# Patient Record
Sex: FEMALE | Race: WHITE | Employment: OTHER | URBAN - METROPOLITAN AREA
[De-identification: names, ages, dates, MRNs, and addresses within clinical notes are randomized per-mention and may not be internally consistent; named-entity substitution may affect disease eponyms.]

---

## 2017-03-01 ENCOUNTER — TELEPHONE (OUTPATIENT)
Dept: ADMINISTRATIVE | Age: 67
End: 2017-03-01

## 2017-03-22 ENCOUNTER — OFFICE VISIT (OUTPATIENT)
Dept: FAMILY MEDICINE CLINIC | Age: 67
End: 2017-03-22

## 2017-03-22 VITALS
SYSTOLIC BLOOD PRESSURE: 120 MMHG | BODY MASS INDEX: 26.66 KG/M2 | OXYGEN SATURATION: 98 % | HEIGHT: 65 IN | HEART RATE: 74 BPM | WEIGHT: 160 LBS | DIASTOLIC BLOOD PRESSURE: 64 MMHG

## 2017-03-22 DIAGNOSIS — F32.A DEPRESSION, UNSPECIFIED DEPRESSION TYPE: ICD-10-CM

## 2017-03-22 DIAGNOSIS — K21.9 GASTROESOPHAGEAL REFLUX DISEASE WITHOUT ESOPHAGITIS: ICD-10-CM

## 2017-03-22 DIAGNOSIS — Z11.59 NEED FOR HEPATITIS C SCREENING TEST: ICD-10-CM

## 2017-03-22 DIAGNOSIS — Z13.29 THYROID DISORDER SCREEN: ICD-10-CM

## 2017-03-22 DIAGNOSIS — F41.9 ANXIETY: Primary | ICD-10-CM

## 2017-03-22 DIAGNOSIS — E78.2 MIXED HYPERLIPIDEMIA: ICD-10-CM

## 2017-03-22 PROCEDURE — 3014F SCREEN MAMMO DOC REV: CPT | Performed by: FAMILY MEDICINE

## 2017-03-22 PROCEDURE — 99204 OFFICE O/P NEW MOD 45 MIN: CPT | Performed by: FAMILY MEDICINE

## 2017-03-22 PROCEDURE — 4040F PNEUMOC VAC/ADMIN/RCVD: CPT | Performed by: FAMILY MEDICINE

## 2017-03-22 PROCEDURE — 1090F PRES/ABSN URINE INCON ASSESS: CPT | Performed by: FAMILY MEDICINE

## 2017-03-22 PROCEDURE — G8400 PT W/DXA NO RESULTS DOC: HCPCS | Performed by: FAMILY MEDICINE

## 2017-03-22 PROCEDURE — G8484 FLU IMMUNIZE NO ADMIN: HCPCS | Performed by: FAMILY MEDICINE

## 2017-03-22 PROCEDURE — 1036F TOBACCO NON-USER: CPT | Performed by: FAMILY MEDICINE

## 2017-03-22 PROCEDURE — 1123F ACP DISCUSS/DSCN MKR DOCD: CPT | Performed by: FAMILY MEDICINE

## 2017-03-22 PROCEDURE — 3017F COLORECTAL CA SCREEN DOC REV: CPT | Performed by: FAMILY MEDICINE

## 2017-03-22 PROCEDURE — G8420 CALC BMI NORM PARAMETERS: HCPCS | Performed by: FAMILY MEDICINE

## 2017-03-22 PROCEDURE — G8427 DOCREV CUR MEDS BY ELIG CLIN: HCPCS | Performed by: FAMILY MEDICINE

## 2017-03-22 RX ORDER — FLUOXETINE HYDROCHLORIDE 20 MG/1
20 CAPSULE ORAL 2 TIMES DAILY
COMMUNITY
End: 2017-03-22 | Stop reason: SDUPTHER

## 2017-03-22 RX ORDER — ALPRAZOLAM 0.5 MG/1
TABLET ORAL
Qty: 15 TABLET | Refills: 0 | Status: SHIPPED | OUTPATIENT
Start: 2017-03-22 | End: 2017-05-11 | Stop reason: HOSPADM

## 2017-03-22 RX ORDER — OMEPRAZOLE 20 MG/1
20 CAPSULE, DELAYED RELEASE ORAL DAILY
Qty: 90 CAPSULE | Refills: 0 | Status: SHIPPED | OUTPATIENT
Start: 2017-03-22 | End: 2017-06-18 | Stop reason: SDUPTHER

## 2017-03-22 RX ORDER — FLUOXETINE HYDROCHLORIDE 20 MG/1
20 CAPSULE ORAL 2 TIMES DAILY
Qty: 180 CAPSULE | Refills: 0 | Status: SHIPPED | OUTPATIENT
Start: 2017-03-22 | End: 2017-06-18 | Stop reason: SDUPTHER

## 2017-03-22 ASSESSMENT — ENCOUNTER SYMPTOMS
RESPIRATORY NEGATIVE: 1
GASTROINTESTINAL NEGATIVE: 1

## 2017-03-23 DIAGNOSIS — E78.2 MIXED HYPERLIPIDEMIA: ICD-10-CM

## 2017-03-23 DIAGNOSIS — Z11.59 NEED FOR HEPATITIS C SCREENING TEST: ICD-10-CM

## 2017-03-23 DIAGNOSIS — Z13.29 THYROID DISORDER SCREEN: ICD-10-CM

## 2017-03-23 LAB
A/G RATIO: 1.7 (ref 1.1–2.2)
ALBUMIN SERPL-MCNC: 4 G/DL (ref 3.4–5)
ALP BLD-CCNC: 88 U/L (ref 40–129)
ALT SERPL-CCNC: 14 U/L (ref 10–40)
ANION GAP SERPL CALCULATED.3IONS-SCNC: 13 MMOL/L (ref 3–16)
AST SERPL-CCNC: 16 U/L (ref 15–37)
BASOPHILS ABSOLUTE: 0 K/UL (ref 0–0.2)
BASOPHILS RELATIVE PERCENT: 0.3 %
BILIRUB SERPL-MCNC: 0.4 MG/DL (ref 0–1)
BUN BLDV-MCNC: 14 MG/DL (ref 7–20)
CALCIUM SERPL-MCNC: 9.7 MG/DL (ref 8.3–10.6)
CHLORIDE BLD-SCNC: 103 MMOL/L (ref 99–110)
CHOLESTEROL, TOTAL: 247 MG/DL (ref 0–199)
CO2: 27 MMOL/L (ref 21–32)
CREAT SERPL-MCNC: 0.6 MG/DL (ref 0.6–1.2)
EOSINOPHILS ABSOLUTE: 0.1 K/UL (ref 0–0.6)
EOSINOPHILS RELATIVE PERCENT: 1.5 %
GFR AFRICAN AMERICAN: >60
GFR NON-AFRICAN AMERICAN: >60
GLOBULIN: 2.4 G/DL
GLUCOSE BLD-MCNC: 86 MG/DL (ref 70–99)
HCT VFR BLD CALC: 45.8 % (ref 36–48)
HDLC SERPL-MCNC: 51 MG/DL (ref 40–60)
HEMOGLOBIN: 14.8 G/DL (ref 12–16)
HEPATITIS C ANTIBODY INTERPRETATION: NORMAL
LDL CHOLESTEROL CALCULATED: 165 MG/DL
LYMPHOCYTES ABSOLUTE: 2.5 K/UL (ref 1–5.1)
LYMPHOCYTES RELATIVE PERCENT: 42.7 %
MCH RBC QN AUTO: 27.8 PG (ref 26–34)
MCHC RBC AUTO-ENTMCNC: 32.3 G/DL (ref 31–36)
MCV RBC AUTO: 85.9 FL (ref 80–100)
MONOCYTES ABSOLUTE: 0.3 K/UL (ref 0–1.3)
MONOCYTES RELATIVE PERCENT: 5.7 %
NEUTROPHILS ABSOLUTE: 2.9 K/UL (ref 1.7–7.7)
NEUTROPHILS RELATIVE PERCENT: 49.8 %
PDW BLD-RTO: 13.4 % (ref 12.4–15.4)
PLATELET # BLD: 202 K/UL (ref 135–450)
PMV BLD AUTO: 9.8 FL (ref 5–10.5)
POTASSIUM SERPL-SCNC: 4.5 MMOL/L (ref 3.5–5.1)
RBC # BLD: 5.33 M/UL (ref 4–5.2)
SODIUM BLD-SCNC: 143 MMOL/L (ref 136–145)
TOTAL PROTEIN: 6.4 G/DL (ref 6.4–8.2)
TRIGL SERPL-MCNC: 157 MG/DL (ref 0–150)
TSH SERPL DL<=0.05 MIU/L-ACNC: 1.45 UIU/ML (ref 0.27–4.2)
VLDLC SERPL CALC-MCNC: 31 MG/DL
WBC # BLD: 5.8 K/UL (ref 4–11)

## 2017-03-26 LAB
6-ACETYLMORPHINE: NOT DETECTED
7-AMINOCLONAZEPAM: NOT DETECTED
ALPHA-OH-ALPRAZOLAM: NOT DETECTED
ALPRAZOLAM: NOT DETECTED
AMPHETAMINE: NOT DETECTED
BARBITURATES: NOT DETECTED
BENZOYLECGONINE: NOT DETECTED
BUPRENORPHINE: NOT DETECTED
CARISOPRODOL: NOT DETECTED
CLONAZEPAM: NOT DETECTED
CODEINE: NOT DETECTED
CREATININE URINE: 42 MG/DL (ref 20–400)
DIAZEPAM: NOT DETECTED
DRUGS EXPECTED: NORMAL
EER PAIN MGT DRUG PANEL, HIGH RES/EMIT U: NORMAL
ETHYL GLUCURONIDE: NOT DETECTED
FENTANYL: NOT DETECTED
HYDROCODONE: NOT DETECTED
HYDROMORPHONE: NOT DETECTED
LORAZEPAM: NOT DETECTED
MARIJUANA METABOLITE: NOT DETECTED
MDA: NOT DETECTED
MDEA: NOT DETECTED
MDMA URINE: NOT DETECTED
MEPERIDINE: NOT DETECTED
METHADONE: NOT DETECTED
METHAMPHETAMINE: NOT DETECTED
METHYLPHENIDATE: NOT DETECTED
MIDAZOLAM: NOT DETECTED
MORPHINE: NOT DETECTED
NORBUPRENORPHINE, FREE: NOT DETECTED
NORDIAZEPAM: NOT DETECTED
NORFENTANYL: NOT DETECTED
NORHYDROCODONE, URINE: NOT DETECTED
NOROXYCODONE: NOT DETECTED
NOROXYMORPHONE, URINE: NOT DETECTED
OXAZEPAM: NOT DETECTED
OXYCODONE: NOT DETECTED
OXYMORPHONE: NOT DETECTED
PAIN MANAGEMENT DRUG PANEL: NORMAL
PAIN MANAGEMENT DRUG PANEL: NORMAL
PCP: NOT DETECTED
PHENTERMINE: NOT DETECTED
PROPOXYPHENE: NOT DETECTED
TAPENTADOL, URINE: NOT DETECTED
TAPENTADOL-O-SULFATE, URINE: NOT DETECTED
TEMAZEPAM: NOT DETECTED
TRAMADOL: NOT DETECTED
ZOLPIDEM: NOT DETECTED

## 2017-04-07 ENCOUNTER — TELEPHONE (OUTPATIENT)
Dept: FAMILY MEDICINE CLINIC | Age: 67
End: 2017-04-07

## 2017-04-07 RX ORDER — ATORVASTATIN CALCIUM 20 MG/1
20 TABLET, FILM COATED ORAL DAILY
Qty: 90 TABLET | Refills: 0 | Status: SHIPPED | OUTPATIENT
Start: 2017-04-07 | End: 2017-06-29 | Stop reason: SDUPTHER

## 2017-05-02 ENCOUNTER — OFFICE VISIT (OUTPATIENT)
Dept: PSYCHOLOGY | Age: 67
End: 2017-05-02

## 2017-05-02 DIAGNOSIS — F41.9 ANXIETY: ICD-10-CM

## 2017-05-02 DIAGNOSIS — F63.0 PATHOLOGICAL GAMBLING: ICD-10-CM

## 2017-05-02 DIAGNOSIS — F32.A DEPRESSION, UNSPECIFIED DEPRESSION TYPE: Primary | ICD-10-CM

## 2017-05-02 PROCEDURE — 90791 PSYCH DIAGNOSTIC EVALUATION: CPT | Performed by: PSYCHOLOGIST

## 2017-05-02 ASSESSMENT — PATIENT HEALTH QUESTIONNAIRE - PHQ9
1. LITTLE INTEREST OR PLEASURE IN DOING THINGS: 3
7. TROUBLE CONCENTRATING ON THINGS, SUCH AS READING THE NEWSPAPER OR WATCHING TELEVISION: 3
9. THOUGHTS THAT YOU WOULD BE BETTER OFF DEAD, OR OF HURTING YOURSELF: 0
5. POOR APPETITE OR OVEREATING: 0
6. FEELING BAD ABOUT YOURSELF - OR THAT YOU ARE A FAILURE OR HAVE LET YOURSELF OR YOUR FAMILY DOWN: 1
SUM OF ALL RESPONSES TO PHQ QUESTIONS 1-9: 17
2. FEELING DOWN, DEPRESSED OR HOPELESS: 3
4. FEELING TIRED OR HAVING LITTLE ENERGY: 1
SUM OF ALL RESPONSES TO PHQ9 QUESTIONS 1 & 2: 6
3. TROUBLE FALLING OR STAYING ASLEEP: 3
8. MOVING OR SPEAKING SO SLOWLY THAT OTHER PEOPLE COULD HAVE NOTICED. OR THE OPPOSITE, BEING SO FIGETY OR RESTLESS THAT YOU HAVE BEEN MOVING AROUND A LOT MORE THAN USUAL: 3

## 2017-05-08 ENCOUNTER — TELEPHONE (OUTPATIENT)
Dept: FAMILY MEDICINE CLINIC | Age: 67
End: 2017-05-08

## 2017-05-09 ENCOUNTER — HOSPITAL ENCOUNTER (OUTPATIENT)
Dept: PSYCHIATRY | Age: 67
Discharge: HOME | End: 2017-05-31
Attending: PSYCHIATRY & NEUROLOGY | Admitting: PSYCHIATRY & NEUROLOGY

## 2017-05-09 ASSESSMENT — SLEEP AND FATIGUE QUESTIONNAIRES
DIFFICULTY ARISING: NO
DIFFICULTY FALLING ASLEEP: YES
DO YOU HAVE DIFFICULTY SLEEPING: YES
DIFFICULTY STAYING ASLEEP: YES
RESTFUL SLEEP: NO
SLEEP PATTERN: DIFFICULTY FALLING ASLEEP;DISTURBED/INTERRUPTED SLEEP;EARLY AWAKENING
AVERAGE NUMBER OF SLEEP HOURS: 4

## 2017-05-09 ASSESSMENT — PATIENT HEALTH QUESTIONNAIRE - PHQ9: SUM OF ALL RESPONSES TO PHQ QUESTIONS 1-9: 24

## 2017-05-09 ASSESSMENT — LIFESTYLE VARIABLES: HISTORY_ALCOHOL_USE: NO

## 2017-05-11 ENCOUNTER — HOSPITAL ENCOUNTER (OUTPATIENT)
Dept: PSYCHIATRY | Age: 67
Discharge: HOME | End: 2017-05-11
Admitting: PSYCHIATRY & NEUROLOGY

## 2017-05-11 PROCEDURE — 99214 OFFICE O/P EST MOD 30 MIN: CPT | Performed by: PHYSICIAN ASSISTANT

## 2017-05-12 RX ORDER — LORAZEPAM 0.5 MG/1
TABLET ORAL
Qty: 15 TABLET | Refills: 0 | Status: SHIPPED | OUTPATIENT
Start: 2017-05-12 | End: 2017-07-07 | Stop reason: SDUPTHER

## 2017-06-19 RX ORDER — FLUOXETINE HYDROCHLORIDE 20 MG/1
CAPSULE ORAL
Qty: 180 CAPSULE | Refills: 0 | Status: SHIPPED | OUTPATIENT
Start: 2017-06-19 | End: 2017-08-09

## 2017-06-19 RX ORDER — OMEPRAZOLE 20 MG/1
CAPSULE, DELAYED RELEASE ORAL
Qty: 90 CAPSULE | Refills: 0 | Status: SHIPPED | OUTPATIENT
Start: 2017-06-19 | End: 2018-04-08 | Stop reason: SDUPTHER

## 2017-06-22 ENCOUNTER — TELEPHONE (OUTPATIENT)
Dept: FAMILY MEDICINE CLINIC | Age: 67
End: 2017-06-22

## 2017-06-22 DIAGNOSIS — E78.2 MIXED HYPERLIPIDEMIA: Primary | ICD-10-CM

## 2017-06-26 DIAGNOSIS — E78.2 MIXED HYPERLIPIDEMIA: ICD-10-CM

## 2017-06-26 LAB
ALBUMIN SERPL-MCNC: 4.5 G/DL (ref 3.4–5)
ALP BLD-CCNC: 100 U/L (ref 40–129)
ALT SERPL-CCNC: 19 U/L (ref 10–40)
AST SERPL-CCNC: 18 U/L (ref 15–37)
BILIRUB SERPL-MCNC: 0.7 MG/DL (ref 0–1)
BILIRUBIN DIRECT: <0.2 MG/DL (ref 0–0.3)
BILIRUBIN, INDIRECT: NORMAL MG/DL (ref 0–1)
CHOLESTEROL, TOTAL: 150 MG/DL (ref 0–199)
HDLC SERPL-MCNC: 59 MG/DL (ref 40–60)
LDL CHOLESTEROL CALCULATED: 73 MG/DL
TOTAL PROTEIN: 6.8 G/DL (ref 6.4–8.2)
TRIGL SERPL-MCNC: 90 MG/DL (ref 0–150)
VLDLC SERPL CALC-MCNC: 18 MG/DL

## 2017-06-29 ENCOUNTER — OFFICE VISIT (OUTPATIENT)
Dept: FAMILY MEDICINE CLINIC | Age: 67
End: 2017-06-29

## 2017-06-29 VITALS
OXYGEN SATURATION: 97 % | HEIGHT: 65 IN | BODY MASS INDEX: 26.82 KG/M2 | WEIGHT: 161 LBS | DIASTOLIC BLOOD PRESSURE: 74 MMHG | SYSTOLIC BLOOD PRESSURE: 120 MMHG | HEART RATE: 84 BPM

## 2017-06-29 DIAGNOSIS — E78.2 MIXED HYPERLIPIDEMIA: ICD-10-CM

## 2017-06-29 DIAGNOSIS — F41.9 ANXIETY: ICD-10-CM

## 2017-06-29 DIAGNOSIS — F32.A DEPRESSION, UNSPECIFIED DEPRESSION TYPE: Primary | ICD-10-CM

## 2017-06-29 PROCEDURE — 3017F COLORECTAL CA SCREEN DOC REV: CPT | Performed by: FAMILY MEDICINE

## 2017-06-29 PROCEDURE — 1090F PRES/ABSN URINE INCON ASSESS: CPT | Performed by: FAMILY MEDICINE

## 2017-06-29 PROCEDURE — 1036F TOBACCO NON-USER: CPT | Performed by: FAMILY MEDICINE

## 2017-06-29 PROCEDURE — 99214 OFFICE O/P EST MOD 30 MIN: CPT | Performed by: FAMILY MEDICINE

## 2017-06-29 PROCEDURE — G8427 DOCREV CUR MEDS BY ELIG CLIN: HCPCS | Performed by: FAMILY MEDICINE

## 2017-06-29 PROCEDURE — G8400 PT W/DXA NO RESULTS DOC: HCPCS | Performed by: FAMILY MEDICINE

## 2017-06-29 PROCEDURE — 4040F PNEUMOC VAC/ADMIN/RCVD: CPT | Performed by: FAMILY MEDICINE

## 2017-06-29 PROCEDURE — 3014F SCREEN MAMMO DOC REV: CPT | Performed by: FAMILY MEDICINE

## 2017-06-29 PROCEDURE — 1123F ACP DISCUSS/DSCN MKR DOCD: CPT | Performed by: FAMILY MEDICINE

## 2017-06-29 PROCEDURE — G8419 CALC BMI OUT NRM PARAM NOF/U: HCPCS | Performed by: FAMILY MEDICINE

## 2017-06-29 RX ORDER — ATORVASTATIN CALCIUM 20 MG/1
20 TABLET, FILM COATED ORAL DAILY
Qty: 90 TABLET | Refills: 1 | Status: SHIPPED | OUTPATIENT
Start: 2017-06-29 | End: 2017-12-20 | Stop reason: SDUPTHER

## 2017-06-29 ASSESSMENT — ENCOUNTER SYMPTOMS
RESPIRATORY NEGATIVE: 1
GASTROINTESTINAL NEGATIVE: 1

## 2017-07-07 RX ORDER — ALPRAZOLAM 0.5 MG/1
TABLET ORAL
Qty: 15 TABLET | Refills: 0 | OUTPATIENT
Start: 2017-07-07

## 2017-07-07 RX ORDER — LORAZEPAM 0.5 MG/1
TABLET ORAL
Qty: 15 TABLET | Refills: 0 | Status: SHIPPED | OUTPATIENT
Start: 2017-07-07 | End: 2017-10-19

## 2017-08-09 ENCOUNTER — TELEPHONE (OUTPATIENT)
Dept: FAMILY MEDICINE CLINIC | Age: 67
End: 2017-08-09

## 2017-08-09 RX ORDER — SERTRALINE HYDROCHLORIDE 25 MG/1
TABLET, FILM COATED ORAL
Qty: 60 TABLET | Refills: 0 | Status: SHIPPED | OUTPATIENT
Start: 2017-08-09 | End: 2017-08-28

## 2017-08-28 ENCOUNTER — OFFICE VISIT (OUTPATIENT)
Dept: FAMILY MEDICINE CLINIC | Age: 67
End: 2017-08-28

## 2017-08-28 VITALS
HEIGHT: 65 IN | OXYGEN SATURATION: 98 % | BODY MASS INDEX: 27.32 KG/M2 | SYSTOLIC BLOOD PRESSURE: 118 MMHG | DIASTOLIC BLOOD PRESSURE: 72 MMHG | HEART RATE: 72 BPM | WEIGHT: 164 LBS

## 2017-08-28 DIAGNOSIS — F41.9 ANXIETY: ICD-10-CM

## 2017-08-28 DIAGNOSIS — F63.0 ADDICTIVE GAMBLING: Primary | ICD-10-CM

## 2017-08-28 PROCEDURE — 3017F COLORECTAL CA SCREEN DOC REV: CPT | Performed by: FAMILY MEDICINE

## 2017-08-28 PROCEDURE — 4040F PNEUMOC VAC/ADMIN/RCVD: CPT | Performed by: FAMILY MEDICINE

## 2017-08-28 PROCEDURE — 1036F TOBACCO NON-USER: CPT | Performed by: FAMILY MEDICINE

## 2017-08-28 PROCEDURE — 1123F ACP DISCUSS/DSCN MKR DOCD: CPT | Performed by: FAMILY MEDICINE

## 2017-08-28 PROCEDURE — 3014F SCREEN MAMMO DOC REV: CPT | Performed by: FAMILY MEDICINE

## 2017-08-28 PROCEDURE — 99213 OFFICE O/P EST LOW 20 MIN: CPT | Performed by: FAMILY MEDICINE

## 2017-08-28 PROCEDURE — G8400 PT W/DXA NO RESULTS DOC: HCPCS | Performed by: FAMILY MEDICINE

## 2017-08-28 PROCEDURE — G8427 DOCREV CUR MEDS BY ELIG CLIN: HCPCS | Performed by: FAMILY MEDICINE

## 2017-08-28 PROCEDURE — 1090F PRES/ABSN URINE INCON ASSESS: CPT | Performed by: FAMILY MEDICINE

## 2017-08-28 PROCEDURE — G8419 CALC BMI OUT NRM PARAM NOF/U: HCPCS | Performed by: FAMILY MEDICINE

## 2017-08-28 ASSESSMENT — ENCOUNTER SYMPTOMS: RESPIRATORY NEGATIVE: 1

## 2017-09-15 RX ORDER — SERTRALINE HYDROCHLORIDE 25 MG/1
50 TABLET, FILM COATED ORAL DAILY
Qty: 60 TABLET | Refills: 1 | Status: SHIPPED | OUTPATIENT
Start: 2017-09-15 | End: 2017-10-19 | Stop reason: DRUGHIGH

## 2017-09-22 ENCOUNTER — NURSE ONLY (OUTPATIENT)
Dept: FAMILY MEDICINE CLINIC | Age: 67
End: 2017-09-22

## 2017-09-22 PROCEDURE — 90662 IIV NO PRSV INCREASED AG IM: CPT | Performed by: FAMILY MEDICINE

## 2017-09-22 PROCEDURE — G0008 ADMIN INFLUENZA VIRUS VAC: HCPCS | Performed by: FAMILY MEDICINE

## 2017-09-22 RX ORDER — ALPRAZOLAM 0.5 MG/1
TABLET ORAL
Qty: 15 TABLET | Refills: 0 | Status: SHIPPED | OUTPATIENT
Start: 2017-09-22 | End: 2018-09-12 | Stop reason: SDUPTHER

## 2017-10-19 ENCOUNTER — HOSPITAL ENCOUNTER (OUTPATIENT)
Dept: OTHER | Age: 67
Discharge: HOME | End: 2017-10-19
Attending: NURSE PRACTITIONER | Admitting: NURSE PRACTITIONER

## 2017-10-19 ENCOUNTER — OFFICE VISIT (OUTPATIENT)
Dept: FAMILY MEDICINE CLINIC | Age: 67
End: 2017-10-19

## 2017-10-19 VITALS
DIASTOLIC BLOOD PRESSURE: 76 MMHG | SYSTOLIC BLOOD PRESSURE: 124 MMHG | BODY MASS INDEX: 27.62 KG/M2 | HEART RATE: 68 BPM | WEIGHT: 166 LBS | TEMPERATURE: 97.8 F | OXYGEN SATURATION: 96 %

## 2017-10-19 DIAGNOSIS — M54.2 NECK PAIN: ICD-10-CM

## 2017-10-19 DIAGNOSIS — Z12.39 SCREENING FOR BREAST CANCER: ICD-10-CM
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
## 2017-10-19 PROCEDURE — 3017F COLORECTAL CA SCREEN DOC REV: CPT | Performed by: NURSE PRACTITIONER

## 2017-10-19 PROCEDURE — G8427 DOCREV CUR MEDS BY ELIG CLIN: HCPCS | Performed by: NURSE PRACTITIONER

## 2017-10-19 PROCEDURE — G8417 CALC BMI ABV UP PARAM F/U: HCPCS | Performed by: NURSE PRACTITIONER

## 2017-10-19 PROCEDURE — G8400 PT W/DXA NO RESULTS DOC: HCPCS | Performed by: NURSE PRACTITIONER

## 2017-10-19 PROCEDURE — 4040F PNEUMOC VAC/ADMIN/RCVD: CPT | Performed by: NURSE PRACTITIONER

## 2017-10-19 PROCEDURE — 1036F TOBACCO NON-USER: CPT | Performed by: NURSE PRACTITIONER

## 2017-10-19 PROCEDURE — 1123F ACP DISCUSS/DSCN MKR DOCD: CPT | Performed by: NURSE PRACTITIONER

## 2017-10-19 PROCEDURE — 99213 OFFICE O/P EST LOW 20 MIN: CPT | Performed by: NURSE PRACTITIONER

## 2017-10-19 PROCEDURE — 1090F PRES/ABSN URINE INCON ASSESS: CPT | Performed by: NURSE PRACTITIONER

## 2017-10-19 PROCEDURE — 3014F SCREEN MAMMO DOC REV: CPT | Performed by: NURSE PRACTITIONER

## 2017-10-19 PROCEDURE — G8484 FLU IMMUNIZE NO ADMIN: HCPCS | Performed by: NURSE PRACTITIONER

## 2017-10-19 RX ORDER — METHYLPREDNISOLONE 4 MG/1
TABLET ORAL
Qty: 21 TABLET | Refills: 0 | Status: SHIPPED | OUTPATIENT
Start: 2017-10-19 | End: 2017-10-25

## 2017-10-19 RX ORDER — SERTRALINE HYDROCHLORIDE 100 MG/1
100 TABLET, FILM COATED ORAL DAILY
COMMUNITY
End: 2018-09-27 | Stop reason: SDUPTHER

## 2017-10-19 NOTE — PATIENT INSTRUCTIONS
Patient Education        Neck Strain or Sprain: Rehab Exercises  Your Care Instructions  Here are some examples of typical rehabilitation exercises for your condition. Start each exercise slowly. Ease off the exercise if you start to have pain. Your doctor or physical therapist will tell you when you can start these exercises and which ones will work best for you. How to do the exercises  Neck rotation    1. Sit in a firm chair, or stand up straight. 2. Keeping your chin level, turn your head to the right, and hold for 15 to 30 seconds. 3. Turn your head to the left and hold for 15 to 30 seconds. 4. Repeat 2 to 4 times to each side. Neck stretches    1. Look straight ahead, and tip your right ear to your right shoulder. Do not let your left shoulder rise up as you tip your head to the right. 2. Hold for 15 to 30 seconds. 3. Tilt your head to the left. Do not let your right shoulder rise up as you tip your head to the left. 4. Hold for 15 to 30 seconds. 5. Repeat 2 to 4 times to each side. Forward neck flexion    1. Sit in a firm chair, or stand up straight. 2. Bend your head forward. 3. Hold for 15 to 30 seconds. 4. Repeat 2 to 4 times. Lateral (side) bend strengthening    1. With your right hand, place your first two fingers on your right temple. 2. Start to bend your head to the side while using gentle pressure from your fingers to keep your head from bending. 3. Hold for about 6 seconds. 4. Repeat 8 to 12 times. 5. Switch hands and repeat the same exercise on your left side. Forward bend strengthening    1. Place your first two fingers of either hand on your forehead. 2. Start to bend your head forward while using gentle pressure from your fingers to keep your head from bending. 3. Hold for about 6 seconds. 4. Repeat 8 to 12 times. Neutral position strengthening    1. Using one hand, place your fingertips on the back of your head at the top of your neck.   2. Start to bend your head backward while using gentle pressure from your fingers to keep your head from bending. 3. Hold for about 6 seconds. 4. Repeat 8 to 12 times. Chin tuck    1. Lie on the floor with a rolled-up towel under your neck. Your head should be touching the floor. 2. Slowly bring your chin toward your chest.  3. Hold for a count of 6, and then relax for up to 10 seconds. 4. Repeat 8 to 12 times. Follow-up care is a key part of your treatment and safety. Be sure to make and go to all appointments, and call your doctor if you are having problems. It's also a good idea to know your test results and keep a list of the medicines you take. Where can you learn more? Go to https://Guanxi.me.Q-Layer. org and sign in to your GoPago account. Enter M679 in the Profex box to learn more about \"Neck Strain or Sprain: Rehab Exercises. \"     If you do not have an account, please click on the \"Sign Up Now\" link. Current as of: March 21, 2017  Content Version: 11.3  © 7153-3904 [employer]. Care instructions adapted under license by [facility] ([facility]). If you have questions about a medical condition or this instruction, always ask your healthcare professional. [address] any warranty or liability for your use of this information.

## 2017-10-19 NOTE — PROGRESS NOTES
°F (36.6 °C)   TempSrc: Oral   SpO2: 96%   Weight: 166 lb (75.3 kg)     Assessment:      1. Neck pain    2. Fall, initial encounter    3. Screening for breast cancer          Plan:      Saima Smalls was seen today for neck pain. Diagnoses and all orders for this visit:    Neck pain  -     XR CERVICAL SPINE (2-3 VIEWS); Future  -     methylPREDNISolone (MEDROL DOSEPACK) 4 MG tablet; Take by mouth. Consider PT if symptoms fail to resolve. Fall, initial encounter  -     XR CERVICAL SPINE (2-3 VIEWS); Future    Screening for breast cancer  -     RUBEN DIGITAL SCREEN W OR WO CAD BILATERAL;  Future

## 2017-10-22 ASSESSMENT — ENCOUNTER SYMPTOMS: COLOR CHANGE: 0

## 2017-11-09 ENCOUNTER — HOSPITAL ENCOUNTER (OUTPATIENT)
Dept: MAMMOGRAPHY | Age: 67
Discharge: HOME | End: 2017-11-09
Attending: FAMILY MEDICINE | Admitting: FAMILY MEDICINE

## 2017-11-09 DIAGNOSIS — Z12.39 SCREENING FOR BREAST CANCER: ICD-10-CM

## 2017-11-13 ENCOUNTER — TELEPHONE (OUTPATIENT)
Dept: FAMILY MEDICINE CLINIC | Age: 67
End: 2017-11-13

## 2017-11-13 RX ORDER — AMOXICILLIN 500 MG/1
500 CAPSULE ORAL 3 TIMES DAILY
Qty: 30 CAPSULE | Refills: 0 | Status: SHIPPED | OUTPATIENT
Start: 2017-11-13 | End: 2017-11-23

## 2017-11-14 ENCOUNTER — TELEPHONE (OUTPATIENT)
Dept: FAMILY MEDICINE CLINIC | Age: 67
End: 2017-11-14

## 2017-11-14 DIAGNOSIS — E04.2 MULTIPLE THYROID NODULES: Primary | ICD-10-CM

## 2017-11-21 ENCOUNTER — HOSPITAL ENCOUNTER (OUTPATIENT)
Dept: ULTRASOUND IMAGING | Age: 67
Discharge: HOME | End: 2017-11-21
Attending: FAMILY MEDICINE | Admitting: FAMILY MEDICINE

## 2017-11-21 DIAGNOSIS — E04.2 NONTOXIC MULTINODULAR GOITER: ICD-10-CM

## 2017-11-21 DIAGNOSIS — E04.2 MULTIPLE THYROID NODULES: ICD-10-CM

## 2018-02-22 ENCOUNTER — TELEPHONE (OUTPATIENT)
Dept: FAMILY MEDICINE CLINIC | Age: 68
End: 2018-02-22

## 2018-02-26 ENCOUNTER — TELEPHONE (OUTPATIENT)
Dept: FAMILY MEDICINE CLINIC | Age: 68
End: 2018-02-26

## 2018-02-26 DIAGNOSIS — E78.2 MIXED HYPERLIPIDEMIA: Primary | ICD-10-CM

## 2018-02-28 DIAGNOSIS — E78.2 MIXED HYPERLIPIDEMIA: ICD-10-CM

## 2018-02-28 LAB
A/G RATIO: 2.3 (ref 1.1–2.2)
ALBUMIN SERPL-MCNC: 4.6 G/DL (ref 3.4–5)
ALP BLD-CCNC: 100 U/L (ref 40–129)
ALT SERPL-CCNC: 18 U/L (ref 10–40)
ANION GAP SERPL CALCULATED.3IONS-SCNC: 15 MMOL/L (ref 3–16)
AST SERPL-CCNC: 17 U/L (ref 15–37)
BASOPHILS ABSOLUTE: 0 K/UL (ref 0–0.2)
BASOPHILS RELATIVE PERCENT: 0.5 %
BILIRUB SERPL-MCNC: 0.5 MG/DL (ref 0–1)
BUN BLDV-MCNC: 16 MG/DL (ref 7–20)
CALCIUM SERPL-MCNC: 9.3 MG/DL (ref 8.3–10.6)
CHLORIDE BLD-SCNC: 104 MMOL/L (ref 99–110)
CHOLESTEROL, TOTAL: 149 MG/DL (ref 0–199)
CO2: 25 MMOL/L (ref 21–32)
CREAT SERPL-MCNC: 0.7 MG/DL (ref 0.6–1.2)
EOSINOPHILS ABSOLUTE: 0.1 K/UL (ref 0–0.6)
EOSINOPHILS RELATIVE PERCENT: 2 %
GFR AFRICAN AMERICAN: >60
GFR NON-AFRICAN AMERICAN: >60
GLOBULIN: 2 G/DL
GLUCOSE BLD-MCNC: 95 MG/DL (ref 70–99)
HCT VFR BLD CALC: 43.1 % (ref 36–48)
HDLC SERPL-MCNC: 51 MG/DL (ref 40–60)
HEMOGLOBIN: 14.4 G/DL (ref 12–16)
LDL CHOLESTEROL CALCULATED: 73 MG/DL
LYMPHOCYTES ABSOLUTE: 2.3 K/UL (ref 1–5.1)
LYMPHOCYTES RELATIVE PERCENT: 38.7 %
MCH RBC QN AUTO: 28.5 PG (ref 26–34)
MCHC RBC AUTO-ENTMCNC: 33.3 G/DL (ref 31–36)
MCV RBC AUTO: 85.5 FL (ref 80–100)
MONOCYTES ABSOLUTE: 0.4 K/UL (ref 0–1.3)
MONOCYTES RELATIVE PERCENT: 7 %
NEUTROPHILS ABSOLUTE: 3 K/UL (ref 1.7–7.7)
NEUTROPHILS RELATIVE PERCENT: 51.8 %
PDW BLD-RTO: 13.6 % (ref 12.4–15.4)
PLATELET # BLD: 238 K/UL (ref 135–450)
PMV BLD AUTO: 9.8 FL (ref 5–10.5)
POTASSIUM SERPL-SCNC: 4.1 MMOL/L (ref 3.5–5.1)
RBC # BLD: 5.04 M/UL (ref 4–5.2)
SODIUM BLD-SCNC: 144 MMOL/L (ref 136–145)
TOTAL PROTEIN: 6.6 G/DL (ref 6.4–8.2)
TRIGL SERPL-MCNC: 125 MG/DL (ref 0–150)
VLDLC SERPL CALC-MCNC: 25 MG/DL
WBC # BLD: 5.9 K/UL (ref 4–11)

## 2018-03-05 ENCOUNTER — OFFICE VISIT (OUTPATIENT)
Dept: FAMILY MEDICINE CLINIC | Age: 68
End: 2018-03-05

## 2018-03-05 VITALS
HEIGHT: 65 IN | BODY MASS INDEX: 28.71 KG/M2 | OXYGEN SATURATION: 97 % | WEIGHT: 172.3 LBS | DIASTOLIC BLOOD PRESSURE: 80 MMHG | HEART RATE: 75 BPM | SYSTOLIC BLOOD PRESSURE: 126 MMHG

## 2018-03-05 DIAGNOSIS — K21.00 GASTROESOPHAGEAL REFLUX DISEASE WITH ESOPHAGITIS: ICD-10-CM

## 2018-03-05 DIAGNOSIS — Z23 NEED FOR PROPHYLACTIC VACCINATION AGAINST STREPTOCOCCUS PNEUMONIAE (PNEUMOCOCCUS): ICD-10-CM

## 2018-03-05 DIAGNOSIS — F41.9 ANXIETY: Primary | ICD-10-CM

## 2018-03-05 DIAGNOSIS — Z78.0 POST-MENOPAUSAL: ICD-10-CM

## 2018-03-05 PROCEDURE — 3014F SCREEN MAMMO DOC REV: CPT | Performed by: FAMILY MEDICINE

## 2018-03-05 PROCEDURE — 4040F PNEUMOC VAC/ADMIN/RCVD: CPT | Performed by: FAMILY MEDICINE

## 2018-03-05 PROCEDURE — G8400 PT W/DXA NO RESULTS DOC: HCPCS | Performed by: FAMILY MEDICINE

## 2018-03-05 PROCEDURE — 1123F ACP DISCUSS/DSCN MKR DOCD: CPT | Performed by: FAMILY MEDICINE

## 2018-03-05 PROCEDURE — 1036F TOBACCO NON-USER: CPT | Performed by: FAMILY MEDICINE

## 2018-03-05 PROCEDURE — G8417 CALC BMI ABV UP PARAM F/U: HCPCS | Performed by: FAMILY MEDICINE

## 2018-03-05 PROCEDURE — 1090F PRES/ABSN URINE INCON ASSESS: CPT | Performed by: FAMILY MEDICINE

## 2018-03-05 PROCEDURE — 99214 OFFICE O/P EST MOD 30 MIN: CPT | Performed by: FAMILY MEDICINE

## 2018-03-05 PROCEDURE — G0009 ADMIN PNEUMOCOCCAL VACCINE: HCPCS | Performed by: FAMILY MEDICINE

## 2018-03-05 PROCEDURE — 3017F COLORECTAL CA SCREEN DOC REV: CPT | Performed by: FAMILY MEDICINE

## 2018-03-05 PROCEDURE — G8427 DOCREV CUR MEDS BY ELIG CLIN: HCPCS | Performed by: FAMILY MEDICINE

## 2018-03-05 PROCEDURE — G8484 FLU IMMUNIZE NO ADMIN: HCPCS | Performed by: FAMILY MEDICINE

## 2018-03-05 PROCEDURE — 90670 PCV13 VACCINE IM: CPT | Performed by: FAMILY MEDICINE

## 2018-03-05 ASSESSMENT — ENCOUNTER SYMPTOMS
RESPIRATORY NEGATIVE: 1
GASTROINTESTINAL NEGATIVE: 1

## 2018-03-05 NOTE — PROGRESS NOTES
Subjective:      Patient ID: Milton Bojorquez is a 75 y.o. female. In for check on Anxiety(seeing Dr. Ladi Nunez zoloft-gambling- free x 5 mos)---GERD(flared in last 2-3 weeks-zoloft was increased recently)--PND a prob    Prior to Visit Medications :  Medication atorvastatin (LIPITOR) 20 MG tablet, Sig TAKE 1 TABLET BY MOUTH DAILY, Taking? Yes, Authorizing Provider Nelson Harrison, DO    Medication sertraline (ZOLOFT) 100 MG tablet, Sig Take 100 mg by mouth daily , Taking? Yes, Authorizing Provider Historical Provider, MD    Medication ALPRAZolam (XANAX) 0.5 MG tablet, Sig TAKE 1 TABLET BY MOUTH DAILY AS NEEDED LIMIT 3 PER WEEK, Taking? Yes, Authorizing Provider Nelson Harrison, DO    Medication omeprazole (PRILOSEC) 20 MG delayed release capsule, Sig TAKE ONE CAPSULE BY MOUTH DAILY, Taking? Yes, Authorizing Provider Nelson Harrison, DO    Medication vitamin D (CHOLECALCIFEROL) 1000 UNIT TABS tablet, Sig Take 1,000 Units by mouth daily, Taking? Yes, Authorizing Provider Historical Provider, MD    Medication vitamin B-12 (CYANOCOBALAMIN) 500 MCG tablet, Sig Take 2,500 mcg by mouth daily , Taking? Yes, Authorizing Provider Historical Provider, MD      Past Medical History:  No date: Allergic rhinitis  10/10/2011: Anxiety  3/22/2017: Depression  No date: GERD (gastroesophageal reflux disease)  No date: Hallux valgus  No date: Headache(784.0)  No date: Herpes simplex  No date: Irritable bowel syndrome  No date: Kidney stones  No date: Osteopenia  4/2/2013: Other and unspecified hyperlipidemia  10/10/2011: Pain medication agreement signed          Review of Systems   HENT: Negative. Respiratory: Negative. Cardiovascular: Negative. Gastrointestinal: Negative. Genitourinary: Negative. Musculoskeletal: Positive for arthralgias. Neurological: Negative. Psychiatric/Behavioral: The patient is nervous/anxious. Objective:   Physical Exam   Constitutional: She is oriented to person, place, and time.    HENT:

## 2018-03-06 ENCOUNTER — TELEPHONE (OUTPATIENT)
Dept: FAMILY MEDICINE CLINIC | Age: 68
End: 2018-03-06

## 2018-06-15 ENCOUNTER — OFFICE VISIT (OUTPATIENT)
Dept: FAMILY MEDICINE CLINIC | Age: 68
End: 2018-06-15

## 2018-06-15 VITALS
RESPIRATION RATE: 15 BRPM | HEART RATE: 82 BPM | TEMPERATURE: 97.9 F | BODY MASS INDEX: 28.96 KG/M2 | DIASTOLIC BLOOD PRESSURE: 72 MMHG | HEIGHT: 65 IN | OXYGEN SATURATION: 95 % | SYSTOLIC BLOOD PRESSURE: 126 MMHG | WEIGHT: 173.8 LBS

## 2018-06-15 DIAGNOSIS — R10.9 FLANK PAIN: Primary | ICD-10-CM

## 2018-06-15 DIAGNOSIS — R14.0 ABDOMINAL BLOATING: ICD-10-CM

## 2018-06-15 DIAGNOSIS — K21.00 GASTROESOPHAGEAL REFLUX DISEASE WITH ESOPHAGITIS: ICD-10-CM

## 2018-06-15 DIAGNOSIS — R31.9 HEMATURIA, UNSPECIFIED TYPE: ICD-10-CM

## 2018-06-15 LAB
BILIRUBIN, POC: NORMAL
BLOOD URINE, POC: NORMAL
CLARITY, POC: NORMAL
COLOR, POC: YELLOW
GLUCOSE URINE, POC: NORMAL
KETONES, POC: NORMAL
LEUKOCYTE EST, POC: NORMAL
NITRITE, POC: NORMAL
PH, POC: 5
PROTEIN, POC: NORMAL
SPECIFIC GRAVITY, POC: 1.02
UROBILINOGEN, POC: 0.2

## 2018-06-15 PROCEDURE — 81002 URINALYSIS NONAUTO W/O SCOPE: CPT | Performed by: NURSE PRACTITIONER

## 2018-06-15 PROCEDURE — 3017F COLORECTAL CA SCREEN DOC REV: CPT | Performed by: NURSE PRACTITIONER

## 2018-06-15 PROCEDURE — 99214 OFFICE O/P EST MOD 30 MIN: CPT | Performed by: NURSE PRACTITIONER

## 2018-06-15 PROCEDURE — G8400 PT W/DXA NO RESULTS DOC: HCPCS | Performed by: NURSE PRACTITIONER

## 2018-06-15 PROCEDURE — 4040F PNEUMOC VAC/ADMIN/RCVD: CPT | Performed by: NURSE PRACTITIONER

## 2018-06-15 PROCEDURE — 1036F TOBACCO NON-USER: CPT | Performed by: NURSE PRACTITIONER

## 2018-06-15 PROCEDURE — 1090F PRES/ABSN URINE INCON ASSESS: CPT | Performed by: NURSE PRACTITIONER

## 2018-06-15 PROCEDURE — G8417 CALC BMI ABV UP PARAM F/U: HCPCS | Performed by: NURSE PRACTITIONER

## 2018-06-15 PROCEDURE — G8427 DOCREV CUR MEDS BY ELIG CLIN: HCPCS | Performed by: NURSE PRACTITIONER

## 2018-06-15 PROCEDURE — 1123F ACP DISCUSS/DSCN MKR DOCD: CPT | Performed by: NURSE PRACTITIONER

## 2018-06-15 RX ORDER — RANITIDINE 150 MG/1
150 TABLET ORAL 2 TIMES DAILY
Qty: 60 TABLET | Refills: 3 | Status: SHIPPED | OUTPATIENT
Start: 2018-06-15 | End: 2019-02-23

## 2018-06-15 ASSESSMENT — ENCOUNTER SYMPTOMS
BACK PAIN: 1
SHORTNESS OF BREATH: 0
CONSTIPATION: 0
CHEST TIGHTNESS: 0
COUGH: 0
NAUSEA: 0

## 2018-06-15 ASSESSMENT — PATIENT HEALTH QUESTIONNAIRE - PHQ9
2. FEELING DOWN, DEPRESSED OR HOPELESS: 0
SUM OF ALL RESPONSES TO PHQ9 QUESTIONS 1 & 2: 0
SUM OF ALL RESPONSES TO PHQ QUESTIONS 1-9: 0
1. LITTLE INTEREST OR PLEASURE IN DOING THINGS: 0

## 2018-06-16 LAB
A/G RATIO: 1.9 (ref 1.1–2.2)
ALBUMIN SERPL-MCNC: 4.5 G/DL (ref 3.4–5)
ALP BLD-CCNC: 95 U/L (ref 40–129)
ALT SERPL-CCNC: 17 U/L (ref 10–40)
ANION GAP SERPL CALCULATED.3IONS-SCNC: 14 MMOL/L (ref 3–16)
AST SERPL-CCNC: 19 U/L (ref 15–37)
BASOPHILS ABSOLUTE: 0 K/UL (ref 0–0.2)
BASOPHILS RELATIVE PERCENT: 0.5 %
BILIRUB SERPL-MCNC: 0.4 MG/DL (ref 0–1)
BUN BLDV-MCNC: 12 MG/DL (ref 7–20)
CALCIUM SERPL-MCNC: 10.1 MG/DL (ref 8.3–10.6)
CHLORIDE BLD-SCNC: 104 MMOL/L (ref 99–110)
CO2: 26 MMOL/L (ref 21–32)
CREAT SERPL-MCNC: 0.6 MG/DL (ref 0.6–1.2)
EOSINOPHILS ABSOLUTE: 0.1 K/UL (ref 0–0.6)
EOSINOPHILS RELATIVE PERCENT: 1.1 %
GFR AFRICAN AMERICAN: >60
GFR NON-AFRICAN AMERICAN: >60
GLOBULIN: 2.4 G/DL
GLUCOSE BLD-MCNC: 87 MG/DL (ref 70–99)
HCT VFR BLD CALC: 39.8 % (ref 36–48)
HEMOGLOBIN: 13.6 G/DL (ref 12–16)
LYMPHOCYTES ABSOLUTE: 2.2 K/UL (ref 1–5.1)
LYMPHOCYTES RELATIVE PERCENT: 38.6 %
MCH RBC QN AUTO: 27.2 PG (ref 26–34)
MCHC RBC AUTO-ENTMCNC: 34.2 G/DL (ref 31–36)
MCV RBC AUTO: 79.8 FL (ref 80–100)
MONOCYTES ABSOLUTE: 0.3 K/UL (ref 0–1.3)
MONOCYTES RELATIVE PERCENT: 6.2 %
NEUTROPHILS ABSOLUTE: 3 K/UL (ref 1.7–7.7)
NEUTROPHILS RELATIVE PERCENT: 53.6 %
PDW BLD-RTO: 14.4 % (ref 12.4–15.4)
PLATELET # BLD: 207 K/UL (ref 135–450)
PMV BLD AUTO: 9.8 FL (ref 5–10.5)
POTASSIUM SERPL-SCNC: 4.4 MMOL/L (ref 3.5–5.1)
RBC # BLD: 4.99 M/UL (ref 4–5.2)
SODIUM BLD-SCNC: 144 MMOL/L (ref 136–145)
TOTAL PROTEIN: 6.9 G/DL (ref 6.4–8.2)
WBC # BLD: 5.6 K/UL (ref 4–11)

## 2018-06-17 LAB — URINE CULTURE, ROUTINE: NORMAL

## 2018-06-19 ENCOUNTER — HOSPITAL ENCOUNTER (OUTPATIENT)
Dept: CT IMAGING | Age: 68
Discharge: HOME | End: 2018-06-19
Attending: NURSE PRACTITIONER | Admitting: NURSE PRACTITIONER

## 2018-06-19 DIAGNOSIS — R10.9 ABDOMINAL PAIN: ICD-10-CM

## 2018-06-19 DIAGNOSIS — R14.0 ABDOMINAL BLOATING: ICD-10-CM

## 2018-06-19 DIAGNOSIS — R10.9 FLANK PAIN: ICD-10-CM

## 2018-06-22 ENCOUNTER — TELEPHONE (OUTPATIENT)
Dept: FAMILY MEDICINE CLINIC | Age: 68
End: 2018-06-22

## 2018-06-25 ENCOUNTER — OFFICE VISIT (OUTPATIENT)
Dept: FAMILY MEDICINE CLINIC | Age: 68
End: 2018-06-25

## 2018-06-25 VITALS
WEIGHT: 174.2 LBS | HEART RATE: 79 BPM | HEIGHT: 65 IN | SYSTOLIC BLOOD PRESSURE: 154 MMHG | DIASTOLIC BLOOD PRESSURE: 86 MMHG | BODY MASS INDEX: 29.02 KG/M2 | OXYGEN SATURATION: 98 % | RESPIRATION RATE: 18 BRPM

## 2018-06-25 DIAGNOSIS — R14.0 ABDOMINAL BLOATING: ICD-10-CM

## 2018-06-25 DIAGNOSIS — R93.89 INCREASED ENDOMETRIAL STRIPE THICKNESS: ICD-10-CM

## 2018-06-25 DIAGNOSIS — R10.9 FLANK PAIN: ICD-10-CM

## 2018-06-25 DIAGNOSIS — M54.50 ACUTE BILATERAL LOW BACK PAIN WITHOUT SCIATICA: Primary | ICD-10-CM

## 2018-06-25 LAB
BILIRUBIN, POC: NORMAL
BLOOD URINE, POC: NORMAL
CLARITY, POC: CLEAR
COLOR, POC: YELLOW
GLUCOSE URINE, POC: NORMAL
KETONES, POC: NORMAL
LEUKOCYTE EST, POC: NORMAL
NITRITE, POC: NORMAL
PH, POC: 7
PROTEIN, POC: NORMAL
SPECIFIC GRAVITY, POC: 1.01
UROBILINOGEN, POC: 0.2

## 2018-06-25 PROCEDURE — 1036F TOBACCO NON-USER: CPT | Performed by: FAMILY MEDICINE

## 2018-06-25 PROCEDURE — 1123F ACP DISCUSS/DSCN MKR DOCD: CPT | Performed by: FAMILY MEDICINE

## 2018-06-25 PROCEDURE — 1090F PRES/ABSN URINE INCON ASSESS: CPT | Performed by: FAMILY MEDICINE

## 2018-06-25 PROCEDURE — G8400 PT W/DXA NO RESULTS DOC: HCPCS | Performed by: FAMILY MEDICINE

## 2018-06-25 PROCEDURE — 3017F COLORECTAL CA SCREEN DOC REV: CPT | Performed by: FAMILY MEDICINE

## 2018-06-25 PROCEDURE — G8417 CALC BMI ABV UP PARAM F/U: HCPCS | Performed by: FAMILY MEDICINE

## 2018-06-25 PROCEDURE — 99214 OFFICE O/P EST MOD 30 MIN: CPT | Performed by: FAMILY MEDICINE

## 2018-06-25 PROCEDURE — 81002 URINALYSIS NONAUTO W/O SCOPE: CPT | Performed by: FAMILY MEDICINE

## 2018-06-25 PROCEDURE — 4040F PNEUMOC VAC/ADMIN/RCVD: CPT | Performed by: FAMILY MEDICINE

## 2018-06-25 PROCEDURE — G8427 DOCREV CUR MEDS BY ELIG CLIN: HCPCS | Performed by: FAMILY MEDICINE

## 2018-06-25 ASSESSMENT — ENCOUNTER SYMPTOMS
ABDOMINAL DISTENTION: 1
COUGH: 0
VOMITING: 0
ABDOMINAL PAIN: 1
NAUSEA: 1
BLOOD IN STOOL: 1
SHORTNESS OF BREATH: 0
DIARRHEA: 1

## 2018-07-02 ENCOUNTER — TELEPHONE (OUTPATIENT)
Dept: FAMILY MEDICINE CLINIC | Age: 68
End: 2018-07-02

## 2018-07-02 DIAGNOSIS — R93.5 ABNORMAL CT SCAN, PELVIS: Primary | ICD-10-CM

## 2018-07-02 NOTE — TELEPHONE ENCOUNTER
Pt called stating she does not have a gynecologist and was told by Dr. Breanne Beltrán that she needed to be seen by one per her recent CT results. Pt has tried calling several in the area and no one can see her any sooner than a month. Pt wants to know if Dr. Breanne Beltrán can give her any referrals that might be able to see her sooner? Please call to advise.

## 2018-08-22 RX ORDER — ATORVASTATIN CALCIUM 20 MG/1
20 TABLET, FILM COATED ORAL DAILY
Qty: 90 TABLET | Refills: 0 | Status: SHIPPED | OUTPATIENT
Start: 2018-08-22 | End: 2018-12-01 | Stop reason: SDUPTHER

## 2018-09-05 ENCOUNTER — OFFICE VISIT (OUTPATIENT)
Dept: FAMILY MEDICINE CLINIC | Age: 68
End: 2018-09-05

## 2018-09-05 VITALS
WEIGHT: 179.8 LBS | DIASTOLIC BLOOD PRESSURE: 80 MMHG | OXYGEN SATURATION: 96 % | HEART RATE: 71 BPM | BODY MASS INDEX: 29.96 KG/M2 | SYSTOLIC BLOOD PRESSURE: 140 MMHG | HEIGHT: 65 IN

## 2018-09-05 DIAGNOSIS — K21.00 GASTROESOPHAGEAL REFLUX DISEASE WITH ESOPHAGITIS: ICD-10-CM

## 2018-09-05 DIAGNOSIS — F41.9 ANXIETY: Primary | ICD-10-CM

## 2018-09-05 PROCEDURE — 4040F PNEUMOC VAC/ADMIN/RCVD: CPT | Performed by: FAMILY MEDICINE

## 2018-09-05 PROCEDURE — G8417 CALC BMI ABV UP PARAM F/U: HCPCS | Performed by: FAMILY MEDICINE

## 2018-09-05 PROCEDURE — G8427 DOCREV CUR MEDS BY ELIG CLIN: HCPCS | Performed by: FAMILY MEDICINE

## 2018-09-05 PROCEDURE — 3288F FALL RISK ASSESSMENT DOCD: CPT | Performed by: FAMILY MEDICINE

## 2018-09-05 PROCEDURE — 1090F PRES/ABSN URINE INCON ASSESS: CPT | Performed by: FAMILY MEDICINE

## 2018-09-05 PROCEDURE — 3017F COLORECTAL CA SCREEN DOC REV: CPT | Performed by: FAMILY MEDICINE

## 2018-09-05 PROCEDURE — 1101F PT FALLS ASSESS-DOCD LE1/YR: CPT | Performed by: FAMILY MEDICINE

## 2018-09-05 PROCEDURE — 1123F ACP DISCUSS/DSCN MKR DOCD: CPT | Performed by: FAMILY MEDICINE

## 2018-09-05 PROCEDURE — 99214 OFFICE O/P EST MOD 30 MIN: CPT | Performed by: FAMILY MEDICINE

## 2018-09-05 PROCEDURE — G8400 PT W/DXA NO RESULTS DOC: HCPCS | Performed by: FAMILY MEDICINE

## 2018-09-05 PROCEDURE — 1036F TOBACCO NON-USER: CPT | Performed by: FAMILY MEDICINE

## 2018-09-05 ASSESSMENT — ENCOUNTER SYMPTOMS
BLOOD IN STOOL: 0
ABDOMINAL PAIN: 0
SHORTNESS OF BREATH: 0

## 2018-09-05 NOTE — PROGRESS NOTES
Subjective:      Patient ID: Polo Arias is a 70 y.o. female. In for check on Anxiety(gambling-saw Dr. Chelsea Reyes gambling x 11 mos on Zoloft)--GERD(OK w meds)    Prior to Visit Medications :  Medication atorvastatin (LIPITOR) 20 MG tablet, Sig TAKE 1 TABLET BY MOUTH DAILY, Taking? Yes, Authorizing Provider Nelson Harrison, DO    Medication ranitidine (ZANTAC) 150 MG tablet, Sig Take 1 tablet by mouth 2 times daily, Taking? Yes, Authorizing Provider BELINDA Tony Asp - CNP    Medication omeprazole (PRILOSEC) 20 MG delayed release capsule, Sig TAKE ONE CAPSULE BY MOUTH DAILY, Taking? Yes, Authorizing Provider Nelson Harrison, DO    Medication sertraline (ZOLOFT) 100 MG tablet, Sig Take 100 mg by mouth daily , Taking? Yes, Authorizing Provider Historical Provider, MD    Medication ALPRAZolam (XANAX) 0.5 MG tablet, Sig TAKE 1 TABLET BY MOUTH DAILY AS NEEDED LIMIT 3 PER WEEK, Taking? Yes, Authorizing Provider Nelson Harrison, DO    Medication vitamin D (CHOLECALCIFEROL) 1000 UNIT TABS tablet, Sig Take 1,000 Units by mouth daily, Taking? Yes, Authorizing Provider Historical Provider, MD    Medication vitamin B-12 (CYANOCOBALAMIN) 500 MCG tablet, Sig Take 2,500 mcg by mouth daily , Taking? Yes, Authorizing Provider Historical Provider, MD      Past Medical History:  No date: Allergic rhinitis  10/10/2011: Anxiety  3/22/2017: Depression  No date: GERD (gastroesophageal reflux disease)  No date: Hallux valgus  No date: Headache(784.0)  No date: Herpes simplex  No date: Irritable bowel syndrome  No date: Kidney stones  No date: Osteopenia  4/2/2013: Other and unspecified hyperlipidemia  10/10/2011: Pain medication agreement signed          Review of Systems   Constitutional: Negative for unexpected weight change. HENT: Negative for congestion and postnasal drip. Eyes: Negative for visual disturbance. Respiratory: Negative for shortness of breath. Cardiovascular: Negative for chest pain and palpitations.

## 2018-09-12 DIAGNOSIS — F41.9 ANXIETY: Primary | ICD-10-CM

## 2018-09-12 RX ORDER — ALPRAZOLAM 0.5 MG/1
TABLET ORAL
Qty: 15 TABLET | Refills: 0 | Status: SHIPPED | OUTPATIENT
Start: 2018-09-12 | End: 2018-10-12

## 2018-09-27 RX ORDER — SERTRALINE HYDROCHLORIDE 100 MG/1
100 TABLET, FILM COATED ORAL DAILY
Qty: 90 TABLET | Refills: 1 | Status: SHIPPED | OUTPATIENT
Start: 2018-09-27 | End: 2019-04-03

## 2018-10-10 ENCOUNTER — HOSPITAL ENCOUNTER (EMERGENCY)
Age: 68
Discharge: HOME | End: 2018-10-10
Attending: EMERGENCY MEDICINE
Payer: MEDICARE

## 2018-10-10 VITALS
TEMPERATURE: 99 F | HEIGHT: 65 IN | WEIGHT: 175 LBS | DIASTOLIC BLOOD PRESSURE: 84 MMHG | OXYGEN SATURATION: 94 % | HEART RATE: 77 BPM | SYSTOLIC BLOOD PRESSURE: 121 MMHG | RESPIRATION RATE: 16 BRPM | BODY MASS INDEX: 29.16 KG/M2

## 2018-10-10 DIAGNOSIS — F41.1 ANXIETY STATE: Primary | ICD-10-CM

## 2018-10-10 PROCEDURE — 99285 EMERGENCY DEPT VISIT HI MDM: CPT

## 2018-10-10 PROCEDURE — 6370000000 HC RX 637 (ALT 250 FOR IP): Performed by: NURSE PRACTITIONER

## 2018-10-10 PROCEDURE — 93005 ELECTROCARDIOGRAM TRACING: CPT | Performed by: NURSE PRACTITIONER

## 2018-10-10 RX ORDER — LORAZEPAM 1 MG/1
1 TABLET ORAL ONCE
Status: COMPLETED | OUTPATIENT
Start: 2018-10-10 | End: 2018-10-10

## 2018-10-10 RX ADMIN — LORAZEPAM 1 MG: 1 TABLET ORAL at 09:55

## 2018-10-10 ASSESSMENT — ENCOUNTER SYMPTOMS
VOMITING: 0
ALLERGIC/IMMUNOLOGIC NEGATIVE: 1
ABDOMINAL PAIN: 0
COUGH: 0
NAUSEA: 0
SHORTNESS OF BREATH: 0
BACK PAIN: 0
ABDOMINAL DISTENTION: 0
EYES NEGATIVE: 1
CHEST TIGHTNESS: 1
DIARRHEA: 0
STRIDOR: 0
WHEEZING: 0
CONSTIPATION: 0
BLOOD IN STOOL: 0
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
## 2018-10-11 LAB
EKG ATRIAL RATE: 71 BPM
EKG DIAGNOSIS: NORMAL
EKG P AXIS: 58 DEGREES
EKG P-R INTERVAL: 164 MS
EKG Q-T INTERVAL: 364 MS
EKG QRS DURATION: 84 MS
EKG QTC CALCULATION (BAZETT): 395 MS
EKG R AXIS: 28 DEGREES
EKG T AXIS: 33 DEGREES
EKG VENTRICULAR RATE: 71 BPM

## 2018-10-11 PROCEDURE — 93010 ELECTROCARDIOGRAM REPORT: CPT | Performed by: INTERNAL MEDICINE

## 2018-12-17 ENCOUNTER — TELEPHONE (OUTPATIENT)
Dept: FAMILY MEDICINE CLINIC | Age: 68
End: 2018-12-17

## 2018-12-17 DIAGNOSIS — E04.1 THYROID NODULE: Primary | ICD-10-CM

## 2018-12-19 ENCOUNTER — HOSPITAL ENCOUNTER (OUTPATIENT)
Dept: ULTRASOUND IMAGING | Age: 68
Discharge: HOME | End: 2018-12-19
Payer: MEDICARE

## 2018-12-19 DIAGNOSIS — E04.1 THYROID NODULE: ICD-10-CM

## 2018-12-19 PROCEDURE — 76536 US EXAM OF HEAD AND NECK: CPT

## 2019-01-28 DIAGNOSIS — F41.9 ANXIETY: ICD-10-CM

## 2019-01-29 RX ORDER — ALPRAZOLAM 0.5 MG/1
TABLET ORAL
Qty: 15 TABLET | Refills: 0 | Status: SHIPPED | OUTPATIENT
Start: 2019-01-29 | End: 2019-05-20 | Stop reason: SDUPTHER

## 2019-02-23 ENCOUNTER — HOSPITAL ENCOUNTER (OUTPATIENT)
Age: 69
Setting detail: OBSERVATION
Discharge: HOME | End: 2019-02-25
Attending: EMERGENCY MEDICINE | Admitting: INTERNAL MEDICINE
Payer: MEDICARE

## 2019-02-23 ENCOUNTER — APPOINTMENT (OUTPATIENT)
Dept: GENERAL RADIOLOGY | Age: 69
End: 2019-02-23
Payer: MEDICARE

## 2019-02-23 DIAGNOSIS — R06.02 SHORTNESS OF BREATH: ICD-10-CM

## 2019-02-23 DIAGNOSIS — R07.9 CHEST PAIN, UNSPECIFIED TYPE: Primary | ICD-10-CM

## 2019-02-23 LAB
BASOPHILS ABSOLUTE: 0.1 K/UL (ref 0–0.2)
BASOPHILS RELATIVE PERCENT: 1 %
EOSINOPHILS ABSOLUTE: 0.1 K/UL (ref 0–0.6)
EOSINOPHILS RELATIVE PERCENT: 1.3 %
HCT VFR BLD CALC: 42.6 % (ref 36–48)
HEMOGLOBIN: 14.2 G/DL (ref 12–16)
LYMPHOCYTES ABSOLUTE: 3 K/UL (ref 1–5.1)
LYMPHOCYTES RELATIVE PERCENT: 41.6 %
MCH RBC QN AUTO: 26.6 PG (ref 26–34)
MCHC RBC AUTO-ENTMCNC: 33.3 G/DL (ref 31–36)
MCV RBC AUTO: 79.7 FL (ref 80–100)
MONOCYTES ABSOLUTE: 0.4 K/UL (ref 0–1.3)
MONOCYTES RELATIVE PERCENT: 5.4 %
NEUTROPHILS ABSOLUTE: 3.6 K/UL (ref 1.7–7.7)
NEUTROPHILS RELATIVE PERCENT: 50.7 %
PDW BLD-RTO: 15.7 % (ref 12.4–15.4)
PLATELET # BLD: 191 K/UL (ref 135–450)
PMV BLD AUTO: 9 FL (ref 5–10.5)
RBC # BLD: 5.34 M/UL (ref 4–5.2)
WBC # BLD: 7.1 K/UL (ref 4–11)

## 2019-02-23 PROCEDURE — 99285 EMERGENCY DEPT VISIT HI MDM: CPT

## 2019-02-23 PROCEDURE — 85025 COMPLETE CBC W/AUTO DIFF WBC: CPT

## 2019-02-23 PROCEDURE — 71045 X-RAY EXAM CHEST 1 VIEW: CPT

## 2019-02-23 PROCEDURE — 93005 ELECTROCARDIOGRAM TRACING: CPT | Performed by: EMERGENCY MEDICINE

## 2019-02-23 PROCEDURE — 80053 COMPREHEN METABOLIC PANEL: CPT

## 2019-02-23 PROCEDURE — 84484 ASSAY OF TROPONIN QUANT: CPT

## 2019-02-23 ASSESSMENT — PAIN DESCRIPTION - LOCATION: LOCATION: CHEST

## 2019-02-23 ASSESSMENT — PAIN DESCRIPTION - PAIN TYPE: TYPE: ACUTE PAIN

## 2019-02-23 ASSESSMENT — PAIN SCALES - GENERAL: PAINLEVEL_OUTOF10: 7

## 2019-02-24 PROBLEM — R07.9 CHEST PAIN: Status: ACTIVE | Noted: 2019-02-24

## 2019-02-24 LAB
A/G RATIO: 1.3 (ref 1.1–2.2)
ALBUMIN SERPL-MCNC: 4.2 G/DL (ref 3.4–5)
ALP BLD-CCNC: 96 U/L (ref 40–129)
ALT SERPL-CCNC: 23 U/L (ref 10–40)
ANION GAP SERPL CALCULATED.3IONS-SCNC: 11 MMOL/L (ref 3–16)
AST SERPL-CCNC: 22 U/L (ref 15–37)
BILIRUB SERPL-MCNC: <0.2 MG/DL (ref 0–1)
BUN BLDV-MCNC: 16 MG/DL (ref 7–20)
CALCIUM SERPL-MCNC: 9.9 MG/DL (ref 8.3–10.6)
CHLORIDE BLD-SCNC: 103 MMOL/L (ref 99–110)
CO2: 24 MMOL/L (ref 21–32)
CREAT SERPL-MCNC: 0.9 MG/DL (ref 0.6–1.2)
EKG ATRIAL RATE: 85 BPM
EKG DIAGNOSIS: NORMAL
EKG P AXIS: 65 DEGREES
EKG P-R INTERVAL: 158 MS
EKG Q-T INTERVAL: 344 MS
EKG QRS DURATION: 84 MS
EKG QTC CALCULATION (BAZETT): 409 MS
EKG R AXIS: 25 DEGREES
EKG T AXIS: 55 DEGREES
EKG VENTRICULAR RATE: 85 BPM
GFR AFRICAN AMERICAN: >60
GFR NON-AFRICAN AMERICAN: >60
GLOBULIN: 3.2 G/DL
GLUCOSE BLD-MCNC: 125 MG/DL (ref 70–99)
POTASSIUM SERPL-SCNC: 3.5 MMOL/L (ref 3.5–5.1)
SODIUM BLD-SCNC: 138 MMOL/L (ref 136–145)
TOTAL PROTEIN: 7.4 G/DL (ref 6.4–8.2)
TROPONIN: <0.01 NG/ML

## 2019-02-24 PROCEDURE — 96376 TX/PRO/DX INJ SAME DRUG ADON: CPT

## 2019-02-24 PROCEDURE — 84484 ASSAY OF TROPONIN QUANT: CPT

## 2019-02-24 PROCEDURE — 96374 THER/PROPH/DIAG INJ IV PUSH: CPT

## 2019-02-24 PROCEDURE — 93010 ELECTROCARDIOGRAM REPORT: CPT | Performed by: INTERNAL MEDICINE

## 2019-02-24 PROCEDURE — 2580000003 HC RX 258: Performed by: INTERNAL MEDICINE

## 2019-02-24 PROCEDURE — 6360000002 HC RX W HCPCS: Performed by: INTERNAL MEDICINE

## 2019-02-24 PROCEDURE — 96372 THER/PROPH/DIAG INJ SC/IM: CPT

## 2019-02-24 PROCEDURE — 6370000000 HC RX 637 (ALT 250 FOR IP): Performed by: INTERNAL MEDICINE

## 2019-02-24 PROCEDURE — 36415 COLL VENOUS BLD VENIPUNCTURE: CPT

## 2019-02-24 PROCEDURE — G0378 HOSPITAL OBSERVATION PER HR: HCPCS

## 2019-02-24 PROCEDURE — 6370000000 HC RX 637 (ALT 250 FOR IP): Performed by: NURSE PRACTITIONER

## 2019-02-24 PROCEDURE — 99234 HOSP IP/OBS SM DT SF/LOW 45: CPT | Performed by: INTERNAL MEDICINE

## 2019-02-24 RX ORDER — SODIUM CHLORIDE 0.9 % (FLUSH) 0.9 %
10 SYRINGE (ML) INJECTION PRN
Status: DISCONTINUED | OUTPATIENT
Start: 2019-02-24 | End: 2019-02-25 | Stop reason: HOSPADM

## 2019-02-24 RX ORDER — IPRATROPIUM BROMIDE AND ALBUTEROL SULFATE 2.5; .5 MG/3ML; MG/3ML
1 SOLUTION RESPIRATORY (INHALATION) ONCE
Status: COMPLETED | OUTPATIENT
Start: 2019-02-24 | End: 2019-02-24

## 2019-02-24 RX ORDER — KETOROLAC TROMETHAMINE 30 MG/ML
15 INJECTION, SOLUTION INTRAMUSCULAR; INTRAVENOUS EVERY 6 HOURS PRN
Status: DISCONTINUED | OUTPATIENT
Start: 2019-02-24 | End: 2019-02-25 | Stop reason: HOSPADM

## 2019-02-24 RX ORDER — ONDANSETRON 2 MG/ML
4 INJECTION INTRAMUSCULAR; INTRAVENOUS EVERY 6 HOURS PRN
Status: DISCONTINUED | OUTPATIENT
Start: 2019-02-24 | End: 2019-02-25 | Stop reason: HOSPADM

## 2019-02-24 RX ORDER — ASPIRIN 81 MG/1
81 TABLET, CHEWABLE ORAL DAILY
Status: DISCONTINUED | OUTPATIENT
Start: 2019-02-25 | End: 2019-02-25 | Stop reason: HOSPADM

## 2019-02-24 RX ORDER — SODIUM CHLORIDE 0.9 % (FLUSH) 0.9 %
10 SYRINGE (ML) INJECTION EVERY 12 HOURS SCHEDULED
Status: DISCONTINUED | OUTPATIENT
Start: 2019-02-24 | End: 2019-02-25 | Stop reason: HOSPADM

## 2019-02-24 RX ORDER — ATORVASTATIN CALCIUM 10 MG/1
20 TABLET, FILM COATED ORAL DAILY
Status: DISCONTINUED | OUTPATIENT
Start: 2019-02-24 | End: 2019-02-25 | Stop reason: HOSPADM

## 2019-02-24 RX ORDER — MAGNESIUM SULFATE 1 G/100ML
1 INJECTION INTRAVENOUS PRN
Status: DISCONTINUED | OUTPATIENT
Start: 2019-02-24 | End: 2019-02-25 | Stop reason: HOSPADM

## 2019-02-24 RX ORDER — PANTOPRAZOLE SODIUM 40 MG/1
40 TABLET, DELAYED RELEASE ORAL
Status: DISCONTINUED | OUTPATIENT
Start: 2019-02-24 | End: 2019-02-25 | Stop reason: HOSPADM

## 2019-02-24 RX ORDER — NITROGLYCERIN 0.4 MG/1
0.4 TABLET SUBLINGUAL EVERY 5 MIN PRN
Status: DISCONTINUED | OUTPATIENT
Start: 2019-02-24 | End: 2019-02-25 | Stop reason: HOSPADM

## 2019-02-24 RX ORDER — POTASSIUM CHLORIDE 7.45 MG/ML
10 INJECTION INTRAVENOUS PRN
Status: DISCONTINUED | OUTPATIENT
Start: 2019-02-24 | End: 2019-02-25 | Stop reason: HOSPADM

## 2019-02-24 RX ORDER — ATORVASTATIN CALCIUM 40 MG/1
40 TABLET, FILM COATED ORAL NIGHTLY
Status: DISCONTINUED | OUTPATIENT
Start: 2019-02-24 | End: 2019-02-24 | Stop reason: ALTCHOICE

## 2019-02-24 RX ORDER — ASPIRIN 81 MG/1
324 TABLET, CHEWABLE ORAL ONCE
Status: COMPLETED | OUTPATIENT
Start: 2019-02-24 | End: 2019-02-24

## 2019-02-24 RX ADMIN — ENOXAPARIN SODIUM 40 MG: 40 INJECTION SUBCUTANEOUS at 10:47

## 2019-02-24 RX ADMIN — NITROGLYCERIN 1 INCH: 20 OINTMENT TOPICAL at 02:27

## 2019-02-24 RX ADMIN — Medication 10 ML: at 21:15

## 2019-02-24 RX ADMIN — ASPIRIN 81 MG 324 MG: 81 TABLET ORAL at 02:27

## 2019-02-24 RX ADMIN — SERTRALINE HYDROCHLORIDE 100 MG: 50 TABLET ORAL at 10:47

## 2019-02-24 RX ADMIN — Medication 10 ML: at 10:47

## 2019-02-24 RX ADMIN — ATORVASTATIN CALCIUM 20 MG: 10 TABLET, FILM COATED ORAL at 10:46

## 2019-02-24 RX ADMIN — KETOROLAC TROMETHAMINE 15 MG: 30 INJECTION, SOLUTION INTRAMUSCULAR; INTRAVENOUS at 21:14

## 2019-02-24 RX ADMIN — IPRATROPIUM BROMIDE AND ALBUTEROL SULFATE 1 AMPULE: .5; 3 SOLUTION RESPIRATORY (INHALATION) at 00:42

## 2019-02-24 RX ADMIN — KETOROLAC TROMETHAMINE 15 MG: 30 INJECTION, SOLUTION INTRAMUSCULAR; INTRAVENOUS at 10:47

## 2019-02-24 ASSESSMENT — PAIN - FUNCTIONAL ASSESSMENT
PAIN_FUNCTIONAL_ASSESSMENT: ACTIVITIES ARE NOT PREVENTED
PAIN_FUNCTIONAL_ASSESSMENT: PREVENTS OR INTERFERES SOME ACTIVE ACTIVITIES AND ADLS

## 2019-02-24 ASSESSMENT — PAIN SCALES - GENERAL
PAINLEVEL_OUTOF10: 4
PAINLEVEL_OUTOF10: 5
PAINLEVEL_OUTOF10: 6

## 2019-02-24 ASSESSMENT — PAIN DESCRIPTION - PROGRESSION
CLINICAL_PROGRESSION: GRADUALLY WORSENING
CLINICAL_PROGRESSION: NOT CHANGED

## 2019-02-24 ASSESSMENT — PAIN DESCRIPTION - ONSET
ONSET: ON-GOING
ONSET: ON-GOING

## 2019-02-24 ASSESSMENT — PAIN DESCRIPTION - FREQUENCY
FREQUENCY: INTERMITTENT
FREQUENCY: CONTINUOUS

## 2019-02-24 ASSESSMENT — PAIN DESCRIPTION - PAIN TYPE
TYPE: ACUTE PAIN
TYPE: ACUTE PAIN

## 2019-02-24 ASSESSMENT — PAIN DESCRIPTION - DESCRIPTORS
DESCRIPTORS: PRESSURE;TIGHTNESS
DESCRIPTORS: PRESSURE

## 2019-02-24 ASSESSMENT — PAIN DESCRIPTION - LOCATION
LOCATION: CHEST
LOCATION: CHEST

## 2019-02-24 ASSESSMENT — HEART SCORE: ECG: 0

## 2019-02-25 ENCOUNTER — APPOINTMENT (OUTPATIENT)
Dept: NUCLEAR MEDICINE | Age: 69
End: 2019-02-25
Payer: MEDICARE

## 2019-02-25 ENCOUNTER — TELEPHONE (OUTPATIENT)
Dept: FAMILY MEDICINE CLINIC | Age: 69
End: 2019-02-25

## 2019-02-25 VITALS
DIASTOLIC BLOOD PRESSURE: 61 MMHG | TEMPERATURE: 98 F | OXYGEN SATURATION: 95 % | HEART RATE: 71 BPM | SYSTOLIC BLOOD PRESSURE: 113 MMHG | HEIGHT: 65 IN | WEIGHT: 174.6 LBS | BODY MASS INDEX: 29.09 KG/M2 | RESPIRATION RATE: 16 BRPM

## 2019-02-25 LAB
D DIMER: <200 NG/ML DDU (ref 0–229)
LV EF: 60 %
LVEF MODALITY: NORMAL

## 2019-02-25 PROCEDURE — G0378 HOSPITAL OBSERVATION PER HR: HCPCS

## 2019-02-25 PROCEDURE — 90686 IIV4 VACC NO PRSV 0.5 ML IM: CPT | Performed by: INTERNAL MEDICINE

## 2019-02-25 PROCEDURE — 78452 HT MUSCLE IMAGE SPECT MULT: CPT

## 2019-02-25 PROCEDURE — 3430000000 HC RX DIAGNOSTIC RADIOPHARMACEUTICAL: Performed by: INTERNAL MEDICINE

## 2019-02-25 PROCEDURE — 6370000000 HC RX 637 (ALT 250 FOR IP): Performed by: INTERNAL MEDICINE

## 2019-02-25 PROCEDURE — A9502 TC99M TETROFOSMIN: HCPCS | Performed by: INTERNAL MEDICINE

## 2019-02-25 PROCEDURE — 36415 COLL VENOUS BLD VENIPUNCTURE: CPT

## 2019-02-25 PROCEDURE — 93017 CV STRESS TEST TRACING ONLY: CPT

## 2019-02-25 PROCEDURE — G0008 ADMIN INFLUENZA VIRUS VAC: HCPCS | Performed by: INTERNAL MEDICINE

## 2019-02-25 PROCEDURE — 6360000002 HC RX W HCPCS: Performed by: INTERNAL MEDICINE

## 2019-02-25 PROCEDURE — 85379 FIBRIN DEGRADATION QUANT: CPT

## 2019-02-25 PROCEDURE — 99217 PR OBSERVATION CARE DISCHARGE MANAGEMENT: CPT | Performed by: INTERNAL MEDICINE

## 2019-02-25 RX ORDER — ALBUTEROL SULFATE 90 UG/1
2 AEROSOL, METERED RESPIRATORY (INHALATION) EVERY 6 HOURS PRN
Qty: 1 INHALER | Refills: 0 | Status: SHIPPED | OUTPATIENT
Start: 2019-02-25 | End: 2019-04-11 | Stop reason: CLARIF

## 2019-02-25 RX ADMIN — ASPIRIN 81 MG 81 MG: 81 TABLET ORAL at 13:39

## 2019-02-25 RX ADMIN — SERTRALINE HYDROCHLORIDE 100 MG: 50 TABLET ORAL at 13:39

## 2019-02-25 RX ADMIN — INFLUENZA A VIRUS A/MICHIGAN/45/2015 X-275 (H1N1) ANTIGEN (FORMALDEHYDE INACTIVATED), INFLUENZA A VIRUS A/SINGAPORE/INFIMH-16-0019/2016 IVR-186 (H3N2) ANTIGEN (FORMALDEHYDE INACTIVATED), INFLUENZA B VIRUS B/PHUKET/3073/2013 ANTIGEN (FORMALDEHYDE INACTIVATED), AND INFLUENZA B VIRUS B/MARYLAND/15/2016 BX-69A ANTIGEN (FORMALDEHYDE INACTIVATED) 0.5 ML: 15; 15; 15; 15 INJECTION, SUSPENSION INTRAMUSCULAR at 13:45

## 2019-02-25 RX ADMIN — TETROFOSMIN 30.9 MILLICURIE: 0.23 INJECTION, POWDER, LYOPHILIZED, FOR SOLUTION INTRAVENOUS at 11:10

## 2019-02-25 RX ADMIN — TETROFOSMIN 10.4 MILLICURIE: 0.23 INJECTION, POWDER, LYOPHILIZED, FOR SOLUTION INTRAVENOUS at 10:00

## 2019-02-25 RX ADMIN — ATORVASTATIN CALCIUM 20 MG: 10 TABLET, FILM COATED ORAL at 13:39

## 2019-02-25 NOTE — TELEPHONE ENCOUNTER
[facility] Transitions Initial Follow Up Call    Outreach made within 2 business days of discharge: [facility] called 19 to make appt    Patient: Lloyd Zendejas Patient : 1950   MRN: [id number]  Reason for Admission: chest pain, shortness of breath   Discharge Date: 18  Spoke with: Kody Shelton    Discharge department/facility: [place] [address] Interactive Patient Contact: did not speak with patient      Scheduled appointment with PCP within 7-14 days    Follow Up  Future Appointments  Date Time Provider Christina Burton   2019 8:40 AM NESHA Ordaz   2019 10:00 AM DO YVROSE Sanders       [facility]

## 2019-02-28 ENCOUNTER — OFFICE VISIT (OUTPATIENT)
Dept: FAMILY MEDICINE CLINIC | Age: 69
End: 2019-02-28
Payer: MEDICARE

## 2019-02-28 VITALS
WEIGHT: 177.6 LBS | HEART RATE: 68 BPM | BODY MASS INDEX: 29.59 KG/M2 | SYSTOLIC BLOOD PRESSURE: 132 MMHG | OXYGEN SATURATION: 98 % | DIASTOLIC BLOOD PRESSURE: 90 MMHG | HEIGHT: 65 IN

## 2019-02-28 DIAGNOSIS — I10 ESSENTIAL HYPERTENSION: ICD-10-CM

## 2019-02-28 DIAGNOSIS — F32.A DEPRESSION, UNSPECIFIED DEPRESSION TYPE: ICD-10-CM

## 2019-02-28 DIAGNOSIS — R07.9 CHEST PAIN, UNSPECIFIED TYPE: Primary | ICD-10-CM

## 2019-02-28 DIAGNOSIS — F41.9 ANXIETY: ICD-10-CM

## 2019-02-28 PROCEDURE — 99496 TRANSJ CARE MGMT HIGH F2F 7D: CPT | Performed by: PHYSICIAN ASSISTANT

## 2019-02-28 PROCEDURE — 1111F DSCHRG MED/CURRENT MED MERGE: CPT | Performed by: PHYSICIAN ASSISTANT

## 2019-02-28 ASSESSMENT — ENCOUNTER SYMPTOMS
ABDOMINAL PAIN: 0
SORE THROAT: 0
VOMITING: 0
CONSTIPATION: 0
COUGH: 0
RHINORRHEA: 0
NAUSEA: 0
DIARRHEA: 0
SHORTNESS OF BREATH: 0

## 2019-04-02 ENCOUNTER — TELEPHONE (OUTPATIENT)
Dept: FAMILY MEDICINE CLINIC | Age: 69
End: 2019-04-02

## 2019-04-02 NOTE — TELEPHONE ENCOUNTER
Pt requesting to speak with Loly Fuller concerning some medication she was supposed to take, sertraline and another for anxiety she can't remember the name of. [phone]

## 2019-04-03 RX ORDER — FLUTICASONE PROPIONATE 50 MCG
2 SPRAY, SUSPENSION (ML) NASAL DAILY
Qty: 1 BOTTLE | Refills: 11 | Status: SHIPPED | OUTPATIENT
Start: 2019-04-03 | End: 2020-02-27

## 2019-04-03 RX ORDER — BUSPIRONE HYDROCHLORIDE 7.5 MG/1
7.5 TABLET ORAL 2 TIMES DAILY
Qty: 60 TABLET | Refills: 2 | Status: SHIPPED | OUTPATIENT
Start: 2019-04-03 | End: 2019-04-11 | Stop reason: SDUPTHER

## 2019-04-03 RX ORDER — SERTRALINE HYDROCHLORIDE 100 MG/1
200 TABLET, FILM COATED ORAL DAILY
Qty: 180 TABLET | Refills: 1 | Status: SHIPPED | OUTPATIENT
Start: 2019-04-03 | End: 2019-05-29

## 2019-04-08 ENCOUNTER — TELEPHONE (OUTPATIENT)
Dept: FAMILY MEDICINE CLINIC | Age: 69
End: 2019-04-08

## 2019-04-08 DIAGNOSIS — E78.5 HYPERLIPIDEMIA, UNSPECIFIED HYPERLIPIDEMIA TYPE: ICD-10-CM

## 2019-04-08 DIAGNOSIS — Z13.1 DIABETES MELLITUS SCREENING: Primary | ICD-10-CM

## 2019-04-08 DIAGNOSIS — Z13.1 DIABETES MELLITUS SCREENING: ICD-10-CM

## 2019-04-08 DIAGNOSIS — R73.01 IMPAIRED FASTING GLUCOSE: ICD-10-CM

## 2019-04-08 LAB
CHOLESTEROL, TOTAL: 157 MG/DL (ref 0–199)
HDLC SERPL-MCNC: 50 MG/DL (ref 40–60)
LDL CHOLESTEROL CALCULATED: 78 MG/DL
TRIGL SERPL-MCNC: 145 MG/DL (ref 0–150)
VLDLC SERPL CALC-MCNC: 29 MG/DL

## 2019-04-08 NOTE — TELEPHONE ENCOUNTER
Pt is in the office has a appt 4/11 with you and states you informed her to come in today 4/8 to have blood drawn first.

## 2019-04-09 LAB
ESTIMATED AVERAGE GLUCOSE: 111.2 MG/DL
HBA1C MFR BLD: 5.5 %

## 2019-04-11 ENCOUNTER — OFFICE VISIT (OUTPATIENT)
Dept: FAMILY MEDICINE CLINIC | Age: 69
End: 2019-04-11
Payer: MEDICARE

## 2019-04-11 VITALS
OXYGEN SATURATION: 97 % | HEIGHT: 65 IN | RESPIRATION RATE: 16 BRPM | BODY MASS INDEX: 29.36 KG/M2 | WEIGHT: 176.2 LBS | HEART RATE: 81 BPM | DIASTOLIC BLOOD PRESSURE: 70 MMHG | SYSTOLIC BLOOD PRESSURE: 124 MMHG | TEMPERATURE: 98.3 F

## 2019-04-11 DIAGNOSIS — H93.13 TINNITUS OF BOTH EARS: Primary | ICD-10-CM

## 2019-04-11 DIAGNOSIS — E04.2 MULTIPLE THYROID NODULES: ICD-10-CM

## 2019-04-11 DIAGNOSIS — R06.2 WHEEZING: ICD-10-CM

## 2019-04-11 DIAGNOSIS — F33.9 EPISODE OF RECURRENT MAJOR DEPRESSIVE DISORDER, UNSPECIFIED DEPRESSION EPISODE SEVERITY (HCC): ICD-10-CM

## 2019-04-11 DIAGNOSIS — H91.93 DECREASED HEARING OF BOTH EARS: ICD-10-CM

## 2019-04-11 DIAGNOSIS — F33.0 MILD EPISODE OF RECURRENT MAJOR DEPRESSIVE DISORDER (HCC): ICD-10-CM

## 2019-04-11 DIAGNOSIS — F41.9 ANXIETY: ICD-10-CM

## 2019-04-11 PROCEDURE — 1090F PRES/ABSN URINE INCON ASSESS: CPT | Performed by: PHYSICIAN ASSISTANT

## 2019-04-11 PROCEDURE — 1123F ACP DISCUSS/DSCN MKR DOCD: CPT | Performed by: PHYSICIAN ASSISTANT

## 2019-04-11 PROCEDURE — 99214 OFFICE O/P EST MOD 30 MIN: CPT | Performed by: PHYSICIAN ASSISTANT

## 2019-04-11 PROCEDURE — G8427 DOCREV CUR MEDS BY ELIG CLIN: HCPCS | Performed by: PHYSICIAN ASSISTANT

## 2019-04-11 PROCEDURE — G8400 PT W/DXA NO RESULTS DOC: HCPCS | Performed by: PHYSICIAN ASSISTANT

## 2019-04-11 PROCEDURE — 4040F PNEUMOC VAC/ADMIN/RCVD: CPT | Performed by: PHYSICIAN ASSISTANT

## 2019-04-11 PROCEDURE — G8417 CALC BMI ABV UP PARAM F/U: HCPCS | Performed by: PHYSICIAN ASSISTANT

## 2019-04-11 PROCEDURE — 1036F TOBACCO NON-USER: CPT | Performed by: PHYSICIAN ASSISTANT

## 2019-04-11 PROCEDURE — 3017F COLORECTAL CA SCREEN DOC REV: CPT | Performed by: PHYSICIAN ASSISTANT

## 2019-04-11 RX ORDER — BUSPIRONE HYDROCHLORIDE 7.5 MG/1
7.5 TABLET ORAL 2 TIMES DAILY
Qty: 60 TABLET | Refills: 2 | Status: SHIPPED | OUTPATIENT
Start: 2019-04-11 | End: 2019-08-27

## 2019-04-11 RX ORDER — ALBUTEROL SULFATE 90 UG/1
2 AEROSOL, METERED RESPIRATORY (INHALATION) EVERY 6 HOURS PRN
Qty: 1 INHALER | Refills: 0 | Status: SHIPPED | OUTPATIENT
Start: 2019-04-11 | End: 2020-09-29

## 2019-04-11 RX ORDER — ALBUTEROL SULFATE 90 UG/1
2 AEROSOL, METERED RESPIRATORY (INHALATION) EVERY 6 HOURS PRN
Qty: 54 G | Refills: 0 | OUTPATIENT
Start: 2019-04-11 | End: 2020-04-10

## 2019-04-11 ASSESSMENT — ENCOUNTER SYMPTOMS
ABDOMINAL PAIN: 0
COUGH: 0
VOMITING: 0
RHINORRHEA: 0
CONSTIPATION: 0
SORE THROAT: 0
NAUSEA: 0
DIARRHEA: 0
SHORTNESS OF BREATH: 0
WHEEZING: 1

## 2019-04-11 NOTE — PROGRESS NOTES
2019  Marcos Agarwal (: 1950)  70 y.o. HPI    Patient presents for follow up of depression and anxiety. Currently on zoloft 200 mg daily. Feels that anxiety has improved. Has taken buspar occasionally, cannot tell much of a difference. Would like to try for 1 month before resuming xanax. Denies si/hi. Tinnitus: patient complains of tinnitus over the last year, has acutely worsened over the last month. Denies ASA use. Tried flonase, but could not tolerate due to burning sensation. Also endorses bilateral hearing loss. No history of cerumen impaction. Denies nasal congestion, sore throat. Thyroid nodules: Patient with history of thyroid nodules. US 2018 showed unchanged multiple bilateral thyroid nodules. Recommended follow up [address] in 1 year. Wheezing: discussed at last appointment, albuterol inhaler sent to pharmacy. Patient reports that she did not fill script. Also with worsening seasonal allergies, takes generic allergy medication prn, none recently. Denies fever, chest pain, cough, shortness of air. Review of Systems   Constitutional: Negative for activity change, chills and fever. HENT: Positive for hearing loss, postnasal drip and tinnitus. Negative for congestion, ear pain, rhinorrhea and sore throat. Eyes: Negative for visual disturbance. Respiratory: Positive for wheezing. Negative for cough and shortness of breath. Cardiovascular: Negative for chest pain and palpitations. Gastrointestinal: Negative for abdominal pain, constipation, diarrhea, nausea and vomiting. Genitourinary: Negative for difficulty urinating and dysuria. Musculoskeletal: Negative for arthralgias and myalgias. Skin: Negative for rash. Neurological: Negative for dizziness, weakness and numbness. Psychiatric/Behavioral: Negative for sleep disturbance. Allergies, past medical history, family history, and social history reviewed and unchanged from previous encounter.      Current Outpatient Medications   Medication Sig Dispense Refill    albuterol sulfate HFA (PROVENTIL HFA) 108 (90 Base) MCG/ACT inhaler Inhale 2 puffs into the lungs every 6 hours as needed for Wheezing 1 Inhaler 0    busPIRone (BUSPAR) 7.5 MG tablet Take 1 tablet by mouth 2 times daily May cause drowsiness. 60 tablet 2    sertraline (ZOLOFT) 100 MG tablet Take 2 tablets by mouth daily 180 tablet 1    fluticasone (FLONASE) 50 MCG/ACT nasal spray 2 sprays by Nasal route daily 1 Bottle 11    omeprazole (PRILOSEC) 20 MG delayed release capsule TAKE ONE CAPSULE BY MOUTH DAILY 90 capsule 2    atorvastatin (LIPITOR) 20 MG tablet TAKE 1 TABLET BY MOUTH DAILY 90 tablet 1    vitamin D (CHOLECALCIFEROL) 1000 UNIT TABS tablet Take 1,000 Units by mouth daily      vitamin B-12 (CYANOCOBALAMIN) 500 MCG tablet Take 2,500 mcg by mouth daily        No current facility-administered medications for this visit. Vitals:    04/11/19 0837   BP: 124/70   Site: Left Upper Arm   Position: Sitting   Cuff Size: Medium Adult   Pulse: 81   Resp: 16   Temp: 98.3 °F (36.8 °C)   SpO2: 97%   Weight: 176 lb 3.2 oz (79.9 kg)   Height: 5' 5\" (1.651 m)     Estimated body mass index is 29.32 kg/m² as calculated from the following:    Height as of this encounter: 5' 5\" (1.651 m). Weight as of this encounter: 176 lb 3.2 oz (79.9 kg). Physical Exam   Constitutional: She is oriented to person, place, and time. She appears well-developed and well-nourished. No distress. HENT:   Head: Normocephalic and atraumatic. Eyes: Pupils are equal, round, and reactive to light. Conjunctivae and EOM are normal.   Neck: Neck supple. Cardiovascular: Normal rate, regular rhythm and normal heart sounds. No murmur heard. Pulmonary/Chest: Effort normal. She has wheezes (minimal wheezing bilaterally). Abdominal: Soft. Bowel sounds are normal. There is no tenderness. Musculoskeletal: She exhibits no edema.    Lymphadenopathy:     She has no cervical

## 2019-04-11 NOTE — TELEPHONE ENCOUNTER
Last office visit 4/11/2019     Last written      Next office visit scheduled 9/9/2019    Requested Prescriptions     Pending Prescriptions Disp Refills    albuterol sulfate  (90 Base) MCG/ACT inhaler [Pharmacy Med Name: ALBUTEROL HFA INH (200 PUFFS) 18GM] 54 g 0     Sig: INHALE 2 PUFFS INTO THE LUNGS EVERY 6 HOURS AS NEEDED FOR WHEEZING

## 2019-05-29 DIAGNOSIS — F32.A DEPRESSION, UNSPECIFIED DEPRESSION TYPE: ICD-10-CM

## 2019-05-29 DIAGNOSIS — F41.9 ANXIETY: ICD-10-CM

## 2019-06-04 ENCOUNTER — TELEPHONE (OUTPATIENT)
Dept: FAMILY MEDICINE CLINIC | Age: 69
End: 2019-06-04

## 2019-06-05 ENCOUNTER — OFFICE VISIT (OUTPATIENT)
Dept: FAMILY MEDICINE CLINIC | Age: 69
End: 2019-06-05
Payer: MEDICARE

## 2019-06-05 VITALS
BODY MASS INDEX: 29.32 KG/M2 | SYSTOLIC BLOOD PRESSURE: 154 MMHG | HEIGHT: 65 IN | TEMPERATURE: 102.8 F | OXYGEN SATURATION: 98 % | WEIGHT: 176 LBS | HEART RATE: 97 BPM | DIASTOLIC BLOOD PRESSURE: 70 MMHG

## 2019-06-05 DIAGNOSIS — R50.9 FEBRILE ILLNESS: ICD-10-CM

## 2019-06-05 DIAGNOSIS — R05.9 COUGH: Primary | ICD-10-CM

## 2019-06-05 LAB
INFLUENZA A ANTIBODY: NEGATIVE
INFLUENZA B ANTIBODY: NEGATIVE

## 2019-06-05 PROCEDURE — 4040F PNEUMOC VAC/ADMIN/RCVD: CPT | Performed by: FAMILY MEDICINE

## 2019-06-05 PROCEDURE — 1123F ACP DISCUSS/DSCN MKR DOCD: CPT | Performed by: FAMILY MEDICINE

## 2019-06-05 PROCEDURE — G8427 DOCREV CUR MEDS BY ELIG CLIN: HCPCS | Performed by: FAMILY MEDICINE

## 2019-06-05 PROCEDURE — G8417 CALC BMI ABV UP PARAM F/U: HCPCS | Performed by: FAMILY MEDICINE

## 2019-06-05 PROCEDURE — 3017F COLORECTAL CA SCREEN DOC REV: CPT | Performed by: FAMILY MEDICINE

## 2019-06-05 PROCEDURE — 87804 INFLUENZA ASSAY W/OPTIC: CPT | Performed by: FAMILY MEDICINE

## 2019-06-05 PROCEDURE — G8400 PT W/DXA NO RESULTS DOC: HCPCS | Performed by: FAMILY MEDICINE

## 2019-06-05 PROCEDURE — 1090F PRES/ABSN URINE INCON ASSESS: CPT | Performed by: FAMILY MEDICINE

## 2019-06-05 PROCEDURE — 99214 OFFICE O/P EST MOD 30 MIN: CPT | Performed by: FAMILY MEDICINE

## 2019-06-05 PROCEDURE — 1036F TOBACCO NON-USER: CPT | Performed by: FAMILY MEDICINE

## 2019-06-05 RX ORDER — DOXYCYCLINE HYCLATE 100 MG
100 TABLET ORAL 2 TIMES DAILY
Qty: 20 TABLET | Refills: 0 | Status: SHIPPED | OUTPATIENT
Start: 2019-06-05 | End: 2019-06-15

## 2019-06-05 NOTE — PROGRESS NOTES
Chief Complaint   Patient presents with    Fever    Pharyngitis    Cough     productive-- yellow    Congestion     New onset 3-4d ago gradual cough, sore throat, achy/malaise, sinus congestion, frontal HA, fatigue  No dyspnea or wheezing. No CP or hemoptysis  Cough productive yellow sputum  nontobacco  grandaughter with similar sx's similar onset this past wk      BP (!) 154/70 (Site: Left Upper Arm, Position: Sitting, Cuff Size: Medium Adult)   Pulse 97   Temp 102.8 °F (39.3 °C) (Oral)   Ht 5' 5\" (1.651 m)   Wt 176 lb (79.8 kg)   LMP  (LMP Unknown)   SpO2 98%   BMI 29.29 kg/m²     A+Ox4, NAD smiling/appropriate/interactive, WD/WN, nontoxic appearing, RR20 no labored breathing  Conj clear, no icterus, TMs dull full. OP clear  Neck supple, no LAD  Lungs upper airway rhonchi, no rales or wheezes. Good aeration. CV: RRR, no M/R/G, nl S1S  Abd: soft, NT/ND  Extr: No edema  Skin: warm dry, no rash no obvious lesions    Assessment/plan:     Frankey Beets was seen today for fever, pharyngitis, cough and congestion. Diagnoses and all orders for this visit:    Cough  -     POCT Influenza A/B negative. -     XR CHEST STANDARD (2 VW) assess for pneumonia. -     doxycycline hyclate (VIBRA-TABS) 100 MG tablet; Take 1 tablet by mouth 2 times daily for 10 days  proventil HFA 2p q4-6 hrs prn  Mucinex prn fluids rest  RTO Fri am recheck; sooner or ER if worsening sx's ie dyspnea, fever, Red flags and precautions reviewed    Febrile illness  As above  -     POCT Influenza A/B  -     XR CHEST STANDARD (2 VW); Future  -     doxycycline hyclate (VIBRA-TABS) 100 MG tablet;  Take 1 tablet by mouth 2 times daily for 10 days

## 2019-06-06 ENCOUNTER — HOSPITAL ENCOUNTER (OUTPATIENT)
Age: 69
Discharge: HOME | End: 2019-06-06
Payer: MEDICARE

## 2019-06-06 ENCOUNTER — HOSPITAL ENCOUNTER (OUTPATIENT)
Dept: GENERAL RADIOLOGY | Age: 69
Discharge: HOME | End: 2019-06-06
Payer: MEDICARE

## 2019-06-06 DIAGNOSIS — R50.9 FEBRILE ILLNESS: ICD-10-CM

## 2019-06-06 DIAGNOSIS — R05.9 COUGH: ICD-10-CM

## 2019-06-06 PROCEDURE — 71046 X-RAY EXAM CHEST 2 VIEWS: CPT

## 2019-07-16 ENCOUNTER — OFFICE VISIT (OUTPATIENT)
Dept: ORTHOPEDIC SURGERY | Age: 69
End: 2019-07-16
Payer: MEDICARE

## 2019-07-16 VITALS
RESPIRATION RATE: 14 BRPM | HEART RATE: 70 BPM | SYSTOLIC BLOOD PRESSURE: 130 MMHG | WEIGHT: 172 LBS | DIASTOLIC BLOOD PRESSURE: 69 MMHG | HEIGHT: 65 IN | BODY MASS INDEX: 28.66 KG/M2

## 2019-07-16 DIAGNOSIS — M48.02 CERVICAL STENOSIS OF SPINE: Primary | ICD-10-CM

## 2019-07-16 DIAGNOSIS — M54.12 CERVICAL RADICULOPATHY: ICD-10-CM

## 2019-07-16 DIAGNOSIS — M47.812 CERVICAL SPONDYLOSIS WITHOUT MYELOPATHY: ICD-10-CM

## 2019-07-16 PROCEDURE — 99203 OFFICE O/P NEW LOW 30 MIN: CPT | Performed by: PHYSICAL MEDICINE & REHABILITATION

## 2019-07-16 PROCEDURE — G8417 CALC BMI ABV UP PARAM F/U: HCPCS | Performed by: PHYSICAL MEDICINE & REHABILITATION

## 2019-07-16 PROCEDURE — 1090F PRES/ABSN URINE INCON ASSESS: CPT | Performed by: PHYSICAL MEDICINE & REHABILITATION

## 2019-07-16 PROCEDURE — G8427 DOCREV CUR MEDS BY ELIG CLIN: HCPCS | Performed by: PHYSICAL MEDICINE & REHABILITATION

## 2019-07-16 NOTE — PROGRESS NOTES
sprays by Nasal route daily, Disp: 1 Bottle, Rfl: 11    omeprazole (PRILOSEC) 20 MG delayed release capsule, TAKE ONE CAPSULE BY MOUTH DAILY, Disp: 90 capsule, Rfl: 2    vitamin D (CHOLECALCIFEROL) 1000 UNIT TABS tablet, Take 1,000 Units by mouth daily, Disp: , Rfl:     vitamin B-12 (CYANOCOBALAMIN) 500 MCG tablet, Take 2,500 mcg by mouth daily , Disp: , Rfl:   Allergies:  Codeine and Sulfa antibiotics  Social History:    reports that she quit smoking about 36 years ago. She has a 15.00 pack-year smoking history. She has never used smokeless tobacco. She reports that she does not drink alcohol or use drugs. Family History:   Family History   Problem Relation Age of Onset    Asthma Mother     Heart Disease Father          REVIEW OF SYSTEMS: Full ROS reviewed & scanned from 7/16/2019           PHYSICAL EXAM:    Vitals: Blood pressure 130/69, pulse 70, resp. rate 14, height 5' 5\" (1.651 m), weight 172 lb (78 kg), not currently breastfeeding. GENERAL EXAM:  · General Apparence: Patient is adequately groomed with no evidence of malnutrition. · Psychiatric: Orientation: The patient is oriented to time, place and person. The patient's mood and affect are appropriate   · Vascular: Examination reveals no swelling and palpation reveals no tenderness in upper or lower extremities. Good capillary refill. · The lymphatic examination of the neck, axillae and groin reveals all areas to be without enlargement or induration   Sensation is intact without deficit in the upper and lower extremities to light touch and pinprick  · Coordination of the upper and lower extremities are normal.    CERVICAL EXAMINATION:  · Inspection: Local inspection shows no step-off or bruising. Cervical alignment is normal. No instability is noted. · Palpation and Percussion: No evidence of tenderness at the midline. Paraspinal tenderness is present. There is no paraspinal spasm.   · Range of Motion:  limited by 50% in all planes due to pain personally performed the services described in this documentation as scribed by Shahrzad Vásquez CRMA in my presence and it is both accurate and complete. Arely Mckenna. Ger Malik MD, REJI, [facility]  Board Certified in [address] [place] [address] Certified and Fellowship Trained in [facility] ([facility])     This dictation was performed with a verbal recognition program [facility]) and it was checked for errors. It is possible that there are still dictated errors within this office note. If so, please bring any errors to my attention for an addendum. All efforts were made to ensure that this office note is accurate.

## 2019-07-18 ENCOUNTER — HOSPITAL ENCOUNTER (OUTPATIENT)
Dept: MRI IMAGING | Age: 69
Discharge: HOME | End: 2019-07-18
Payer: MEDICARE

## 2019-07-18 ENCOUNTER — TELEPHONE (OUTPATIENT)
Dept: FAMILY MEDICINE CLINIC | Age: 69
End: 2019-07-18

## 2019-07-18 DIAGNOSIS — M54.12 CERVICAL RADICULOPATHY: ICD-10-CM

## 2019-07-18 DIAGNOSIS — M48.02 CERVICAL STENOSIS OF SPINE: ICD-10-CM

## 2019-07-18 DIAGNOSIS — M47.812 CERVICAL SPONDYLOSIS WITHOUT MYELOPATHY: ICD-10-CM

## 2019-07-18 DIAGNOSIS — R06.2 WHEEZING: Primary | ICD-10-CM

## 2019-07-18 PROCEDURE — 72141 MRI NECK SPINE W/O DYE: CPT

## 2019-07-22 ENCOUNTER — TELEPHONE (OUTPATIENT)
Dept: FAMILY MEDICINE CLINIC | Age: 69
End: 2019-07-22

## 2019-07-22 DIAGNOSIS — R06.2 WHEEZING: Primary | ICD-10-CM

## 2019-07-22 NOTE — TELEPHONE ENCOUNTER
Please call the patient with the following pulmonary referral:    [id number] [facility] [address] [address] [facility] [address], [address], [address] [place] [address]  Ph: [phone]

## 2019-07-24 ENCOUNTER — TELEPHONE (OUTPATIENT)
Dept: ORTHOPEDIC SURGERY | Age: 69
End: 2019-07-24

## 2019-07-24 NOTE — TELEPHONE ENCOUNTER
I believe the patient is referencing her MRI CSP. Per protocol, results are not given over the phone. Patient is more than welcome to move up her appointment for results review. Please contact patient offer to move up her appointment. Thanks.

## 2019-07-25 ENCOUNTER — TELEPHONE (OUTPATIENT)
Dept: ORTHOPEDIC SURGERY | Age: 69
End: 2019-07-25

## 2019-07-25 NOTE — TELEPHONE ENCOUNTER
S/W PATIENT offered appointment for today or tomorrow, however patient declines any office other than [facility]. Appointment has been r/s to 7/30 at 8:30am w/ PA-C. Patient voiced understanding and will contact the office with further questions or concerns.

## 2019-07-30 ENCOUNTER — OFFICE VISIT (OUTPATIENT)
Dept: ORTHOPEDIC SURGERY | Age: 69
End: 2019-07-30
Payer: MEDICARE

## 2019-07-30 VITALS
DIASTOLIC BLOOD PRESSURE: 80 MMHG | HEIGHT: 65 IN | WEIGHT: 171.96 LBS | BODY MASS INDEX: 28.65 KG/M2 | HEART RATE: 84 BPM | SYSTOLIC BLOOD PRESSURE: 128 MMHG

## 2019-07-30 DIAGNOSIS — M54.12 CERVICAL RADICULOPATHY: ICD-10-CM

## 2019-07-30 DIAGNOSIS — M79.18 MYOFASCIAL PAIN: ICD-10-CM

## 2019-07-30 DIAGNOSIS — M47.812 CERVICAL SPONDYLOSIS WITHOUT MYELOPATHY: ICD-10-CM

## 2019-07-30 DIAGNOSIS — M48.02 CERVICAL STENOSIS OF SPINE: Primary | ICD-10-CM

## 2019-07-30 PROCEDURE — G8400 PT W/DXA NO RESULTS DOC: HCPCS | Performed by: PHYSICIAN ASSISTANT

## 2019-07-30 PROCEDURE — 1036F TOBACCO NON-USER: CPT | Performed by: PHYSICIAN ASSISTANT

## 2019-07-30 PROCEDURE — 99213 OFFICE O/P EST LOW 20 MIN: CPT | Performed by: PHYSICIAN ASSISTANT

## 2019-07-30 PROCEDURE — 1123F ACP DISCUSS/DSCN MKR DOCD: CPT | Performed by: PHYSICIAN ASSISTANT

## 2019-07-30 PROCEDURE — 20552 NJX 1/MLT TRIGGER POINT 1/2: CPT | Performed by: PHYSICIAN ASSISTANT

## 2019-07-30 PROCEDURE — 4040F PNEUMOC VAC/ADMIN/RCVD: CPT | Performed by: PHYSICIAN ASSISTANT

## 2019-07-30 PROCEDURE — G8427 DOCREV CUR MEDS BY ELIG CLIN: HCPCS | Performed by: PHYSICIAN ASSISTANT

## 2019-07-30 PROCEDURE — G8417 CALC BMI ABV UP PARAM F/U: HCPCS | Performed by: PHYSICIAN ASSISTANT

## 2019-07-30 PROCEDURE — 1090F PRES/ABSN URINE INCON ASSESS: CPT | Performed by: PHYSICIAN ASSISTANT

## 2019-07-30 PROCEDURE — 3017F COLORECTAL CA SCREEN DOC REV: CPT | Performed by: PHYSICIAN ASSISTANT

## 2019-07-30 NOTE — PROGRESS NOTES
 Asthma Mother     Heart Disease Father        REVIEW OF SYSTEMS:   CONSTITUTIONAL: Denies unexplained weight loss, fevers, chills or fatigue  NEUROLOGICAL: Denies unsteady gait or progressive weakness  MUSCULOSKELETAL: Denies joint swelling or redness  GI: Denies nausea, vomiting, diarrhea   : Denies bowel or bladder issues       PHYSICAL EXAM:    Vitals: Blood pressure 128/80, pulse 84, height 5' 5\" (1.651 m), weight 171 lb 15.3 oz (78 kg), not currently breastfeeding. GENERAL EXAM:  · General Apparence: Patient is adequately groomed with no evidence of malnutrition. · Psychiatric: Orientation: The patient is oriented to time, place and person. The patient's mood and affect are appropriate   · Vascular: Examination reveals no swelling and palpation reveals no tenderness in upper or lower extremities. Good capillary refill. · The lymphatic examination of the neck, axillae and groin reveals all areas to be without enlargement or induration  · Sensation is intact without deficit in the upper and lower extremities to light touch and pinprick  · Coordination of the upper and lower extremities are normal.    CERVICAL EXAMINATION:  · Inspection: Local inspection shows no step-off or bruising. Cervical alignment is normal. No instability is noted. · Palpation and Percussion: No evidence of tenderness at the midline. Paraspinal tenderness is present with a trigger point palpated over the right trapezius muscle. There is no paraspinal spasm. Skin:There are no rashes, ulcerations or lesions  · Range of Motion:  limited by 25% in all planes due to pain   · Strength: 5/5 bilateral upper extremities  · Special Tests:   Spurling's and Tomas's are negative bilaterally. Turner and Impingement tests are negative bilaterally. · Skin:There are no rashes, ulcerations or lesions in right & left upper extremities. · Reflexes: Bilaterally triceps, biceps and brachioradialis are 2+.   Clonus absent bilaterally at the feet. No pathological reflexes are noted. · Gait & station: normal, patient ambulates without assistance  · Additional Examinations:  · RIGHT UPPER EXTREMITY:  Inspection/examination of the right upper extremity does not show any tenderness, deformity or injury. Range of motion is unremarkable and pain-free. There is no gross instability. There are no rashes, ulcerations or lesions. Strength and tone are normal. No atrophy or abnormal movements are noted. · LEFT UPPER EXTREMITY: Inspection/examination of the left upper extremity does not show any tenderness, deformity or injury. Range of motion is unremarkable and pain-free. There is no gross instability. There are no rashes, ulcerations or lesions. Strength and tone are normal. No atrophy or abnormal movements are noted. · RIGHT LOWER EXTREMITY: Inspection/examination of the right lower extremity does not show any tenderness, deformity or injury. Range of motion is normal and pain-free. There is no gross instability. There are no rashes, ulcerations or lesions. Strength and tone are normal. No atrophy or abnormal movements are noted. · LEFT LOWER EXTREMITY:  Inspection/examination of the left lower extremity does not show any tenderness, deformity or injury. Range of motion is normal and pain-free. There is no gross instability. There are no rashes, ulcerations or lesions. Strength and tone are normal. No atrophy or abnormal movements are noted. Diagnostic Testing:    MR cervical spine shows: from 7/18/19  FINDINGS:   BONES/ALIGNMENT: There is normal alignment of the spine. The vertebral body   heights are maintained.  The bone marrow signal appears unremarkable.       SPINAL CORD: The visualized spinal cord has normal signal and morphology.  No   evidence of mass or abnormal fluid collection within the spinal canal.       SOFT TISSUES: Paraspinal soft tissues are unremarkable.       C2-C3: Disc height and signal maintained.  No neural foraminal studies:  No further studies. 4. Interventional:  A trigger point injection was performed at the site of maximal tenderness along the right upper trapezius muscle using 1 mL of 1% plain Lidocaine and 40 mg of Kenalog. This was well tolerated, and followed by optimal relief of pain. 5. Follow up:  4-6 weeks      Stacie Marshall was instructed to call the office if her symptoms worsen or if new symptoms appear prior to the next scheduled visit. She is specifically instructed to contact the office between now & her scheduled appointment if she has concerns related to her condition or if she needs assistance in scheduling the above tests. She is welcome to call for an appointment sooner if she has any additional concerns or questions. Jacinto Buchanan ATC, am scribing for and in the presence of Maxine Beckham PA-C.    07/30/19 8:51 AM Genoveva Jama ATC    I, Stephanie Cordoba PA-C, personally performed the services described in this documentation as scribed by SINAI Bueno in my presence and it is both accurate and complete. CARLOS Nielson PA-C  Board Certified by the [employer] on Certification of Physician Assistants  Susy Gama 57  Partner of [facility] [place] ([facility] [place] [facility])           This dictation was performed with a verbal recognition program [facility]) and it was checked for errors. It is possible that there are still dictated errors within this office note. If so, please bring any errors to my attention for an addendum. All efforts were made to ensure that this office note is accurate.

## 2019-08-06 ENCOUNTER — OFFICE VISIT (OUTPATIENT)
Dept: PULMONOLOGY | Age: 69
End: 2019-08-06
Payer: MEDICARE

## 2019-08-06 ENCOUNTER — HOSPITAL ENCOUNTER (OUTPATIENT)
Age: 69
Discharge: HOME | End: 2019-08-06
Payer: MEDICARE

## 2019-08-06 VITALS
RESPIRATION RATE: 16 BRPM | SYSTOLIC BLOOD PRESSURE: 142 MMHG | WEIGHT: 174 LBS | BODY MASS INDEX: 28.99 KG/M2 | HEART RATE: 69 BPM | HEIGHT: 65 IN | OXYGEN SATURATION: 98 % | DIASTOLIC BLOOD PRESSURE: 71 MMHG

## 2019-08-06 DIAGNOSIS — R06.02 SHORTNESS OF BREATH: ICD-10-CM

## 2019-08-06 DIAGNOSIS — R06.02 SHORTNESS OF BREATH: Primary | ICD-10-CM

## 2019-08-06 DIAGNOSIS — R06.2 WHEEZING: ICD-10-CM

## 2019-08-06 DIAGNOSIS — R07.89 OTHER CHEST PAIN: ICD-10-CM

## 2019-08-06 LAB
BASOPHILS ABSOLUTE: 0 K/UL (ref 0–0.2)
BASOPHILS RELATIVE PERCENT: 0.6 %
EOSINOPHILS ABSOLUTE: 0 K/UL (ref 0–0.6)
EOSINOPHILS RELATIVE PERCENT: 0.6 %
HCT VFR BLD CALC: 34.8 % (ref 36–48)
HEMOGLOBIN: 11.3 G/DL (ref 12–16)
LYMPHOCYTES ABSOLUTE: 1.6 K/UL (ref 1–5.1)
LYMPHOCYTES RELATIVE PERCENT: 26.5 %
MCH RBC QN AUTO: 24.2 PG (ref 26–34)
MCHC RBC AUTO-ENTMCNC: 32.4 G/DL (ref 31–36)
MCV RBC AUTO: 74.6 FL (ref 80–100)
MONOCYTES ABSOLUTE: 0.4 K/UL (ref 0–1.3)
MONOCYTES RELATIVE PERCENT: 6.8 %
NEUTROPHILS ABSOLUTE: 3.9 K/UL (ref 1.7–7.7)
NEUTROPHILS RELATIVE PERCENT: 65.5 %
PDW BLD-RTO: 15.5 % (ref 12.4–15.4)
PLATELET # BLD: 216 K/UL (ref 135–450)
PMV BLD AUTO: 9.4 FL (ref 5–10.5)
RBC # BLD: 4.66 M/UL (ref 4–5.2)
WBC # BLD: 6 K/UL (ref 4–11)

## 2019-08-06 PROCEDURE — 36415 COLL VENOUS BLD VENIPUNCTURE: CPT

## 2019-08-06 PROCEDURE — G8427 DOCREV CUR MEDS BY ELIG CLIN: HCPCS | Performed by: INTERNAL MEDICINE

## 2019-08-06 PROCEDURE — 82104 ALPHA-1-ANTITRYPSIN PHENO: CPT

## 2019-08-06 PROCEDURE — 85025 COMPLETE CBC W/AUTO DIFF WBC: CPT

## 2019-08-06 PROCEDURE — 99204 OFFICE O/P NEW MOD 45 MIN: CPT | Performed by: INTERNAL MEDICINE

## 2019-08-06 PROCEDURE — G8417 CALC BMI ABV UP PARAM F/U: HCPCS | Performed by: INTERNAL MEDICINE

## 2019-08-06 PROCEDURE — 1090F PRES/ABSN URINE INCON ASSESS: CPT | Performed by: INTERNAL MEDICINE

## 2019-08-06 PROCEDURE — 82103 ALPHA-1-ANTITRYPSIN TOTAL: CPT

## 2019-08-06 PROCEDURE — 86003 ALLG SPEC IGE CRUDE XTRC EA: CPT

## 2019-08-06 PROCEDURE — 82785 ASSAY OF IGE: CPT

## 2019-08-06 ASSESSMENT — ENCOUNTER SYMPTOMS
EYE ITCHING: 0
DIARRHEA: 0
EYE DISCHARGE: 0
CHOKING: 0
EYE PAIN: 0
CHEST TIGHTNESS: 0
SORE THROAT: 0
CONSTIPATION: 0
STRIDOR: 0
VOICE CHANGE: 0
ABDOMINAL PAIN: 0

## 2019-08-06 NOTE — PROGRESS NOTES
Psychiatric/Behavioral: Negative for agitation, confusion and hallucinations. Physical Exam   Constitutional: She appears well-developed and well-nourished. No distress. HENT:   Head: Normocephalic and atraumatic. Mouth/Throat: Oropharynx is clear and moist. No oropharyngeal exudate. Eyes: Pupils are equal, round, and reactive to light. EOM are normal.   Neck: Neck supple. No JVD present. Cardiovascular: Normal heart sounds. Exam reveals no gallop and no friction rub. No murmur heard. Pulmonary/Chest: Effort normal. She has no wheezes. She has no rales. Equal chest rise and expansion bilaterally   Abdominal: Soft. Bowel sounds are normal. She exhibits no distension. There is no tenderness. Musculoskeletal: Normal range of motion. She exhibits no edema. Lymphadenopathy:     She has no cervical adenopathy. Neurological: She is alert. No cranial nerve deficit. CN 2-12 grossly intact   Skin: Skin is warm and dry. No rash noted. She is not diaphoretic. ASSESSMENT:    1. Shortness of breath    2. Other chest pain    3.  Wheezing      PLAN:    -Further evaluate respiratory symptoms with CT chest, PFTs, and allergen testing.   -Depending on results of above will determine if asthma therapy is appropriate  -Encouraged her to restart her flonase for sinus symptoms as this may be contributing  -Return to clinic after testing     Orders Placed This Encounter   Procedures    CT Chest WO Contrast    RESPIRATORY ALLERGEN PROFILE    CBC Auto Differential    Alpha-1-Antitrypsin w Phenotype    Full PFT Study With Bronchodilator       Aamir Hinds MD

## 2019-08-06 NOTE — PATIENT INSTRUCTIONS
Remember to bring all pulmonary medications to your next appointment with the office. Please keep all of your future appointments scheduled by [address], [facility] office. Out of respect for other patients and providers, you may be asked to reschedule your appointment if you arrive later than your scheduled appointment time. Appointments cancelled less than 24hrs in advance will be considered a no show. Patients with three missed appointments within 1 year or four missed appointments within 2 years can be dismissed from the practice. You may receive a survey regarding the care you received during your visit. Your input is valuable to us. We encourage you to complete and return your survey. We hope you will choose us in the future for your healthcare needs.

## 2019-08-07 LAB
2000687N OAK TREE IGE: 0.45 KU/L
ALLERGEN ASPERGILLUS ALTERNATA IGE: <0.1 KU/L
ALLERGEN ASPERGILLUS FUMIGATUS IGE: <0.1 KU/L
ALLERGEN BERMUDA GRASS IGE: <0.1 KU/L
ALLERGEN BIRCH IGE: 4.63 KU/L
ALLERGEN CAT DANDER IGE: 0.43 KU/L
ALLERGEN COMMON SHORT RAGWEED IGE: <0.1 KU/L
ALLERGEN COTTONWOOD: 0.2 KU/L
ALLERGEN COW MILK IGE: <0.1 KU/L
ALLERGEN DOG DANDER IGE: 0.11 KU/L
ALLERGEN ELM IGE: 0.12 KU/L
ALLERGEN FUNGI/MOLD M.RACEMOSUS IGE: <0.1 KU/L
ALLERGEN GERMAN COCKROACH IGE: 0.16 KU/L
ALLERGEN HORMODENDRUM HORDEI IGE: <0.1 KU/L
ALLERGEN MAPLE/BOX ELDER IGE: <0.1 KU/L
ALLERGEN MITE DUST FARINAE IGE: 0.19 KU/L
ALLERGEN MITE DUST PTERONYSSINUS IGE: 0.2 KU/L
ALLERGEN MOUNTAIN CEDAR: <0.1 KU/L
ALLERGEN MOUSE EPITHELIA IGE: <0.1 KU/L
ALLERGEN PEANUT (F13) IGE: <0.1 KU/L
ALLERGEN PECAN TREE IGE: <0.1 KU/L
ALLERGEN PENICILLIUM NOTATUM: <0.1 KU/L
ALLERGEN ROUGH PIGWEED (W14) IGE: <0.1 KU/L
ALLERGEN RUSSIAN THISTLE IGE: 0.17 KU/L
ALLERGEN SEE NOTE: NORMAL
ALLERGEN SHEEP SORREL (W18) IGE: <0.1 KU/L
ALLERGEN TIMOTHY GRASS: <0.1 KU/L
ALLERGEN TREE SYCAMORE: 0.13 KU/L
ALLERGEN WALNUT TREE IGE: 0.15 KU/L
ALLERGEN WHITE MULBERRY TREE, IGE: <0.1 KU/L
ALLERGEN, TREE, WHITE ASH IGE: 0.31 KU/L
IGE: 232 KU/L

## 2019-08-08 LAB
ALPHA-1 ANTITRYPSIN PHENOTYPE: ABNORMAL
ALPHA-1 ANTITRYPSIN: 82 MG/DL (ref 90–200)

## 2019-08-14 RX ORDER — SERTRALINE HYDROCHLORIDE 100 MG/1
200 TABLET, FILM COATED ORAL DAILY
Qty: 180 TABLET | Refills: 0 | Status: SHIPPED | OUTPATIENT
Start: 2019-08-14 | End: 2019-09-10

## 2019-08-22 ENCOUNTER — HOSPITAL ENCOUNTER (OUTPATIENT)
Dept: PULMONOLOGY | Age: 69
Discharge: HOME | End: 2019-08-22
Payer: MEDICARE

## 2019-08-22 ENCOUNTER — HOSPITAL ENCOUNTER (OUTPATIENT)
Dept: CT IMAGING | Age: 69
Discharge: HOME | End: 2019-08-22
Payer: MEDICARE

## 2019-08-22 ENCOUNTER — OFFICE VISIT (OUTPATIENT)
Dept: PULMONOLOGY | Age: 69
End: 2019-08-22
Payer: MEDICARE

## 2019-08-22 ENCOUNTER — TELEPHONE (OUTPATIENT)
Dept: FAMILY MEDICINE CLINIC | Age: 69
End: 2019-08-22

## 2019-08-22 VITALS
OXYGEN SATURATION: 97 % | HEART RATE: 76 BPM | BODY MASS INDEX: 29.16 KG/M2 | WEIGHT: 175 LBS | DIASTOLIC BLOOD PRESSURE: 66 MMHG | RESPIRATION RATE: 16 BRPM | SYSTOLIC BLOOD PRESSURE: 149 MMHG | HEIGHT: 65 IN

## 2019-08-22 DIAGNOSIS — R91.8 MULTIPLE PULMONARY NODULES: ICD-10-CM

## 2019-08-22 DIAGNOSIS — R07.89 OTHER CHEST PAIN: Primary | ICD-10-CM

## 2019-08-22 DIAGNOSIS — R06.02 SHORTNESS OF BREATH: ICD-10-CM

## 2019-08-22 DIAGNOSIS — K21.9 GASTROESOPHAGEAL REFLUX DISEASE WITHOUT ESOPHAGITIS: ICD-10-CM

## 2019-08-22 DIAGNOSIS — R07.89 OTHER CHEST PAIN: ICD-10-CM

## 2019-08-22 LAB
DLCO %PRED: 104 %
DLCO PRED: NORMAL ML/MIN/MMHG
DLCO/VA %PRED: NORMAL %
DLCO/VA PRED: NORMAL ML/MIN/MMHG
DLCO/VA: NORMAL ML/MIN/MMHG
DLCO: NORMAL ML/MIN/MMHG
EXPIRATORY TIME-POST: NORMAL SEC
EXPIRATORY TIME: NORMAL SEC
FEF 25-75% %CHNG: NORMAL
FEF 25-75% %PRED-POST: NORMAL %
FEF 25-75% %PRED-PRE: NORMAL L/SEC
FEF 25-75% PRED: NORMAL L/SEC
FEF 25-75%-POST: NORMAL L/SEC
FEF 25-75%-PRE: NORMAL L/SEC
FEV1 %PRED-POST: 114 %
FEV1 %PRED-PRE: 111 %
FEV1 PRED: NORMAL L
FEV1-POST: NORMAL L
FEV1-PRE: NORMAL L
FEV1/FVC %PRED-POST: NORMAL %
FEV1/FVC %PRED-PRE: NORMAL %
FEV1/FVC PRED: NORMAL %
FEV1/FVC-POST: 83 %
FEV1/FVC-PRE: 82 %
FVC %PRED-POST: NORMAL L
FVC %PRED-PRE: NORMAL %
FVC PRED: NORMAL L
FVC-POST: NORMAL L
FVC-PRE: NORMAL L
GAW %PRED: NORMAL %
GAW PRED: NORMAL L/S/CMH2O
GAW: NORMAL L/S/CMH2O
IC %PRED: NORMAL %
IC PRED: NORMAL L
IC: NORMAL L
MEP: NORMAL
MIP: NORMAL
MVV %PRED-PRE: NORMAL %
MVV PRED: NORMAL L/MIN
MVV-PRE: NORMAL L/MIN
PEF %PRED-POST: NORMAL %
PEF %PRED-PRE: NORMAL L/SEC
PEF PRED: NORMAL L/SEC
PEF%CHNG: NORMAL
PEF-POST: NORMAL L/SEC
PEF-PRE: NORMAL L/SEC
RAW %PRED: NORMAL %
RAW PRED: NORMAL CMH2O/L/S
RAW: NORMAL CMH2O/L/S
RV %PRED: NORMAL %
RV PRED: NORMAL L
RV: NORMAL L
SVC %PRED: NORMAL %
SVC PRED: NORMAL L
SVC: NORMAL L
TLC %PRED: 98 %
TLC PRED: NORMAL L
TLC: NORMAL L
VA %PRED: NORMAL %
VA PRED: NORMAL L
VA: NORMAL L
VTG %PRED: NORMAL %
VTG PRED: NORMAL L
VTG: NORMAL L

## 2019-08-22 PROCEDURE — 94060 EVALUATION OF WHEEZING: CPT

## 2019-08-22 PROCEDURE — 71250 CT THORAX DX C-: CPT

## 2019-08-22 PROCEDURE — G8427 DOCREV CUR MEDS BY ELIG CLIN: HCPCS | Performed by: INTERNAL MEDICINE

## 2019-08-22 PROCEDURE — 1123F ACP DISCUSS/DSCN MKR DOCD: CPT | Performed by: INTERNAL MEDICINE

## 2019-08-22 PROCEDURE — 94726 PLETHYSMOGRAPHY LUNG VOLUMES: CPT

## 2019-08-22 PROCEDURE — 94729 DIFFUSING CAPACITY: CPT

## 2019-08-22 PROCEDURE — 94760 N-INVAS EAR/PLS OXIMETRY 1: CPT

## 2019-08-22 PROCEDURE — 1090F PRES/ABSN URINE INCON ASSESS: CPT | Performed by: INTERNAL MEDICINE

## 2019-08-22 PROCEDURE — 4040F PNEUMOC VAC/ADMIN/RCVD: CPT | Performed by: INTERNAL MEDICINE

## 2019-08-22 PROCEDURE — 3017F COLORECTAL CA SCREEN DOC REV: CPT | Performed by: INTERNAL MEDICINE

## 2019-08-22 PROCEDURE — G8400 PT W/DXA NO RESULTS DOC: HCPCS | Performed by: INTERNAL MEDICINE

## 2019-08-22 PROCEDURE — 99214 OFFICE O/P EST MOD 30 MIN: CPT | Performed by: INTERNAL MEDICINE

## 2019-08-22 PROCEDURE — G8417 CALC BMI ABV UP PARAM F/U: HCPCS | Performed by: INTERNAL MEDICINE

## 2019-08-22 PROCEDURE — 1036F TOBACCO NON-USER: CPT | Performed by: INTERNAL MEDICINE

## 2019-08-22 PROCEDURE — 6370000000 HC RX 637 (ALT 250 FOR IP): Performed by: INTERNAL MEDICINE

## 2019-08-22 RX ORDER — ALBUTEROL SULFATE 90 UG/1
4 AEROSOL, METERED RESPIRATORY (INHALATION) ONCE
Status: COMPLETED | OUTPATIENT
Start: 2019-08-22 | End: 2019-08-22

## 2019-08-22 RX ORDER — GABAPENTIN 300 MG/1
300 CAPSULE ORAL 3 TIMES DAILY
Qty: 90 CAPSULE | Refills: 0 | Status: SHIPPED | OUTPATIENT
Start: 2019-08-22 | End: 2019-09-03

## 2019-08-22 RX ADMIN — Medication 4 PUFF: at 10:03

## 2019-08-22 ASSESSMENT — PULMONARY FUNCTION TESTS
FEV1/FVC_PRE: 82
FEV1/FVC_POST: 83
FEV1_PERCENT_PREDICTED_PRE: 111
FEV1_PERCENT_PREDICTED_POST: 114

## 2019-08-26 NOTE — PROCEDURES
[address], [place] [address]                               PULMONARY FUNCTION    PATIENT NAME: Salvatore Cooper                    :        1950  MED REC NO:   [id number]                          ROOM:  ACCOUNT NO:   [de-identified]                           ADMIT DATE: 2019  PROVIDER:     Jessica Orellana MD    DATE OF PROCEDURE:  2019    This is a 39-year-old female. TEST PERFORMED:  1. Spirometry with flow-volume loops obtained before and after  bronchodilation. 2.  Lung volumes by plethysmography. 3.  Diffusion capacity of carbon monoxide. Test meets ATS criteria and the quality of flow-volume loops is  sufficient for interpretation. Good patient effort. The FEV1 is 2.62 L or 111% predicted. The FEV1 to FVC ratio is 82. Postbronchodilator, the FEV1 changed to 2.69 L or 114% predicted. Total  lung capacity is 98% predicted and diffusion is 104% predicted. INTERPRETATION:  1. Normal spirometry without evidence of obstruction or  postbronchodilator improvement. 2.  Normal lung volumes. 3.  Normal diffusion capacity. 4.  Normal pulmonary function testing, clinical correlation recommended.         Fredo Quijano MD    D: 2019 16:00:17       T: 2019 17:48:42     [email or web address]  Job#: [id number]     Doc#: [id number]    CC:

## 2019-08-27 ENCOUNTER — INITIAL CONSULT (OUTPATIENT)
Dept: GASTROENTEROLOGY | Age: 69
End: 2019-08-27
Payer: MEDICARE

## 2019-08-27 VITALS
DIASTOLIC BLOOD PRESSURE: 80 MMHG | WEIGHT: 172 LBS | SYSTOLIC BLOOD PRESSURE: 136 MMHG | HEIGHT: 65 IN | BODY MASS INDEX: 28.66 KG/M2

## 2019-08-27 DIAGNOSIS — R10.13 DYSPEPSIA: ICD-10-CM

## 2019-08-27 DIAGNOSIS — R13.19 ESOPHAGEAL DYSPHAGIA: ICD-10-CM

## 2019-08-27 DIAGNOSIS — D50.9 IRON DEFICIENCY ANEMIA, UNSPECIFIED IRON DEFICIENCY ANEMIA TYPE: Primary | ICD-10-CM

## 2019-08-27 DIAGNOSIS — R07.9 CHEST PAIN, UNSPECIFIED TYPE: ICD-10-CM

## 2019-08-27 PROCEDURE — 4040F PNEUMOC VAC/ADMIN/RCVD: CPT | Performed by: INTERNAL MEDICINE

## 2019-08-27 PROCEDURE — 1090F PRES/ABSN URINE INCON ASSESS: CPT | Performed by: INTERNAL MEDICINE

## 2019-08-27 PROCEDURE — 3017F COLORECTAL CA SCREEN DOC REV: CPT | Performed by: INTERNAL MEDICINE

## 2019-08-27 PROCEDURE — G8400 PT W/DXA NO RESULTS DOC: HCPCS | Performed by: INTERNAL MEDICINE

## 2019-08-27 PROCEDURE — 1036F TOBACCO NON-USER: CPT | Performed by: INTERNAL MEDICINE

## 2019-08-27 PROCEDURE — 99204 OFFICE O/P NEW MOD 45 MIN: CPT | Performed by: INTERNAL MEDICINE

## 2019-08-27 PROCEDURE — G8427 DOCREV CUR MEDS BY ELIG CLIN: HCPCS | Performed by: INTERNAL MEDICINE

## 2019-08-27 PROCEDURE — 1123F ACP DISCUSS/DSCN MKR DOCD: CPT | Performed by: INTERNAL MEDICINE

## 2019-08-27 PROCEDURE — G8417 CALC BMI ABV UP PARAM F/U: HCPCS | Performed by: INTERNAL MEDICINE

## 2019-08-27 RX ORDER — ALPRAZOLAM 0.25 MG/1
0.25 TABLET ORAL NIGHTLY PRN
COMMUNITY
End: 2020-07-09

## 2019-08-27 RX ORDER — POLYETHYLENE GLYCOL 3350 17 G/17G
238 POWDER ORAL DAILY
Qty: 255 G | Refills: 0 | Status: SHIPPED | OUTPATIENT
Start: 2019-08-27 | End: 2020-02-27

## 2019-08-27 NOTE — PROGRESS NOTES
patients are encouraged to check with their prescribing physician. The patient may hold Plavix, Effient, Brilinta 5 days prior to the procedure unless:       A drug eluting stent has been placed within past 12 months. A nondrug eluting stent has been placed within past 1 month. Coumadin may be held 4 days prior to the procedure unless:        Mechanical mitral valve replacement (requires heparin bridge while Coumadin held and is managed by pharmacy)      Pradaxa, Xarelto, Eliquis may be held 2-3 days prior to procedure. According to pharmacokinetics of the drug, package insert, cardiology practice patterns, and T1/2 of theses drugs (12 hrs), Eliquis and Xarelto are held 48hrs prior to any procedure, including major surgical procedures w/o       increased bleeding.  That is usually the standard of care, as coagulation would/should be normalized at 48hrs. Every attempt should be made to maintain ASA 81mg per day throughout the agustina-operative period in patients with diagnosis of ASHD. These recommendations may need to be modified by the provider/ based on risk /benefit analysis of the procedure and the patients history. If anticoagulation can not be held because recent cardiac stent, elective endoscopic procedures should be delayed until they have received the minimum duration of recommended antiplatlet therapy and it can safely be held. Again if unsure, patient should discuss with prescribing physician/service. If anticoagulation can not be stopped, endoscopic procedures can still be performed either diagnostically at a somewhat higher risk. Understand that any therapeutic procedure where anything beyond looking is performed, carries higher risks. For this reason without overt bleeding other testing       such as cologuard may be more appropriate.               High risk endoscopic procedures that require stopping antiplatelet and anticoagulation therapy

## 2019-08-27 NOTE — PATIENT INSTRUCTIONS
small intestine)  The physician who performs the procedures, known as an endoscopist, has special training in using an endoscope to examine the upper GI system, looking for inflammation (redness, irritation), bleeding, ulcers, or tumors. REASONS FOR UPPER ENDOSCOPY  The most common reasons for upper endoscopy include:  Unexplained discomfort in the upper abdomen   GERD or gastroesophageal reflux disease, (often called heartburn)   Persistent nausea and vomiting   Upper GI bleeding (vomiting blood or blood found in the stool that originated from the upper part of the gastrointestinal tract). Bleeding can be treated during the endoscopy. Difficulty swallowing; food/liquids getting stuck in the esophagus during swallowing. This may be caused by a narrowing (stricture) or tumor. The stricture may be dilated with special balloons or dilation tubes during the endoscopy. Abnormal or unclear findings on an upper GI x-ray, CT scan or MRI. Removal of a foreign body (a swallowed object). To check healing or progress on previously found polyps (growths), tumors, or ulcers. ENDOSCOPY PREPARATION  You will be given specific instructions regarding how to prepare for the examination before the procedure. These instructions are designed to maximize your safety during and after the examination and to minimize possible complications. It is important to read the instructions ahead of time and follow them carefully. Do not hesitate to call the physician's office or the endoscopy unit if there are questions. Nothing to eat after midnight the day before the test. You may be asked not to eat or drink anything for up to eight hours before the test. It is important for your stomach to be empty to allow the endoscopist to visualize the entire area and to decrease the possibility of food or fluid being vomited into the lungs while under sedation (called aspiration).   You may be asked to adjust the dose of your medications or to healthcare provider is the best source of information for questions and concerns related to your medical problem. The following organizations also provide reliable health information. [employer] (www.nlm.nih.gov/medlineplus/healthtopics. html)  The American Society of Gastrointestinal Endoscopy: (www.askasge. org)  [employer] of Diabetes and Digestive and Kidney Diseases (http://digestive. niddk.nih.gov/ddiseases/pubs/upperendoscopy/index. htm)  ______________________________________________________________________________________________________________________________________    COLONOSCOPY OVERVIEW  A colonoscopy is an exam of the lower part of the gastrointestinal tract, which is called the colon or large intestine (bowel). Colonoscopy is a safe procedure that provides information other tests may not be able to give. Patients who require colonoscopy often have questions and concerns about the procedure. Colonoscopy is performed by inserting a device called a colonoscope into the anus and advanced through the entire colon. The procedure generally takes between 20 minutes and one hour. Other tests that are sometimes used to screen for colon cancer, like virtual colonoscopy (also called CT colonography), are discussed separately. More detailed information about colonoscopy is available by subscription.     REASONS FOR COLONOSCOPY   The most common reasons for colonoscopy are to evaluate the following:        As a screening exam for colon cancer      Rectal bleeding      A change in bowel habits, like persistent diarrhea      Iron deficiency anemia (a decrease in blood count due to loss of iron)      A family history of colon cancer      As a follow-up test in people with colon polyps or colon cancer      Chronic, unexplained abdominal or rectal pain      An abnormal X-ray exam, like a barium enema or CT scan    COLONOSCOPY PREPARATION  Before colonoscopy, your colon must be completely

## 2019-08-27 NOTE — LETTER
Schedule with anesthesia provided diprivan sedation. Please provide prep of choice instructions and prescription. General guidelines for holding blood thinners/anticoagulants around endoscopic procedure are but patients are encouraged to check with their prescribing physician. The patient may hold Plavix, Effient, Brilinta 5 days prior to the procedure unless:       A drug eluting stent has been placed within past 12 months. A nondrug eluting stent has been placed within past 1 month. Coumadin may be held 4 days prior to the procedure unless:        Mechanical mitral valve replacement (requires heparin bridge while Coumadin held and is managed by pharmacy)      Pradaxa, Xarelto, Eliquis may be held 2-3 days prior to procedure. According to pharmacokinetics of the drug, package insert, cardiology practice patterns, and T1/2 of theses drugs (12 hrs), Eliquis and Xarelto are held 48hrs prior to any procedure, including major surgical procedures w/o       increased bleeding.  That is usually the standard of care, as coagulation would/should be normalized at 48hrs. Every attempt should be made to maintain ASA 81mg per day throughout the agustina-operative period in patients with diagnosis of ASHD. These recommendations may need to be modified by the provider/ based on risk /benefit analysis of the procedure and the patients history. If anticoagulation can not be held because recent cardiac stent, elective endoscopic procedures should be delayed until they have received the minimum duration of recommended antiplatlet therapy and it can safely be held. Again if unsure, patient should discuss with prescribing physician/service. If anticoagulation can not be stopped, endoscopic procedures can still be performed either diagnostically at a somewhat higher risk.   Understand
that any therapeutic procedure where anything beyond looking is performed, carries higher risks. For this reason without overt bleeding other testing       such as cologuard may be more appropriate. High risk endoscopic procedures that require stopping antiplatelet and anticoagulation therapy include polypectomy, biliary or pancreatic sphincterotomy, pneumatic or bougie dilation, PEG placement, therapeutic balloon-assisted enteroscopy, EUS and FNA, tumor ablation by any technique,       cystogastrostomy,and treatment of varices. Order Specific Question:   Screening or Diagnostic? Answer:   Diagnostic       If you have questions, please do not hesitate to call me. I look forward to following Isaak Jean along with you.     Sincerely,        Lexi Cohn 8/30/19 7:11 AM  a

## 2019-09-05 ENCOUNTER — ANESTHESIA EVENT (OUTPATIENT)
Dept: ENDOSCOPY | Age: 69
End: 2019-09-05
Payer: MEDICARE

## 2019-09-05 ASSESSMENT — ENCOUNTER SYMPTOMS: SHORTNESS OF BREATH: 1

## 2019-09-05 NOTE — ANESTHESIA PRE PROCEDURE
Department of Anesthesiology  Preprocedure Note       Name:  Vernon Uribe   Age:  78 y.o.  :  1950                                          MRN:  [id number]         Date:  2019      Surgeon: Katharine Rouse):  Delena Lanes, MD    Procedure: COLON & EGD W/ ANESTHESIA (N/A )  COLON & EGD W/ ANESTHESIA (N/A )    Medications prior to admission:   Prior to Admission medications    Medication Sig Start Date End Date Taking? Authorizing Provider   ALPRAZolam (XANAX) 0.25 MG tablet Take 0.25 mg by mouth nightly as needed for Sleep. Historical Provider, MD   polyethylene glycol (MIRALAX) POWD powder Take 238 g by mouth daily Take as directed for colonoscopy 19   Delena Lanes, MD   sertraline (ZOLOFT) 100 MG tablet TAKE 2 TABLETS BY MOUTH DAILY  Patient taking differently: Take 150 mg by mouth daily  19   Nelson Harrison DO   atorvastatin (LIPITOR) 20 MG tablet TAKE 1 TABLET BY MOUTH DAILY 19   Nelson Harrison DO   albuterol sulfate HFA (PROVENTIL HFA) 108 (90 Base) MCG/ACT inhaler Inhale 2 puffs into the lungs every 6 hours as needed for Wheezing 4/11/19 4/10/20  NESHA Rojo   fluticasone [facility]) 50 MCG/ACT nasal spray 2 sprays by Nasal route daily  Patient taking differently: 2 sprays by Nasal route as needed  4/3/19   NESHA Rojo   omeprazole (PRILOSEC) 20 MG delayed release capsule TAKE ONE CAPSULE BY MOUTH DAILY 19   Nelson Harrison DO   vitamin D (CHOLECALCIFEROL) 1000 UNIT TABS tablet Take 1,000 Units by mouth daily    Historical Provider, MD   vitamin B-12 (CYANOCOBALAMIN) 500 MCG tablet Take 2,500 mcg by mouth daily     Historical Provider, MD       Current medications:    No current facility-administered medications for this encounter. Current Outpatient Medications   Medication Sig Dispense Refill    ALPRAZolam (XANAX) 0.25 MG tablet Take 0.25 mg by mouth nightly as needed for Sleep.       polyethylene glycol (MIRALAX) POWD powder Take 238 g by mouth daily gastritis       Social History:    Social History     Tobacco Use    Smoking status: Former Smoker     Packs/day: 1.00     Years: 15.00     Pack years: 15.00     Last attempt to quit: 6/15/1983     Years since quittin.2    Smokeless tobacco: Never Used   Substance Use Topics    Alcohol use: No                                Counseling given: Not Answered      Vital Signs (Current):   Vitals:    19 1045   Weight: 172 lb (78 kg)   Height: 5' 5\" (1.651 m)                                              BP Readings from Last 3 Encounters:   19 136/80   19 (!) 149/66   19 (!) 142/71       NPO Status:                                                                                 BMI:   Wt Readings from Last 3 Encounters:   19 172 lb (78 kg)   19 175 lb (79.4 kg)   19 174 lb (78.9 kg)     Body mass index is 28.62 kg/m². CBC:   Lab Results   Component Value Date    WBC 6.0 2019    RBC 4.66 2019    HGB 11.3 2019    HCT 34.8 2019    MCV 74.6 2019    RDW 15.5 2019     2019       CMP:   Lab Results   Component Value Date     2019    K 3.5 2019     2019    CO2 24 2019    BUN 16 2019    CREATININE 0.9 2019    GFRAA >60 2019    GFRAA >60 2012    AGRATIO 1.3 2019    LABGLOM >60 2019    GLUCOSE 125 2019    PROT 7.4 2019    PROT 7.2 2012    CALCIUM 9.9 2019    BILITOT <0.2 2019    ALKPHOS 96 2019    AST 22 2019    ALT 23 2019       POC Tests: No results for input(s): POCGLU, POCNA, POCK, POCCL, POCBUN, POCHEMO, POCHCT in the last 72 hours.     Coags:   Lab Results   Component Value Date    PROTIME 11.6 09/15/2011    INR 1.06 09/15/2011       HCG (If Applicable): No results found for: PREGTESTUR, PREGSERUM, HCG, HCGQUANT     ABGs: No results found for: PHART, PO2ART, ZPC4IEL, EJP8YLN, BEART, T3HKTZAB     Type & Screen

## 2019-09-06 ENCOUNTER — ANESTHESIA (OUTPATIENT)
Dept: ENDOSCOPY | Age: 69
End: 2019-09-06
Payer: MEDICARE

## 2019-09-06 ENCOUNTER — TELEPHONE (OUTPATIENT)
Dept: GASTROENTEROLOGY | Age: 69
End: 2019-09-06

## 2019-09-06 ENCOUNTER — HOSPITAL ENCOUNTER (OUTPATIENT)
Age: 69
Setting detail: OUTPATIENT SURGERY
Discharge: HOME | End: 2019-09-06
Attending: INTERNAL MEDICINE | Admitting: INTERNAL MEDICINE
Payer: MEDICARE

## 2019-09-06 VITALS
RESPIRATION RATE: 16 BRPM | BODY MASS INDEX: 28.16 KG/M2 | WEIGHT: 169 LBS | DIASTOLIC BLOOD PRESSURE: 68 MMHG | HEART RATE: 66 BPM | SYSTOLIC BLOOD PRESSURE: 112 MMHG | HEIGHT: 65 IN | OXYGEN SATURATION: 98 % | TEMPERATURE: 97.8 F

## 2019-09-06 VITALS — DIASTOLIC BLOOD PRESSURE: 53 MMHG | SYSTOLIC BLOOD PRESSURE: 97 MMHG | OXYGEN SATURATION: 99 %

## 2019-09-06 DIAGNOSIS — D50.9 IRON DEFICIENCY ANEMIA, UNSPECIFIED IRON DEFICIENCY ANEMIA TYPE: ICD-10-CM

## 2019-09-06 LAB
BASOPHILS ABSOLUTE: 0 K/UL (ref 0–0.2)
BASOPHILS RELATIVE PERCENT: 0.6 %
EOSINOPHILS ABSOLUTE: 0 K/UL (ref 0–0.6)
EOSINOPHILS RELATIVE PERCENT: 0.5 %
FERRITIN: 14.2 NG/ML (ref 15–150)
HCT VFR BLD CALC: 34.2 % (ref 36–48)
HEMOGLOBIN: 11 G/DL (ref 12–16)
IRON SATURATION: 11 % (ref 15–50)
IRON: 43 UG/DL (ref 37–145)
LYMPHOCYTES ABSOLUTE: 1.4 K/UL (ref 1–5.1)
LYMPHOCYTES RELATIVE PERCENT: 23.6 %
MCH RBC QN AUTO: 23.1 PG (ref 26–34)
MCHC RBC AUTO-ENTMCNC: 32.1 G/DL (ref 31–36)
MCV RBC AUTO: 72 FL (ref 80–100)
MONOCYTES ABSOLUTE: 0.3 K/UL (ref 0–1.3)
MONOCYTES RELATIVE PERCENT: 5.1 %
NEUTROPHILS ABSOLUTE: 4.1 K/UL (ref 1.7–7.7)
NEUTROPHILS RELATIVE PERCENT: 70.2 %
PDW BLD-RTO: 16.2 % (ref 12.4–15.4)
PLATELET # BLD: 193 K/UL (ref 135–450)
PMV BLD AUTO: 10 FL (ref 5–10.5)
RBC # BLD: 4.75 M/UL (ref 4–5.2)
TOTAL IRON BINDING CAPACITY: 386 UG/DL (ref 260–445)
WBC # BLD: 5.8 K/UL (ref 4–11)

## 2019-09-06 PROCEDURE — 2580000003 HC RX 258: Performed by: NURSE ANESTHETIST, CERTIFIED REGISTERED

## 2019-09-06 PROCEDURE — 2580000003 HC RX 258: Performed by: INTERNAL MEDICINE

## 2019-09-06 PROCEDURE — 82728 ASSAY OF FERRITIN: CPT

## 2019-09-06 PROCEDURE — 83540 ASSAY OF IRON: CPT

## 2019-09-06 PROCEDURE — 7100000010 HC PHASE II RECOVERY - FIRST 15 MIN: Performed by: INTERNAL MEDICINE

## 2019-09-06 PROCEDURE — 45378 DIAGNOSTIC COLONOSCOPY: CPT | Performed by: INTERNAL MEDICINE

## 2019-09-06 PROCEDURE — 3609017100 HC EGD: Performed by: INTERNAL MEDICINE

## 2019-09-06 PROCEDURE — 3700000001 HC ADD 15 MINUTES (ANESTHESIA): Performed by: INTERNAL MEDICINE

## 2019-09-06 PROCEDURE — 2500000003 HC RX 250 WO HCPCS: Performed by: NURSE ANESTHETIST, CERTIFIED REGISTERED

## 2019-09-06 PROCEDURE — 2709999900 HC NON-CHARGEABLE SUPPLY: Performed by: INTERNAL MEDICINE

## 2019-09-06 PROCEDURE — 43239 EGD BIOPSY SINGLE/MULTIPLE: CPT | Performed by: INTERNAL MEDICINE

## 2019-09-06 PROCEDURE — 6360000002 HC RX W HCPCS: Performed by: NURSE ANESTHETIST, CERTIFIED REGISTERED

## 2019-09-06 PROCEDURE — 43450 DILATE ESOPHAGUS 1/MULT PASS: CPT | Performed by: INTERNAL MEDICINE

## 2019-09-06 PROCEDURE — 83550 IRON BINDING TEST: CPT

## 2019-09-06 PROCEDURE — 3609012400 HC EGD TRANSORAL BIOPSY SINGLE/MULTIPLE: Performed by: INTERNAL MEDICINE

## 2019-09-06 PROCEDURE — 85025 COMPLETE CBC W/AUTO DIFF WBC: CPT

## 2019-09-06 PROCEDURE — 88305 TISSUE EXAM BY PATHOLOGIST: CPT

## 2019-09-06 PROCEDURE — 7100000011 HC PHASE II RECOVERY - ADDTL 15 MIN: Performed by: INTERNAL MEDICINE

## 2019-09-06 PROCEDURE — 3609012700 HC EGD DILATION SAVORY: Performed by: INTERNAL MEDICINE

## 2019-09-06 PROCEDURE — 3700000000 HC ANESTHESIA ATTENDED CARE: Performed by: INTERNAL MEDICINE

## 2019-09-06 RX ORDER — LABETALOL HYDROCHLORIDE 5 MG/ML
5 INJECTION, SOLUTION INTRAVENOUS EVERY 10 MIN PRN
Status: DISCONTINUED | OUTPATIENT
Start: 2019-09-06 | End: 2019-09-06 | Stop reason: HOSPADM

## 2019-09-06 RX ORDER — OXYCODONE HYDROCHLORIDE AND ACETAMINOPHEN 5; 325 MG/1; MG/1
2 TABLET ORAL PRN
Status: DISCONTINUED | OUTPATIENT
Start: 2019-09-06 | End: 2019-09-06 | Stop reason: HOSPADM

## 2019-09-06 RX ORDER — SODIUM CHLORIDE, SODIUM LACTATE, POTASSIUM CHLORIDE, CALCIUM CHLORIDE 600; 310; 30; 20 MG/100ML; MG/100ML; MG/100ML; MG/100ML
INJECTION, SOLUTION INTRAVENOUS ONCE
Status: COMPLETED | OUTPATIENT
Start: 2019-09-06 | End: 2019-09-06

## 2019-09-06 RX ORDER — PROMETHAZINE HYDROCHLORIDE 25 MG/ML
6.25 INJECTION, SOLUTION INTRAMUSCULAR; INTRAVENOUS
Status: DISCONTINUED | OUTPATIENT
Start: 2019-09-06 | End: 2019-09-06 | Stop reason: HOSPADM

## 2019-09-06 RX ORDER — SODIUM CHLORIDE, SODIUM LACTATE, POTASSIUM CHLORIDE, CALCIUM CHLORIDE 600; 310; 30; 20 MG/100ML; MG/100ML; MG/100ML; MG/100ML
INJECTION, SOLUTION INTRAVENOUS CONTINUOUS
Status: DISCONTINUED | OUTPATIENT
Start: 2019-09-06 | End: 2019-09-06 | Stop reason: HOSPADM

## 2019-09-06 RX ORDER — SODIUM CHLORIDE, SODIUM LACTATE, POTASSIUM CHLORIDE, CALCIUM CHLORIDE 600; 310; 30; 20 MG/100ML; MG/100ML; MG/100ML; MG/100ML
INJECTION, SOLUTION INTRAVENOUS CONTINUOUS PRN
Status: DISCONTINUED | OUTPATIENT
Start: 2019-09-06 | End: 2019-09-06 | Stop reason: SDUPTHER

## 2019-09-06 RX ORDER — ONDANSETRON 2 MG/ML
4 INJECTION INTRAMUSCULAR; INTRAVENOUS PRN
Status: DISCONTINUED | OUTPATIENT
Start: 2019-09-06 | End: 2019-09-06 | Stop reason: HOSPADM

## 2019-09-06 RX ORDER — DIPHENHYDRAMINE HYDROCHLORIDE 50 MG/ML
12.5 INJECTION INTRAMUSCULAR; INTRAVENOUS
Status: DISCONTINUED | OUTPATIENT
Start: 2019-09-06 | End: 2019-09-06 | Stop reason: HOSPADM

## 2019-09-06 RX ORDER — HYDRALAZINE HYDROCHLORIDE 20 MG/ML
5 INJECTION INTRAMUSCULAR; INTRAVENOUS EVERY 10 MIN PRN
Status: DISCONTINUED | OUTPATIENT
Start: 2019-09-06 | End: 2019-09-06 | Stop reason: HOSPADM

## 2019-09-06 RX ORDER — MORPHINE SULFATE 2 MG/ML
1 INJECTION, SOLUTION INTRAMUSCULAR; INTRAVENOUS EVERY 5 MIN PRN
Status: DISCONTINUED | OUTPATIENT
Start: 2019-09-06 | End: 2019-09-06 | Stop reason: HOSPADM

## 2019-09-06 RX ORDER — LIDOCAINE HYDROCHLORIDE 20 MG/ML
INJECTION, SOLUTION INFILTRATION; PERINEURAL PRN
Status: DISCONTINUED | OUTPATIENT
Start: 2019-09-06 | End: 2019-09-06 | Stop reason: SDUPTHER

## 2019-09-06 RX ORDER — MEPERIDINE HYDROCHLORIDE 50 MG/ML
12.5 INJECTION INTRAMUSCULAR; INTRAVENOUS; SUBCUTANEOUS EVERY 5 MIN PRN
Status: DISCONTINUED | OUTPATIENT
Start: 2019-09-06 | End: 2019-09-06 | Stop reason: HOSPADM

## 2019-09-06 RX ORDER — OXYCODONE HYDROCHLORIDE AND ACETAMINOPHEN 5; 325 MG/1; MG/1
1 TABLET ORAL PRN
Status: DISCONTINUED | OUTPATIENT
Start: 2019-09-06 | End: 2019-09-06 | Stop reason: HOSPADM

## 2019-09-06 RX ORDER — MORPHINE SULFATE 2 MG/ML
2 INJECTION, SOLUTION INTRAMUSCULAR; INTRAVENOUS EVERY 5 MIN PRN
Status: DISCONTINUED | OUTPATIENT
Start: 2019-09-06 | End: 2019-09-06 | Stop reason: HOSPADM

## 2019-09-06 RX ORDER — PROPOFOL 10 MG/ML
INJECTION, EMULSION INTRAVENOUS PRN
Status: DISCONTINUED | OUTPATIENT
Start: 2019-09-06 | End: 2019-09-06 | Stop reason: SDUPTHER

## 2019-09-06 RX ADMIN — PROPOFOL 200 MG: 10 INJECTION, EMULSION INTRAVENOUS at 07:36

## 2019-09-06 RX ADMIN — PROPOFOL 20 MG: 10 INJECTION, EMULSION INTRAVENOUS at 07:50

## 2019-09-06 RX ADMIN — PROPOFOL 30 MG: 10 INJECTION, EMULSION INTRAVENOUS at 07:48

## 2019-09-06 RX ADMIN — PROPOFOL 50 MG: 10 INJECTION, EMULSION INTRAVENOUS at 07:54

## 2019-09-06 RX ADMIN — PROPOFOL 50 MG: 10 INJECTION, EMULSION INTRAVENOUS at 07:43

## 2019-09-06 RX ADMIN — PROPOFOL 50 MG: 10 INJECTION, EMULSION INTRAVENOUS at 07:46

## 2019-09-06 RX ADMIN — SODIUM CHLORIDE, POTASSIUM CHLORIDE, SODIUM LACTATE AND CALCIUM CHLORIDE: 600; 310; 30; 20 INJECTION, SOLUTION INTRAVENOUS at 07:15

## 2019-09-06 RX ADMIN — SODIUM CHLORIDE, POTASSIUM CHLORIDE, SODIUM LACTATE AND CALCIUM CHLORIDE: 600; 310; 30; 20 INJECTION, SOLUTION INTRAVENOUS at 07:35

## 2019-09-06 RX ADMIN — PROPOFOL 50 MG: 10 INJECTION, EMULSION INTRAVENOUS at 07:42

## 2019-09-06 RX ADMIN — LIDOCAINE HYDROCHLORIDE 60 MG: 20 INJECTION, SOLUTION INFILTRATION; PERINEURAL at 07:36

## 2019-09-06 ASSESSMENT — PAIN - FUNCTIONAL ASSESSMENT: PAIN_FUNCTIONAL_ASSESSMENT: 0-10

## 2019-09-06 ASSESSMENT — PAIN SCALES - GENERAL
PAINLEVEL_OUTOF10: 0
PAINLEVEL_OUTOF10: 0

## 2019-09-06 NOTE — H&P
168 lb (76.2 kg)   08/25/11 166 lb (75.3 kg)   07/07/11 168 lb (76.2 kg)   06/10/11 168 lb (76.2 kg)   06/10/11 166 lb (75.3 kg)   04/27/11 170 lb (77.1 kg)   03/25/11 174 lb (78.9 kg)   08/26/10 178 lb (80.7 kg)   05/27/10 177 lb (80.3 kg)   02/19/10 179 lb (81.2 kg)   02/08/10 179 lb (81.2 kg)         No components found for: HGBA1C      BP Readings from Last 3 Encounters:   08/27/19 136/80   08/22/19 (!) 149/66   08/06/19 (!) 142/71           Health Maintenance   Topic Date Due    Shingles Vaccine (1 of 2) 08/06/1999    DTaP/Tdap/Td vaccine (2 - Td) 01/01/2011    Annual Wellness Visit (AWV)  08/06/2012    DEXA (modify frequency per FRAX score)  08/06/2014    Flu vaccine (1) 09/01/2019    Breast cancer screen  11/09/2019    Diabetes screen  04/08/2022    Colon cancer screen colonoscopy  04/01/2023    Lipid screen  04/08/2024    Pneumococcal 65+ years Vaccine  Completed    Hepatitis C screen  Completed         No components found for: SURGICALPATH      PAST MEDICAL HISTORY      Past Medical History   Past Medical History:   Diagnosis Date    Allergic rhinitis      Anxiety 10/10/2011    Depression 3/22/2017    GERD (gastroesophageal reflux disease)      Hallux valgus      Headache(784.0)      Herpes simplex      Irritable bowel syndrome      Kidney stones      Osteopenia      Other and unspecified hyperlipidemia 4/2/2013    Pain medication agreement signed 10/10/2011         FAMILY HISTORY      Family History         Family History   Problem Relation Age of Onset    Asthma Mother      Heart Disease Father           SOCIAL HISTORY      Social History               Socioeconomic History    Marital status:         Spouse name: Not on file    Number of children: Not on file    Years of education: Not on file    Highest education level: Not on file   Occupational History    Not on file   Social Needs    Financial resource strain: Not on file    Food insecurity:       Worry: Not on month.         Coumadin may be held 4 days prior to the procedure unless:           Mechanical mitral valve replacement (requires heparin bridge while Coumadin held and is managed by pharmacy)         Pradaxa, Xarelto, Eliquis may be held 2-3 days prior to procedure. According to pharmacokinetics of the drug, package insert, cardiology practice patterns, and T1/2 of theses drugs (12 hrs), Eliquis and Xarelto are held 48hrs prior to any procedure, including major surgical procedures w/o          increased bleeding.  That is usually the standard of care, as coagulation would/should be normalized at 48hrs.         Every attempt should be made to maintain ASA 81mg per day throughout the agustina-operative period in patients with diagnosis of ASHD.       These recommendations may need to be modified by the provider/ based on risk /benefit analysis of the procedure and the patients history.                   If anticoagulation can not be held because recent cardiac stent, elective endoscopic procedures should be delayed until they have received the minimum duration of recommended antiplatlet therapy and it can safely be held. Again if unsure, patient should discuss with prescribing physician/service.                   If anticoagulation can not be stopped, endoscopic procedures can still be performed either diagnostically at a somewhat higher risk. Understand that any therapeutic procedure where anything beyond looking is performed, carries higher risks. For this reason without overt bleeding other testing          such as cologuard may be more appropriate.                     High risk endoscopic procedures that require stopping antiplatelet and anticoagulation therapy include polypectomy, biliary or pancreatic sphincterotomy, pneumatic or bougie dilation, PEG placement, therapeutic balloon-assisted enteroscopy, EUS and FNA, tumor ablation by any technique,          cystogastrostomy,and treatment of varices. be stopped, endoscopic procedures can still be performed either diagnostically at a somewhat higher risk. Understand that any therapeutic procedure where anything beyond looking is performed, carries higher risks. For this reason without overt bleeding other testing          such as cologuard may be more appropriate.                     High risk endoscopic procedures that require stopping antiplatelet and anticoagulation therapy include polypectomy, biliary or pancreatic sphincterotomy, pneumatic or bougie dilation, PEG placement, therapeutic balloon-assisted enteroscopy, EUS and FNA, tumor ablation by any technique,          cystogastrostomy,and treatment of varices.       Order Specific Question:   Screening or Diagnostic?       Answer:   Diagnostic      [facility] was seen today for establish care.     Diagnoses and all orders for this visit:     Iron deficiency anemia, unspecified iron deficiency anemia type  -     EGD  -     COLONOSCOPY W/ OR W/O BIOPSY  -     bisacodyl (DULCOLAX) 5 MG EC tablet; Take 4 tablets by mouth once for 1 dose Take as directed for colonoscopy. -     polyethylene glycol (MIRALAX) POWD powder; Take 238 g by mouth daily Take as directed for colonoscopy     Chest pain, unspecified type  -     EGD  -     bisacodyl (DULCOLAX) 5 MG EC tablet; Take 4 tablets by mouth once for 1 dose Take as directed for colonoscopy. -     polyethylene glycol (MIRALAX) POWD powder; Take 238 g by mouth daily Take as directed for colonoscopy     Esophageal dysphagia  -     EGD  -     bisacodyl (DULCOLAX) 5 MG EC tablet; Take 4 tablets by mouth once for 1 dose Take as directed for colonoscopy. -     polyethylene glycol (MIRALAX) POWD powder; Take 238 g by mouth daily Take as directed for colonoscopy     Dyspepsia  -     EGD  -     bisacodyl (DULCOLAX) 5 MG EC tablet; Take 4 tablets by mouth once for 1 dose Take as directed for colonoscopy. -     polyethylene glycol (MIRALAX) POWD powder;  Take 238 g by mouth

## 2019-09-09 ENCOUNTER — TELEPHONE (OUTPATIENT)
Dept: PULMONOLOGY | Age: 69
End: 2019-09-09

## 2019-09-09 NOTE — TELEPHONE ENCOUNTER
Patient cancelled appointment on 9/19/19 with Dr. Nilda Rashid for 4 wk fu.    Reason: Dr. Nilda Rashid was in ICU    Patient did not reschedule appointment.     Appointment rescheduled for (Pt does not feel like she needs follow up at this time)

## 2019-09-10 ENCOUNTER — TELEPHONE (OUTPATIENT)
Dept: FAMILY MEDICINE CLINIC | Age: 69
End: 2019-09-10

## 2019-09-10 NOTE — TELEPHONE ENCOUNTER
Patient would like the sertaline sent in as two separate scripts for 100mg and 50mg tablets she is having trouble cutting the tablets.

## 2019-10-17 ENCOUNTER — OFFICE VISIT (OUTPATIENT)
Dept: FAMILY MEDICINE CLINIC | Age: 69
End: 2019-10-17
Payer: MEDICARE

## 2019-10-17 VITALS
HEIGHT: 65 IN | OXYGEN SATURATION: 98 % | HEART RATE: 91 BPM | SYSTOLIC BLOOD PRESSURE: 130 MMHG | DIASTOLIC BLOOD PRESSURE: 65 MMHG | WEIGHT: 171.4 LBS | BODY MASS INDEX: 28.56 KG/M2

## 2019-10-17 DIAGNOSIS — D50.9 IRON DEFICIENCY ANEMIA, UNSPECIFIED IRON DEFICIENCY ANEMIA TYPE: ICD-10-CM

## 2019-10-17 DIAGNOSIS — K21.00 GASTROESOPHAGEAL REFLUX DISEASE WITH ESOPHAGITIS: ICD-10-CM

## 2019-10-17 DIAGNOSIS — F32.A DEPRESSION, UNSPECIFIED DEPRESSION TYPE: ICD-10-CM

## 2019-10-17 DIAGNOSIS — F41.9 ANXIETY: Primary | ICD-10-CM

## 2019-10-17 LAB
BASOPHILS ABSOLUTE: 0 K/UL (ref 0–0.2)
BASOPHILS RELATIVE PERCENT: 0.5 %
EOSINOPHILS ABSOLUTE: 0.1 K/UL (ref 0–0.6)
EOSINOPHILS RELATIVE PERCENT: 1 %
HCT VFR BLD CALC: 38.5 % (ref 36–48)
HEMOGLOBIN: 12.5 G/DL (ref 12–16)
LYMPHOCYTES ABSOLUTE: 1.9 K/UL (ref 1–5.1)
LYMPHOCYTES RELATIVE PERCENT: 32.6 %
MCH RBC QN AUTO: 23.4 PG (ref 26–34)
MCHC RBC AUTO-ENTMCNC: 32.4 G/DL (ref 31–36)
MCV RBC AUTO: 72.5 FL (ref 80–100)
MONOCYTES ABSOLUTE: 0.3 K/UL (ref 0–1.3)
MONOCYTES RELATIVE PERCENT: 5.5 %
NEUTROPHILS ABSOLUTE: 3.5 K/UL (ref 1.7–7.7)
NEUTROPHILS RELATIVE PERCENT: 60.4 %
PDW BLD-RTO: 18.1 % (ref 12.4–15.4)
PLATELET # BLD: 198 K/UL (ref 135–450)
PMV BLD AUTO: 10.2 FL (ref 5–10.5)
RBC # BLD: 5.32 M/UL (ref 4–5.2)
WBC # BLD: 5.9 K/UL (ref 4–11)

## 2019-10-17 PROCEDURE — 99214 OFFICE O/P EST MOD 30 MIN: CPT | Performed by: FAMILY MEDICINE

## 2019-10-17 PROCEDURE — 1123F ACP DISCUSS/DSCN MKR DOCD: CPT | Performed by: FAMILY MEDICINE

## 2019-10-17 PROCEDURE — G8427 DOCREV CUR MEDS BY ELIG CLIN: HCPCS | Performed by: FAMILY MEDICINE

## 2019-10-17 PROCEDURE — G8400 PT W/DXA NO RESULTS DOC: HCPCS | Performed by: FAMILY MEDICINE

## 2019-10-17 PROCEDURE — G8417 CALC BMI ABV UP PARAM F/U: HCPCS | Performed by: FAMILY MEDICINE

## 2019-10-17 PROCEDURE — G8484 FLU IMMUNIZE NO ADMIN: HCPCS | Performed by: FAMILY MEDICINE

## 2019-10-17 PROCEDURE — 3017F COLORECTAL CA SCREEN DOC REV: CPT | Performed by: FAMILY MEDICINE

## 2019-10-17 PROCEDURE — 1090F PRES/ABSN URINE INCON ASSESS: CPT | Performed by: FAMILY MEDICINE

## 2019-10-17 PROCEDURE — 1036F TOBACCO NON-USER: CPT | Performed by: FAMILY MEDICINE

## 2019-10-17 PROCEDURE — 3288F FALL RISK ASSESSMENT DOCD: CPT | Performed by: FAMILY MEDICINE

## 2019-10-17 PROCEDURE — 4040F PNEUMOC VAC/ADMIN/RCVD: CPT | Performed by: FAMILY MEDICINE

## 2019-10-17 ASSESSMENT — ENCOUNTER SYMPTOMS
SHORTNESS OF BREATH: 0
ABDOMINAL PAIN: 0
COUGH: 0
BLOOD IN STOOL: 0

## 2019-10-21 ENCOUNTER — CARE COORDINATION (OUTPATIENT)
Dept: CARE COORDINATION | Age: 69
End: 2019-10-21

## 2019-10-24 ENCOUNTER — OFFICE VISIT (OUTPATIENT)
Dept: FAMILY MEDICINE CLINIC | Age: 69
End: 2019-10-24
Payer: MEDICARE

## 2019-10-24 ENCOUNTER — CARE COORDINATION (OUTPATIENT)
Dept: CARE COORDINATION | Age: 69
End: 2019-10-24

## 2019-10-24 VITALS
OXYGEN SATURATION: 98 % | BODY MASS INDEX: 28.66 KG/M2 | DIASTOLIC BLOOD PRESSURE: 82 MMHG | HEIGHT: 65 IN | SYSTOLIC BLOOD PRESSURE: 122 MMHG | RESPIRATION RATE: 16 BRPM | TEMPERATURE: 98.2 F | WEIGHT: 172 LBS | HEART RATE: 83 BPM

## 2019-10-24 DIAGNOSIS — Z23 FLU VACCINE NEED: ICD-10-CM

## 2019-10-24 DIAGNOSIS — R45.86 MOOD SWINGS: ICD-10-CM

## 2019-10-24 DIAGNOSIS — F33.1 MODERATE EPISODE OF RECURRENT MAJOR DEPRESSIVE DISORDER (HCC): ICD-10-CM

## 2019-10-24 DIAGNOSIS — R45.86 MOOD SWINGS: Primary | ICD-10-CM

## 2019-10-24 DIAGNOSIS — F41.9 ANXIETY: ICD-10-CM

## 2019-10-24 PROCEDURE — G8400 PT W/DXA NO RESULTS DOC: HCPCS | Performed by: NURSE PRACTITIONER

## 2019-10-24 PROCEDURE — 99213 OFFICE O/P EST LOW 20 MIN: CPT | Performed by: NURSE PRACTITIONER

## 2019-10-24 PROCEDURE — G8482 FLU IMMUNIZE ORDER/ADMIN: HCPCS | Performed by: NURSE PRACTITIONER

## 2019-10-24 PROCEDURE — 1036F TOBACCO NON-USER: CPT | Performed by: NURSE PRACTITIONER

## 2019-10-24 PROCEDURE — 90653 IIV ADJUVANT VACCINE IM: CPT | Performed by: NURSE PRACTITIONER

## 2019-10-24 PROCEDURE — 1123F ACP DISCUSS/DSCN MKR DOCD: CPT | Performed by: NURSE PRACTITIONER

## 2019-10-24 PROCEDURE — G8417 CALC BMI ABV UP PARAM F/U: HCPCS | Performed by: NURSE PRACTITIONER

## 2019-10-24 PROCEDURE — G0008 ADMIN INFLUENZA VIRUS VAC: HCPCS | Performed by: NURSE PRACTITIONER

## 2019-10-24 PROCEDURE — 1090F PRES/ABSN URINE INCON ASSESS: CPT | Performed by: NURSE PRACTITIONER

## 2019-10-24 PROCEDURE — G8427 DOCREV CUR MEDS BY ELIG CLIN: HCPCS | Performed by: NURSE PRACTITIONER

## 2019-10-24 PROCEDURE — 4040F PNEUMOC VAC/ADMIN/RCVD: CPT | Performed by: NURSE PRACTITIONER

## 2019-10-24 PROCEDURE — 3017F COLORECTAL CA SCREEN DOC REV: CPT | Performed by: NURSE PRACTITIONER

## 2019-10-24 RX ORDER — LAMOTRIGINE 100 MG/1
100 TABLET ORAL DAILY
Qty: 30 TABLET | Refills: 3 | Status: SHIPPED | OUTPATIENT
Start: 2019-10-24 | End: 2019-10-24 | Stop reason: SDUPTHER

## 2019-10-24 RX ORDER — SERTRALINE HYDROCHLORIDE 100 MG/1
100 TABLET, FILM COATED ORAL DAILY
COMMUNITY
End: 2019-11-12

## 2019-10-25 RX ORDER — LAMOTRIGINE 100 MG/1
100 TABLET ORAL DAILY
Qty: 90 TABLET | Refills: 1 | Status: SHIPPED | OUTPATIENT
Start: 2019-10-25 | End: 2020-02-27 | Stop reason: DRUGHIGH

## 2019-10-30 ENCOUNTER — CARE COORDINATION (OUTPATIENT)
Dept: CARE COORDINATION | Age: 69
End: 2019-10-30

## 2019-10-30 PROBLEM — R45.86 MOOD SWINGS: Status: ACTIVE | Noted: 2019-10-30

## 2019-10-30 PROBLEM — F33.1 MODERATE EPISODE OF RECURRENT MAJOR DEPRESSIVE DISORDER (HCC): Status: ACTIVE | Noted: 2017-03-22

## 2019-10-30 ASSESSMENT — ENCOUNTER SYMPTOMS
CHEST TIGHTNESS: 0
CONSTIPATION: 0

## 2019-11-06 ENCOUNTER — CARE COORDINATION (OUTPATIENT)
Dept: CARE COORDINATION | Age: 69
End: 2019-11-06

## 2019-11-14 ENCOUNTER — CARE COORDINATION (OUTPATIENT)
Dept: CARE COORDINATION | Age: 69
End: 2019-11-14

## 2019-12-03 ENCOUNTER — CARE COORDINATION (OUTPATIENT)
Dept: CARE COORDINATION | Age: 69
End: 2019-12-03

## 2020-01-21 ENCOUNTER — TELEPHONE (OUTPATIENT)
Dept: FAMILY MEDICINE CLINIC | Age: 70
End: 2020-01-21

## 2020-01-21 ENCOUNTER — CARE COORDINATION (OUTPATIENT)
Dept: CARE COORDINATION | Age: 70
End: 2020-01-21

## 2020-01-21 NOTE — CARE COORDINATION
Received a call from Leslie Lopze. She states that she has been very jittery and anxious. She would like to schedule an appointment with the provider her prescribed her Edison Dupree 23 NP, prescribed in Oct 2019.  Office visit with Kim Villanueva scheduled for 1/27/20 at 7:15PM.

## 2020-01-21 NOTE — TELEPHONE ENCOUNTER
Pt. States she is having mood swings and is becoming \"mean\"  States she has made some enemies lately. She was started on Lamictal in Sept. And doesn't know if that isn't working or if it is causing her \"meaness\" to escalate. States she can feel her emotions in her chest but it is not heart related. Please advise.

## 2020-01-22 NOTE — TELEPHONE ENCOUNTER
Patient states she's already taking 1/2 tablet, but now she is switching back to 1 whole tablet. She see's Kim Villanueva on the [facility] is the one who prescribed it to her. Any suggestions on what she is to do until her appt? Please advise.

## 2020-01-27 ENCOUNTER — OFFICE VISIT (OUTPATIENT)
Dept: FAMILY MEDICINE CLINIC | Age: 70
End: 2020-01-27
Payer: MEDICARE

## 2020-01-27 VITALS
OXYGEN SATURATION: 97 % | HEART RATE: 74 BPM | WEIGHT: 173 LBS | DIASTOLIC BLOOD PRESSURE: 76 MMHG | BODY MASS INDEX: 28.82 KG/M2 | HEIGHT: 65 IN | SYSTOLIC BLOOD PRESSURE: 138 MMHG

## 2020-01-27 PROCEDURE — G8427 DOCREV CUR MEDS BY ELIG CLIN: HCPCS | Performed by: NURSE PRACTITIONER

## 2020-01-27 PROCEDURE — 99213 OFFICE O/P EST LOW 20 MIN: CPT | Performed by: NURSE PRACTITIONER

## 2020-01-27 PROCEDURE — 1123F ACP DISCUSS/DSCN MKR DOCD: CPT | Performed by: NURSE PRACTITIONER

## 2020-01-27 PROCEDURE — G8482 FLU IMMUNIZE ORDER/ADMIN: HCPCS | Performed by: NURSE PRACTITIONER

## 2020-01-27 PROCEDURE — G8417 CALC BMI ABV UP PARAM F/U: HCPCS | Performed by: NURSE PRACTITIONER

## 2020-01-27 PROCEDURE — 1036F TOBACCO NON-USER: CPT | Performed by: NURSE PRACTITIONER

## 2020-01-27 PROCEDURE — 4040F PNEUMOC VAC/ADMIN/RCVD: CPT | Performed by: NURSE PRACTITIONER

## 2020-01-27 PROCEDURE — G8400 PT W/DXA NO RESULTS DOC: HCPCS | Performed by: NURSE PRACTITIONER

## 2020-01-27 PROCEDURE — 1090F PRES/ABSN URINE INCON ASSESS: CPT | Performed by: NURSE PRACTITIONER

## 2020-01-27 PROCEDURE — 3017F COLORECTAL CA SCREEN DOC REV: CPT | Performed by: NURSE PRACTITIONER

## 2020-01-27 RX ORDER — ALPRAZOLAM 0.25 MG/1
0.25 TABLET ORAL NIGHTLY PRN
Status: CANCELLED | OUTPATIENT
Start: 2020-01-27

## 2020-01-27 RX ORDER — QUETIAPINE FUMARATE 25 MG/1
25 TABLET, FILM COATED ORAL NIGHTLY
Qty: 30 TABLET | Refills: 3 | Status: SHIPPED | OUTPATIENT
Start: 2020-01-27 | End: 2020-02-27

## 2020-01-27 ASSESSMENT — ENCOUNTER SYMPTOMS
CHEST TIGHTNESS: 0
NAUSEA: 0
DIARRHEA: 0

## 2020-01-28 NOTE — PROGRESS NOTES
Subjective:      Patient ID: Casimiro Romero is a 78 y.o. female. HPI     For follow up anxiety. Was taking lamictal 100mg daily and felt nausea and headache. Changed to 1/2 tablet and then headache and nausea subsided but mood not good. Increased to 100 mg and no headache and nausea. No longer crying. Angry with everyone, even herself. Sleep 6-7 hours nightly. Dreams a lot and wakes up frequently. Has to talk or hum to herself. Talking to herself as well. Better past few days. Stopped xanax    Daughter is irritating her by just talking to her. Asked her family to stay away. Retired. Reports \"used to have problems remembering but denies currently     Hasn't gambled since 10/17/2017. Has a note with this date on it. Review of Systems   Constitutional: Negative for chills, fever and unexpected weight change. Respiratory: Negative for chest tightness. Cardiovascular: Negative for chest pain and palpitations. Gastrointestinal: Negative for diarrhea and nausea. Musculoskeletal: Negative for arthralgias. Neurological: Negative for dizziness and headaches. Psychiatric/Behavioral: Positive for agitation and dysphoric mood. The patient is nervous/anxious. Objective:   Physical Exam  Constitutional:       Appearance: Normal appearance. She is well-developed. HENT:      Head: Normocephalic and atraumatic. Neck:      Musculoskeletal: Normal range of motion and neck supple. Thyroid: No thyromegaly. Cardiovascular:      Rate and Rhythm: Normal rate and regular rhythm. Pulmonary:      Effort: Pulmonary effort is normal.      Breath sounds: Normal breath sounds. Abdominal:      General: Bowel sounds are normal.      Palpations: Abdomen is soft. Musculoskeletal: Normal range of motion. Skin:     General: Skin is warm. Neurological:      Mental Status: She is alert. Comments: Using noted for detailed discussion. When in casual conversation relaxed, complete sentences. Psychiatric:         Behavior: Behavior normal.       Current Outpatient Medications   Medication Sig Dispense Refill    QUEtiapine (SEROQUEL) 25 MG tablet Take 1 tablet by mouth nightly 30 tablet 3    omeprazole (PRILOSEC) 20 MG delayed release capsule TAKE ONE CAPSULE BY MOUTH DAILY 90 capsule 1    sertraline (ZOLOFT) 100 MG tablet TAKE 2 TABLETS BY MOUTH DAILY (Patient taking differently: Take 150 mg by mouth daily Takes 1 100mg and 1-50 mg) 180 tablet 1    lamoTRIgine (LAMICTAL) 100 MG tablet TAKE 1 TABLET BY MOUTH DAILY 90 tablet 1    ALPRAZolam (XANAX) 0.25 MG tablet Take 0.25 mg by mouth nightly as needed for Sleep.  atorvastatin (LIPITOR) 20 MG tablet TAKE 1 TABLET BY MOUTH DAILY 90 tablet 3    albuterol sulfate HFA (PROVENTIL HFA) 108 (90 Base) MCG/ACT inhaler Inhale 2 puffs into the lungs every 6 hours as needed for Wheezing 1 Inhaler 0    vitamin D (CHOLECALCIFEROL) 1000 UNIT TABS tablet Take 1,000 Units by mouth daily      vitamin B-12 (CYANOCOBALAMIN) 500 MCG tablet Take 2,500 mcg by mouth daily       polyethylene glycol (MIRALAX) POWD powder Take 238 g by mouth daily Take as directed for colonoscopy (Patient not taking: Reported on 1/27/2020) 255 g 0    fluticasone (FLONASE) 50 MCG/ACT nasal spray 2 sprays by Nasal route daily (Patient not taking: Reported on 1/27/2020) 1 Bottle 11     No current facility-administered medications for this visit. Assessment:      1. Anxiety  2. Anger  3. Bipolar disorder  4. Consideration for memory loss      Plan:      1. Will add seroquel for anger, bipolar disorder    2. Follow up in 4 weeks. 3. Consideration for labs. Consider checking vit B 12    4. Tiffanie Jesúslorena was seen today for anxiety. Diagnoses and all orders for this visit:    Anxiety  -     QUEtiapine (SEROQUEL) 25 MG tablet; Take 1 tablet by mouth nightly    Post-menopausal  -     DEXA Bone Density Axial Skeleton;  Future    Encounter for screening mammogram for breast cancer  -

## 2020-02-11 ENCOUNTER — HOSPITAL ENCOUNTER (OUTPATIENT)
Dept: WOMENS IMAGING | Age: 70
Discharge: HOME | End: 2020-02-11
Payer: MEDICARE

## 2020-02-11 PROCEDURE — 77063 BREAST TOMOSYNTHESIS BI: CPT

## 2020-02-14 ENCOUNTER — TELEPHONE (OUTPATIENT)
Dept: WOMENS IMAGING | Age: 70
End: 2020-02-14

## 2020-02-17 ENCOUNTER — TELEPHONE (OUTPATIENT)
Dept: FAMILY MEDICINE CLINIC | Age: 70
End: 2020-02-17

## 2020-02-17 ENCOUNTER — HOSPITAL ENCOUNTER (EMERGENCY)
Age: 70
Discharge: HOME | End: 2020-02-17
Attending: EMERGENCY MEDICINE
Payer: MEDICARE

## 2020-02-17 ENCOUNTER — APPOINTMENT (OUTPATIENT)
Dept: GENERAL RADIOLOGY | Age: 70
End: 2020-02-17
Payer: MEDICARE

## 2020-02-17 VITALS
OXYGEN SATURATION: 94 % | TEMPERATURE: 98.1 F | HEART RATE: 60 BPM | WEIGHT: 173 LBS | RESPIRATION RATE: 16 BRPM | BODY MASS INDEX: 28.82 KG/M2 | DIASTOLIC BLOOD PRESSURE: 69 MMHG | HEIGHT: 65 IN | SYSTOLIC BLOOD PRESSURE: 136 MMHG

## 2020-02-17 LAB
A/G RATIO: 1.6 (ref 1.1–2.2)
ALBUMIN SERPL-MCNC: 4.4 G/DL (ref 3.4–5)
ALP BLD-CCNC: 94 U/L (ref 40–129)
ALT SERPL-CCNC: 15 U/L (ref 10–40)
ANION GAP SERPL CALCULATED.3IONS-SCNC: 11 MMOL/L (ref 3–16)
AST SERPL-CCNC: 19 U/L (ref 15–37)
BASOPHILS ABSOLUTE: 0 K/UL (ref 0–0.2)
BASOPHILS RELATIVE PERCENT: 0.8 %
BILIRUB SERPL-MCNC: <0.2 MG/DL (ref 0–1)
BUN BLDV-MCNC: 12 MG/DL (ref 7–20)
CALCIUM SERPL-MCNC: 9.7 MG/DL (ref 8.3–10.6)
CHLORIDE BLD-SCNC: 100 MMOL/L (ref 99–110)
CO2: 28 MMOL/L (ref 21–32)
CREAT SERPL-MCNC: 0.8 MG/DL (ref 0.6–1.2)
EOSINOPHILS ABSOLUTE: 0.3 K/UL (ref 0–0.6)
EOSINOPHILS RELATIVE PERCENT: 5.4 %
GFR AFRICAN AMERICAN: >60
GFR NON-AFRICAN AMERICAN: >60
GLOBULIN: 2.8 G/DL
GLUCOSE BLD-MCNC: 90 MG/DL (ref 70–99)
HCT VFR BLD CALC: 40.3 % (ref 36–48)
HEMOGLOBIN: 13.4 G/DL (ref 12–16)
LYMPHOCYTES ABSOLUTE: 2.3 K/UL (ref 1–5.1)
LYMPHOCYTES RELATIVE PERCENT: 37.7 %
MCH RBC QN AUTO: 25.9 PG (ref 26–34)
MCHC RBC AUTO-ENTMCNC: 33.2 G/DL (ref 31–36)
MCV RBC AUTO: 78.1 FL (ref 80–100)
MONOCYTES ABSOLUTE: 0.5 K/UL (ref 0–1.3)
MONOCYTES RELATIVE PERCENT: 7.4 %
NEUTROPHILS ABSOLUTE: 3 K/UL (ref 1.7–7.7)
NEUTROPHILS RELATIVE PERCENT: 48.7 %
PDW BLD-RTO: 15.8 % (ref 12.4–15.4)
PLATELET # BLD: 187 K/UL (ref 135–450)
PMV BLD AUTO: 8.7 FL (ref 5–10.5)
POTASSIUM REFLEX MAGNESIUM: 4.1 MMOL/L (ref 3.5–5.1)
RBC # BLD: 5.16 M/UL (ref 4–5.2)
SODIUM BLD-SCNC: 139 MMOL/L (ref 136–145)
TOTAL PROTEIN: 7.2 G/DL (ref 6.4–8.2)
TROPONIN: <0.01 NG/ML
WBC # BLD: 6.2 K/UL (ref 4–11)

## 2020-02-17 PROCEDURE — 96374 THER/PROPH/DIAG INJ IV PUSH: CPT

## 2020-02-17 PROCEDURE — 71046 X-RAY EXAM CHEST 2 VIEWS: CPT

## 2020-02-17 PROCEDURE — 80053 COMPREHEN METABOLIC PANEL: CPT

## 2020-02-17 PROCEDURE — 6360000002 HC RX W HCPCS: Performed by: PHYSICIAN ASSISTANT

## 2020-02-17 PROCEDURE — 96375 TX/PRO/DX INJ NEW DRUG ADDON: CPT

## 2020-02-17 PROCEDURE — 85025 COMPLETE CBC W/AUTO DIFF WBC: CPT

## 2020-02-17 PROCEDURE — 99285 EMERGENCY DEPT VISIT HI MDM: CPT

## 2020-02-17 PROCEDURE — 84484 ASSAY OF TROPONIN QUANT: CPT

## 2020-02-17 PROCEDURE — 93005 ELECTROCARDIOGRAM TRACING: CPT | Performed by: PHYSICIAN ASSISTANT

## 2020-02-17 RX ORDER — PREDNISONE 50 MG/1
50 TABLET ORAL DAILY
Qty: 2 TABLET | Refills: 0 | Status: SHIPPED | OUTPATIENT
Start: 2020-02-17 | End: 2020-02-19

## 2020-02-17 RX ORDER — METHYLPREDNISOLONE SODIUM SUCCINATE 125 MG/2ML
125 INJECTION, POWDER, LYOPHILIZED, FOR SOLUTION INTRAMUSCULAR; INTRAVENOUS ONCE
Status: COMPLETED | OUTPATIENT
Start: 2020-02-17 | End: 2020-02-17

## 2020-02-17 RX ORDER — DIPHENHYDRAMINE HYDROCHLORIDE 50 MG/ML
12.5 INJECTION INTRAMUSCULAR; INTRAVENOUS ONCE
Status: COMPLETED | OUTPATIENT
Start: 2020-02-17 | End: 2020-02-17

## 2020-02-17 RX ADMIN — DIPHENHYDRAMINE HYDROCHLORIDE 12.5 MG: 50 INJECTION, SOLUTION INTRAMUSCULAR; INTRAVENOUS at 22:54

## 2020-02-17 RX ADMIN — METHYLPREDNISOLONE SODIUM SUCCINATE 125 MG: 125 INJECTION, POWDER, FOR SOLUTION INTRAMUSCULAR; INTRAVENOUS at 22:55

## 2020-02-17 ASSESSMENT — ENCOUNTER SYMPTOMS
GASTROINTESTINAL NEGATIVE: 1
SHORTNESS OF BREATH: 1

## 2020-02-18 LAB
EKG ATRIAL RATE: 59 BPM
EKG DIAGNOSIS: NORMAL
EKG P AXIS: 54 DEGREES
EKG P-R INTERVAL: 146 MS
EKG Q-T INTERVAL: 400 MS
EKG QRS DURATION: 86 MS
EKG QTC CALCULATION (BAZETT): 396 MS
EKG R AXIS: 19 DEGREES
EKG T AXIS: 19 DEGREES
EKG VENTRICULAR RATE: 59 BPM

## 2020-02-18 PROCEDURE — 93010 ELECTROCARDIOGRAM REPORT: CPT | Performed by: INTERNAL MEDICINE

## 2020-02-18 NOTE — ED PROVIDER NOTES
I independently examined and evaluated Allegra Sidhu. In brief, 78 y.o. female presented with sensation of tongue/throat swelling. Symptoms started several weeks ago. Feels as if this is worsening. Has tried diphenhydramine, gargling with salt water. Feels that this helps the symptoms, but then seems to recur. Seems to recur nightly, wonders whether it could be associated with peanut butter she eats nightly. Focused exam is notable for 78 y.o. female, nontoxic, NAD. Managing secretions easily. No respiratory distress. No intraoral edema. Lungs clear. No wheezing/stridor. EKG interpreted by myself. Rate: 59  Rhythm: sinus bradycardia  Axis: normal  Intervals: within normal limits  ST Segments: no acute abnormality  T waves: no acute abnormality  Comparison: Compared to 2/23/19, rhythm is sinus isabel from NSR   Impression: sinus isabel otherwise normal      The patient was observed for a period of time in the emergency department and her symptoms remain stable/improved. I did offer to perform nasopharyngoscopy however the patient refused. I believe this is reasonable as the patient is extremely well in appearance. Plan is discharged home with short course of oral steroids, antihistamines, close follow-up with PCP as scheduled, usual strict return precautions for new or worsening symptoms. All diagnostic, treatment, and disposition decisions were made by myself in conjunction with the advanced practice provider. For all further details of the patient's emergency department visit, please see the advanced practice provider's documentation. Cedric Perez MD     This report has been produced using speech recognition software and may contain errors related to that system including errors in grammar, punctuation, and spelling, as well as words and phrases that may be inappropriate. If there are any questions or concerns please feel free to contact the dictating provider for clarification. Johnny Villalobos MD  02/18/20 [id number]

## 2020-02-18 NOTE — ED PROVIDER NOTES
[address] [facility]  EMERGENCY DEPARTMENT ENCOUNTER        Pt Name: Lincoln Gutierrez  MRN: [id number]  [place] 1950  Date of evaluation: 2/17/2020  Provider: Natalie Dow PA-C  PCP: Elda Batista,     This patient was seen and evaluated by the attending physician Daisy Mcgee MD.      [facility]       Chief Complaint   Patient presents with    Shortness of Breath     SOB, unable to swallow x 2wks ususally resolves onset of not being able to swallow today and has not resolved, states back of throat has closing. Pt belives she has peanut allergy, Onset of horesness x 1hr ago       HISTORY OF PRESENT ILLNESS   (Location, Timing/Onset, Context/Setting, Quality, Duration, Modifying Factors, Severity, Associated Signs and Symptoms)  Note limiting factors. Lincoln Gutierrez is a 78 y.o. female brought in today with a past medical history of hyperlipidemia, GERD, anxiety for evaluation of what she describes of as shortness of breath. She also feels as though she is had dysphasia over the past several weeks. She states that she still can swallow food and liquids. But she states that she feels like her throat is swollen. She is concerned that she might have an allergy. Symptoms x2 weeks. Duration of symptoms have been intermittent in nature. No aggravating or alleviating complaints. She denies any chest pain. Denies any fevers chills cough or congestion. Denies any new medications or change in medications. She states that she does not feel like anything is stuck in her throat. She denies any other complaints at this time. She has not tried anything at home for symptomatic relief. She denies any other concerns. Nursing Notes were all reviewed and agreed with or any disagreements were addressed in the HPI. REVIEW OF SYSTEMS    (2-9 systems for level 4, 10 or more for level 5)     Review of Systems   Constitutional: Negative. HENT: Negative.     Respiratory: Positive for lesions or angioedema. Dentition: Has dentures. No dental tenderness, gingival swelling or dental caries. Tongue: No lesions. Palate: No mass and lesions. Palate does not elevate in midline. Pharynx: Oropharynx is clear. Uvula midline. No pharyngeal swelling, oropharyngeal exudate, posterior oropharyngeal erythema or uvula swelling. Tonsils: No tonsillar exudate or tonsillar abscesses. Swellin on the right. 0 on the left. Comments: Oral mucosa is pink and moist.  No uvular swelling or deviation noted. Neck is supple without lymphadenopathy. There is no swelling noted to the floor or the tongue. No tripod positioning or drooling. Eyes:      General:         Right eye: No discharge. Left eye: No discharge. Neck:      Musculoskeletal: Full passive range of motion without pain, normal range of motion and neck supple. Trachea: Trachea and phonation normal. No tracheal tenderness, abnormal tracheal secretions or tracheal deviation. Meningeal: Brudzinski's sign and Kernig's sign absent. Cardiovascular:      Rate and Rhythm: Normal rate and regular rhythm. Pulses:           Radial pulses are 2+ on the right side and 2+ on the left side. Heart sounds: Normal heart sounds. No murmur. No gallop. Pulmonary:      Effort: Pulmonary effort is normal. No respiratory distress. Breath sounds: Normal breath sounds. No decreased breath sounds, wheezing, rhonchi or rales. Chest:      Chest wall: No tenderness. Abdominal:      General: Abdomen is flat. Bowel sounds are normal.      Palpations: Abdomen is soft. Tenderness: There is no abdominal tenderness. There is no right CVA tenderness, left CVA tenderness, guarding or rebound. Musculoskeletal: Normal range of motion. General: No deformity. Lymphadenopathy:      Cervical: No cervical adenopathy. Right cervical: No superficial, deep or posterior cervical adenopathy.      Left cervical: PROVIDED HISTORY: Reason for exam:->SOB Reason for Exam: pharangitis FINDINGS: Chronic interstitial opacities are identified. No focal infiltrate is identified. No pneumothorax is seen. No free air. No acute bony abnormality. No acute abnormality identified. Tyshawn Brendan Digital Screen Bilateral    Result Date: 2/13/2020  EXAMINATION: SCREENING DIGITAL BILATERAL  MAMMOGRAM WITH TOMOSYNTHESIS 2/11/2020 TECHNIQUE: Screening mammography was performed with tomosynthesis including MLO and CC views of the bilateral breasts. Computer aided detection was used for the interpretation of this exam. COMPARISON: 2017, 2012, 2011 HISTORY: Screening. FINDINGS: There are scattered areas of fibroglandular density. There is no new dominant mass, suspicious microcalcification, or area of architectural distortion. No mammographic evidence of malignancy. BIRADS: BIRADS - CATEGORY 1 Negative, no evidence of malignancy. Normal interval follow-up is recommended in 12 months. OVERALL ASSESSMENT - NEGATIVE A letter of notification will be sent to the patient regarding the results. The [employer] recommends annual mammograms for women 40 years and older. PROCEDURES   Unless otherwise noted below, none     Procedures    CRITICAL CARE TIME   N/A    CONSULTS:  None      EMERGENCY DEPARTMENT COURSE and DIFFERENTIAL DIAGNOSIS/MDM:   Vitals:    Vitals:    02/17/20 2130   BP: (!) 156/83   Pulse: 70   Resp: 16   Temp: 98.1 °F (36.7 °C)   TempSrc: Oral   SpO2: 94%   Weight: 173 lb (78.5 kg)   Height: 5' 5\" (1.651 m)       Patient was given the following medications:  Medications   methylPREDNISolone sodium (SOLU-MEDROL) injection 125 mg (125 mg Intravenous Given 2/17/20 2054)   diphenhydrAMINE (BENADRYL) injection 12.5 mg (12.5 mg Intravenous Given 2/17/20 3988)       Patient brought in today for evaluation of shortness of breath.   On exam patient is alert oriented afebrile breathing on room air satting at 94%.  She appears nontoxic no acute respiratory distress. Old labs and records reviewed. Patient was also concerned that she felt like her throat was swelling. Patient able to tolerate p.o. here. There is no evidence of any swelling on physical exam.    CBC reveals no acute leukocytosis. No electrolyte abnormalities. Troponin less than 0.01. EKG was obtained please see my attendings note for dictation. Chest x-ray is unremarkable. Patient was given Solu-Medrol and Benadryl. At this time plan will be to discharge home. Will be discharged with prednisone for home. She was also told to continue with Benadryl for potential allergic reaction. She was advised to follow closely with her PCP. She verbalized understand this plan was comfortable and stable at time of discharge. I did feel comfortable sending this patient home with close follow-up instructions and strict return precautions. Patient seen by my attending as well. FINAL IMPRESSION      1.  Allergic reaction, initial encounter          DISPOSITION/PLAN   DISPOSITION Decision To Discharge 02/17/2020 11:14:38 PM      PATIENT REFERREDTO:  Leanne Larios DO  [facility]  [address]  [address] [place] [address]  [phone]    Schedule an appointment as soon as possible for a visit in 1 day      [facility] ED  [address] [address]  [phone]  Schedule an appointment as soon as possible for a visit   As needed, If symptoms worsen      DISCHARGE MEDICATIONS:  New Prescriptions    PREDNISONE (DELTASONE) 50 MG TABLET    Take 1 tablet by mouth daily for 2 days       DISCONTINUED MEDICATIONS:  Discontinued Medications    No medications on file              (Please note that portions of this note were completed with a voice recognition program.  Efforts were made to edit the dictations but occasionally words are mis-transcribed.)    Mil Lovell PA-C (electronically signed)            Mil Lovell PA-C  02/17/20 [address] [facility]

## 2020-02-27 ENCOUNTER — OFFICE VISIT (OUTPATIENT)
Dept: FAMILY MEDICINE CLINIC | Age: 70
End: 2020-02-27
Payer: MEDICARE

## 2020-02-27 VITALS
SYSTOLIC BLOOD PRESSURE: 122 MMHG | RESPIRATION RATE: 16 BRPM | OXYGEN SATURATION: 96 % | HEIGHT: 65 IN | DIASTOLIC BLOOD PRESSURE: 80 MMHG | HEART RATE: 81 BPM | TEMPERATURE: 98.3 F | WEIGHT: 172 LBS | BODY MASS INDEX: 28.66 KG/M2

## 2020-02-27 PROBLEM — R07.9 CHEST PAIN: Status: RESOLVED | Noted: 2019-02-24 | Resolved: 2020-02-27

## 2020-02-27 PROCEDURE — G8400 PT W/DXA NO RESULTS DOC: HCPCS | Performed by: NURSE PRACTITIONER

## 2020-02-27 PROCEDURE — 3017F COLORECTAL CA SCREEN DOC REV: CPT | Performed by: NURSE PRACTITIONER

## 2020-02-27 PROCEDURE — 1036F TOBACCO NON-USER: CPT | Performed by: NURSE PRACTITIONER

## 2020-02-27 PROCEDURE — G8417 CALC BMI ABV UP PARAM F/U: HCPCS | Performed by: NURSE PRACTITIONER

## 2020-02-27 PROCEDURE — 1123F ACP DISCUSS/DSCN MKR DOCD: CPT | Performed by: NURSE PRACTITIONER

## 2020-02-27 PROCEDURE — G8482 FLU IMMUNIZE ORDER/ADMIN: HCPCS | Performed by: NURSE PRACTITIONER

## 2020-02-27 PROCEDURE — 99213 OFFICE O/P EST LOW 20 MIN: CPT | Performed by: NURSE PRACTITIONER

## 2020-02-27 PROCEDURE — 1090F PRES/ABSN URINE INCON ASSESS: CPT | Performed by: NURSE PRACTITIONER

## 2020-02-27 PROCEDURE — 4040F PNEUMOC VAC/ADMIN/RCVD: CPT | Performed by: NURSE PRACTITIONER

## 2020-02-27 PROCEDURE — G8427 DOCREV CUR MEDS BY ELIG CLIN: HCPCS | Performed by: NURSE PRACTITIONER

## 2020-02-27 RX ORDER — LAMOTRIGINE 25 MG/1
75 TABLET ORAL
Qty: 90 TABLET | Refills: 2 | Status: SHIPPED | OUTPATIENT
Start: 2020-02-27 | End: 2020-05-19

## 2020-02-27 RX ORDER — SERTRALINE HYDROCHLORIDE 100 MG/1
100 TABLET, FILM COATED ORAL NIGHTLY
COMMUNITY
End: 2020-04-20

## 2020-02-27 RX ORDER — OMEPRAZOLE 20 MG/1
CAPSULE, DELAYED RELEASE ORAL
Qty: 90 CAPSULE | Refills: 1 | Status: SHIPPED | OUTPATIENT
Start: 2020-02-27 | End: 2020-04-08 | Stop reason: SDUPTHER

## 2020-02-27 ASSESSMENT — ENCOUNTER SYMPTOMS
DIARRHEA: 0
CHEST TIGHTNESS: 0
NAUSEA: 0

## 2020-02-27 NOTE — PROGRESS NOTES
Subjective:      Patient ID: Chris Montes De Oca is a 78 y.o. female. HPI     For follow up depression and anxiety. Changed lamictal from 100 to 1/2 daily and didn't cry as often but became jittery. Increased back to 100mg and not jittery but easy to cry. Family was on her rodney and would yell at them. 2 weeks ago had sensation that throat was closing and was advised might be food allergy. Advised to go to hospital. Chest xray was normal. No medication changes. Occurred again after eating out with children and feels was related to reflux. Taking TUMS in addition to omeprazole, states 2 tums every 2-3 days. Review of Systems   Constitutional: Negative for appetite change and unexpected weight change. Respiratory: Negative for chest tightness. Cardiovascular: Negative for chest pain and palpitations. Gastrointestinal: Negative for diarrhea and nausea. Musculoskeletal: Negative for arthralgias. Neurological: Negative for dizziness and headaches. Psychiatric/Behavioral: Negative for self-injury and suicidal ideas. The patient is nervous/anxious. Objective:   Physical Exam  Constitutional:       Appearance: Normal appearance. She is well-developed. HENT:      Head: Normocephalic and atraumatic. Neck:      Musculoskeletal: Normal range of motion and neck supple. Thyroid: No thyromegaly. Cardiovascular:      Rate and Rhythm: Normal rate and regular rhythm. Pulmonary:      Effort: Pulmonary effort is normal.      Breath sounds: Normal breath sounds. Abdominal:      General: Bowel sounds are normal.      Palpations: Abdomen is soft. Musculoskeletal: Normal range of motion. Skin:     General: Skin is warm. Neurological:      Mental Status: She is alert. Psychiatric:         Mood and Affect: Mood normal.         Behavior: Behavior normal.      Comments: Anxious in conversation. Good eye contact.            Current Outpatient Medications   Medication Sig Dispense Refill  sertraline (ZOLOFT) 50 MG tablet Take 50 mg by mouth nightly      sertraline (ZOLOFT) 100 MG tablet Take 100 mg by mouth nightly      omeprazole (PRILOSEC) 20 MG delayed release capsule TAKE ONE CAPSULE BY MOUTH DAILY 90 capsule 1    lamoTRIgine (LAMICTAL) 100 MG tablet TAKE 1 TABLET BY MOUTH DAILY 90 tablet 1    ALPRAZolam (XANAX) 0.25 MG tablet Take 0.25 mg by mouth nightly as needed for Sleep.  atorvastatin (LIPITOR) 20 MG tablet TAKE 1 TABLET BY MOUTH DAILY 90 tablet 3    albuterol sulfate HFA (PROVENTIL HFA) 108 (90 Base) MCG/ACT inhaler Inhale 2 puffs into the lungs every 6 hours as needed for Wheezing 1 Inhaler 0    vitamin D (CHOLECALCIFEROL) 1000 UNIT TABS tablet Take 1,000 Units by mouth daily      vitamin B-12 (CYANOCOBALAMIN) 500 MCG tablet Take 2,500 mcg by mouth daily        No current facility-administered medications for this visit. Assessment:      1. Mood disorder  2. Anxiety        Plan:      1. Will change lamictal to 75mg daily and monitor    2. [facility] was seen today for depression and anxiety. Diagnoses and all orders for this visit:    Mood swings  -     lamoTRIgine (LAMICTAL) 25 MG tablet;  Take 3 tablets by mouth daily (with breakfast) Mood swings    Gastroesophageal reflux disease with esophagitis  -     omeprazole (PRILOSEC) 20 MG delayed release capsule; TAKE ONE CAPSULE BY MOUTH DAILY              BELINDA BATRES - CNP

## 2020-03-03 ENCOUNTER — OFFICE VISIT (OUTPATIENT)
Dept: GASTROENTEROLOGY | Age: 70
End: 2020-03-03
Payer: MEDICARE

## 2020-03-03 VITALS
DIASTOLIC BLOOD PRESSURE: 84 MMHG | WEIGHT: 171 LBS | HEIGHT: 65 IN | SYSTOLIC BLOOD PRESSURE: 126 MMHG | BODY MASS INDEX: 28.49 KG/M2

## 2020-03-03 PROCEDURE — 1090F PRES/ABSN URINE INCON ASSESS: CPT | Performed by: INTERNAL MEDICINE

## 2020-03-03 PROCEDURE — 4040F PNEUMOC VAC/ADMIN/RCVD: CPT | Performed by: INTERNAL MEDICINE

## 2020-03-03 PROCEDURE — 99214 OFFICE O/P EST MOD 30 MIN: CPT | Performed by: INTERNAL MEDICINE

## 2020-03-03 PROCEDURE — G8427 DOCREV CUR MEDS BY ELIG CLIN: HCPCS | Performed by: INTERNAL MEDICINE

## 2020-03-03 PROCEDURE — G8400 PT W/DXA NO RESULTS DOC: HCPCS | Performed by: INTERNAL MEDICINE

## 2020-03-03 PROCEDURE — 1123F ACP DISCUSS/DSCN MKR DOCD: CPT | Performed by: INTERNAL MEDICINE

## 2020-03-03 PROCEDURE — 1036F TOBACCO NON-USER: CPT | Performed by: INTERNAL MEDICINE

## 2020-03-03 PROCEDURE — G8482 FLU IMMUNIZE ORDER/ADMIN: HCPCS | Performed by: INTERNAL MEDICINE

## 2020-03-03 PROCEDURE — 3017F COLORECTAL CA SCREEN DOC REV: CPT | Performed by: INTERNAL MEDICINE

## 2020-03-03 PROCEDURE — G8417 CALC BMI ABV UP PARAM F/U: HCPCS | Performed by: INTERNAL MEDICINE

## 2020-03-03 NOTE — PROGRESS NOTES
02/17/20 173 lb (78.5 kg)   01/27/20 173 lb (78.5 kg)   10/24/19 172 lb (78 kg)   10/17/19 171 lb 6.4 oz (77.7 kg)   09/06/19 169 lb (76.7 kg)   08/27/19 172 lb (78 kg)   08/22/19 175 lb (79.4 kg)   08/06/19 174 lb (78.9 kg)   07/30/19 171 lb 15.3 oz (78 kg)   07/16/19 172 lb (78 kg)   06/05/19 176 lb (79.8 kg)   04/11/19 176 lb 3.2 oz (79.9 kg)   02/28/19 177 lb 9.6 oz (80.6 kg)   02/25/19 174 lb 9.6 oz (79.2 kg)   10/10/18 175 lb (79.4 kg)   09/05/18 179 lb 12.8 oz (81.6 kg)   06/25/18 174 lb 3.2 oz (79 kg)   06/15/18 173 lb 12.8 oz (78.8 kg)   03/05/18 172 lb 4.8 oz (78.2 kg)   10/19/17 166 lb (75.3 kg)   08/28/17 164 lb (74.4 kg)   06/29/17 161 lb (73 kg)   03/22/17 160 lb (72.6 kg)   04/02/13 171 lb (77.6 kg)   05/18/12 176 lb (79.8 kg)   10/10/11 168 lb (76.2 kg)   08/25/11 166 lb (75.3 kg)   07/07/11 168 lb (76.2 kg)   06/10/11 168 lb (76.2 kg)   06/10/11 166 lb (75.3 kg)   04/27/11 170 lb (77.1 kg)   03/25/11 174 lb (78.9 kg)   08/26/10 178 lb (80.7 kg)   05/27/10 177 lb (80.3 kg)   02/19/10 179 lb (81.2 kg)   02/08/10 179 lb (81.2 kg)       No components found for: HGBA1C  BP Readings from Last 3 Encounters:   03/03/20 126/84   02/27/20 122/80   02/17/20 136/69     Health Maintenance   Topic Date Due    Shingles Vaccine (1 of 2) 08/06/1999    DEXA (modify frequency per FRAX score)  08/06/2014    Annual Wellness Visit (AWV)  01/28/2020    DTaP/Tdap/Td vaccine (2 - Td) 10/24/2020 (Originally 1/1/2011)    Lipid screen  04/08/2020    Breast cancer screen  02/11/2022    Colon cancer screen colonoscopy  09/06/2024    Flu vaccine  Completed    Pneumococcal 65+ years Vaccine  Completed    Hepatitis C screen  Completed    Hepatitis A vaccine  Aged Out    Hepatitis B vaccine  Aged Out    Hib vaccine  Aged Out    Meningococcal (ACWY) vaccine  Aged Out       No components found for: [employer]     PAST MEDICAL HISTORY     Past Medical History:   Diagnosis Date    Allergic rhinitis     Anxiety 10/10/2011    Arthritis     Depression 3/22/2017    GERD (gastroesophageal reflux disease)     Hallux valgus     Headache(784.0)     Herpes simplex     Irritable bowel syndrome     Kidney stones     Osteopenia     Other and unspecified hyperlipidemia 2013    Pain medication agreement signed 10/10/2011    Thyroid disease     nodules     FAMILY HISTORY     Family History   Problem Relation Age of Onset    Asthma Mother     Heart Disease Father      SOCIAL HISTORY     Social History     Socioeconomic History    Marital status:       Spouse name: Not on file    Number of children: Not on file    Years of education: Not on file    Highest education level: Not on file   Occupational History    Not on file   Social Needs    Financial resource strain: Not on file    Food insecurity:     Worry: Not on file     Inability: Not on file    Transportation needs:     Medical: Not on file     Non-medical: Not on file   Tobacco Use    Smoking status: Former Smoker     Packs/day: 1.00     Years: 15.00     Pack years: 15.00     Last attempt to quit: 6/15/1983     Years since quittin.7    Smokeless tobacco: Never Used   Substance and Sexual Activity    Alcohol use: No    Drug use: No    Sexual activity: Yes     Partners: Male   Lifestyle    Physical activity:     Days per week: Not on file     Minutes per session: Not on file    Stress: Not on file   Relationships    Social connections:     Talks on phone: Not on file     Gets together: Not on file     Attends [employer] service: Not on file     Active member of club or organization: Not on file     Attends meetings of clubs or organizations: Not on file     Relationship status: Not on file    Intimate partner violence:     Fear of current or ex partner: Not on file     Emotionally abused: Not on file     Physically abused: Not on file     Forced sexual activity: Not on file   Other Topics Concern    Not on file   Social History Narrative  Not on file     SURGICAL HISTORY     Past Surgical History:   Procedure Laterality Date    LITHOTRIPSY      SHOULDER SURGERY      TUBAL LIGATION      UPPER GASTROINTESTINAL ENDOSCOPY      gastritis    UPPER GASTROINTESTINAL ENDOSCOPY N/A 9/6/2019    EGD BIOPSY performed by Geni Yoder MD at [facility] 5 N/A 9/6/2019    COLON & EGD W/ ANESTHESIA performed by Geni Yoder MD at [place] [address]  9/6/2019    EGD [address] performed by Geni Yoder MD at [place] [address]   (This list may include medications prescribed during this encounter as epic can not insert only the list prior to this encounter.)  Current Outpatient Rx   Medication Sig Dispense Refill    sertraline (ZOLOFT) 50 MG tablet Take 50 mg by mouth nightly      sertraline (ZOLOFT) 100 MG tablet Take 100 mg by mouth nightly      lamoTRIgine (LAMICTAL) 25 MG tablet Take 3 tablets by mouth daily (with breakfast) Mood swings 90 tablet 2    omeprazole (PRILOSEC) 20 MG delayed release capsule TAKE ONE CAPSULE BY MOUTH DAILY 90 capsule 1    ALPRAZolam (XANAX) 0.25 MG tablet Take 0.25 mg by mouth nightly as needed for Sleep.       atorvastatin (LIPITOR) 20 MG tablet TAKE 1 TABLET BY MOUTH DAILY 90 tablet 3    albuterol sulfate HFA (PROVENTIL HFA) 108 (90 Base) MCG/ACT inhaler Inhale 2 puffs into the lungs every 6 hours as needed for Wheezing 1 Inhaler 0    vitamin D (CHOLECALCIFEROL) 1000 UNIT TABS tablet Take 1,000 Units by mouth daily      vitamin B-12 (CYANOCOBALAMIN) 500 MCG tablet Take 2,500 mcg by mouth daily        ALLERGIES     Allergies   Allergen Reactions    Codeine Nausea Only    Sulfa Antibiotics      IMMUNIZATIONS     Immunization History   Administered Date(s) Administered    Influenza 10/10/2011    Influenza, High Dose (Fluzone 65 yrs and older) 09/22/2017    Influenza, Quadv, 6 mo and older, IM, PF (Flulaval, Fluarix) 02/25/2019    Influenza, Quadv, IM, PF (6 mo and older Fluzone, Flulaval, Fluarix, and 3 yrs and older Afluria) 02/25/2019    Influenza, Triv, inactivated, subunit, adjuvanted, IM (Fluad 65 yrs and older) 10/24/2019    Pneumococcal Conjugate 13-valent (Imobcyn44) 03/05/2018    Pneumococcal Polysaccharide ([id number]) 01/01/2009, 05/01/2015    Tdap (Boostrix, Adacel) 01/01/2001     REVIEW OF SYSTEMS   See HPI for further details and pertinent postiives. Negative for the following:  Constitutional: Negative for weight change. Negative for appetite change and fatigue. HENT: Negative for nosebleeds, sore throat, mouth sores, and voice change. Respiratory: Negative for cough, choking and chest tightness. Cardiovascular: Negative for chest pain   Gastrointestinal: See HPI  Musculoskeletal: Negative for arthralgias. Skin: Negative for pallor. Neurological: Negative for weakness and light-headedness. Hematological: Negative for adenopathy. Does not bruise/bleed easily. Psychiatric/Behavioral: Negative for suicidal ideas. PHYSICAL EXAM   VITAL SIGNS: /84 (Site: Right Upper Arm, Position: Sitting, Cuff Size: Small Adult)   Ht 5' 5\" (1.651 m)   Wt 171 lb (77.6 kg)   LMP  (LMP Unknown)   BMI 28.46 kg/m²   Wt Readings from Last 3 Encounters:   03/03/20 171 lb (77.6 kg)   02/27/20 172 lb (78 kg)   02/17/20 173 lb (78.5 kg)     Constitutional: Well developed, Well nourished, No acute distress, Non-toxic appearance. HENT: Normocephalic, Atraumatic, Bilateral external ears normal, Oropharynx moist, No oral exudates, Nose normal.   Eyes: Conjunctiva normal, No discharge. Neck: Normal range of motion, No tenderness, Supple, No stridor. Lymphatic: No cervical, subclavian, or axillary lymphadenopathy. Cardiovascular: Normal heart rate, Normal rhythm, No murmurs, No rubs, No gallops.    Thorax & Lungs: Normal breath sounds, No respiratory distress, No wheezing, No chest tenderness. No gynecomastia. Abdomen: scars consistent with stated surgeries, no hernias, no HSM, soft NTND   Rectal:  Deferred. Skin: Warm, Dry, No erythema, No rash. No bruising. No spider hemangiomas. Back: No tenderness, No CVA tenderness. Lower Extremities: Intact distal pulses, No edema, No tenderness, No cyanosis, No clubbing. Neurologic: Alert & oriented x 3, Normal motor function, Normal sensory function, No focal deficits noted. No asterixis. RADIOLOGY/PROCEDURES       FINAL IMPRESSION     Orders Placed This Encounter   Procedures    US Gallbladder Ruq     Standing Status:   Future     Number of Occurrences:   1     Standing Expiration Date:   3/3/2021    XR ABDOMEN (KUB) (SINGLE AP VIEW)     Standing Status:   Future     Number of Occurrences:   1     Standing Expiration Date:   3/3/2021    TSH with Reflex     Standing Status:   Future     Number of Occurrences:   1     Standing Expiration Date:   3/3/2021     Faye Hector was seen today for follow-up. Diagnoses and all orders for this visit:    Epigastric pain  -     US Gallbladder Ruq; Future  -     XR ABDOMEN (KUB) (SINGLE AP VIEW); Future    Constipation, unspecified constipation type  -     XR ABDOMEN (KUB) (SINGLE AP VIEW); Future  -     TSH with Reflex; Future    discussed probable slow transit both colonic and gastric. Asked to titrate miralax up to 2-3 doses/d and use rescue therapies every 3rd day including enemas, milk of magnesium or ducolax. If rescue therapy required should further titrate miralax upwards. If that does not help, colonoscopy was should be considered. Beyond 3 doses per day, it would be worthwhile to try newer pharmacologic agents alone or in combination with miralax including linzess, trulance or amitiza or lactulose. Insoluble fiber may be beneficial like flax or jon seed but is more likely to lead to bloating. Promotility agents including motegrity or zelnorm are also options.   Daily \"abdominal motility massage\"--where you lay on your back and massage/rub your abdomen in a clockwise fashion to stimulate bowel motility. Relaxation breathing simultaneously. Best results come from doing this once awakening in the morning or shortly after eating. When trying to move the bowels, rest feet on a stool and leaning forward slightly to help with evacuation. Consider purchasing a \"Squatty Potty\", which is stool designed to fit under the toilet for this purpose. This can be purchased online from [address] [employer] or from www.squattypotty.com. Another similar stool is \"easyGropo\", also on [address] [employer] [address]. By elevating the feet/knees over the level of the hips when trying to move the bowels, it improves the angle of the muscles in the rectum to optimize evacuation. This can help patients in particular who have smaller caliber stools, incomplete bowel emptying, or have straining with bowel movements. For those on chronic narcotics, trials of Movantik, Symproic, or Relistor can be employed. These therapies should be tailored to a [place] 3-4 type stool without straining, hard/large stools, incomplete evacuation, or infrequence (at least every 3 days). Long standing constipation may lead to increased burden of diverticular disease and complications (bleeding and infection), bowel obstruction, perforation, ischemic colitis, ibs and various other symptoms. ORDERED FUTURE/PENDING TESTS     Lab Frequency Next Occurrence   CT Chest WO Contrast Once 08/21/2020   RUBEN Digital Screen Bilateral [[id number]] Once 02/26/2020   DEXA Bone Density Axial Skeleton Once 02/27/2020       FOLLOWUP   Return for after ordered tests.           SYLVAIN LOZANO 3/3/20 1:19 PM    CC:  Nelson Harrison,

## 2020-03-04 ENCOUNTER — HOSPITAL ENCOUNTER (OUTPATIENT)
Dept: GENERAL RADIOLOGY | Age: 70
Discharge: HOME | End: 2020-03-04
Payer: MEDICARE

## 2020-03-04 ENCOUNTER — HOSPITAL ENCOUNTER (OUTPATIENT)
Dept: ULTRASOUND IMAGING | Age: 70
Discharge: HOME | End: 2020-03-04
Payer: MEDICARE

## 2020-03-04 ENCOUNTER — HOSPITAL ENCOUNTER (OUTPATIENT)
Age: 70
Discharge: HOME | End: 2020-03-04
Payer: MEDICARE

## 2020-03-04 LAB — TSH REFLEX: 0.94 UIU/ML (ref 0.27–4.2)

## 2020-03-04 PROCEDURE — 74018 RADEX ABDOMEN 1 VIEW: CPT

## 2020-03-04 PROCEDURE — 76705 ECHO EXAM OF ABDOMEN: CPT

## 2020-03-04 PROCEDURE — 84443 ASSAY THYROID STIM HORMONE: CPT

## 2020-03-04 PROCEDURE — 36415 COLL VENOUS BLD VENIPUNCTURE: CPT

## 2020-03-04 NOTE — RESULT ENCOUNTER NOTE
Please call patient with normal results. Remind about ability to get results, make appointments, and communicate with us through [employer] since not signed up.

## 2020-03-04 NOTE — RESULT ENCOUNTER NOTE
Please call patient with normal results. Remind about ability to get results, make appointments, and communicate with us through [employer] since not signed up. Stool burden not particularly large. Recommend miralax 1-2 times daily. If bowel normalize and still has epigastric pain related to eating can consider another gallbladder test (hida scan), UGI (preferred if actual dysphagia), or repeat her endoscopy. Could also do a trial of reglan for gastroparesis. The best thing would probably to have her follow up if no better in a few weeks after taking miralax.

## 2020-04-08 RX ORDER — OMEPRAZOLE 20 MG/1
CAPSULE, DELAYED RELEASE ORAL
Qty: 180 CAPSULE | Refills: 1 | Status: SHIPPED | OUTPATIENT
Start: 2020-04-08 | End: 2020-07-09 | Stop reason: SDUPTHER

## 2020-05-19 RX ORDER — LAMOTRIGINE 25 MG/1
75 TABLET ORAL
Qty: 90 TABLET | Refills: 2 | Status: SHIPPED | OUTPATIENT
Start: 2020-05-19 | End: 2020-07-17 | Stop reason: SDUPTHER

## 2020-05-21 ENCOUNTER — CARE COORDINATION (OUTPATIENT)
Dept: CARE COORDINATION | Age: 70
End: 2020-05-21

## 2020-05-21 ENCOUNTER — TELEPHONE (OUTPATIENT)
Dept: FAMILY MEDICINE CLINIC | Age: 70
End: 2020-05-21

## 2020-05-21 NOTE — CARE COORDINATION
Received a message from Nancy Roberts asking for [facility] to call her. Call returned and Nancy Roberts states that she has some concerns with one of her medications (Lamictal) and that she has already spoke with office staff. See telephone encounter for 5/21/20. She also states that she has an appointment scheduled for 5/28/20 and needs to cancel. Appointment cancelled per patient's request.      Will route message to both Aaron Anglin and Dr Roe Barkley. Along with practice manager RADHA Tomlinson RN.

## 2020-06-29 ENCOUNTER — TELEPHONE (OUTPATIENT)
Dept: FAMILY MEDICINE CLINIC | Age: 70
End: 2020-06-29

## 2020-06-29 ENCOUNTER — NURSE TRIAGE (OUTPATIENT)
Dept: OTHER | Facility: CLINIC | Age: 70
End: 2020-06-29

## 2020-06-29 NOTE — TELEPHONE ENCOUNTER
Pt called she is having chest pain, started about a week ago, has pain in her upper chest from her breast to her collarbone to her shoulder blades, has a cough, short of breathe, has a on going low fever 98.8, sore throat. Took temp this morning 98.8 and then used an inhaler and made it a little better.  Please Advise

## 2020-06-30 NOTE — TELEPHONE ENCOUNTER
LM for pt to call back
Patient informed
Now.    Transferred to [facility] for a virtual appointment. I also gave flu clinic info.

## 2020-07-01 ENCOUNTER — OFFICE VISIT (OUTPATIENT)
Dept: PRIMARY CARE CLINIC | Age: 70
End: 2020-07-01
Payer: MEDICARE

## 2020-07-01 PROCEDURE — G8428 CUR MEDS NOT DOCUMENT: HCPCS | Performed by: NURSE PRACTITIONER

## 2020-07-01 PROCEDURE — G8417 CALC BMI ABV UP PARAM F/U: HCPCS | Performed by: NURSE PRACTITIONER

## 2020-07-01 PROCEDURE — 99211 OFF/OP EST MAY X REQ PHY/QHP: CPT | Performed by: NURSE PRACTITIONER

## 2020-07-03 LAB
SARS-COV-2: NOT DETECTED
SOURCE: NORMAL

## 2020-07-09 ENCOUNTER — TELEMEDICINE (OUTPATIENT)
Dept: FAMILY MEDICINE CLINIC | Age: 70
End: 2020-07-09
Payer: MEDICARE

## 2020-07-09 PROCEDURE — 4040F PNEUMOC VAC/ADMIN/RCVD: CPT | Performed by: NURSE PRACTITIONER

## 2020-07-09 PROCEDURE — 1123F ACP DISCUSS/DSCN MKR DOCD: CPT | Performed by: NURSE PRACTITIONER

## 2020-07-09 PROCEDURE — 99213 OFFICE O/P EST LOW 20 MIN: CPT | Performed by: NURSE PRACTITIONER

## 2020-07-09 PROCEDURE — 1090F PRES/ABSN URINE INCON ASSESS: CPT | Performed by: NURSE PRACTITIONER

## 2020-07-09 PROCEDURE — G8400 PT W/DXA NO RESULTS DOC: HCPCS | Performed by: NURSE PRACTITIONER

## 2020-07-09 PROCEDURE — G8427 DOCREV CUR MEDS BY ELIG CLIN: HCPCS | Performed by: NURSE PRACTITIONER

## 2020-07-09 PROCEDURE — 3017F COLORECTAL CA SCREEN DOC REV: CPT | Performed by: NURSE PRACTITIONER

## 2020-07-09 RX ORDER — FAMOTIDINE 40 MG/1
40 TABLET, FILM COATED ORAL NIGHTLY PRN
Qty: 30 TABLET | Refills: 5 | Status: SHIPPED | OUTPATIENT
Start: 2020-07-09 | End: 2020-11-14

## 2020-07-09 RX ORDER — OMEPRAZOLE 20 MG/1
CAPSULE, DELAYED RELEASE ORAL
Qty: 180 CAPSULE | Refills: 1 | Status: ON HOLD | OUTPATIENT
Start: 2020-07-09 | End: 2020-10-21 | Stop reason: HOSPADM

## 2020-07-09 ASSESSMENT — ENCOUNTER SYMPTOMS
COUGH: 1
CONSTIPATION: 0
WHEEZING: 1
BACK PAIN: 0
NAUSEA: 0
SHORTNESS OF BREATH: 1
CHEST TIGHTNESS: 0
DIARRHEA: 0

## 2020-07-09 NOTE — PROGRESS NOTES
2020    TELEHEALTH EVALUATION -- Audio/Visual (During TSAIC-32 public health emergency)    HPI:    Piedad Mitchell (:  1950) has requested an audio/video evaluation for the following concern(s):    In March had episode of chest pain, back pain and cough with easing. Now again with 5 days of cough, non productive. Wheezing. Chest pain with cough. Had fever initially, now resolved. Hx of reflux as well. Last temp 7/3/2020, 98.0. Feels tired with steps and walking. Used albuterol 1 time, not sure if helped. To ER  and ruled out heart problem with neg trop and EKG. Review of Systems   Constitutional: Positive for fatigue. Negative for chills, fever and unexpected weight change. HENT: Positive for sneezing. Respiratory: Positive for cough, shortness of breath and wheezing. Negative for chest tightness. Cardiovascular: Positive for chest pain. Negative for palpitations. Gastrointestinal: Negative for constipation, diarrhea and nausea. Musculoskeletal: Negative for arthralgias, back pain and gait problem. Neurological: Negative for dizziness and headaches. Psychiatric/Behavioral: Negative.       Current Outpatient Medications   Medication Sig Dispense Refill    sertraline (ZOLOFT) 50 MG tablet TAKE 3 TABLETS BY MOUTH EVERY DAY (Patient taking differently: Take 50 mg by mouth 3 times daily ) 90 tablet 1    lamoTRIgine (LAMICTAL) 25 MG tablet TAKE 3 TABLETS BY MOUTH DAILY (WITH BREAKFAST) MOOD SWINGS (Patient taking differently: Take 50 mg by mouth daily (with breakfast) Mood swings) 90 tablet 2    omeprazole (PRILOSEC) 20 MG delayed release capsule 1 bid 180 capsule 1    atorvastatin (LIPITOR) 20 MG tablet TAKE 1 TABLET BY MOUTH DAILY 90 tablet 3    albuterol sulfate HFA (PROVENTIL HFA) 108 (90 Base) MCG/ACT inhaler Inhale 2 puffs into the lungs every 6 hours as needed for Wheezing 1 Inhaler 0    vitamin D (CHOLECALCIFEROL) 1000 UNIT TABS tablet Take 1,000 Units by mouth daily  vitamin B-12 (CYANOCOBALAMIN) 500 MCG tablet Take 2,500 mcg by mouth daily        No current facility-administered medications for this visit. Social History     Tobacco Use    Smoking status: Former Smoker     Packs/day: 1.00     Years: 15.00     Pack years: 15.00     Last attempt to quit: 6/15/1983     Years since quittin.0    Smokeless tobacco: Never Used   Substance Use Topics    Alcohol use: No    Drug use: No            PHYSICAL EXAMINATION:  [ INSTRUCTIONS:  \"[x]\" Indicates a positive item  \"[]\" Indicates a negative item  -- DELETE ALL ITEMS NOT EXAMINED]  Vital Signs: (As obtained by patient/caregiver or practitioner observation)    Blood pressure-  Heart rate-    Respiratory rate-    Temperature-  Pulse oximetry-     Constitutional: [x] Appears well-developed and well-nourished [x] No apparent distress      [] Abnormal-   Mental status  [x] Alert and awake  [x] Oriented to person/place/time []Able to follow commands      Eyes:  EOM    [x]  Normal  [] Abnormal-  Sclera  []  Normal  [] Abnormal -         Discharge []  None visible  [] Abnormal -    HENT:   [x] Normocephalic, atraumatic.   [] Abnormal   [] Mouth/Throat: Mucous membranes are moist.     External Ears [] Normal  [] Abnormal-     Neck: [] No visualized mass     Pulmonary/Chest: [x] Respiratory effort normal.  [x] No visualized signs of difficulty breathing or respiratory distress        [] Abnormal-      Musculoskeletal:   [x] Normal gait with no signs of ataxia         [x] Normal range of motion of neck        [] Abnormal-       Neurological:        [x] No Facial Asymmetry (Cranial nerve 7 motor function) (limited exam to video visit)          [] No gaze palsy        [] Abnormal-         Skin:        [] No significant exanthematous lesions or discoloration noted on facial skin         [] Abnormal-            Psychiatric:       [x] Normal Affect [] No Hallucinations        [] Abnormal-     Other pertinent observable physical exam findings-     ASSESSMENT/PLAN:    Increase albuterol to 2 puffs 2 times daily    Will increase omeprazole to 40 mg daily. Avoid caffeine, peppermint, alcohol  Avoid eating within 3 hours of lying down  Limit fatty food, this is harder to digest  Avoid acidy foods such as orange juice, grapefruit, tomato juice products    Diana Gallardo was seen today for cough and wheezing. Diagnoses and all orders for this visit:    Gastroesophageal reflux disease with esophagitis  -     famotidine (PEPCID) 40 MG tablet; Take 1 tablet by mouth nightly as needed (gerd)  -     omeprazole (PRILOSEC) 20 MG delayed release capsule; 1 bid      Return if no improvement or worsens      Apolonia Olguin is a 78 y.o. female being evaluated by a Virtual Visit (video visit) encounter to address concerns as mentioned above. A caregiver was present when appropriate. Due to this being a TeleHealth encounter (During [facility] public health emergency), evaluation of the following organ systems was limited: Vitals/Constitutional/EENT/Resp/CV/GI//MS/Neuro/Skin/Heme-Lymph-Imm. Pursuant to the emergency declaration under the [address] [place] [address], [address] [place] [address] authority and the [employer] and [employer] Act, this Virtual Visit was conducted with patient's (and/or legal guardian's) consent, to reduce the patient's risk of exposure to COVID-19 and provide necessary medical care. The patient (and/or legal guardian) has also been advised to contact this office for worsening conditions or problems, and seek emergency medical treatment and/or call 911 if deemed necessary. Patient identification was verified at the start of the visit: Yes    Total time spent on this encounter: 15 min    Services were provided through a video synchronous discussion virtually to substitute for in-person clinic visit. Patient and provider were located at their individual homes.     --ALYSSA BUSTILLO,

## 2020-07-17 RX ORDER — LAMOTRIGINE 25 MG/1
75 TABLET ORAL 3 TIMES DAILY
Qty: 90 TABLET | Refills: 5 | Status: SHIPPED | OUTPATIENT
Start: 2020-07-17 | End: 2020-10-06

## 2020-07-23 ENCOUNTER — TELEPHONE (OUTPATIENT)
Dept: FAMILY MEDICINE CLINIC | Age: 70
End: 2020-07-23

## 2020-07-24 NOTE — TELEPHONE ENCOUNTER
I placed the order for the labs, have done fasting. Here at our lab would be the best place to have done.

## 2020-07-27 DIAGNOSIS — R53.82 CHRONIC FATIGUE: ICD-10-CM

## 2020-07-27 DIAGNOSIS — E04.2 MULTIPLE THYROID NODULES: ICD-10-CM

## 2020-07-27 DIAGNOSIS — M89.9 DISORDER OF BONE, UNSPECIFIED: ICD-10-CM

## 2020-07-27 DIAGNOSIS — D50.9 IRON DEFICIENCY ANEMIA, UNSPECIFIED IRON DEFICIENCY ANEMIA TYPE: ICD-10-CM

## 2020-07-27 DIAGNOSIS — Z13.21 ENCOUNTER FOR VITAMIN DEFICIENCY SCREENING: ICD-10-CM

## 2020-07-27 LAB
A/G RATIO: 1.8 (ref 1.1–2.2)
ALBUMIN SERPL-MCNC: 4.4 G/DL (ref 3.4–5)
ALP BLD-CCNC: 93 U/L (ref 40–129)
ALT SERPL-CCNC: 18 U/L (ref 10–40)
ANION GAP SERPL CALCULATED.3IONS-SCNC: 14 MMOL/L (ref 3–16)
AST SERPL-CCNC: 19 U/L (ref 15–37)
BASOPHILS ABSOLUTE: 0 K/UL (ref 0–0.2)
BASOPHILS RELATIVE PERCENT: 0.5 %
BILIRUB SERPL-MCNC: 0.3 MG/DL (ref 0–1)
BUN BLDV-MCNC: 11 MG/DL (ref 7–20)
CALCIUM SERPL-MCNC: 9.7 MG/DL (ref 8.3–10.6)
CHLORIDE BLD-SCNC: 102 MMOL/L (ref 99–110)
CO2: 27 MMOL/L (ref 21–32)
CREAT SERPL-MCNC: 0.8 MG/DL (ref 0.6–1.2)
EOSINOPHILS ABSOLUTE: 0.1 K/UL (ref 0–0.6)
EOSINOPHILS RELATIVE PERCENT: 1.8 %
FOLATE: 14.95 NG/ML (ref 4.78–24.2)
GFR AFRICAN AMERICAN: >60
GFR NON-AFRICAN AMERICAN: >60
GLOBULIN: 2.5 G/DL
GLUCOSE BLD-MCNC: 96 MG/DL (ref 70–99)
HCT VFR BLD CALC: 42.7 % (ref 36–48)
HEMOGLOBIN: 13.8 G/DL (ref 12–16)
IRON SATURATION: 10 % (ref 15–50)
IRON: 40 UG/DL (ref 37–145)
LYMPHOCYTES ABSOLUTE: 1.4 K/UL (ref 1–5.1)
LYMPHOCYTES RELATIVE PERCENT: 32.3 %
MCH RBC QN AUTO: 25.1 PG (ref 26–34)
MCHC RBC AUTO-ENTMCNC: 32.4 G/DL (ref 31–36)
MCV RBC AUTO: 77.5 FL (ref 80–100)
MONOCYTES ABSOLUTE: 0.3 K/UL (ref 0–1.3)
MONOCYTES RELATIVE PERCENT: 6.3 %
NEUTROPHILS ABSOLUTE: 2.6 K/UL (ref 1.7–7.7)
NEUTROPHILS RELATIVE PERCENT: 59.1 %
PDW BLD-RTO: 15.8 % (ref 12.4–15.4)
PLATELET # BLD: 194 K/UL (ref 135–450)
PMV BLD AUTO: 9.8 FL (ref 5–10.5)
POTASSIUM SERPL-SCNC: 4.6 MMOL/L (ref 3.5–5.1)
RBC # BLD: 5.51 M/UL (ref 4–5.2)
SODIUM BLD-SCNC: 143 MMOL/L (ref 136–145)
T4 FREE: 1.2 NG/DL (ref 0.9–1.8)
TOTAL IRON BINDING CAPACITY: 418 UG/DL (ref 260–445)
TOTAL PROTEIN: 6.9 G/DL (ref 6.4–8.2)
TSH SERPL DL<=0.05 MIU/L-ACNC: 1.16 UIU/ML (ref 0.27–4.2)
VITAMIN B-12: >2000 PG/ML (ref 211–911)
VITAMIN D 25-HYDROXY: 38.8 NG/ML
WBC # BLD: 4.3 K/UL (ref 4–11)

## 2020-08-06 ENCOUNTER — TELEPHONE (OUTPATIENT)
Dept: FAMILY MEDICINE CLINIC | Age: 70
End: 2020-08-06

## 2020-08-07 ENCOUNTER — TELEPHONE (OUTPATIENT)
Dept: FAMILY MEDICINE CLINIC | Age: 70
End: 2020-08-07

## 2020-08-07 NOTE — TELEPHONE ENCOUNTER
Please advise if pt is ok to change with another provider? It looks like she may be worried about her lab results?

## 2020-08-18 ENCOUNTER — HOSPITAL ENCOUNTER (OUTPATIENT)
Dept: CT IMAGING | Age: 70
Discharge: HOME | End: 2020-08-18
Payer: MEDICARE

## 2020-08-18 PROCEDURE — 6360000004 HC RX CONTRAST MEDICATION: Performed by: INTERNAL MEDICINE

## 2020-08-18 PROCEDURE — 74177 CT ABD & PELVIS W/CONTRAST: CPT

## 2020-08-18 RX ADMIN — IOHEXOL 50 ML: 240 INJECTION, SOLUTION INTRATHECAL; INTRAVASCULAR; INTRAVENOUS; ORAL at 14:51

## 2020-08-18 RX ADMIN — IOPAMIDOL 75 ML: 755 INJECTION, SOLUTION INTRAVENOUS at 14:59

## 2020-08-21 ENCOUNTER — TELEPHONE (OUTPATIENT)
Dept: INTERVENTIONAL RADIOLOGY/VASCULAR | Age: 70
End: 2020-08-21

## 2020-08-24 ENCOUNTER — TELEPHONE (OUTPATIENT)
Dept: FAMILY MEDICINE CLINIC | Age: 70
End: 2020-08-24

## 2020-08-24 RX ORDER — ATORVASTATIN CALCIUM 20 MG/1
20 TABLET, FILM COATED ORAL DAILY
Qty: 90 TABLET | Refills: 3 | Status: ON HOLD | OUTPATIENT
Start: 2020-08-24 | End: 2021-05-24 | Stop reason: CLARIF

## 2020-08-24 NOTE — TELEPHONE ENCOUNTER
----- Message from Sunny Marshall sent at 8/24/2020  4:38 PM EDT -----  Subject: Results Request    QUESTIONS  Which lab or imaging result is the patient calling about? cat scan  Which provider ordered the test? Catherine Koenig   At what location was the test performed? Date the test was performed? 2020-08-18  Additional Information for Provider? patient calling about test results    and would like the results put into [employer]   please reachout to patient   ---------------------------------------------------------------------------  --------------  [address]  What is the best way for the office to contact you? OK to respond with   secure message via [employer] portal (only for patients who have registered   [employer] account)  Preferred Call Back Phone Number?  [phone]

## 2020-08-24 NOTE — TELEPHONE ENCOUNTER
Last office visit 7/9/2020     Last written 5- #90 x 3 refills Pt.  Changing pharmacies     Next office visit scheduled not scheduled    Requested Prescriptions     Pending Prescriptions Disp Refills    atorvastatin (LIPITOR) 20 MG tablet 90 tablet 3     Sig: Take 1 tablet by mouth daily

## 2020-08-28 ENCOUNTER — OFFICE VISIT (OUTPATIENT)
Dept: PRIMARY CARE CLINIC | Age: 70
End: 2020-08-28
Payer: MEDICARE

## 2020-08-28 PROCEDURE — G8428 CUR MEDS NOT DOCUMENT: HCPCS | Performed by: NURSE PRACTITIONER

## 2020-08-28 PROCEDURE — 99211 OFF/OP EST MAY X REQ PHY/QHP: CPT | Performed by: NURSE PRACTITIONER

## 2020-08-28 PROCEDURE — G8417 CALC BMI ABV UP PARAM F/U: HCPCS | Performed by: NURSE PRACTITIONER

## 2020-08-28 NOTE — PATIENT INSTRUCTIONS

## 2020-08-28 NOTE — PROGRESS NOTES
Melanie Rahman received a viral test for COVID-19. They were educated on isolation and quarantine as appropriate. For any symptoms, they were directed to seek care from their PCP, given contact information to establish with a doctor, directed to an urgent care or the emergency room.

## 2020-08-29 LAB — SARS-COV-2, NAA: NOT DETECTED

## 2020-09-03 ENCOUNTER — HOSPITAL ENCOUNTER (OUTPATIENT)
Dept: CT IMAGING | Age: 70
Discharge: HOME | End: 2020-09-03
Payer: MEDICARE

## 2020-09-03 ENCOUNTER — HOSPITAL ENCOUNTER (OUTPATIENT)
Dept: GENERAL RADIOLOGY | Age: 70
Discharge: HOME | End: 2020-09-03
Payer: MEDICARE

## 2020-09-03 ENCOUNTER — HOSPITAL ENCOUNTER (OUTPATIENT)
Age: 70
Discharge: HOME | End: 2020-09-03
Payer: MEDICARE

## 2020-09-03 VITALS
SYSTOLIC BLOOD PRESSURE: 122 MMHG | WEIGHT: 168 LBS | HEIGHT: 65 IN | BODY MASS INDEX: 27.99 KG/M2 | HEART RATE: 62 BPM | OXYGEN SATURATION: 98 % | DIASTOLIC BLOOD PRESSURE: 60 MMHG | RESPIRATION RATE: 16 BRPM | TEMPERATURE: 98.4 F

## 2020-09-03 DIAGNOSIS — R91.1 LUNG NODULE: Primary | ICD-10-CM

## 2020-09-03 LAB
BASOPHILS ABSOLUTE: 0 K/UL (ref 0–0.2)
BASOPHILS RELATIVE PERCENT: 0.6 %
EOSINOPHILS ABSOLUTE: 0.1 K/UL (ref 0–0.6)
EOSINOPHILS RELATIVE PERCENT: 1.6 %
HCT VFR BLD CALC: 43.8 % (ref 36–48)
HEMOGLOBIN: 14.3 G/DL (ref 12–16)
INR BLD: 1.02 (ref 0.86–1.14)
LYMPHOCYTES ABSOLUTE: 2 K/UL (ref 1–5.1)
LYMPHOCYTES RELATIVE PERCENT: 30.7 %
MCH RBC QN AUTO: 25.8 PG (ref 26–34)
MCHC RBC AUTO-ENTMCNC: 32.7 G/DL (ref 31–36)
MCV RBC AUTO: 78.9 FL (ref 80–100)
MONOCYTES ABSOLUTE: 0.4 K/UL (ref 0–1.3)
MONOCYTES RELATIVE PERCENT: 5.9 %
NEUTROPHILS ABSOLUTE: 4.1 K/UL (ref 1.7–7.7)
NEUTROPHILS RELATIVE PERCENT: 61.2 %
PDW BLD-RTO: 17.6 % (ref 12.4–15.4)
PLATELET # BLD: 190 K/UL (ref 135–450)
PMV BLD AUTO: 8.7 FL (ref 5–10.5)
PROTHROMBIN TIME: 11.8 SEC (ref 10–13.2)
RBC # BLD: 5.55 M/UL (ref 4–5.2)
WBC # BLD: 6.6 K/UL (ref 4–11)

## 2020-09-03 PROCEDURE — 85610 PROTHROMBIN TIME: CPT

## 2020-09-03 PROCEDURE — 36415 COLL VENOUS BLD VENIPUNCTURE: CPT

## 2020-09-03 PROCEDURE — 88342 IMHCHEM/IMCYTCHM 1ST ANTB: CPT

## 2020-09-03 PROCEDURE — 2709999900 CT NEEDLE BIOPSY LUNG PERCUTANEOUS

## 2020-09-03 PROCEDURE — 77012 CT SCAN FOR NEEDLE BIOPSY: CPT

## 2020-09-03 PROCEDURE — 6360000002 HC RX W HCPCS: Performed by: RADIOLOGY

## 2020-09-03 PROCEDURE — 88305 TISSUE EXAM BY PATHOLOGIST: CPT

## 2020-09-03 PROCEDURE — 71045 X-RAY EXAM CHEST 1 VIEW: CPT

## 2020-09-03 PROCEDURE — 85025 COMPLETE CBC W/AUTO DIFF WBC: CPT

## 2020-09-03 RX ORDER — FENTANYL CITRATE 50 UG/ML
INJECTION, SOLUTION INTRAMUSCULAR; INTRAVENOUS
Status: COMPLETED | OUTPATIENT
Start: 2020-09-03 | End: 2020-09-03

## 2020-09-03 RX ORDER — MIDAZOLAM HYDROCHLORIDE 5 MG/ML
INJECTION INTRAMUSCULAR; INTRAVENOUS
Status: COMPLETED | OUTPATIENT
Start: 2020-09-03 | End: 2020-09-03

## 2020-09-03 RX ORDER — ACETAMINOPHEN 325 MG/1
650 TABLET ORAL EVERY 4 HOURS PRN
Status: DISCONTINUED | OUTPATIENT
Start: 2020-09-03 | End: 2020-09-04 | Stop reason: HOSPADM

## 2020-09-03 RX ADMIN — MIDAZOLAM HYDROCHLORIDE 1 MG: 5 INJECTION, SOLUTION INTRAMUSCULAR; INTRAVENOUS at 08:39

## 2020-09-03 RX ADMIN — FENTANYL CITRATE 25 MCG: 50 INJECTION INTRAMUSCULAR; INTRAVENOUS at 08:53

## 2020-09-03 RX ADMIN — FENTANYL CITRATE 50 MCG: 50 INJECTION INTRAMUSCULAR; INTRAVENOUS at 08:39

## 2020-09-03 RX ADMIN — MIDAZOLAM HYDROCHLORIDE 0.5 MG: 5 INJECTION, SOLUTION INTRAMUSCULAR; INTRAVENOUS at 08:53

## 2020-09-03 NOTE — BRIEF OP NOTE
Brief Postoperative Note    Henry Kumar  YOB: 1950  [id number]    Pre-operative Diagnosis: enlarging left apical lung nodule    Post-operative Diagnosis: Same    Procedure: CT guided left lung biopsy    Anesthesia: Moderate Sedation    Surgeons/Assistants: Dr. Nick Lilly    Estimated Blood Loss: less than 5ml     Complications: None    Specimens: Was Obtained: single 20G core biopsy left upper lobe lung lesion    Findings: no pneumothorax on post procedure CT    Electronically signed by Alida Angelo MD on 9/3/2020 at 9:04 AM  Brief Postoperative Note

## 2020-09-03 NOTE — PRE SEDATION
Sedation Pre-Procedure Note    Patient Name: Emely Alvarez   YOB: 1950  Room/Bed: Room/bed info not found  Medical Record Number: [id number]  Date: 9/3/2020   Time: 8:19 AM       Indication:  Enlarging left upper lobe lung nodule    Consent: I have discussed with the patient and/or the patient representative the indication, alternatives, and the possible risks and/or complications of the planned procedure and the anesthesia methods. The patient and/or patient representative appear to understand and agree to proceed. Vital Signs:   Vitals:    09/03/20 0757   BP:    Pulse: 78   Resp:    Temp:    SpO2:        Past Medical History:   has a past medical history of Allergic rhinitis, Anxiety, Arthritis, Depression, GERD (gastroesophageal reflux disease), Hallux valgus, Headache(784.0), Herpes simplex, Irritable bowel syndrome, Kidney stones, Osteopenia, Other and unspecified hyperlipidemia, Pain medication agreement signed, and Thyroid disease. Past Surgical History:   has a past surgical history that includes Lithotripsy; shoulder surgery; Tubal ligation; Upper gastrointestinal endoscopy; Upper gastrointestinal endoscopy (N/A, 9/6/2019); Upper gastrointestinal endoscopy (N/A, 9/6/2019); and Upper gastrointestinal endoscopy (9/6/2019). Medications:   Scheduled Meds:   Continuous Infusions:   PRN Meds:   Home Meds:   Prior to Admission medications    Medication Sig Start Date End Date Taking?  Authorizing Provider   atorvastatin (LIPITOR) 20 MG tablet Take 1 tablet by mouth daily 8/24/20   Nelson Harrison DO   lamoTRIgine (LAMICTAL) 25 MG tablet Take 3 tablets by mouth three times daily Mood swings 7/17/20   BELINDA Negron CNP   sertraline (ZOLOFT) 50 MG tablet Take 1 tablet by mouth 3 times daily 7/17/20   BELINDA Negron CNP   famotidine (PEPCID) 40 MG tablet Take 1 tablet by mouth nightly as needed (gerd) 7/9/20   BELINDA Negron CNP   omeprazole (PRILOSEC) 20 MG delayed release capsule 1 bid 7/9/20   BELINDA Barker - CNP   albuterol sulfate HFA (PROVENTIL HFA) 108 (90 Base) MCG/ACT inhaler Inhale 2 puffs into the lungs every 6 hours as needed for Wheezing 4/11/19 7/9/20  NESHA Pruitt   vitamin D (CHOLECALCIFEROL) 1000 UNIT TABS tablet Take 1,000 Units by mouth daily    Historical Provider, MD   vitamin B-12 (CYANOCOBALAMIN) 500 MCG tablet Take 2,500 mcg by mouth daily     Historical Provider, MD     Coumadin Use Last 7 Days:  no  Antiplatelet drug therapy use last 7 days: no  Other anticoagulant use last 7 days: no  Additional Medication Information:        Pre-Sedation Documentation and Exam:   I have reviewed the patient's history and review of systems.     Mallampati Airway Assessment:  normal neck range of motion    Prior History of Anesthesia Complications:   none    ASA Classification:  Class 2 - A normal healthy patient with mild systemic disease    Sedation/ Anesthesia Plan:   intravenous sedation    Medications Planned:   midazolam (Versed) intravenously and fentanyl intravenously    Patient is an appropriate candidate for plan of sedation: yes    Electronically signed by Gwen Gonzalez MD on 9/3/2020 at 8:19 AM

## 2020-09-03 NOTE — PROGRESS NOTES
Post biopsy CXR complete. Patient sitting up in bed awake, alert and oriented, VSS on RA, denies pain or SOB. Biopsy site WNL. Lunch tray ordered.   Summer ANNE MCGEE RN

## 2020-09-03 NOTE — PROGRESS NOTES
Image guided right lung nodule biopsy completed. One core sample obtained and sent to lab for pathology. Pt tolerated procedure without any signs or symptoms of distress vital signs stable see flow sheets. Pt taken to recovery bay for post operative monitoring.

## 2020-09-08 ENCOUNTER — TELEPHONE (OUTPATIENT)
Dept: PULMONOLOGY | Age: 70
End: 2020-09-08

## 2020-09-25 ENCOUNTER — TELEPHONE (OUTPATIENT)
Dept: CARDIOTHORACIC SURGERY | Age: 70
End: 2020-09-25

## 2020-09-25 NOTE — TELEPHONE ENCOUNTER
Spoke w/pt, aware of date, time and location OV w/dr susan Friedman Sept 29. PET scan completed 25Sept; pt brining disc of images with her to OV.

## 2020-09-29 ENCOUNTER — OFFICE VISIT (OUTPATIENT)
Dept: CARDIOTHORACIC SURGERY | Age: 70
End: 2020-09-29
Payer: MEDICARE

## 2020-09-29 ENCOUNTER — TELEPHONE (OUTPATIENT)
Dept: CARDIOTHORACIC SURGERY | Age: 70
End: 2020-09-29

## 2020-09-29 VITALS
HEART RATE: 72 BPM | OXYGEN SATURATION: 97 % | DIASTOLIC BLOOD PRESSURE: 80 MMHG | TEMPERATURE: 97.9 F | HEIGHT: 65 IN | SYSTOLIC BLOOD PRESSURE: 144 MMHG | BODY MASS INDEX: 28.99 KG/M2 | WEIGHT: 174 LBS

## 2020-09-29 PROCEDURE — 1090F PRES/ABSN URINE INCON ASSESS: CPT | Performed by: THORACIC SURGERY (CARDIOTHORACIC VASCULAR SURGERY)

## 2020-09-29 PROCEDURE — 1036F TOBACCO NON-USER: CPT | Performed by: THORACIC SURGERY (CARDIOTHORACIC VASCULAR SURGERY)

## 2020-09-29 PROCEDURE — 4040F PNEUMOC VAC/ADMIN/RCVD: CPT | Performed by: THORACIC SURGERY (CARDIOTHORACIC VASCULAR SURGERY)

## 2020-09-29 PROCEDURE — 1123F ACP DISCUSS/DSCN MKR DOCD: CPT | Performed by: THORACIC SURGERY (CARDIOTHORACIC VASCULAR SURGERY)

## 2020-09-29 PROCEDURE — 3017F COLORECTAL CA SCREEN DOC REV: CPT | Performed by: THORACIC SURGERY (CARDIOTHORACIC VASCULAR SURGERY)

## 2020-09-29 PROCEDURE — 99205 OFFICE O/P NEW HI 60 MIN: CPT | Performed by: THORACIC SURGERY (CARDIOTHORACIC VASCULAR SURGERY)

## 2020-09-29 PROCEDURE — G8400 PT W/DXA NO RESULTS DOC: HCPCS | Performed by: THORACIC SURGERY (CARDIOTHORACIC VASCULAR SURGERY)

## 2020-09-29 PROCEDURE — G8417 CALC BMI ABV UP PARAM F/U: HCPCS | Performed by: THORACIC SURGERY (CARDIOTHORACIC VASCULAR SURGERY)

## 2020-09-29 PROCEDURE — G8427 DOCREV CUR MEDS BY ELIG CLIN: HCPCS | Performed by: THORACIC SURGERY (CARDIOTHORACIC VASCULAR SURGERY)

## 2020-09-29 NOTE — TELEPHONE ENCOUNTER
I spoke with Mrs. Dora Vail regarding her upcoming surgery with Dr. Gerald Harris at [facility]. She is scheduled for PFT test on 10/08/20 at 9:30, register at 9:00, NPO one hour prior; also will need a COVID test on 10/02/20 at 9:45. Her surgery is scheduled on Friday, October 16, 2020 at 7:30 a.m., arrive at 5:30 a.m., NPO after midnight. She will need the COVID repeated for the surgery and this is scheduled on 10/12/20 at 10:10 a.m. She wrote down the instructions. She has our office telephone number for any questions or concerns.

## 2020-09-29 NOTE — PROGRESS NOTES
Review of Systems:  Constitutional:  + night sweats. No headaches, weight loss. Eyes:  No glaucoma, cataracts. Cataracts removed. ENMT:  No nosebleeds, deviated septum. + thyroid nodules  Cardiac:  No arrhythmias. Occasional palpitations and chest pressure. Vascular:  No claudication. + varicosities. GI:  No PUD. + heartburn. Occasional chest pressure. :  No kidney stones, frequent UTIs  Musculoskeletal:  + arthritis. No gout. Respiratory:  + SOB. No emphysema, asthma. Integumentary:  No dermatitis, itching, rash. New redness on left cheek  Neurological:  No stroke, TIAs, seizures. Psychiatric:  + depression, anxiety. Endocrine: No diabetes. + thyroid nodules. Hematologic:  No bleeding, easy bruising. Immunologic:  + known lung cancer. No steroid therapies.

## 2020-09-29 NOTE — LETTER
[facility]  [address] [address]  Phone: [phone]  Fax: [phone]    Alejandra Necessary*        September 29, 2020       Patient: Tanner Benjamin   MR Number: [id number]   YOB: 1950   Date of Visit: 9/29/2020       Dear Dr. Goddard Room: Thank you for the request for consultation for Leslie Power to me for the evaluation of her newly diagnosed left upper lobe lung cancer. Below are the relevant portions of my assessment and plan of care. If you have questions, please do not hesitate to call me. I look forward to following William Pack along with you.     Sincerely,        Belia Reid MD    CC providers:  Noman Arreguin DO  [address] [facility] [address]  VIA In [facility], [address] [facility]  [address] [facility] [address] [address]  VIA In [place]

## 2020-09-29 NOTE — PROGRESS NOTES
Department of Cardiovascular & Thoracic Surgery  History and Physical          DIAGNOSIS: Adenocarcinoma left upper lobe lung    CHIEF COMPLAINT:    Chief Complaint   Patient presents with   Rick Valderrama Patient     Mrs. Morenita Aponte is being seen, at the request of Dr. Annette Ware, for evaluation of a left upper lobe lung nodule which was positive for adenocarcinoma. History Obtained From:  patient and electronic medical record    HISTORY OF PRESENT ILLNESS:      The patient is a 69 y.o. female with significant past medical history of thyroid nodules, HLD, and osteopenia who presents with a chief complaint of diffuse chest pain and lung nodule. Pt reports diffuse chest pain located in the left upper lung field, which radiates to right upper lung field for about 1 year. She has even undergone a Cardiologic evaluation which is negative for ischemic disease. States that she becomes short of breath after brief walks, and \"feels wheezy. \" Pain is consistently present, though flares a few times per month. Endorses nocturnal pain and diaphoresis, dyspnea, hot flashes, and blurry vision. Denies fever, sputum production, indigestion, paresthesias, dizziness. CT abd, pelv, chest (08/18/2020) revealed increasing solid nodule in medial aspect of left upper lobe, compared to previous CT imaging on (08/22/2019). CT-guided core needle biopsy (09/03/2020) revealed alveolar neoplastic cells indicative of Adenocarcinoma. Medications include iron injections, atorvastatin, lamotrigine, sertraline, famotidine, omeprazole, vitamin D, vitamin B-12.      Past Medical History:    Past Medical History:   Diagnosis Date    Allergic rhinitis     Anxiety 10/10/2011    Arthritis     Depression 3/22/2017    GERD (gastroesophageal reflux disease)     Hallux valgus     Headache(784.0)     Herpes simplex     Irritable bowel syndrome     Kidney stones     Osteopenia     Other and unspecified hyperlipidemia 4/2/2013    Pain medication agreement signed 10/10/2011    Thyroid disease     nodules       Past Surgical History:    Past Surgical History:   Procedure Laterality Date    CT NEEDLE BIOPSY LUNG PERCUTANEOUS  9/3/2020    CT NEEDLE BIOPSY LUNG PERCUTANEOUS 9/3/2020 [address] SCAN    LITHOTRIPSY      SHOULDER SURGERY      TUBAL LIGATION      UPPER GASTROINTESTINAL ENDOSCOPY      gastritis    UPPER GASTROINTESTINAL ENDOSCOPY N/A 9/6/2019    EGD BIOPSY performed by Adrienne Cabrera MD at [facility] N/A 9/6/2019    COLON & EGD W/ ANESTHESIA performed by Adrienne Cabrera MD at [facility] [address]  9/6/2019    EGD [address] performed by Adrienne Cabrera MD at [address] [facility]       Medications:   Current Outpatient Medications   Medication Sig Dispense Refill    atorvastatin (LIPITOR) 20 MG tablet Take 1 tablet by mouth daily 90 tablet 3    lamoTRIgine (LAMICTAL) 25 MG tablet Take 3 tablets by mouth three times daily Mood swings 90 tablet 5    sertraline (ZOLOFT) 50 MG tablet Take 1 tablet by mouth 3 times daily 90 tablet 5    famotidine (PEPCID) 40 MG tablet Take 1 tablet by mouth nightly as needed (gerd) (Patient taking differently: Take 40 mg by mouth 2 times daily ) 30 tablet 5    omeprazole (PRILOSEC) 20 MG delayed release capsule 1 bid 180 capsule 1    vitamin D (CHOLECALCIFEROL) 1000 UNIT TABS tablet Take 1,000 Units by mouth daily      vitamin B-12 (CYANOCOBALAMIN) 500 MCG tablet Take 2,500 mcg by mouth daily        No current facility-administered medications for this visit. Allergies:  Sulfa antibiotics and Codeine    Social History:    TOBACCO:  quit tobacco use 38 years ago. ETOH:  Current alcohol usage:  Type of Drink(s):  Liquor/Mixed drink. Frequency of use:  Weekly. Duration of alcohol use:  10+ years. Approximate date of last drink:  .   CAFFEINE ABUSE:  No  DRUGS:  Never used recreational drugs  LIFESTYLE: Takes walks in the woods, lives at home, retired. MARITAL STATUS:    OCCUPATION:  Retired     Family History:        Problem Relation Age of Onset    Asthma Mother     Heart Disease Father        REVIEW OF SYSTEMS:  I have reviewed and agree with the following:  Constitutional:  + night sweats. No headaches, weight loss. Eyes:  No glaucoma, cataracts. Cataracts removed. ENMT:  No nosebleeds, deviated septum. + thyroid nodules  Cardiac:  No arrhythmias. Occasional palpitations and chest pressure. Vascular:  No claudication. + varicosities. GI:  No PUD. + heartburn. Occasional chest pressure. :  No kidney stones, frequent UTIs  Musculoskeletal:  + arthritis. No gout. Respiratory:  + SOB. No emphysema, asthma. Integumentary:  No dermatitis, itching, rash. New redness on left cheek  Neurological:  No stroke, TIAs, seizures. Psychiatric:  + depression, anxiety. Endocrine: No diabetes. + thyroid nodules. Hematologic:  No bleeding, easy bruising. Immunologic:  + known lung cancer. No steroid therapies. PHYSICAL EXAM:    VITALS:  BP (!) 144/80 (Site: Right Upper Arm, Position: Sitting, Cuff Size: Medium Adult)   Pulse 72   Temp 97.9 °F (36.6 °C) (Temporal)   Ht 5' 5\" (1.651 m)   Wt 174 lb (78.9 kg)   LMP  (LMP Unknown)   SpO2 97%   BMI 28.96 kg/m²       Constitutional:   Well-developed and nourished, appears younger than stated age. In no acute distress. No obesity. Eyes:  lids and lashes normal, pupils equal, round and reactive to light, extra ocular muscles intact, sclera clear, conjunctiva normal    Head/ENT:  Normocephalic, atraumatic. Dentures upper and lower. Normal gums, & palate. Moist mucous membranes. No cyanosis or pallor. Neck:  supple, symmetrical, trachea midline, no lymphadenopathy, no jugular venous distension, no carotid bruits and MASSES:  no masses. No thyromegaly. Lungs:  no increased work of breathing, good air exchange, no retractions and wheeze left apex.   No tactile fremitus    Cardiovascular:  regular rate and rhythm, S1, S2 normal, no murmur, click, rub or gallop. Apical impulse in 5th intercostal space. Pulses:  Right dorsalis pedis 2, Left dorsalis pedis 2, Right posterior tibial 2, Left Posterior tibial 2, Right radial 2, and Left radial 2. Abdomen:  normal bowel sounds, non-tender, aorta normal and bruits absent. No hepatosplenomegaly or masses. Musculoskeletal:  Back is straight and non-tender, full ROM of upper and lower extremities. No kyphosis or scoliosis    Extremities:  Warm, pink, no clubbing, cyanosis, petechiae, ischemia, or deformities. Negative peripheral edema lower extremities. Skin: no rashes, no ecchymoses, no jaundice, no purpura    Neurological/Psychiatric: oriented, normal    DATA:  2/17/2020 EKG: Normal sinus rhythm with some bradycardia. CXRAY:  (09/03/2020)   Heart size is normal.  No pneumothorax evident in the left lung following the    biopsy.  Small amount of hazy opacity present medially in the left apex at    the site of biopsy probably representing small amount of hemorrhage.  Lungs    are otherwise clear.  No adenopathy or pleural effusion.         RECOMMENDATION:    No pneumothorax evident following CT-guided left lung biopsy 2 hours earlier. No pleural effusion. CT Chest scan:  (08/18/2020)    1. No acute intrapulmonary findings. 2. Slight interval increase in size of a part solid nodule within the left    upper lobe, with two separate nodular components having slightly increased in    size in comparison with the study of 08/22/2019.  This is highly concerning    for a slow growing malignancy.  Tissue sampling should be strongly considered. 3. Stable unchanged appearance of a ground-glass nodule within the right    upper lobe. 4. No acute process within the abdomen or pelvis. 5. Stable hepatic and bilateral renal cysts. 6. Bilateral nonobstructing nephrolithiasis.       9/3/2020 CT-guided needle discussed with the patient. I have discussed the risks, benefits and alternatives with patient, including risks of infection and bleeding and an operative mortality risk of <1%. I also discussed the the alternative of not doing surgery and the consequences of that action. Questions have been answered and the patient is willing to proceed. I also had a discussion regarding secondary risk modification with her, particularly addressing the need for lifestyle changes including exercise and weight loss and will continue to have those conversations with her post op. Surgery to be scheduled in the next 1-2 weeks.     PFTs    Lorena Martines MD FACS,  [facility] [place], [facility]  9/29/2020  12:48 PM

## 2020-09-30 ENCOUNTER — TELEPHONE (OUTPATIENT)
Dept: FAMILY MEDICINE CLINIC | Age: 70
End: 2020-09-30

## 2020-09-30 ENCOUNTER — PREP FOR PROCEDURE (OUTPATIENT)
Dept: CARDIOTHORACIC SURGERY | Age: 70
End: 2020-09-30

## 2020-10-01 ENCOUNTER — OFFICE VISIT (OUTPATIENT)
Dept: PRIMARY CARE CLINIC | Age: 70
End: 2020-10-01
Payer: MEDICARE

## 2020-10-01 PROCEDURE — G8428 CUR MEDS NOT DOCUMENT: HCPCS | Performed by: NURSE PRACTITIONER

## 2020-10-01 PROCEDURE — G8417 CALC BMI ABV UP PARAM F/U: HCPCS | Performed by: NURSE PRACTITIONER

## 2020-10-01 PROCEDURE — 99211 OFF/OP EST MAY X REQ PHY/QHP: CPT | Performed by: NURSE PRACTITIONER

## 2020-10-01 RX ORDER — SODIUM CHLORIDE 0.9 % (FLUSH) 0.9 %
10 SYRINGE (ML) INJECTION EVERY 12 HOURS SCHEDULED
Status: CANCELLED | OUTPATIENT
Start: 2020-10-01

## 2020-10-01 RX ORDER — SODIUM CHLORIDE 0.9 % (FLUSH) 0.9 %
10 SYRINGE (ML) INJECTION PRN
Status: CANCELLED | OUTPATIENT
Start: 2020-10-01

## 2020-10-01 NOTE — PROGRESS NOTES
[facility] received a viral test for COVID-19. They were educated on isolation and quarantine as appropriate. For any symptoms, they were directed to seek care from their PCP, given contact information to establish with a doctor, directed to an urgent care or the emergency room.

## 2020-10-01 NOTE — PATIENT INSTRUCTIONS

## 2020-10-02 LAB — SARS-COV-2, NAA: NOT DETECTED

## 2020-10-06 RX ORDER — LAMOTRIGINE 25 MG/1
TABLET ORAL
Qty: 270 TABLET | Refills: 0 | Status: SHIPPED | OUTPATIENT
Start: 2020-10-06 | End: 2020-12-28

## 2020-10-06 NOTE — TELEPHONE ENCOUNTER
Refill Request     Last Seen: 7/9/2020    Last Written: 7/17/2020    Next Appointment:   Future Appointments   Date Time Provider Christina Burton   10/8/2020  9:30 AM SCHEDULE, [facility]   10/12/2020 10:10 AM SCHEDULE, [facility] AND [facility]             Requested Prescriptions     Pending Prescriptions Disp Refills    lamoTRIgine (LAMICTAL) 25 MG tablet [Pharmacy Med Name: LAMOTRIGINE 25 MG TABLET] 270 tablet 0     Sig: TAKE 3 TABLETS BY MOUTH DAILY (WITH BREAKFAST) MOOD SWINGS

## 2020-10-08 ENCOUNTER — HOSPITAL ENCOUNTER (OUTPATIENT)
Age: 70
Discharge: HOME | End: 2020-10-12
Payer: MEDICARE

## 2020-10-08 ENCOUNTER — HOSPITAL ENCOUNTER (OUTPATIENT)
Dept: GENERAL RADIOLOGY | Age: 70
Discharge: HOME | End: 2020-10-08
Payer: MEDICARE

## 2020-10-08 ENCOUNTER — HOSPITAL ENCOUNTER (OUTPATIENT)
Dept: PULMONOLOGY | Age: 70
Discharge: HOME | End: 2020-10-08
Payer: MEDICARE

## 2020-10-08 LAB
ABO/RH: NORMAL
ANION GAP SERPL CALCULATED.3IONS-SCNC: 14 MMOL/L (ref 3–16)
ANTIBODY SCREEN: NORMAL
APTT: 31 SEC (ref 24.2–36.2)
BASE EXCESS ARTERIAL: -2.4 MMOL/L (ref -3–3)
BASOPHILS ABSOLUTE: 0.1 K/UL (ref 0–0.2)
BASOPHILS RELATIVE PERCENT: 1 %
BILIRUBIN URINE: NEGATIVE
BLOOD, URINE: ABNORMAL
BUN BLDV-MCNC: 13 MG/DL (ref 7–20)
CALCIUM SERPL-MCNC: 9.6 MG/DL (ref 8.3–10.6)
CARBOXYHEMOGLOBIN ARTERIAL: 0.5 % (ref 0–1.5)
CHLORIDE BLD-SCNC: 104 MMOL/L (ref 99–110)
CLARITY: CLEAR
CO2: 26 MMOL/L (ref 21–32)
COLOR: YELLOW
CREAT SERPL-MCNC: 0.6 MG/DL (ref 0.6–1.2)
DLCO %PRED: 98 %
DLCO PRED: NORMAL
DLCO/VA %PRED: NORMAL
DLCO/VA PRED: NORMAL
DLCO/VA: NORMAL
DLCO: NORMAL
EKG ATRIAL RATE: 65 BPM
EKG DIAGNOSIS: NORMAL
EKG P AXIS: 39 DEGREES
EKG P-R INTERVAL: 166 MS
EKG Q-T INTERVAL: 400 MS
EKG QRS DURATION: 84 MS
EKG QTC CALCULATION (BAZETT): 416 MS
EKG R AXIS: 14 DEGREES
EKG T AXIS: 21 DEGREES
EKG VENTRICULAR RATE: 65 BPM
EOSINOPHILS ABSOLUTE: 0.1 K/UL (ref 0–0.6)
EOSINOPHILS RELATIVE PERCENT: 1.7 %
EPITHELIAL CELLS, UA: NORMAL /HPF (ref 0–5)
EXPIRATORY TIME-POST: NORMAL
EXPIRATORY TIME: NORMAL
FEF 25-75% %CHNG: NORMAL
FEF 25-75% %PRED-POST: NORMAL
FEF 25-75% %PRED-PRE: NORMAL
FEF 25-75% PRED: NORMAL
FEF 25-75%-POST: NORMAL
FEF 25-75%-PRE: NORMAL
FEV1 %PRED-POST: 111 %
FEV1 %PRED-PRE: 108 %
FEV1 PRED: NORMAL
FEV1-POST: NORMAL
FEV1-PRE: NORMAL
FEV1/FVC %PRED-POST: NORMAL
FEV1/FVC %PRED-PRE: NORMAL
FEV1/FVC PRED: NORMAL
FEV1/FVC-POST: 82 %
FEV1/FVC-PRE: 80 %
FVC %PRED-POST: NORMAL
FVC %PRED-PRE: NORMAL
FVC PRED: NORMAL
FVC-POST: NORMAL
FVC-PRE: NORMAL
GAW %PRED: NORMAL
GAW PRED: NORMAL
GAW: NORMAL
GFR AFRICAN AMERICAN: >60
GFR NON-AFRICAN AMERICAN: >60
GLUCOSE BLD-MCNC: 87 MG/DL (ref 70–99)
GLUCOSE URINE: NEGATIVE MG/DL
HCO3 ARTERIAL: 22.2 MMOL/L (ref 21–29)
HCT VFR BLD CALC: 44.7 % (ref 36–48)
HEMOGLOBIN, ART, EXTENDED: 15.4 G/DL (ref 12–16)
HEMOGLOBIN: 14.8 G/DL (ref 12–16)
IC %PRED: NORMAL
IC PRED: NORMAL
IC: NORMAL
INR BLD: 1.02 (ref 0.86–1.14)
KETONES, URINE: NEGATIVE MG/DL
LEUKOCYTE ESTERASE, URINE: NEGATIVE
LYMPHOCYTES ABSOLUTE: 1.6 K/UL (ref 1–5.1)
LYMPHOCYTES RELATIVE PERCENT: 30.3 %
MCH RBC QN AUTO: 27.2 PG (ref 26–34)
MCHC RBC AUTO-ENTMCNC: 33.1 G/DL (ref 31–36)
MCV RBC AUTO: 82.2 FL (ref 80–100)
MEP: NORMAL
METHEMOGLOBIN ARTERIAL: 0.6 %
MICROSCOPIC EXAMINATION: YES
MIP: NORMAL
MONOCYTES ABSOLUTE: 0.4 K/UL (ref 0–1.3)
MONOCYTES RELATIVE PERCENT: 6.7 %
MVV %PRED-PRE: NORMAL
MVV PRED: NORMAL
MVV-PRE: NORMAL
NEUTROPHILS ABSOLUTE: 3.2 K/UL (ref 1.7–7.7)
NEUTROPHILS RELATIVE PERCENT: 60.3 %
NITRITE, URINE: NEGATIVE
O2 CONTENT ARTERIAL: 21 ML/DL
O2 SAT, ARTERIAL: 97.2 %
O2 THERAPY: NORMAL
PCO2 ARTERIAL: 38.2 MMHG (ref 35–45)
PDW BLD-RTO: 20.3 % (ref 12.4–15.4)
PEF %PRED-POST: NORMAL
PEF %PRED-PRE: NORMAL
PEF PRED: NORMAL
PEF%CHNG: NORMAL
PEF-POST: NORMAL
PEF-PRE: NORMAL
PH ARTERIAL: 7.38 (ref 7.35–7.45)
PH UA: 6 (ref 5–8)
PLATELET # BLD: 185 K/UL (ref 135–450)
PMV BLD AUTO: 9.3 FL (ref 5–10.5)
PO2 ARTERIAL: 92 MMHG (ref 75–108)
POTASSIUM SERPL-SCNC: 4 MMOL/L (ref 3.5–5.1)
PROTEIN UA: NEGATIVE MG/DL
PROTHROMBIN TIME: 11.8 SEC (ref 10–13.2)
RAW %PRED: NORMAL
RAW PRED: NORMAL
RAW: NORMAL
RBC # BLD: 5.43 M/UL (ref 4–5.2)
RBC UA: NORMAL /HPF (ref 0–4)
RV %PRED: NORMAL
RV PRED: NORMAL
RV: NORMAL
SODIUM BLD-SCNC: 144 MMOL/L (ref 136–145)
SPECIFIC GRAVITY UA: 1.02 (ref 1–1.03)
SVC %PRED: NORMAL
SVC PRED: NORMAL
SVC: NORMAL
TCO2 ARTERIAL: 23.4 MMOL/L
TLC %PRED: 101 %
TLC PRED: NORMAL
TLC: NORMAL
URINE REFLEX TO CULTURE: ABNORMAL
URINE TYPE: ABNORMAL
UROBILINOGEN, URINE: 0.2 E.U./DL
VA %PRED: NORMAL
VA PRED: NORMAL
VA: NORMAL
VTG %PRED: NORMAL
VTG PRED: NORMAL
VTG: NORMAL
WBC # BLD: 5.2 K/UL (ref 4–11)
WBC UA: NORMAL /HPF (ref 0–5)

## 2020-10-08 PROCEDURE — 94760 N-INVAS EAR/PLS OXIMETRY 1: CPT

## 2020-10-08 PROCEDURE — 94729 DIFFUSING CAPACITY: CPT

## 2020-10-08 PROCEDURE — 82803 BLOOD GASES ANY COMBINATION: CPT

## 2020-10-08 PROCEDURE — 36415 COLL VENOUS BLD VENIPUNCTURE: CPT

## 2020-10-08 PROCEDURE — 86900 BLOOD TYPING SEROLOGIC ABO: CPT

## 2020-10-08 PROCEDURE — 93010 ELECTROCARDIOGRAM REPORT: CPT | Performed by: INTERNAL MEDICINE

## 2020-10-08 PROCEDURE — 85730 THROMBOPLASTIN TIME PARTIAL: CPT

## 2020-10-08 PROCEDURE — 85025 COMPLETE CBC W/AUTO DIFF WBC: CPT

## 2020-10-08 PROCEDURE — 36600 WITHDRAWAL OF ARTERIAL BLOOD: CPT

## 2020-10-08 PROCEDURE — 81001 URINALYSIS AUTO W/SCOPE: CPT

## 2020-10-08 PROCEDURE — 94726 PLETHYSMOGRAPHY LUNG VOLUMES: CPT

## 2020-10-08 PROCEDURE — 71046 X-RAY EXAM CHEST 2 VIEWS: CPT

## 2020-10-08 PROCEDURE — 86850 RBC ANTIBODY SCREEN: CPT

## 2020-10-08 PROCEDURE — 93005 ELECTROCARDIOGRAM TRACING: CPT | Performed by: THORACIC SURGERY (CARDIOTHORACIC VASCULAR SURGERY)

## 2020-10-08 PROCEDURE — 6370000000 HC RX 637 (ALT 250 FOR IP): Performed by: THORACIC SURGERY (CARDIOTHORACIC VASCULAR SURGERY)

## 2020-10-08 PROCEDURE — 86901 BLOOD TYPING SEROLOGIC RH(D): CPT

## 2020-10-08 PROCEDURE — 85610 PROTHROMBIN TIME: CPT

## 2020-10-08 PROCEDURE — 80048 BASIC METABOLIC PNL TOTAL CA: CPT

## 2020-10-08 PROCEDURE — 94060 EVALUATION OF WHEEZING: CPT

## 2020-10-08 RX ORDER — ALBUTEROL SULFATE 90 UG/1
4 AEROSOL, METERED RESPIRATORY (INHALATION) ONCE
Status: COMPLETED | OUTPATIENT
Start: 2020-10-08 | End: 2020-10-08

## 2020-10-08 RX ADMIN — Medication 4 PUFF: at 09:40

## 2020-10-08 ASSESSMENT — PULMONARY FUNCTION TESTS
FEV1_PERCENT_PREDICTED_PRE: 108
FEV1/FVC_POST: 82
FEV1_PERCENT_PREDICTED_POST: 111
FEV1/FVC_PRE: 80

## 2020-10-09 RX ORDER — ALPRAZOLAM 0.5 MG/1
0.5 TABLET ORAL NIGHTLY PRN
COMMUNITY
End: 2021-05-06

## 2020-10-09 NOTE — PROCEDURES
[facility] [place] [address]                               PULMONARY FUNCTION    PATIENT NAME: Nikolas Guardado                    :        1950  MED REC NO:   [id number]                          ROOM:  ACCOUNT NO:   [de-identified]                           ADMIT DATE: 10/08/2020  PROVIDER:     Olga Bai MD    DATE OF PROCEDURE:  10/08/2020    The patient is a 69-year-old female who underwent PFT for preop left  lung resection. Spirometry shows FVC to be 103%, FEV1 to be 108%, FEV1  to FVC ratio was 105%, FEF 25%-75% was 125%. The patient had some  postbronchodilator improvement in the small airways on the study which  was not significant. The patient's lung volume shows the total lung  capacity was normal.  The patient does not have any air trapping or  hyperinflation. The patient does not have any decrease in diffusion  capacity when adjusted for volume. The patient's flow-volume loop was  normal.    The patient also had a PFT done in 2019 which had shown the patient's  FVC to be 102%, FEV1 to be 111%, FEV1 to FVC ratio was 108% at that  time. The patient's total lung capacity was 98% at that time. On the basis of this PFT, the patient has normal PFT with no significant  change in the PFT parameters as compared to 2019. Please correlate  clinically.         Severo Laguerre MD    D: 10/09/2020 13:27:03       T: 10/09/2020 13:38:00     [id number]  Job#: [id number]     Doc#:    CC:

## 2020-10-09 NOTE — PROGRESS NOTES
Preoperative Screening for Elective Surgery/Invasive Procedures While COVID-19 present in the community     Have you had any of the following symptoms? o Fever, chills  o Cough  o Shortness of breath  o Muscle aches/pain  o Diarrhea  o Abdominal pain, nausea, vomiting  o Loss or decrease in taste and / or smell   Risk of Exposure  o Have you recently been hospitalized for COVID-19 or flu-like illness, if so when?  o Recently diagnosed with COVID-19, if so when?  o Recently tested for COVID-19, if so when?  o Have you been in close contact with a person or family member who currently has or recently had COVID-19? If yes, when and in what context?  o Do you live with anybody who in the last 14 days has had fever, chills, shortness of breath, muscle aches, flu-like illness?  o Do you have any close contacts or family members who are currently in the hospital for COVID-19 or flu-like illness? If yes, assess recent close contact with this person. Indicate if the patient has a positive screen by answering yes to one or more of the above questions. Patients who test positive or screen positive prior to surgery or on the day of surgery should be evaluated in conjunction with the surgeon/proceduralist/anesthesiologist to determine the urgency of the procedure.        No to all questions Covid test 10/12/20

## 2020-10-09 NOTE — PROGRESS NOTES
Obstructive Sleep Apnea (SHAKA) Screening     Patient:  Donato Neely    YOB: 1950      Medical Record #:  [id number]                     Date:  10/9/2020     1. Are you a loud and/or regular snorer? []  Yes       [x] No    2. Have you been observed to gasp or stop breathing during sleep? []  Yes       [x] No    3. Do you feel tired or groggy upon awakening or do you awaken with a headache?           []  Yes       [x] No    4. Are you often tired or fatigued during the wake time hours? []  Yes       [] No    5. Do you fall asleep sitting, reading, watching TV or driving? []  Yes       [] No    6. Do you often have problems with memory or concentration? []  Yes       [] No    **If patient's score is ? 3 they are considered high risk for SHAKA. An Anesthesia provider will evaluate the patient and develop a plan of care the day of surgery. Note:  If the patient's BMI is more than 35 kg m¯² , has neck circumference > 40 cm, and/or high blood pressure the risk is greater (© American Sleep Apnea Association, 2006).

## 2020-10-12 ENCOUNTER — OFFICE VISIT (OUTPATIENT)
Dept: PRIMARY CARE CLINIC | Age: 70
End: 2020-10-12
Payer: MEDICARE

## 2020-10-12 LAB — SARS-COV-2, PCR: NOT DETECTED

## 2020-10-12 PROCEDURE — G8428 CUR MEDS NOT DOCUMENT: HCPCS | Performed by: NURSE PRACTITIONER

## 2020-10-12 PROCEDURE — G8417 CALC BMI ABV UP PARAM F/U: HCPCS | Performed by: NURSE PRACTITIONER

## 2020-10-12 PROCEDURE — 99211 OFF/OP EST MAY X REQ PHY/QHP: CPT | Performed by: NURSE PRACTITIONER

## 2020-10-12 NOTE — PROGRESS NOTES
Andrea Cameron received a viral test for COVID-19. They were educated on isolation and quarantine as appropriate. For any symptoms, they were directed to seek care from their PCP, given contact information to establish with a doctor, directed to an urgent care or the emergency room.

## 2020-10-12 NOTE — PATIENT INSTRUCTIONS

## 2020-10-16 ENCOUNTER — HOSPITAL ENCOUNTER (INPATIENT)
Age: 70
LOS: 5 days | Discharge: HOME | DRG: 164 | End: 2020-10-21
Attending: THORACIC SURGERY (CARDIOTHORACIC VASCULAR SURGERY) | Admitting: THORACIC SURGERY (CARDIOTHORACIC VASCULAR SURGERY)
Payer: MEDICARE

## 2020-10-16 ENCOUNTER — ANESTHESIA (OUTPATIENT)
Dept: OPERATING ROOM | Age: 70
DRG: 164 | End: 2020-10-16
Payer: MEDICARE

## 2020-10-16 ENCOUNTER — ANESTHESIA EVENT (OUTPATIENT)
Dept: OPERATING ROOM | Age: 70
DRG: 164 | End: 2020-10-16
Payer: MEDICARE

## 2020-10-16 ENCOUNTER — APPOINTMENT (OUTPATIENT)
Dept: GENERAL RADIOLOGY | Age: 70
DRG: 164 | End: 2020-10-16
Attending: THORACIC SURGERY (CARDIOTHORACIC VASCULAR SURGERY)
Payer: MEDICARE

## 2020-10-16 VITALS
RESPIRATION RATE: 11 BRPM | TEMPERATURE: 97.2 F | DIASTOLIC BLOOD PRESSURE: 70 MMHG | OXYGEN SATURATION: 100 % | SYSTOLIC BLOOD PRESSURE: 111 MMHG

## 2020-10-16 PROBLEM — C34.02 LUNG CANCER, MAIN BRONCHUS, LEFT (HCC): Status: ACTIVE | Noted: 2020-10-16

## 2020-10-16 LAB
ABO/RH: NORMAL
ANTIBODY SCREEN: NORMAL
INR BLD: 1.06 (ref 0.86–1.14)
PROTHROMBIN TIME: 12.3 SEC (ref 10–13.2)

## 2020-10-16 PROCEDURE — 2500000003 HC RX 250 WO HCPCS: Performed by: THORACIC SURGERY (CARDIOTHORACIC VASCULAR SURGERY)

## 2020-10-16 PROCEDURE — C2618 PROBE/NEEDLE, CRYO: HCPCS | Performed by: THORACIC SURGERY (CARDIOTHORACIC VASCULAR SURGERY)

## 2020-10-16 PROCEDURE — 88309 TISSUE EXAM BY PATHOLOGIST: CPT

## 2020-10-16 PROCEDURE — 86901 BLOOD TYPING SEROLOGIC RH(D): CPT

## 2020-10-16 PROCEDURE — 2720000010 HC SURG SUPPLY STERILE: Performed by: THORACIC SURGERY (CARDIOTHORACIC VASCULAR SURGERY)

## 2020-10-16 PROCEDURE — 0BTG0ZZ RESECTION OF LEFT UPPER LUNG LOBE, OPEN APPROACH: ICD-10-PCS | Performed by: THORACIC SURGERY (CARDIOTHORACIC VASCULAR SURGERY)

## 2020-10-16 PROCEDURE — 2709999900 HC NON-CHARGEABLE SUPPLY: Performed by: THORACIC SURGERY (CARDIOTHORACIC VASCULAR SURGERY)

## 2020-10-16 PROCEDURE — 3600000003 HC SURGERY LEVEL 3 BASE: Performed by: THORACIC SURGERY (CARDIOTHORACIC VASCULAR SURGERY)

## 2020-10-16 PROCEDURE — 3700000000 HC ANESTHESIA ATTENDED CARE: Performed by: THORACIC SURGERY (CARDIOTHORACIC VASCULAR SURGERY)

## 2020-10-16 PROCEDURE — 36620 INSERTION CATHETER ARTERY: CPT

## 2020-10-16 PROCEDURE — 86850 RBC ANTIBODY SCREEN: CPT

## 2020-10-16 PROCEDURE — 3700000001 HC ADD 15 MINUTES (ANESTHESIA): Performed by: THORACIC SURGERY (CARDIOTHORACIC VASCULAR SURGERY)

## 2020-10-16 PROCEDURE — 6370000000 HC RX 637 (ALT 250 FOR IP): Performed by: THORACIC SURGERY (CARDIOTHORACIC VASCULAR SURGERY)

## 2020-10-16 PROCEDURE — 6360000002 HC RX W HCPCS: Performed by: THORACIC SURGERY (CARDIOTHORACIC VASCULAR SURGERY)

## 2020-10-16 PROCEDURE — 07B70ZZ EXCISION OF THORAX LYMPHATIC, OPEN APPROACH: ICD-10-PCS | Performed by: THORACIC SURGERY (CARDIOTHORACIC VASCULAR SURGERY)

## 2020-10-16 PROCEDURE — 88305 TISSUE EXAM BY PATHOLOGIST: CPT

## 2020-10-16 PROCEDURE — 3600000013 HC SURGERY LEVEL 3 ADDTL 15MIN: Performed by: THORACIC SURGERY (CARDIOTHORACIC VASCULAR SURGERY)

## 2020-10-16 PROCEDURE — 38746 REMOVE THORACIC LYMPH NODES: CPT | Performed by: THORACIC SURGERY (CARDIOTHORACIC VASCULAR SURGERY)

## 2020-10-16 PROCEDURE — 2500000003 HC RX 250 WO HCPCS: Performed by: NURSE ANESTHETIST, CERTIFIED REGISTERED

## 2020-10-16 PROCEDURE — 2580000003 HC RX 258: Performed by: ANESTHESIOLOGY

## 2020-10-16 PROCEDURE — 71045 X-RAY EXAM CHEST 1 VIEW: CPT

## 2020-10-16 PROCEDURE — 6360000002 HC RX W HCPCS: Performed by: NURSE ANESTHETIST, CERTIFIED REGISTERED

## 2020-10-16 PROCEDURE — 32480 PARTIAL REMOVAL OF LUNG: CPT | Performed by: THORACIC SURGERY (CARDIOTHORACIC VASCULAR SURGERY)

## 2020-10-16 PROCEDURE — 2000000000 HC ICU R&B

## 2020-10-16 PROCEDURE — 86900 BLOOD TYPING SEROLOGIC ABO: CPT

## 2020-10-16 PROCEDURE — C2615 SEALANT, PULMONARY, LIQUID: HCPCS | Performed by: THORACIC SURGERY (CARDIOTHORACIC VASCULAR SURGERY)

## 2020-10-16 PROCEDURE — 3E0T3BZ INTRODUCTION OF ANESTHETIC AGENT INTO PERIPHERAL NERVES AND PLEXI, PERCUTANEOUS APPROACH: ICD-10-PCS | Performed by: THORACIC SURGERY (CARDIOTHORACIC VASCULAR SURGERY)

## 2020-10-16 PROCEDURE — 85610 PROTHROMBIN TIME: CPT

## 2020-10-16 PROCEDURE — 0B5G0ZZ DESTRUCTION OF LEFT UPPER LUNG LOBE, OPEN APPROACH: ICD-10-PCS | Performed by: THORACIC SURGERY (CARDIOTHORACIC VASCULAR SURGERY)

## 2020-10-16 PROCEDURE — C1729 CATH, DRAINAGE: HCPCS | Performed by: THORACIC SURGERY (CARDIOTHORACIC VASCULAR SURGERY)

## 2020-10-16 PROCEDURE — 2580000003 HC RX 258: Performed by: THORACIC SURGERY (CARDIOTHORACIC VASCULAR SURGERY)

## 2020-10-16 DEVICE — SEALANT TISS GLUE 4ML PLEUR AIR LEAK PROGEL: Type: IMPLANTABLE DEVICE | Site: LUNG | Status: FUNCTIONAL

## 2020-10-16 RX ORDER — ONDANSETRON 2 MG/ML
4 INJECTION INTRAMUSCULAR; INTRAVENOUS EVERY 6 HOURS PRN
Status: DISCONTINUED | OUTPATIENT
Start: 2020-10-16 | End: 2020-10-21 | Stop reason: HOSPADM

## 2020-10-16 RX ORDER — MORPHINE SULFATE 4 MG/ML
4 INJECTION, SOLUTION INTRAMUSCULAR; INTRAVENOUS
Status: DISCONTINUED | OUTPATIENT
Start: 2020-10-16 | End: 2020-10-21 | Stop reason: HOSPADM

## 2020-10-16 RX ORDER — ALPRAZOLAM 0.25 MG/1
0.5 TABLET ORAL NIGHTLY PRN
Status: DISCONTINUED | OUTPATIENT
Start: 2020-10-16 | End: 2020-10-21 | Stop reason: HOSPADM

## 2020-10-16 RX ORDER — PROMETHAZINE HYDROCHLORIDE 25 MG/1
12.5 TABLET ORAL EVERY 6 HOURS PRN
Status: DISCONTINUED | OUTPATIENT
Start: 2020-10-16 | End: 2020-10-21 | Stop reason: HOSPADM

## 2020-10-16 RX ORDER — EPHEDRINE SULFATE 50 MG/ML
INJECTION INTRAVENOUS PRN
Status: DISCONTINUED | OUTPATIENT
Start: 2020-10-16 | End: 2020-10-16 | Stop reason: SDUPTHER

## 2020-10-16 RX ORDER — DEXAMETHASONE SODIUM PHOSPHATE 10 MG/ML
INJECTION INTRAMUSCULAR; INTRAVENOUS PRN
Status: DISCONTINUED | OUTPATIENT
Start: 2020-10-16 | End: 2020-10-16 | Stop reason: SDUPTHER

## 2020-10-16 RX ORDER — MORPHINE SULFATE 2 MG/ML
2 INJECTION, SOLUTION INTRAMUSCULAR; INTRAVENOUS
Status: DISCONTINUED | OUTPATIENT
Start: 2020-10-16 | End: 2020-10-21 | Stop reason: HOSPADM

## 2020-10-16 RX ORDER — SODIUM CHLORIDE 0.9 % (FLUSH) 0.9 %
10 SYRINGE (ML) INJECTION PRN
Status: DISCONTINUED | OUTPATIENT
Start: 2020-10-16 | End: 2020-10-16

## 2020-10-16 RX ORDER — SODIUM CHLORIDE 0.9 % (FLUSH) 0.9 %
10 SYRINGE (ML) INJECTION PRN
Status: DISCONTINUED | OUTPATIENT
Start: 2020-10-16 | End: 2020-10-21 | Stop reason: HOSPADM

## 2020-10-16 RX ORDER — DIPHENHYDRAMINE HYDROCHLORIDE 50 MG/ML
12.5 INJECTION INTRAMUSCULAR; INTRAVENOUS
Status: DISCONTINUED | OUTPATIENT
Start: 2020-10-16 | End: 2020-10-16

## 2020-10-16 RX ORDER — MIDAZOLAM HYDROCHLORIDE 1 MG/ML
INJECTION INTRAMUSCULAR; INTRAVENOUS PRN
Status: DISCONTINUED | OUTPATIENT
Start: 2020-10-16 | End: 2020-10-16 | Stop reason: SDUPTHER

## 2020-10-16 RX ORDER — MORPHINE SULFATE 2 MG/ML
2 INJECTION, SOLUTION INTRAMUSCULAR; INTRAVENOUS EVERY 5 MIN PRN
Status: DISCONTINUED | OUTPATIENT
Start: 2020-10-16 | End: 2020-10-16

## 2020-10-16 RX ORDER — ONDANSETRON 2 MG/ML
INJECTION INTRAMUSCULAR; INTRAVENOUS PRN
Status: DISCONTINUED | OUTPATIENT
Start: 2020-10-16 | End: 2020-10-16 | Stop reason: SDUPTHER

## 2020-10-16 RX ORDER — PROPOFOL 10 MG/ML
INJECTION, EMULSION INTRAVENOUS PRN
Status: DISCONTINUED | OUTPATIENT
Start: 2020-10-16 | End: 2020-10-16 | Stop reason: SDUPTHER

## 2020-10-16 RX ORDER — SODIUM CHLORIDE 0.9 % (FLUSH) 0.9 %
10 SYRINGE (ML) INJECTION EVERY 12 HOURS SCHEDULED
Status: DISCONTINUED | OUTPATIENT
Start: 2020-10-16 | End: 2020-10-21 | Stop reason: HOSPADM

## 2020-10-16 RX ORDER — CHOLECALCIFEROL (VITAMIN D3) 125 MCG
2500 CAPSULE ORAL DAILY
Status: DISCONTINUED | OUTPATIENT
Start: 2020-10-16 | End: 2020-10-21 | Stop reason: HOSPADM

## 2020-10-16 RX ORDER — MORPHINE SULFATE 2 MG/ML
1 INJECTION, SOLUTION INTRAMUSCULAR; INTRAVENOUS EVERY 5 MIN PRN
Status: DISCONTINUED | OUTPATIENT
Start: 2020-10-16 | End: 2020-10-16

## 2020-10-16 RX ORDER — OXYCODONE HYDROCHLORIDE 5 MG/1
10 TABLET ORAL EVERY 4 HOURS PRN
Status: DISCONTINUED | OUTPATIENT
Start: 2020-10-16 | End: 2020-10-21 | Stop reason: HOSPADM

## 2020-10-16 RX ORDER — LIDOCAINE HYDROCHLORIDE 20 MG/ML
INJECTION, SOLUTION INFILTRATION; PERINEURAL PRN
Status: DISCONTINUED | OUTPATIENT
Start: 2020-10-16 | End: 2020-10-16 | Stop reason: SDUPTHER

## 2020-10-16 RX ORDER — HYDRALAZINE HYDROCHLORIDE 20 MG/ML
5 INJECTION INTRAMUSCULAR; INTRAVENOUS EVERY 10 MIN PRN
Status: DISCONTINUED | OUTPATIENT
Start: 2020-10-16 | End: 2020-10-16

## 2020-10-16 RX ORDER — KETOROLAC TROMETHAMINE 30 MG/ML
15 INJECTION, SOLUTION INTRAMUSCULAR; INTRAVENOUS EVERY 6 HOURS
Status: COMPLETED | OUTPATIENT
Start: 2020-10-16 | End: 2020-10-18

## 2020-10-16 RX ORDER — ONDANSETRON 2 MG/ML
4 INJECTION INTRAMUSCULAR; INTRAVENOUS
Status: DISCONTINUED | OUTPATIENT
Start: 2020-10-16 | End: 2020-10-16

## 2020-10-16 RX ORDER — LABETALOL HYDROCHLORIDE 5 MG/ML
5 INJECTION, SOLUTION INTRAVENOUS EVERY 10 MIN PRN
Status: DISCONTINUED | OUTPATIENT
Start: 2020-10-16 | End: 2020-10-16

## 2020-10-16 RX ORDER — HYDROMORPHONE HCL 110MG/55ML
PATIENT CONTROLLED ANALGESIA SYRINGE INTRAVENOUS PRN
Status: DISCONTINUED | OUTPATIENT
Start: 2020-10-16 | End: 2020-10-16 | Stop reason: SDUPTHER

## 2020-10-16 RX ORDER — ATORVASTATIN CALCIUM 10 MG/1
20 TABLET, FILM COATED ORAL DAILY
Status: DISCONTINUED | OUTPATIENT
Start: 2020-10-16 | End: 2020-10-21 | Stop reason: HOSPADM

## 2020-10-16 RX ORDER — LAMOTRIGINE 25 MG/1
25 TABLET ORAL DAILY
Status: DISCONTINUED | OUTPATIENT
Start: 2020-10-16 | End: 2020-10-21 | Stop reason: HOSPADM

## 2020-10-16 RX ORDER — ACETAMINOPHEN 325 MG/1
650 TABLET ORAL EVERY 4 HOURS PRN
Status: DISCONTINUED | OUTPATIENT
Start: 2020-10-16 | End: 2020-10-21 | Stop reason: HOSPADM

## 2020-10-16 RX ORDER — PANTOPRAZOLE SODIUM 40 MG/1
40 TABLET, DELAYED RELEASE ORAL
Status: DISCONTINUED | OUTPATIENT
Start: 2020-10-17 | End: 2020-10-21 | Stop reason: HOSPADM

## 2020-10-16 RX ORDER — SODIUM CHLORIDE, SODIUM LACTATE, POTASSIUM CHLORIDE, CALCIUM CHLORIDE 600; 310; 30; 20 MG/100ML; MG/100ML; MG/100ML; MG/100ML
INJECTION, SOLUTION INTRAVENOUS CONTINUOUS
Status: DISCONTINUED | OUTPATIENT
Start: 2020-10-16 | End: 2020-10-16

## 2020-10-16 RX ORDER — OXYCODONE HYDROCHLORIDE 5 MG/1
5 TABLET ORAL EVERY 4 HOURS PRN
Status: DISCONTINUED | OUTPATIENT
Start: 2020-10-16 | End: 2020-10-21 | Stop reason: HOSPADM

## 2020-10-16 RX ORDER — SODIUM CHLORIDE 0.9 % (FLUSH) 0.9 %
10 SYRINGE (ML) INJECTION EVERY 12 HOURS SCHEDULED
Status: DISCONTINUED | OUTPATIENT
Start: 2020-10-16 | End: 2020-10-16 | Stop reason: SDUPTHER

## 2020-10-16 RX ORDER — VITAMIN B COMPLEX
1000 TABLET ORAL DAILY
Status: DISCONTINUED | OUTPATIENT
Start: 2020-10-16 | End: 2020-10-21 | Stop reason: HOSPADM

## 2020-10-16 RX ORDER — SODIUM CHLORIDE 9 MG/ML
INJECTION, SOLUTION INTRAVENOUS CONTINUOUS
Status: DISCONTINUED | OUTPATIENT
Start: 2020-10-16 | End: 2020-10-17

## 2020-10-16 RX ORDER — SODIUM CHLORIDE 0.9 % (FLUSH) 0.9 %
10 SYRINGE (ML) INJECTION PRN
Status: DISCONTINUED | OUTPATIENT
Start: 2020-10-16 | End: 2020-10-16 | Stop reason: SDUPTHER

## 2020-10-16 RX ORDER — SODIUM CHLORIDE 0.9 % (FLUSH) 0.9 %
10 SYRINGE (ML) INJECTION EVERY 12 HOURS SCHEDULED
Status: DISCONTINUED | OUTPATIENT
Start: 2020-10-16 | End: 2020-10-16

## 2020-10-16 RX ORDER — FENTANYL CITRATE 50 UG/ML
INJECTION, SOLUTION INTRAMUSCULAR; INTRAVENOUS PRN
Status: DISCONTINUED | OUTPATIENT
Start: 2020-10-16 | End: 2020-10-16 | Stop reason: SDUPTHER

## 2020-10-16 RX ORDER — LIDOCAINE HYDROCHLORIDE 10 MG/ML
0.3 INJECTION, SOLUTION EPIDURAL; INFILTRATION; INTRACAUDAL; PERINEURAL
Status: DISCONTINUED | OUTPATIENT
Start: 2020-10-16 | End: 2020-10-16

## 2020-10-16 RX ORDER — OXYCODONE HYDROCHLORIDE AND ACETAMINOPHEN 5; 325 MG/1; MG/1
1 TABLET ORAL PRN
Status: DISCONTINUED | OUTPATIENT
Start: 2020-10-16 | End: 2020-10-16

## 2020-10-16 RX ORDER — OXYCODONE HYDROCHLORIDE AND ACETAMINOPHEN 5; 325 MG/1; MG/1
2 TABLET ORAL PRN
Status: DISCONTINUED | OUTPATIENT
Start: 2020-10-16 | End: 2020-10-16

## 2020-10-16 RX ORDER — ROCURONIUM BROMIDE 10 MG/ML
INJECTION, SOLUTION INTRAVENOUS PRN
Status: DISCONTINUED | OUTPATIENT
Start: 2020-10-16 | End: 2020-10-16 | Stop reason: SDUPTHER

## 2020-10-16 RX ORDER — PROMETHAZINE HYDROCHLORIDE 25 MG/ML
6.25 INJECTION, SOLUTION INTRAMUSCULAR; INTRAVENOUS
Status: DISCONTINUED | OUTPATIENT
Start: 2020-10-16 | End: 2020-10-16

## 2020-10-16 RX ORDER — MEPERIDINE HYDROCHLORIDE 50 MG/ML
12.5 INJECTION INTRAMUSCULAR; INTRAVENOUS; SUBCUTANEOUS EVERY 5 MIN PRN
Status: DISCONTINUED | OUTPATIENT
Start: 2020-10-16 | End: 2020-10-16

## 2020-10-16 RX ORDER — PHENYLEPHRINE HCL IN 0.9% NACL 1 MG/10 ML
SYRINGE (ML) INTRAVENOUS PRN
Status: DISCONTINUED | OUTPATIENT
Start: 2020-10-16 | End: 2020-10-16 | Stop reason: SDUPTHER

## 2020-10-16 RX ADMIN — CEFAZOLIN SODIUM 2 G: 10 INJECTION, POWDER, FOR SOLUTION INTRAVENOUS at 19:14

## 2020-10-16 RX ADMIN — FENTANYL CITRATE 50 MCG: 50 INJECTION INTRAMUSCULAR; INTRAVENOUS at 17:02

## 2020-10-16 RX ADMIN — HYDROMORPHONE HYDROCHLORIDE 0.5 MG: 2 INJECTION INTRAMUSCULAR; INTRAVENOUS; SUBCUTANEOUS at 17:29

## 2020-10-16 RX ADMIN — KETOROLAC TROMETHAMINE 15 MG: 30 INJECTION, SOLUTION INTRAMUSCULAR at 23:56

## 2020-10-16 RX ADMIN — Medication 100 MCG: at 15:02

## 2020-10-16 RX ADMIN — EPHEDRINE SULFATE 5 MG: 50 INJECTION INTRAVENOUS at 15:41

## 2020-10-16 RX ADMIN — SODIUM CHLORIDE: 9 INJECTION, SOLUTION INTRAVENOUS at 18:49

## 2020-10-16 RX ADMIN — FENTANYL CITRATE 100 MCG: 50 INJECTION INTRAMUSCULAR; INTRAVENOUS at 14:33

## 2020-10-16 RX ADMIN — PROPOFOL 100 MG: 10 INJECTION, EMULSION INTRAVENOUS at 14:37

## 2020-10-16 RX ADMIN — Medication 100 MCG: at 14:54

## 2020-10-16 RX ADMIN — ATORVASTATIN CALCIUM 20 MG: 10 TABLET, FILM COATED ORAL at 20:43

## 2020-10-16 RX ADMIN — DEXAMETHASONE SODIUM PHOSPHATE 8 MG: 10 INJECTION INTRAMUSCULAR; INTRAVENOUS at 14:58

## 2020-10-16 RX ADMIN — ONDANSETRON 4 MG: 2 INJECTION INTRAMUSCULAR; INTRAVENOUS at 18:51

## 2020-10-16 RX ADMIN — KETOROLAC TROMETHAMINE 15 MG: 30 INJECTION, SOLUTION INTRAMUSCULAR at 18:19

## 2020-10-16 RX ADMIN — EPHEDRINE SULFATE 10 MG: 50 INJECTION INTRAVENOUS at 15:20

## 2020-10-16 RX ADMIN — Medication 100 MCG: at 15:18

## 2020-10-16 RX ADMIN — SODIUM CHLORIDE, SODIUM LACTATE, POTASSIUM CHLORIDE, AND CALCIUM CHLORIDE: .6; .31; .03; .02 INJECTION, SOLUTION INTRAVENOUS at 15:24

## 2020-10-16 RX ADMIN — SUGAMMADEX 200 MG: 100 INJECTION, SOLUTION INTRAVENOUS at 17:24

## 2020-10-16 RX ADMIN — Medication 100 MCG: at 15:21

## 2020-10-16 RX ADMIN — SODIUM CHLORIDE, SODIUM LACTATE, POTASSIUM CHLORIDE, AND CALCIUM CHLORIDE: .6; .31; .03; .02 INJECTION, SOLUTION INTRAVENOUS at 14:23

## 2020-10-16 RX ADMIN — EPHEDRINE SULFATE 10 MG: 50 INJECTION INTRAVENOUS at 15:54

## 2020-10-16 RX ADMIN — Medication 100 MCG: at 16:34

## 2020-10-16 RX ADMIN — PROPOFOL 50 MG: 10 INJECTION, EMULSION INTRAVENOUS at 14:44

## 2020-10-16 RX ADMIN — FENTANYL CITRATE 50 MCG: 50 INJECTION INTRAMUSCULAR; INTRAVENOUS at 16:00

## 2020-10-16 RX ADMIN — ROCURONIUM BROMIDE 25 MG: 10 SOLUTION INTRAVENOUS at 16:34

## 2020-10-16 RX ADMIN — Medication 100 MCG: at 15:00

## 2020-10-16 RX ADMIN — Medication 100 MCG: at 15:52

## 2020-10-16 RX ADMIN — ONDANSETRON 4 MG: 2 INJECTION INTRAMUSCULAR; INTRAVENOUS at 14:57

## 2020-10-16 RX ADMIN — LIDOCAINE HYDROCHLORIDE 60 MG: 20 INJECTION, SOLUTION INFILTRATION; PERINEURAL at 14:37

## 2020-10-16 RX ADMIN — SERTRALINE HYDROCHLORIDE 50 MG: 50 TABLET ORAL at 20:43

## 2020-10-16 RX ADMIN — MIDAZOLAM HYDROCHLORIDE 2 MG: 2 INJECTION, SOLUTION INTRAMUSCULAR; INTRAVENOUS at 13:04

## 2020-10-16 RX ADMIN — Medication 100 MCG: at 15:36

## 2020-10-16 RX ADMIN — OXYCODONE 5 MG: 5 TABLET ORAL at 22:20

## 2020-10-16 RX ADMIN — ROCURONIUM BROMIDE 50 MG: 10 SOLUTION INTRAVENOUS at 14:37

## 2020-10-16 RX ADMIN — MORPHINE SULFATE 4 MG: 4 INJECTION, SOLUTION INTRAMUSCULAR; INTRAVENOUS at 19:29

## 2020-10-16 RX ADMIN — MIDAZOLAM HYDROCHLORIDE 2 MG: 2 INJECTION, SOLUTION INTRAMUSCULAR; INTRAVENOUS at 14:28

## 2020-10-16 RX ADMIN — Medication 100 MCG: at 15:06

## 2020-10-16 RX ADMIN — FENTANYL CITRATE 50 MCG: 50 INJECTION INTRAMUSCULAR; INTRAVENOUS at 14:37

## 2020-10-16 ASSESSMENT — PAIN SCALES - GENERAL
PAINLEVEL_OUTOF10: 6
PAINLEVEL_OUTOF10: 0
PAINLEVEL_OUTOF10: 9
PAINLEVEL_OUTOF10: 7
PAINLEVEL_OUTOF10: 6
PAINLEVEL_OUTOF10: 9

## 2020-10-16 ASSESSMENT — PULMONARY FUNCTION TESTS
PIF_VALUE: 18
PIF_VALUE: 23
PIF_VALUE: 21
PIF_VALUE: 1
PIF_VALUE: 27
PIF_VALUE: 16
PIF_VALUE: 5
PIF_VALUE: 23
PIF_VALUE: 28
PIF_VALUE: 22
PIF_VALUE: 23
PIF_VALUE: 0
PIF_VALUE: 20
PIF_VALUE: 21
PIF_VALUE: 21
PIF_VALUE: 18
PIF_VALUE: 18
PIF_VALUE: 21
PIF_VALUE: 16
PIF_VALUE: 22
PIF_VALUE: 20
PIF_VALUE: 3
PIF_VALUE: 22
PIF_VALUE: 3
PIF_VALUE: 16
PIF_VALUE: 23
PIF_VALUE: 22
PIF_VALUE: 1
PIF_VALUE: 22
PIF_VALUE: 28
PIF_VALUE: 15
PIF_VALUE: 2
PIF_VALUE: 25
PIF_VALUE: 25
PIF_VALUE: 3
PIF_VALUE: 29
PIF_VALUE: 22
PIF_VALUE: 16
PIF_VALUE: 1
PIF_VALUE: 22
PIF_VALUE: 23
PIF_VALUE: 1
PIF_VALUE: 22
PIF_VALUE: 16
PIF_VALUE: 18
PIF_VALUE: 3
PIF_VALUE: 20
PIF_VALUE: 16
PIF_VALUE: 3
PIF_VALUE: 16
PIF_VALUE: 22
PIF_VALUE: 23
PIF_VALUE: 12
PIF_VALUE: 15
PIF_VALUE: 21
PIF_VALUE: 23
PIF_VALUE: 15
PIF_VALUE: 29
PIF_VALUE: 23
PIF_VALUE: 27
PIF_VALUE: 21
PIF_VALUE: 22
PIF_VALUE: 25
PIF_VALUE: 22
PIF_VALUE: 28
PIF_VALUE: 22
PIF_VALUE: 2
PIF_VALUE: 28
PIF_VALUE: 2
PIF_VALUE: 15
PIF_VALUE: 16
PIF_VALUE: 22
PIF_VALUE: 18
PIF_VALUE: 1
PIF_VALUE: 13
PIF_VALUE: 28
PIF_VALUE: 23
PIF_VALUE: 16
PIF_VALUE: 18
PIF_VALUE: 2
PIF_VALUE: 22
PIF_VALUE: 1
PIF_VALUE: 24
PIF_VALUE: 22
PIF_VALUE: 22
PIF_VALUE: 0
PIF_VALUE: 23
PIF_VALUE: 17
PIF_VALUE: 22
PIF_VALUE: 2
PIF_VALUE: 0
PIF_VALUE: 3
PIF_VALUE: 16
PIF_VALUE: 26
PIF_VALUE: 16
PIF_VALUE: 21
PIF_VALUE: 20
PIF_VALUE: 21
PIF_VALUE: 15
PIF_VALUE: 2
PIF_VALUE: 3
PIF_VALUE: 16
PIF_VALUE: 3
PIF_VALUE: 13
PIF_VALUE: 21
PIF_VALUE: 22
PIF_VALUE: 28
PIF_VALUE: 1
PIF_VALUE: 22
PIF_VALUE: 31
PIF_VALUE: 22
PIF_VALUE: 23
PIF_VALUE: 22
PIF_VALUE: 16
PIF_VALUE: 16
PIF_VALUE: 24
PIF_VALUE: 22
PIF_VALUE: 1
PIF_VALUE: 2
PIF_VALUE: 8
PIF_VALUE: 22
PIF_VALUE: 20
PIF_VALUE: 24
PIF_VALUE: 3
PIF_VALUE: 23
PIF_VALUE: 16
PIF_VALUE: 22
PIF_VALUE: 23
PIF_VALUE: 22
PIF_VALUE: 12
PIF_VALUE: 22
PIF_VALUE: 21
PIF_VALUE: 21
PIF_VALUE: 2
PIF_VALUE: 15
PIF_VALUE: 0
PIF_VALUE: 15
PIF_VALUE: 25
PIF_VALUE: 21
PIF_VALUE: 29
PIF_VALUE: 0
PIF_VALUE: 22
PIF_VALUE: 22
PIF_VALUE: 24
PIF_VALUE: 22
PIF_VALUE: 3
PIF_VALUE: 15
PIF_VALUE: 22
PIF_VALUE: 2
PIF_VALUE: 3
PIF_VALUE: 24
PIF_VALUE: 22
PIF_VALUE: 2
PIF_VALUE: 22
PIF_VALUE: 23
PIF_VALUE: 3
PIF_VALUE: 15
PIF_VALUE: 18
PIF_VALUE: 22
PIF_VALUE: 15
PIF_VALUE: 21
PIF_VALUE: 22
PIF_VALUE: 16
PIF_VALUE: 1
PIF_VALUE: 22
PIF_VALUE: 19
PIF_VALUE: 14
PIF_VALUE: 22
PIF_VALUE: 36
PIF_VALUE: 23
PIF_VALUE: 21
PIF_VALUE: 22
PIF_VALUE: 23
PIF_VALUE: 28
PIF_VALUE: 2
PIF_VALUE: 22
PIF_VALUE: 22
PIF_VALUE: 21
PIF_VALUE: 23
PIF_VALUE: 16
PIF_VALUE: 2
PIF_VALUE: 22
PIF_VALUE: 22
PIF_VALUE: 18
PIF_VALUE: 1

## 2020-10-16 ASSESSMENT — PAIN DESCRIPTION - ORIENTATION
ORIENTATION: LEFT

## 2020-10-16 ASSESSMENT — PAIN DESCRIPTION - PAIN TYPE
TYPE: SURGICAL PAIN

## 2020-10-16 ASSESSMENT — PAIN DESCRIPTION - FREQUENCY: FREQUENCY: CONTINUOUS

## 2020-10-16 ASSESSMENT — PAIN DESCRIPTION - LOCATION
LOCATION: RIB CAGE
LOCATION: BACK
LOCATION: RIB CAGE

## 2020-10-16 ASSESSMENT — PAIN DESCRIPTION - ONSET
ONSET: OTHER (COMMENT)
ONSET: ON-GOING

## 2020-10-16 ASSESSMENT — PAIN DESCRIPTION - DESCRIPTORS: DESCRIPTORS: SHARP

## 2020-10-16 ASSESSMENT — PAIN DESCRIPTION - PROGRESSION
CLINICAL_PROGRESSION: GRADUALLY IMPROVING
CLINICAL_PROGRESSION: GRADUALLY IMPROVING
CLINICAL_PROGRESSION: NOT CHANGED
CLINICAL_PROGRESSION: GRADUALLY IMPROVING

## 2020-10-16 ASSESSMENT — ENCOUNTER SYMPTOMS: SHORTNESS OF BREATH: 1

## 2020-10-16 NOTE — PROGRESS NOTES
Received from surgery via bed from [place] [id number]. Report given by [facility] staff at bedside. Chest tube dressing CD&I. Chest tubes placed  To suction, bloody drainage noted. Ferraro to BSD, clear yellow urine noted. Mayte to left radial and dressing intact. SCDs to BLEs. 02 at 5 L nasal cannula. Restless & moaning. Medicated with Toradol for pain. Monitoring vitals.  Taylor Olmos

## 2020-10-16 NOTE — ANESTHESIA PRE PROCEDURE
Department of Anesthesiology  Preprocedure Note       Name:  Kade Moore   Age:  69 y.o.  :  1950                                          MRN:  [id number]         Date:  10/16/2020      Surgeon: Cristina Resendiz):  Ambrose Pritchett MD    Procedure: Procedure(s):  LEFT UPPER LOBECTOMY (THORACOTOMY) WITH MEDIASTINAL LYMPH NODE DISSECTION    Medications prior to admission:   Prior to Admission medications    Medication Sig Start Date End Date Taking? Authorizing Provider   ALPRAZolam Alfreida Bottoms) 0.5 MG tablet Take 0.5 mg by mouth nightly as needed for Sleep.    Yes Historical Provider, MD   lamoTRIgine (LAMICTAL) 25 MG tablet TAKE 3 TABLETS BY MOUTH DAILY (WITH BREAKFAST) MOOD SWINGS 10/6/20  Yes Bryce Harrison DO   atorvastatin (LIPITOR) 20 MG tablet Take 1 tablet by mouth daily 20  Yes Bryce Harrison DO   sertraline (ZOLOFT) 50 MG tablet Take 1 tablet by mouth 3 times daily 20  Yes BELINDA Weiss CNP   famotidine (PEPCID) 40 MG tablet Take 1 tablet by mouth nightly as needed (gerd)  Patient taking differently: Take 40 mg by mouth 2 times daily  20  Yes BELINDA Mac CNP   omeprazole (PRILOSEC) 20 MG delayed release capsule 1 bid 20  Yes BELINDA Weiss CNP   vitamin D (CHOLECALCIFEROL) 1000 UNIT TABS tablet Take 1,000 Units by mouth daily    Historical Provider, MD   vitamin B-12 (CYANOCOBALAMIN) 500 MCG tablet Take 2,500 mcg by mouth daily     Historical Provider, MD       Current medications:    Current Facility-Administered Medications   Medication Dose Route Frequency Provider Last Rate Last Dose    lactated ringers infusion   Intravenous Continuous Shalini Ervin MD        sodium chloride flush 0.9 % injection 10 mL  10 mL Intravenous 2 times per day Shalini Ervin MD        sodium chloride flush 0.9 % injection 10 mL  10 mL Intravenous PRN Shalini Ervin MD        lidocaine PF 1 % injection 0.3 mL  0.3 mL Intradermal Once PRN Shalini Ervin MD  ceFAZolin (ANCEF) 2 g in dextrose 5 % 100 mL IVPB  2 g Intravenous On Call to [address] MD           Allergies:     Allergies   Allergen Reactions    Sulfa Antibiotics     Codeine Nausea Only and Other (See Comments)     Significant headaches       Problem List:    Patient Active Problem List   Diagnosis Code    Anxiety F41.9    Pain medication agreement signed Z02.89    History of colon polyps Z86.010    History of gastritis Z87.19    Insomnia G47.00    Hyperlipidemia E78.5    Moderate episode of recurrent major depressive disorder (HCC) F33.1    Gastroesophageal reflux disease with esophagitis K21.00    Shortness of breath R06.02    Multiple thyroid nodules E04.2    Iron deficiency anemia D50.9    Colon, diverticulosis K57.30    Esophageal dysphagia R13.10    Gastritis without bleeding K29.70    Mood swings R45.86       Past Medical History:        Diagnosis Date    Allergic rhinitis     Anxiety 10/10/2011    Arthritis     Depression 3/22/2017    GERD (gastroesophageal reflux disease)     Hallux valgus     Headache(784.0)     Herpes simplex     Irritable bowel syndrome     Kidney stones     Osteopenia     Other and unspecified hyperlipidemia 4/2/2013    Pain medication agreement signed 10/10/2011    Thyroid disease     nodules       Past Surgical History:        Procedure Laterality Date    CT NEEDLE BIOPSY LUNG PERCUTANEOUS  9/3/2020    CT NEED[facility]    LITHOTRIPSY      SHOULDER SURGERY      TUBAL LIGATION      UPPER GASTROINTESTINAL ENDOSCOPY      gastritis    UPPER GASTROINTESTINAL ENDOSCOPY N/A 9/6/2019    EGD BIOPSY performed by Osiel Dowling MD at [address] [place] [address] N/A 9/6/2019    COLON & EGD W/ ANESTHESIA performed by Osiel Dowling MD at [address] [place] [address]  9/6/2019    EGD DILATION Mallony performed by Osiel Dowling MD at [facility] [address] ENDOSCOPY       Social History:    Social History     Tobacco Use    Smoking status: Former Smoker     Packs/day: 1.00     Years: 15.00     Pack years: 15.00     Last attempt to quit: 6/15/1983     Years since quittin.3    Smokeless tobacco: Never Used   Substance Use Topics    Alcohol use: No                                Counseling given: Not Answered      Vital Signs (Current):   Vitals:    10/09/20 1010   Weight: 170 lb (77.1 kg)   Height: 5' 5\" (1.651 m)                                              BP Readings from Last 3 Encounters:   20 (!) 144/80   20 122/60   20 126/84       NPO Status: Time of last liquid consumption:                         Time of last solid consumption:                         Date of last liquid consumption: 10/15/20                        Date of last solid food consumption: 10/15/20    BMI:   Wt Readings from Last 3 Encounters:   10/09/20 170 lb (77.1 kg)   20 174 lb (78.9 kg)   20 168 lb (76.2 kg)     Body mass index is 28.29 kg/m². CBC:   Lab Results   Component Value Date    WBC 5.2 10/08/2020    RBC 5.43 10/08/2020    HGB 14.8 10/08/2020    HCT 44.7 10/08/2020    MCV 82.2 10/08/2020    RDW 20.3 10/08/2020     10/08/2020       CMP:   Lab Results   Component Value Date     10/08/2020    K 4.0 10/08/2020    K 4.1 2020     10/08/2020    CO2 26 10/08/2020    BUN 13 10/08/2020    CREATININE 0.6 10/08/2020    GFRAA >60 10/08/2020    GFRAA >60 2012    AGRATIO 1.8 2020    LABGLOM >60 10/08/2020    GLUCOSE 87 10/08/2020    PROT 6.9 2020    PROT 7.2 2012    CALCIUM 9.6 10/08/2020    BILITOT 0.3 2020    ALKPHOS 93 2020    AST 19 2020    ALT 18 2020       POC Tests: No results for input(s): POCGLU, POCNA, POCK, POCCL, POCBUN, POCHEMO, POCHCT in the last 72 hours.     Coags:   Lab Results   Component Value Date    PROTIME 12.3 10/16/2020    INR 1.06 10/16/2020    APTT 31.0 10/08/2020       HCG (If Applicable): No results found for: PREGTESTUR, PREGSERUM, HCG, HCGQUANT     ABGs:   Lab Results   Component Value Date    PHART 7.383 10/08/2020    PO2ART 92.0 10/08/2020    EUA1BPP 38.2 10/08/2020    MAP9HGF 22.2 10/08/2020    BEART -2.4 10/08/2020    U0QJWXLI 97.2 10/08/2020        Type & Screen (If Applicable):  No results found for: LABABO, LABRH    Drug/Infectious Status (If Applicable):  No results found for: HIV, HEPCAB    COVID-19 Screening (If Applicable):   Lab Results   Component Value Date    COVID19 Not Detected 10/12/2020         Anesthesia Evaluation   no history of anesthetic complications:   Airway: Mallampati: II  TM distance: <3 FB   Neck ROM: full  Mouth opening: > = 3 FB Dental:    (+) edentulous      Pulmonary:   (+) shortness of breath: chronic,                            ROS comment: H.o tob   Cardiovascular:Negative CV ROS                      Neuro/Psych:   (+) headaches:, psychiatric history:depression/anxiety             GI/Hepatic/Renal:   (+) GERD: well controlled,           Endo/Other: Negative Endo/Other ROS                    Abdominal:           Vascular:                                        Anesthesia Plan      general     ASA 3     (Pt agrees to risks, benefits and alternatives of GETA, DLETT, possible arterial line. Questions answered. Willing to proceed with plan.)  Induction: intravenous. Anesthetic plan and risks discussed with patient.                       Yves Peck MD   10/16/2020

## 2020-10-16 NOTE — ANESTHESIA PROCEDURE NOTES
Arterial Line:    An arterial line was placed using ultrasound guidance and surface landmarks, in the pre-op for the following indication(s): continuous blood pressure monitoring and blood sampling needed. A 20 gauge (size), 1 and 3/8 inch (length), Arrow (type) catheter was placed, Seldinger technique used, into the left radial artery and lido 1 % 0.1 cc, secured by Tegaderm. Anesthesia type: Local  Local infiltration: Injection    Events:  patient tolerated procedure well with no complications and EBL 0mL.   Anesthesiologist: Jose Bloom MD  Preanesthetic Checklist  Completed: patient identified, site marked, surgical consent, pre-op evaluation, timeout performed, IV checked, risks and benefits discussed, monitors and equipment checked, anesthesia consent given, oxygen available and patient being monitored

## 2020-10-16 NOTE — PROGRESS NOTES
Preoperative Screening for Elective Surgery/Invasive Procedures While COVID-19 present in the community     Have you had any of the following symptoms?no  o Fever, chills  o Cough  o Shortness of breath  o Muscle aches/pain  o Diarrhea  o Abdominal pain, nausea, vomiting  o Loss or decrease in taste and / or smell   Risk of Exposure-no  o Have you recently been hospitalized for COVID-19 or flu-like illness, if so when?  o Recently diagnosed with COVID-19, if so when?  o Recently tested for COVID-19, if so when?  o Have you been in close contact with a person or family member who currently has or recently had COVID-19? If yes, when and in what context?  o Do you live with anybody who in the last 14 days has had fever, chills, shortness of breath, muscle aches, flu-like illness?  o Do you have any close contacts or family members who are currently in the hospital for COVID-19 or flu-like illness? If yes, assess recent close contact with this person. Indicate if the patient has a positive screen by answering yes to one or more of the above questions. Patients who test positive or screen positive prior to surgery or on the day of surgery should be evaluated in conjunction with the surgeon/proceduralist/anesthesiologist to determine the urgency of the procedure.

## 2020-10-17 LAB
ANION GAP SERPL CALCULATED.3IONS-SCNC: 10 MMOL/L (ref 3–16)
BASOPHILS ABSOLUTE: 0.1 K/UL (ref 0–0.2)
BASOPHILS RELATIVE PERCENT: 0.4 %
BUN BLDV-MCNC: 12 MG/DL (ref 7–20)
CALCIUM SERPL-MCNC: 8.3 MG/DL (ref 8.3–10.6)
CHLORIDE BLD-SCNC: 104 MMOL/L (ref 99–110)
CO2: 23 MMOL/L (ref 21–32)
CREAT SERPL-MCNC: 0.6 MG/DL (ref 0.6–1.2)
EOSINOPHILS ABSOLUTE: 0 K/UL (ref 0–0.6)
EOSINOPHILS RELATIVE PERCENT: 0 %
GFR AFRICAN AMERICAN: >60
GFR NON-AFRICAN AMERICAN: >60
GLUCOSE BLD-MCNC: 135 MG/DL (ref 70–99)
HCT VFR BLD CALC: 40.5 % (ref 36–48)
HEMOGLOBIN: 13.3 G/DL (ref 12–16)
LYMPHOCYTES ABSOLUTE: 0.7 K/UL (ref 1–5.1)
LYMPHOCYTES RELATIVE PERCENT: 4.6 %
MCH RBC QN AUTO: 27.3 PG (ref 26–34)
MCHC RBC AUTO-ENTMCNC: 32.8 G/DL (ref 31–36)
MCV RBC AUTO: 83.3 FL (ref 80–100)
MONOCYTES ABSOLUTE: 1.2 K/UL (ref 0–1.3)
MONOCYTES RELATIVE PERCENT: 8.2 %
NEUTROPHILS ABSOLUTE: 13.1 K/UL (ref 1.7–7.7)
NEUTROPHILS RELATIVE PERCENT: 86.8 %
PDW BLD-RTO: 20.1 % (ref 12.4–15.4)
PLATELET # BLD: 158 K/UL (ref 135–450)
PMV BLD AUTO: 8.6 FL (ref 5–10.5)
POTASSIUM SERPL-SCNC: 4.3 MMOL/L (ref 3.5–5.1)
RBC # BLD: 4.86 M/UL (ref 4–5.2)
SODIUM BLD-SCNC: 137 MMOL/L (ref 136–145)
WBC # BLD: 15.1 K/UL (ref 4–11)

## 2020-10-17 PROCEDURE — 99024 POSTOP FOLLOW-UP VISIT: CPT | Performed by: THORACIC SURGERY (CARDIOTHORACIC VASCULAR SURGERY)

## 2020-10-17 PROCEDURE — 6360000002 HC RX W HCPCS: Performed by: THORACIC SURGERY (CARDIOTHORACIC VASCULAR SURGERY)

## 2020-10-17 PROCEDURE — 85025 COMPLETE CBC W/AUTO DIFF WBC: CPT

## 2020-10-17 PROCEDURE — 2000000000 HC ICU R&B

## 2020-10-17 PROCEDURE — 6370000000 HC RX 637 (ALT 250 FOR IP): Performed by: THORACIC SURGERY (CARDIOTHORACIC VASCULAR SURGERY)

## 2020-10-17 PROCEDURE — 94761 N-INVAS EAR/PLS OXIMETRY MLT: CPT

## 2020-10-17 PROCEDURE — 2700000000 HC OXYGEN THERAPY PER DAY

## 2020-10-17 PROCEDURE — 80048 BASIC METABOLIC PNL TOTAL CA: CPT

## 2020-10-17 PROCEDURE — 2580000003 HC RX 258: Performed by: THORACIC SURGERY (CARDIOTHORACIC VASCULAR SURGERY)

## 2020-10-17 PROCEDURE — 6370000000 HC RX 637 (ALT 250 FOR IP): Performed by: INTERNAL MEDICINE

## 2020-10-17 RX ORDER — CALCIUM CARBONATE 200(500)MG
500 TABLET,CHEWABLE ORAL 3 TIMES DAILY PRN
Status: DISCONTINUED | OUTPATIENT
Start: 2020-10-17 | End: 2020-10-21 | Stop reason: HOSPADM

## 2020-10-17 RX ADMIN — ATORVASTATIN CALCIUM 20 MG: 10 TABLET, FILM COATED ORAL at 09:19

## 2020-10-17 RX ADMIN — OXYCODONE 5 MG: 5 TABLET ORAL at 02:57

## 2020-10-17 RX ADMIN — ENOXAPARIN SODIUM 40 MG: 40 INJECTION SUBCUTANEOUS at 09:18

## 2020-10-17 RX ADMIN — Medication 10 ML: at 20:15

## 2020-10-17 RX ADMIN — KETOROLAC TROMETHAMINE 15 MG: 30 INJECTION, SOLUTION INTRAMUSCULAR at 18:26

## 2020-10-17 RX ADMIN — CYANOCOBALAMIN TAB 500 MCG 2500 MCG: 500 TAB at 09:19

## 2020-10-17 RX ADMIN — MORPHINE SULFATE 2 MG: 2 INJECTION, SOLUTION INTRAMUSCULAR; INTRAVENOUS at 21:57

## 2020-10-17 RX ADMIN — SERTRALINE HYDROCHLORIDE 50 MG: 50 TABLET ORAL at 13:19

## 2020-10-17 RX ADMIN — PANTOPRAZOLE SODIUM 40 MG: 40 TABLET, DELAYED RELEASE ORAL at 06:04

## 2020-10-17 RX ADMIN — Medication 1000 UNITS: at 09:20

## 2020-10-17 RX ADMIN — KETOROLAC TROMETHAMINE 15 MG: 30 INJECTION, SOLUTION INTRAMUSCULAR at 13:19

## 2020-10-17 RX ADMIN — KETOROLAC TROMETHAMINE 15 MG: 30 INJECTION, SOLUTION INTRAMUSCULAR at 05:59

## 2020-10-17 RX ADMIN — SERTRALINE HYDROCHLORIDE 50 MG: 50 TABLET ORAL at 09:20

## 2020-10-17 RX ADMIN — CEFAZOLIN SODIUM 2 G: 10 INJECTION, POWDER, FOR SOLUTION INTRAVENOUS at 02:09

## 2020-10-17 RX ADMIN — SODIUM CHLORIDE: 9 INJECTION, SOLUTION INTRAVENOUS at 02:10

## 2020-10-17 RX ADMIN — Medication 10 ML: at 09:20

## 2020-10-17 RX ADMIN — LAMOTRIGINE 25 MG: 25 TABLET ORAL at 09:20

## 2020-10-17 RX ADMIN — ANTACID TABLETS 500 MG: 500 TABLET, CHEWABLE ORAL at 22:31

## 2020-10-17 RX ADMIN — SERTRALINE HYDROCHLORIDE 50 MG: 50 TABLET ORAL at 20:14

## 2020-10-17 ASSESSMENT — PAIN DESCRIPTION - DESCRIPTORS
DESCRIPTORS: SORE
DESCRIPTORS: SHARP

## 2020-10-17 ASSESSMENT — PAIN DESCRIPTION - LOCATION
LOCATION: BACK;CHEST
LOCATION: BACK;CHEST

## 2020-10-17 ASSESSMENT — PAIN SCALES - GENERAL
PAINLEVEL_OUTOF10: 6
PAINLEVEL_OUTOF10: 0
PAINLEVEL_OUTOF10: 0
PAINLEVEL_OUTOF10: 4
PAINLEVEL_OUTOF10: 0
PAINLEVEL_OUTOF10: 0
PAINLEVEL_OUTOF10: 5
PAINLEVEL_OUTOF10: 0
PAINLEVEL_OUTOF10: 5
PAINLEVEL_OUTOF10: 0
PAINLEVEL_OUTOF10: 5
PAINLEVEL_OUTOF10: 0

## 2020-10-17 ASSESSMENT — PAIN DESCRIPTION - PROGRESSION
CLINICAL_PROGRESSION: NOT CHANGED
CLINICAL_PROGRESSION: NOT CHANGED

## 2020-10-17 ASSESSMENT — PAIN DESCRIPTION - ORIENTATION: ORIENTATION: LEFT

## 2020-10-17 ASSESSMENT — PAIN DESCRIPTION - PAIN TYPE
TYPE: SURGICAL PAIN
TYPE: SURGICAL PAIN

## 2020-10-17 ASSESSMENT — PAIN DESCRIPTION - ONSET
ONSET: ON-GOING
ONSET: ON-GOING

## 2020-10-17 ASSESSMENT — PAIN DESCRIPTION - FREQUENCY: FREQUENCY: OTHER (COMMENT)

## 2020-10-17 NOTE — PLAN OF CARE
Problem: Pain:  Goal: Pain level will decrease  Description: Pain level will decrease  Outcome: Ongoing  Goal: Control of acute pain  Description: Control of acute pain  Outcome: Ongoing     Problem: Falls - Risk of:  Goal: Will remain free from falls  Description: Will remain free from falls  Outcome: Ongoing  Goal: Absence of physical injury  Description: Absence of physical injury  Outcome: Ongoing     Problem: Skin Integrity:  Goal: Will show no infection signs and symptoms  Description: Will show no infection signs and symptoms  Outcome: Ongoing  Goal: Absence of new skin breakdown  Description: Absence of new skin breakdown  Outcome: Ongoing

## 2020-10-17 NOTE — PROGRESS NOTES
Shift assessment completed, see flow sheet. Morning medications passed see MAR. A&O X4. Lung sounds have expiratory wheeze, call placed to MD, verbal orders to discontinued iv fluids. Bowel sounds are active and tolerating PO intake. Pedal pulses are palpable. Pt up to chair with moderate assistance. Pt denies pain and any other needs at this time. Will continue to monitor.

## 2020-10-17 NOTE — PROGRESS NOTES
[facility]  Progress Note    10/17/2020 7:34 AM  Surgeon: Dr. Juan Manuel Sánchez      POD#1   S/P : Left upper lobectomy    Chief complaint: Postop    Subjective: No major complaint or event overnight     Vital Signs: /73   Pulse 81   Temp 99.4 °F (37.4 °C) (Oral)   Resp 29   Ht 5' 5\" (1.651 m)   Wt 170 lb 1.6 oz (77.2 kg)   LMP  (LMP Unknown)   SpO2 92%   BMI 28.31 kg/m²  O2 Flow Rate (L/min): 3 L/min     I/O:      Intake/Output Summary (Last 24 hours) at 10/17/2020 0734  Last data filed at 10/17/2020 0656  Gross per 24 hour   Intake 2201 ml   Output 1117 ml   Net 1084 ml       Exam:   Cardiovascular: S1 plus S2 no added sound  Pulmonary: Bilaterally clear no additional sound  CT: Serosanguineous           airleak no air leak    Labs:   CBC:   Recent Labs     10/17/20  0610   WBC 15.1*   HGB 13.3   HCT 40.5   MCV 83.3        BMP: No results for input(s): NA, K, CL, CO2, PHOS, BUN, CREATININE in the last 72 hours. Invalid input(s): CA  PT/INR:   Recent Labs     10/16/20  1059   PROTIME 12.3   INR 1.06     APTT: No results for input(s): APTT in the last 72 hours.     Scheduled Meds:    vitamin B-12  2,500 mcg Oral Daily    Vitamin D  1,000 Units Oral Daily    pantoprazole  40 mg Oral QAM AC    sertraline  50 mg Oral TID    atorvastatin  20 mg Oral Daily    lamoTRIgine  25 mg Oral Daily    sodium chloride flush  10 mL Intravenous 2 times per day    enoxaparin  40 mg Subcutaneous Daily    ketorolac  15 mg Intravenous Q6H     Continuous Infusions:    sodium chloride 125 mL/hr at 10/17/20 0210         Patient Active Problem List   Diagnosis    Anxiety    Pain medication agreement signed    History of colon polyps    History of gastritis    Insomnia    Hyperlipidemia    Moderate episode of recurrent major depressive disorder (HCC)    Gastroesophageal reflux disease with esophagitis    Shortness of breath    Multiple thyroid nodules    Iron deficiency anemia

## 2020-10-17 NOTE — OP NOTE
[address], [place]                                OPERATIVE REPORT    PATIENT NAME: Young Zavala                    :        1950  MED REC NO:   [id number]                          ROOM:       9820  ACCOUNT NO:   [de-identified]                           ADMIT DATE: 10/16/2020  PROVIDER:     Lisa Romero. Chandrakant Lu MD    DATE OF PROCEDURE:  10/16/2020    PREOPERATIVE DIAGNOSES:  Left upper lobe lung cancer, remote tobacco use  history. POSTOPERATIVE DIAGNOSES:  Left upper lobe lung cancer, remote tobacco  use history. OPERATION PERFORMED:  Left thoracotomy; left upper lobectomy;  mediastinal lymph node dissection; intercostal cryoablation levels 3, 4,  5 and 6; five-level intercostal nerve block with 0.25% Marcaine with  epi, total of 30 mL utilized. SURGEON:  Vince Keane MD    INDICATIONS AND CONSENT:  The patient is a 65-year-old female with  remote history of tobacco use, quit over 38 years ago, history of  thyroid nodules, dyslipidemia. Presented with some left-sided chest  discomfort. She underwent evaluation. Cardiac evaluation was  unremarkable. Ultimately, CT scan was completed showing a spiculated  nodule in left upper lobe of the lung. It showed evidence of serial  imaging studies of some enlargement. CT-guided core needle biopsy was  complete of the left upper lobe nodule showing findings consistent with  adenocarcinoma. I saw the patient in consultation regarding surgical  resection. I discussed the operation of thoracotomy and left upper  lobectomy with the patient. Pulmonary function testing was satisfactory  for tolerating lung resection surgery including lobectomy. The  procedure recommended was left thoracotomy, lobectomy, mediastinal lymph  node dissection and nerve block. I discussed this operation with the  patient and her daughter.   I discussed risks, benefits and the bronchus. The bronchus was cleared using cautery and blunt dissection. The lobe  was amputated with linear stapling device. Lymph node was harvested  from levels 5, 7, 9 and 10, all sent for permanent path. Hemostasis was  achieved. Two 32-Pashto chest tubes were  placed one down the  diaphragm, one in mid axillary line towards the apex both secured with  Vicryl suture connected to suction drainage device. The fifth rib did  fracture and a short section was resected to prevent rubbing of the  edges. 0.25% Marcaine with epinephrine was used to infiltrate the ribs  at five levels 3, 4, 5, 6 and 7, total 3 mL utilized. Holes were  drilled through the fifth rib. #2 Vicryl suture placed through the  fifth rib over the top of the fourth rib. The lung was ventilated. The  chest was irrigated with copious amounts of warm water. Progel was used  to coat the hilum of the lung after inflating the lung. Ribs were  reapproximated, sutures tied. The muscle was then reapproximated in  appropriate layers with 0 Vicryl suture in a running fashion,  subcutaneous tissue with 3-0 and skin with 4-0 Vicryl suture in a  subcuticular fashion. Dry sterile dressing applied and the patient was  transferred to ICU in stable condition. ESTIMATED BLOOD LOSS:  Less than 10 mL. REPLACEMENTS:  None. SPONGE AND NEEDLE COUNT:  Correct x2. CAR Chapa MD    D: 10/16/2020 17:27:05       T: 10/16/2020 21:28:58     [email or web address]  Job#: [id number]     Doc#: [id number]    CC:  Lucrezia Belch, MD Deretha Bumpers.  Araceli Regalado MD       Primary Care Physician

## 2020-10-18 LAB
A/G RATIO: 1.7 (ref 1.1–2.2)
ALBUMIN SERPL-MCNC: 3.8 G/DL (ref 3.4–5)
ALP BLD-CCNC: 67 U/L (ref 40–129)
ALT SERPL-CCNC: 16 U/L (ref 10–40)
ANION GAP SERPL CALCULATED.3IONS-SCNC: 8 MMOL/L (ref 3–16)
AST SERPL-CCNC: 24 U/L (ref 15–37)
BILIRUB SERPL-MCNC: 0.7 MG/DL (ref 0–1)
BUN BLDV-MCNC: 17 MG/DL (ref 7–20)
CALCIUM SERPL-MCNC: 8.9 MG/DL (ref 8.3–10.6)
CHLORIDE BLD-SCNC: 105 MMOL/L (ref 99–110)
CO2: 26 MMOL/L (ref 21–32)
CREAT SERPL-MCNC: 0.8 MG/DL (ref 0.6–1.2)
EKG ATRIAL RATE: 394 BPM
EKG DIAGNOSIS: NORMAL
EKG Q-T INTERVAL: 312 MS
EKG QRS DURATION: 72 MS
EKG QTC CALCULATION (BAZETT): 460 MS
EKG R AXIS: 4 DEGREES
EKG T AXIS: -22 DEGREES
EKG VENTRICULAR RATE: 131 BPM
GFR AFRICAN AMERICAN: >60
GFR NON-AFRICAN AMERICAN: >60
GLOBULIN: 2.3 G/DL
GLUCOSE BLD-MCNC: 111 MG/DL (ref 70–99)
PHOSPHORUS: 2 MG/DL (ref 2.5–4.9)
POTASSIUM REFLEX MAGNESIUM: 4.1 MMOL/L (ref 3.5–5.1)
SODIUM BLD-SCNC: 139 MMOL/L (ref 136–145)
TOTAL PROTEIN: 6.1 G/DL (ref 6.4–8.2)
TSH REFLEX FT4: 0.48 UIU/ML (ref 0.27–4.2)

## 2020-10-18 PROCEDURE — 6360000002 HC RX W HCPCS: Performed by: THORACIC SURGERY (CARDIOTHORACIC VASCULAR SURGERY)

## 2020-10-18 PROCEDURE — 2580000003 HC RX 258: Performed by: THORACIC SURGERY (CARDIOTHORACIC VASCULAR SURGERY)

## 2020-10-18 PROCEDURE — 84443 ASSAY THYROID STIM HORMONE: CPT

## 2020-10-18 PROCEDURE — 99024 POSTOP FOLLOW-UP VISIT: CPT | Performed by: THORACIC SURGERY (CARDIOTHORACIC VASCULAR SURGERY)

## 2020-10-18 PROCEDURE — 6370000000 HC RX 637 (ALT 250 FOR IP): Performed by: THORACIC SURGERY (CARDIOTHORACIC VASCULAR SURGERY)

## 2020-10-18 PROCEDURE — 2500000003 HC RX 250 WO HCPCS

## 2020-10-18 PROCEDURE — 36415 COLL VENOUS BLD VENIPUNCTURE: CPT

## 2020-10-18 PROCEDURE — 2000000000 HC ICU R&B

## 2020-10-18 PROCEDURE — 93005 ELECTROCARDIOGRAM TRACING: CPT | Performed by: INTERNAL MEDICINE

## 2020-10-18 PROCEDURE — 84100 ASSAY OF PHOSPHORUS: CPT

## 2020-10-18 PROCEDURE — 2500000003 HC RX 250 WO HCPCS: Performed by: INTERNAL MEDICINE

## 2020-10-18 PROCEDURE — 93010 ELECTROCARDIOGRAM REPORT: CPT | Performed by: INTERNAL MEDICINE

## 2020-10-18 PROCEDURE — 80053 COMPREHEN METABOLIC PANEL: CPT

## 2020-10-18 RX ORDER — METOPROLOL TARTRATE 5 MG/5ML
2.5 INJECTION INTRAVENOUS EVERY 6 HOURS PRN
Status: DISCONTINUED | OUTPATIENT
Start: 2020-10-18 | End: 2020-10-21 | Stop reason: HOSPADM

## 2020-10-18 RX ORDER — METOPROLOL TARTRATE 5 MG/5ML
INJECTION INTRAVENOUS
Status: COMPLETED
Start: 2020-10-18 | End: 2020-10-18

## 2020-10-18 RX ADMIN — CYANOCOBALAMIN TAB 500 MCG 2500 MCG: 500 TAB at 08:26

## 2020-10-18 RX ADMIN — Medication 10 ML: at 08:27

## 2020-10-18 RX ADMIN — ATORVASTATIN CALCIUM 20 MG: 10 TABLET, FILM COATED ORAL at 08:26

## 2020-10-18 RX ADMIN — DEXTROSE MONOHYDRATE 1 MG/MIN: 50 INJECTION, SOLUTION INTRAVENOUS at 20:56

## 2020-10-18 RX ADMIN — Medication 1000 UNITS: at 08:26

## 2020-10-18 RX ADMIN — OXYCODONE 5 MG: 5 TABLET ORAL at 18:09

## 2020-10-18 RX ADMIN — METOPROLOL TARTRATE 2.5 MG: 1 INJECTION, SOLUTION INTRAVENOUS at 06:19

## 2020-10-18 RX ADMIN — KETOROLAC TROMETHAMINE 15 MG: 30 INJECTION, SOLUTION INTRAMUSCULAR at 12:29

## 2020-10-18 RX ADMIN — KETOROLAC TROMETHAMINE 15 MG: 30 INJECTION, SOLUTION INTRAMUSCULAR at 06:11

## 2020-10-18 RX ADMIN — DEXTROSE MONOHYDRATE 1 MG/MIN: 50 INJECTION, SOLUTION INTRAVENOUS at 13:02

## 2020-10-18 RX ADMIN — PANTOPRAZOLE SODIUM 40 MG: 40 TABLET, DELAYED RELEASE ORAL at 06:12

## 2020-10-18 RX ADMIN — MORPHINE SULFATE 2 MG: 2 INJECTION, SOLUTION INTRAMUSCULAR; INTRAVENOUS at 13:48

## 2020-10-18 RX ADMIN — ENOXAPARIN SODIUM 40 MG: 40 INJECTION SUBCUTANEOUS at 08:26

## 2020-10-18 RX ADMIN — METOPROLOL TARTRATE 2.5 MG: 1 INJECTION, SOLUTION INTRAVENOUS at 09:49

## 2020-10-18 RX ADMIN — SERTRALINE HYDROCHLORIDE 50 MG: 50 TABLET ORAL at 15:23

## 2020-10-18 RX ADMIN — LAMOTRIGINE 25 MG: 25 TABLET ORAL at 08:27

## 2020-10-18 RX ADMIN — SERTRALINE HYDROCHLORIDE 50 MG: 50 TABLET ORAL at 08:26

## 2020-10-18 RX ADMIN — SERTRALINE HYDROCHLORIDE 50 MG: 50 TABLET ORAL at 20:33

## 2020-10-18 RX ADMIN — KETOROLAC TROMETHAMINE 15 MG: 30 INJECTION, SOLUTION INTRAMUSCULAR at 01:56

## 2020-10-18 ASSESSMENT — PAIN SCALES - GENERAL
PAINLEVEL_OUTOF10: 4
PAINLEVEL_OUTOF10: 8
PAINLEVEL_OUTOF10: 5
PAINLEVEL_OUTOF10: 1
PAINLEVEL_OUTOF10: 4
PAINLEVEL_OUTOF10: 0

## 2020-10-18 NOTE — PROGRESS NOTES
Cardiac, Vascular [facility] Thoracic Surgeons  Progress Note    10/18/2020 11:23 AM  Surgeon: Dr. Karo Sawyer      POD#1   S/P : Left upper lobectomy    Chief complaint: Postop    Subjective: No major complaint or event overnight     Vital Signs: BP 89/71   Pulse 146   Temp 98.6 °F (37 °C) (Oral)   Resp 18   Ht 5' 5\" (1.651 m)   Wt 173 lb 8 oz (78.7 kg)   LMP  (LMP Unknown)   SpO2 93%   BMI 28.87 kg/m²  O2 Flow Rate (L/min): 1 L/min     I/O:      Intake/Output Summary (Last 24 hours) at 10/18/2020 1123  Last data filed at 10/18/2020 0800  Gross per 24 hour   Intake --   Output 3190 ml   Net -3190 ml       Exam:   Cardiovascular: S1 plus S2 no added sound  Pulmonary: Bilaterally clear no additional sound  CT: Serosanguineous           airleak no air leak    Labs:   CBC:   Recent Labs     10/17/20  0610   WBC 15.1*   HGB 13.3   HCT 40.5   MCV 83.3        BMP:   Recent Labs     10/17/20  0610 10/18/20  0631    139   K 4.3 4.1    105   CO2 23 26   PHOS  --  2.0*   BUN 12 17   CREATININE 0.6 0.8     PT/INR:   Recent Labs     10/16/20  1059   PROTIME 12.3   INR 1.06     APTT: No results for input(s): APTT in the last 72 hours.     Scheduled Meds:    vitamin B-12  2,500 mcg Oral Daily    Vitamin D  1,000 Units Oral Daily    pantoprazole  40 mg Oral QAM AC    sertraline  50 mg Oral TID    atorvastatin  20 mg Oral Daily    lamoTRIgine  25 mg Oral Daily    sodium chloride flush  10 mL Intravenous 2 times per day    enoxaparin  40 mg Subcutaneous Daily    ketorolac  15 mg Intravenous Q6H     Continuous Infusions:         Patient Active Problem List   Diagnosis    Anxiety    Pain medication agreement signed    History of colon polyps    History of gastritis    Insomnia    Hyperlipidemia    Moderate episode of recurrent major depressive disorder (HCC)    Gastroesophageal reflux disease with esophagitis    Shortness of breath    Multiple thyroid nodules    Iron deficiency anemia    Colon,

## 2020-10-19 PROCEDURE — 6370000000 HC RX 637 (ALT 250 FOR IP): Performed by: THORACIC SURGERY (CARDIOTHORACIC VASCULAR SURGERY)

## 2020-10-19 PROCEDURE — 6360000002 HC RX W HCPCS: Performed by: THORACIC SURGERY (CARDIOTHORACIC VASCULAR SURGERY)

## 2020-10-19 PROCEDURE — 99024 POSTOP FOLLOW-UP VISIT: CPT | Performed by: THORACIC SURGERY (CARDIOTHORACIC VASCULAR SURGERY)

## 2020-10-19 PROCEDURE — 97166 OT EVAL MOD COMPLEX 45 MIN: CPT

## 2020-10-19 PROCEDURE — 97530 THERAPEUTIC ACTIVITIES: CPT

## 2020-10-19 PROCEDURE — 97110 THERAPEUTIC EXERCISES: CPT

## 2020-10-19 PROCEDURE — 2000000000 HC ICU R&B

## 2020-10-19 PROCEDURE — 97162 PT EVAL MOD COMPLEX 30 MIN: CPT

## 2020-10-19 RX ORDER — AMIODARONE HYDROCHLORIDE 200 MG/1
200 TABLET ORAL DAILY
Status: DISCONTINUED | OUTPATIENT
Start: 2020-10-19 | End: 2020-10-21 | Stop reason: HOSPADM

## 2020-10-19 RX ADMIN — OXYCODONE 10 MG: 5 TABLET ORAL at 00:17

## 2020-10-19 RX ADMIN — ENOXAPARIN SODIUM 40 MG: 40 INJECTION SUBCUTANEOUS at 08:18

## 2020-10-19 RX ADMIN — SERTRALINE HYDROCHLORIDE 50 MG: 50 TABLET ORAL at 08:18

## 2020-10-19 RX ADMIN — OXYCODONE 10 MG: 5 TABLET ORAL at 20:10

## 2020-10-19 RX ADMIN — SERTRALINE HYDROCHLORIDE 50 MG: 50 TABLET ORAL at 14:06

## 2020-10-19 RX ADMIN — OXYCODONE 5 MG: 5 TABLET ORAL at 08:18

## 2020-10-19 RX ADMIN — OXYCODONE 10 MG: 5 TABLET ORAL at 14:06

## 2020-10-19 RX ADMIN — MUPIROCIN: 20 OINTMENT TOPICAL at 08:18

## 2020-10-19 RX ADMIN — ONDANSETRON 4 MG: 2 INJECTION INTRAMUSCULAR; INTRAVENOUS at 15:00

## 2020-10-19 RX ADMIN — ALPRAZOLAM 0.5 MG: 0.25 TABLET ORAL at 20:10

## 2020-10-19 RX ADMIN — OXYCODONE 5 MG: 5 TABLET ORAL at 10:15

## 2020-10-19 RX ADMIN — AMIODARONE HYDROCHLORIDE 200 MG: 200 TABLET ORAL at 08:18

## 2020-10-19 RX ADMIN — ATORVASTATIN CALCIUM 20 MG: 10 TABLET, FILM COATED ORAL at 08:18

## 2020-10-19 RX ADMIN — SERTRALINE HYDROCHLORIDE 50 MG: 50 TABLET ORAL at 20:10

## 2020-10-19 RX ADMIN — PANTOPRAZOLE SODIUM 40 MG: 40 TABLET, DELAYED RELEASE ORAL at 05:34

## 2020-10-19 RX ADMIN — LAMOTRIGINE 25 MG: 25 TABLET ORAL at 08:18

## 2020-10-19 RX ADMIN — ALPRAZOLAM 0.5 MG: 0.25 TABLET ORAL at 00:16

## 2020-10-19 ASSESSMENT — PAIN DESCRIPTION - PROGRESSION
CLINICAL_PROGRESSION: NOT CHANGED

## 2020-10-19 ASSESSMENT — PAIN SCALES - GENERAL
PAINLEVEL_OUTOF10: 7
PAINLEVEL_OUTOF10: 0
PAINLEVEL_OUTOF10: 0
PAINLEVEL_OUTOF10: 9
PAINLEVEL_OUTOF10: 7
PAINLEVEL_OUTOF10: 4
PAINLEVEL_OUTOF10: 0
PAINLEVEL_OUTOF10: 0
PAINLEVEL_OUTOF10: 9
PAINLEVEL_OUTOF10: 2
PAINLEVEL_OUTOF10: 8
PAINLEVEL_OUTOF10: 0
PAINLEVEL_OUTOF10: 7

## 2020-10-19 ASSESSMENT — PAIN DESCRIPTION - ORIENTATION
ORIENTATION: LEFT

## 2020-10-19 ASSESSMENT — PAIN DESCRIPTION - PAIN TYPE
TYPE: ACUTE PAIN;SURGICAL PAIN
TYPE: SURGICAL PAIN
TYPE: ACUTE PAIN;SURGICAL PAIN
TYPE: ACUTE PAIN;SURGICAL PAIN
TYPE: SURGICAL PAIN;ACUTE PAIN

## 2020-10-19 ASSESSMENT — PAIN DESCRIPTION - LOCATION
LOCATION: RIB CAGE

## 2020-10-19 ASSESSMENT — PAIN DESCRIPTION - FREQUENCY: FREQUENCY: INTERMITTENT

## 2020-10-19 ASSESSMENT — PAIN - FUNCTIONAL ASSESSMENT
PAIN_FUNCTIONAL_ASSESSMENT: PREVENTS OR INTERFERES SOME ACTIVE ACTIVITIES AND ADLS
PAIN_FUNCTIONAL_ASSESSMENT: PREVENTS OR INTERFERES SOME ACTIVE ACTIVITIES AND ADLS

## 2020-10-19 ASSESSMENT — PAIN DESCRIPTION - DESCRIPTORS
DESCRIPTORS: ACHING;SORE
DESCRIPTORS: ACHING;SORE

## 2020-10-19 ASSESSMENT — PAIN DESCRIPTION - ONSET: ONSET: SUDDEN

## 2020-10-19 NOTE — PROGRESS NOTES
Amio drip stopped due to pain and swelling at IV site. IV removed. New IV access gained in [facility]. Patient has now converted to [address] [place] [address] notified that drip was stopped and asked to notify writer if patient goes back into afib.

## 2020-10-19 NOTE — PROGRESS NOTES
Physical Therapy    Facility/Department: [facility] B2 - ICU  Initial Assessment    NAME: Sydnee Sibley  : 1950  MRN: [id number]    Date of Service: 10/19/2020    Discharge Recommendations:  24 hour supervision or assist   PT Equipment Recommendations  Equipment Needed: (CTA)  If pt is unable to be seen after this session, please let this note serve as discharge summary. Please see case management note for discharge disposition. Thank you. Assessment   Body structures, Functions, Activity limitations: Decreased functional mobility ; Increased pain;Decreased ADL status; Decreased endurance;Decreased balance  Assessment: Pt functioning below baseline follow L lobectomy. Pt previously indep and living alone in a 2nd level apartment. Pt limited by pain and nausea with ambulation. Pt required Ax2 for bed mobility due to pain. Pt will benefit from skilled PT to address deficits and maximize return to PLOF. Recommend home with 24 hr A. Will CTA for HHPT needs  Treatment Diagnosis: decreased mobility  Prognosis: Excellent  Decision Making: Medium Complexity  PT Education: Goals; General Safety;Gait Training;Disease Specific Education;PT Role;Plan of Care;Transfer Training;Equipment  Patient Education: pt expressed understanding on benefits of OOB activity to improve recovery  Barriers to Learning: none  REQUIRES PT FOLLOW UP: Yes  Activity Tolerance  Activity Tolerance: Patient limited by fatigue;Patient limited by endurance;Treatment limited secondary to medical complications (free text)  Activity Tolerance: nausea       Patient Diagnosis(es): Diagnoses of Lung nodule and 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      has a past medical history of Allergic rhinitis, Anxiety, Arthritis, Depression, GERD (gastroesophageal reflux disease), Hallux valgus, Headache(784.0), Herpes simplex, Irritable bowel syndrome, Kidney stones, Lung cancer, main bronchus, left ([address] Osteopenia, Other and unspecified hyperlipidemia, Pain medication agreement signed, and Thyroid disease. has a past surgical history that includes Lithotripsy; shoulder surgery; Tubal ligation; Upper gastrointestinal endoscopy; Upper gastrointestinal endoscopy (N/A, 9/6/2019); Upper gastrointestinal endoscopy (N/A, 9/6/2019); Upper gastrointestinal endoscopy (9/6/2019); CT NEEDLE BIOPSY LUNG PERCUTANEOUS (9/3/2020); and thoracotomy (N/A, 10/16/2020). Restrictions  Restrictions/Precautions  Restrictions/Precautions: General Precautions, Fall Risk  Position Activity Restriction  Other position/activity restrictions: Left chest tube  Vision/Hearing  Vision: Impaired  Vision Exceptions: Wears glasses for distance;Wears glasses for reading  Hearing: Within functional limits     Subjective  General  Chart Reviewed: Yes  Patient assessed for rehabilitation services?: Yes  Response To Previous Treatment: Not applicable  Family / Caregiver Present: No  Referring Practitioner: BELINDA Green CNP  Referral Date : 10/19/20  Diagnosis: Left upper lobectomy  Follows Commands: Within Functional Limits  General Comment  Comments: cleared by nursing  Subjective  Subjective: pt up in the chair. Reported 3/10 pain at rest.  Pain Screening  Patient Currently in Pain: Yes     Pre Treatment Pain Screening  Intervention List: Patient able to continue with treatment;Patient declined any intervention;Nurse/physician notified(upon ambulation pt c/o increased pain and nausea.  RN notified)    Orientation  Orientation  Overall Orientation Status: Within Normal Limits  Social/Functional History  Social/Functional History  Lives With: Alone  Type of Home: Apartment  Home Layout: One level  Home Access: Stairs to enter with rails  Entrance Stairs - Number of Steps: 1 + 1+ 1 JUNE without Handrail & 9 JUNE with Handrail on Left  Bathroom Shower/Tub: Tub/Shower unit  Bathroom Toilet: Standard(vanity next to door)  Home Equipment: Reacher  ADL Assistance: Independent  Homemaking Responsibilities: Yes  Meal Prep Responsibility: Primary  Laundry Responsibility: Primary  Cleaning Responsibility: Primary  Bill Paying/Finance Responsibility: Primary  Shopping Responsibility: Primary  Ambulation Assistance: Independent(without AD)  Transfer Assistance: Independent  Active : Yes  Mode of Transportation: Car  Occupation: Retired  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, walking at [employer]  Additional Comments: reports daughter to stay with her upon discharge  Cognition   Cognition  Overall Cognitive Status: WNL    Objective          PROM RLE (degrees)  RLE PROM: WFL  AROM RLE (degrees)  RLE AROM: WFL  PROM LLE (degrees)  LLE PROM: WFL  AROM LLE (degrees)  LLE AROM : WFL  Strength RLE  Strength RLE: WFL  Strength LLE  Strength LLE: WFL  Tone RLE  RLE Tone: Normotonic  Tone LLE  LLE Tone: Normotonic  Sensation  Overall Sensation Status: WFL  Bed mobility  Supine to Sit: Unable to assess  Sit to Supine: 2 Person assistance  Transfers  Sit to Stand: Contact guard assistance  Stand to sit: Contact guard assistance  Ambulation  Ambulation?: Yes  More Ambulation?: No  Ambulation 1  Surface: level tile  Device: Rollator  Assistance: Contact guard assistance  Gait Deviations: Slow Jessica;Decreased step length;Decreased step height  Distance: 15'  Comments: limited by pain and nausea  Stairs/Curb  Stairs?: No     Balance  Posture: Fair  Sitting - Static: Good  Sitting - Dynamic: Good  Standing - Static: Good  Standing - Dynamic: Fair  Exercises  Hip Flexion: x15 b  Knee Long Arc Quad: x15 B  Ankle Pumps: x15 B     Plan   Plan  Times per week: 5-7x/week in ICU  Times per day: Daily  Current Treatment Recommendations: Balance Training, Functional Mobility Training, Transfer Training, Gait Training, Stair training, Positioning, Pain Management, Home Exercise Program, Endurance Training  Safety Devices  Type of devices:  All fall risk precautions in place, Call light within reach, Patient at risk for falls, Left in bed, Nurse notified  Restraints  Initially in place: No      Goals  Short term goals  Time Frame for Short term goals: 10/26  Short term goal 1: Pt will complete bed mobility with min A  Short term goal 2: Pt will ambulate x 150' with LRAD with supervision by 10/23  Short term goal 3: Pt will complete 1 flight of stairs with CGA  Patient Goals   Patient goals : to be able to walk       Therapy Time   Individual Concurrent Group Co-treatment   Time In 1438         Time Out 1500         Minutes 22         Timed Code Treatment Minutes: [address], PT

## 2020-10-19 NOTE — PROGRESS NOTES
Occupational Therapy   Occupational Therapy Initial Assessment  Date: 10/19/2020   Patient Name: Kaleb Collins  MRN: [id number]     : 1950    Date of Service: 10/19/2020    Discharge Recommendations:  24 hour supervision or assist       Assessment   Performance deficits / Impairments: Decreased functional mobility ; Decreased ADL status; Decreased high-level IADLs;Decreased balance;Decreased ROM  Assessment: pt normally independent with high level IADL's & functional mobility without AD, now s/p Left Lung lobectomy with CT 10-16-, requiring min to mod assist of 2 for bed mobility, max assist with dressing self; pt to benefit from skilled OT services  OT Education: OT Role;Plan of Care;ADL Adaptive Strategies;Transfer Training  Patient Education: disease specific:  importance of OOB to chair for meals & ADL's, assist from nursing/staff for transfers  [address] UP: Yes  Activity Tolerance  Activity Tolerance: Patient Tolerated treatment well  Safety Devices  Safety Devices in place: Yes  Type of devices: Call light within reach; Left in chair(with Physical Therapy)           Patient Diagnosis(es): Diagnoses of Lung nodule and 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 has a past medical history of Allergic rhinitis, Anxiety, Arthritis, Depression, GERD (gastroesophageal reflux disease), Hallux valgus, Headache(784.0), Herpes simplex, Irritable bowel syndrome, Kidney stones, Lung cancer, main bronchus, left ([address] Osteopenia, Other and unspecified hyperlipidemia, Pain medication agreement signed, and Thyroid disease. has a past surgical history that includes Lithotripsy; shoulder surgery; Tubal ligation; Upper gastrointestinal endoscopy; Upper gastrointestinal endoscopy (N/A, 2019); Upper gastrointestinal endoscopy (N/A, 2019);  Upper gastrointestinal endoscopy (2019); CT NEEDLE BIOPSY LUNG PERCUTANEOUS (9/3/2020); and thoracotomy (N/A, [place]  Additional Comments: reports daughter to stay with her upon discharge       Objective   Vision: Impaired  Vision Exceptions: Wears glasses for distance;Wears glasses for reading  Hearing: Within functional limits    Orientation  Overall Orientation Status: Within Functional Limits     Balance  Sitting Balance: Supervision  Standing Balance: Contact guard assistance  Standing Balance  Time: 1 minute x 2  Activity: walking around bed to use toilet in room  Comment: guarded but safe due to pain  Functional Mobility  Functional - Mobility Device: No device  Assist Level: Contact guard assistance  Toilet Transfers  Toilet - Technique: Ambulating(CGA without AD)  Equipment Used: Standard toilet  Toilet Transfer: Minimal assistance(on/off low toilet)  ADL  Grooming: Setup(seated to use hand  after toileting)  Toileting: Supervision(seated)    RUE Tone: Normotonic  LUE Tone: Normotonic    Coordination  Movements Are Fluid And Coordinated: Yes     Bed mobility  Supine to Sit: Minimal assistance(log roll to Right)  Sit to Supine: 2 Person assistance(mod assist of 2)  Scooting: Supervision  Comment: difficulty with log roll technique for pain relief  Transfers  Sit to stand: Contact guard assistance  Stand to sit: Contact guard assistance     Vision - Basic Assessment  Prior Vision: Wears glasses all the time     Cognition  Overall Cognitive Status: [facility]                   Plan   Plan  Times per week: 4-6x/ week in ICU  Current Treatment Recommendations: ROM, Balance Training, Functional Mobility Training, Safety Education & Training, Positioning, Endurance Training, Self-Care / ADL    AM-PAC Score        AM-PAC Inpatient Daily Activity Raw Score: 16 (10/19/20 1507)  AM-PAC Inpatient ADL T-Scale Score : 35.96 (10/19/20 1507)  ADL Inpatient CMS 0-100% Score: 53.32 (10/19/20 1507)  ADL Inpatient CMS G-Code Modifier : CK (10/19/20 1507)    Goals  Short term goals  Time Frame for Short term goals: 1 week(10-26-20)  Short term goal 1: supervision with functional/toilet transfers by 10-26-20  Short term goal 2: supervision with bathroom mobility  Short term goal 3: min assist with UE/LE dressing with AE PRN  Short term goal 4: supervision standing ADL's for 5 minutes  Patient Goals   Patient goals : less pain to get around by myself       Therapy Time   Individual Concurrent Group Co-treatment   Time In [address]         Time Out 1445         Minutes [address] [place]

## 2020-10-19 NOTE — PLAN OF CARE
Problem: Pain:  Goal: Pain level will decrease  Description: Pain level will decrease  Outcome: Ongoing  Goal: Control of acute pain  Description: Control of acute pain  Outcome: Ongoing  Goal: Control of chronic pain  Description: Control of chronic pain  Outcome: Ongoing     Problem: Falls - Risk of:  Goal: Will remain free from falls  Description: Will remain free from falls  Outcome: Ongoing  Note: Fall risk assessment complete, fall precautions in place. Fall visuals posted, bed alarm on, bed in lowest position with wheels locked. Patient has been free of falls this shift, will continue to monitor. Goal: Absence of physical injury  Description: Absence of physical injury  Outcome: Ongoing     Problem: Skin Integrity:  Goal: Will show no infection signs and symptoms  Description: Will show no infection signs and symptoms  Outcome: Ongoing  Note: Skin assessment complete. Patient is a low risk for skin breakdown, see Carlos score. Patient walks frequently and is able to reposition self in bed. Pillows used for positioning. Will continue to monitor and assess for skin breakdown.     Goal: Absence of new skin breakdown  Description: Absence of new skin breakdown  Outcome: Ongoing

## 2020-10-19 NOTE — FLOWSHEET NOTE
10/18/20 1940   Assessment   Charting Type Shift assessment   Neurological   Neuro (WDL) WDL   Level of Consciousness 0   [place] Coma Scale   Eye Opening 4   Best Verbal Response 5   Best Motor Response 6   [place] Coma Scale Score 15   HEENT   HEENT (WDL) X   Right Eye Intact   Left Eye Intact   Nose Intact   Neck Symmetrical;Trachea midline   Tongue Pink & moist   Voice Normal   Mucous Membrane Moist;Pink; Intact   Teeth Dentures upper   Respiratory   Respiratory (WDL) X   Respiratory Pattern Regular; Tachypneic   Respiratory Depth Shallow   Respiratory Quality/Effort Unlabored   Chest Assessment Chest expansion symmetrical   L Breath Sounds Diminished;Clear   R Breath Sounds Diminished;Clear   Breath Sounds   Right Upper Lobe Clear   Right Middle Lobe Diminished   Right Lower Lobe Diminished   Left Upper Lobe Clear   Left Lower Lobe Diminished   Cardiac   Cardiac (WDL) X   Cardiac Rhythm RVR; Atrial fibrillation   Cardiac Monitor   Telemetry Monitor On Yes   Telemetry Audible Yes   Telemetry Alarms Set Yes   Telemetry Box Number ICU   Telemetry Monitor Alarm Parameters    Gastrointestinal   Abdominal (WDL) WDL   Abdomen Inspection Soft   Tenderness Nontender; Soft   RUQ Bowel Sounds Active   LUQ Bowel Sounds Active   RLQ Bowel Sounds Active   LLQ Bowel Sounds Active   Peripheral Vascular   Peripheral Vascular (WDL) WDL   Edema None   Skin Color/Condition   Skin Color/Condition (WDL) WDL   Skin Color Appropriate for ethnicity   Skin Condition/Temp Warm;Dry   Skin Integrity   Skin Integrity (WDL) X   Skin Integrity Intact; Other (Comment)  (CT tube/Sx site )   Location Lt rib cage    Preventative Dressing Yes   Skin Fold Management No   Multiple Skin Integrity Sites No   Dressing Site Sacrum   Date Applied 10/17/20   Assessed this shift?  Yes   Musculoskeletal   Musculoskeletal (WDL) WDL   Genitourinary   Genitourinary (WDL) WDL   Flank Tenderness No   Suprapubic Tenderness No   Dysuria No   Anus/Rectum Anus/Rectum (WDL) WDL   Incision 10/16/20 Chest Lateral;Left   Date First Assessed/Time First Assessed: 10/16/20 1504   Present on Hospital Admission: No  Primary Wound Type: Surgical Type  Location: Chest  Wound Location Orientation: Lateral;Left   Dressing Status Clean;Dry; Intact   Chest Tube 1 Left Pleural 32 [place]   Placement Date/Time: 10/16/20 1703   Inserted by: Anna Pond Number: (c) [id number]  Chest Tube Orientation: Left  Chest Tube Location: Pleural  Size (fr): 32 [place]  Chest Tube Drainage System: Gravity/nonsuction water seal drainage   Dressing Status Clean;Reinforced; Changed   Dressing Type Dry dressing   Site Assessment Clean;Dry; Intact   Surrounding Skin Intact;Dry; Non reddened

## 2020-10-19 NOTE — PROGRESS NOTES
Cardiac, Vascular and Thoracic Surgeons  Progress Note    10/19/2020 12:46 PM  Surgeon: Dr. Rosemarie Lawrence      POD#3   S/P : Left upper lobectomy    Chief complaint: Postop    Subjective: No major complaint or event overnight     Vital Signs: /66   Pulse 68   Temp 99 °F (37.2 °C) (Oral)   Resp 19   Ht 5' 5\" (1.651 m)   Wt 173 lb 8 oz (78.7 kg)   LMP  (LMP Unknown)   SpO2 93%   BMI 28.87 kg/m²  O2 Flow Rate (L/min): 0 L/min     I/O:      Intake/Output Summary (Last 24 hours) at 10/19/2020 1246  Last data filed at 10/19/2020 1000  Gross per 24 hour   Intake 1118.49 ml   Output 239 ml   Net 879.49 ml     Exam:   Cardiovascular: S1 plus S2 no added sound  Pulmonary: Bilaterally clear no additional sound  CT:  With 14-= 189 ml serosanguinous drainage, no air leak    Labs:   CBC:   Recent Labs     10/17/20  0610   WBC 15.1*   HGB 13.3   HCT 40.5   MCV 83.3        BMP:   Recent Labs     10/17/20  0610 10/18/20  0631    139   K 4.3 4.1    105   CO2 23 26   PHOS  --  2.0*   BUN 12 17   CREATININE 0.6 0.8     Scheduled Meds:    mupirocin   Nasal BID    amiodarone  200 mg Oral Daily    vitamin B-12  2,500 mcg Oral Daily    Vitamin D  1,000 Units Oral Daily    pantoprazole  40 mg Oral QAM AC    sertraline  50 mg Oral TID    atorvastatin  20 mg Oral Daily    lamoTRIgine  25 mg Oral Daily    sodium chloride flush  10 mL Intravenous 2 times per day    enoxaparin  40 mg Subcutaneous Daily     Continuous Infusions:     Patient Active Problem List   Diagnosis    Anxiety    Pain medication agreement signed    History of colon polyps    History of gastritis    Insomnia    Hyperlipidemia    Moderate episode of recurrent major depressive disorder (HCC)    Gastroesophageal reflux disease with esophagitis    Shortness of breath    Multiple thyroid nodules    Iron deficiency anemia    Colon, diverticulosis    Esophageal dysphagia    Gastritis without bleeding    Mood swings    Lung cancer, main bronchus, left [facility])     Surgical pathology: 10/16/20 pending     Assessment:   Left upper lobe lung cancer: s/p JAMI lobectomy  Anterior chest tube removed 10/18. Remaining chest tube with too much drainage to remove at this time. Continue chest tube to water seal. Sat 93% on RA. Needs pulm expansion- IS, OOBTC, ambulation. Atrial fibrillation: pt went into A-fib/RVR on 10/18 ('s). Amiodarone gtt started and pt converted back into SR. She remains in SR (HR 60-70's) and is on PO amiodarone. Dispo: likely home in next 1-2 days. DCP following.   ____________________________________________________    Lencho Ann, CNP 10/19/20   12:50 pm   Note reviewed, events of last 24 hours reviewed along with vital signs and I/Os and patient examined. Assessment and plans discussed and are as outlined above.      Yury Valencia MD, FACS, [facility] [place], [place], [place]

## 2020-10-20 ENCOUNTER — APPOINTMENT (OUTPATIENT)
Dept: GENERAL RADIOLOGY | Age: 70
DRG: 164 | End: 2020-10-20
Attending: THORACIC SURGERY (CARDIOTHORACIC VASCULAR SURGERY)
Payer: MEDICARE

## 2020-10-20 LAB
ANION GAP SERPL CALCULATED.3IONS-SCNC: 9 MMOL/L (ref 3–16)
ANISOCYTOSIS: ABNORMAL
BASOPHILS ABSOLUTE: 0 K/UL (ref 0–0.2)
BASOPHILS RELATIVE PERCENT: 0.5 %
BUN BLDV-MCNC: 14 MG/DL (ref 7–20)
CALCIUM SERPL-MCNC: 8.7 MG/DL (ref 8.3–10.6)
CHLORIDE BLD-SCNC: 102 MMOL/L (ref 99–110)
CO2: 26 MMOL/L (ref 21–32)
CREAT SERPL-MCNC: 0.6 MG/DL (ref 0.6–1.2)
EOSINOPHILS ABSOLUTE: 0.1 K/UL (ref 0–0.6)
EOSINOPHILS RELATIVE PERCENT: 0.9 %
GFR AFRICAN AMERICAN: >60
GFR NON-AFRICAN AMERICAN: >60
GLUCOSE BLD-MCNC: 108 MG/DL (ref 70–99)
HCT VFR BLD CALC: 39.7 % (ref 36–48)
HEMOGLOBIN: 13.2 G/DL (ref 12–16)
LYMPHOCYTES ABSOLUTE: 1 K/UL (ref 1–5.1)
LYMPHOCYTES RELATIVE PERCENT: 13.2 %
MACROCYTES: ABNORMAL
MCH RBC QN AUTO: 27.9 PG (ref 26–34)
MCHC RBC AUTO-ENTMCNC: 33.3 G/DL (ref 31–36)
MCV RBC AUTO: 84 FL (ref 80–100)
MICROCYTES: ABNORMAL
MONOCYTES ABSOLUTE: 0.5 K/UL (ref 0–1.3)
MONOCYTES RELATIVE PERCENT: 6 %
NEUTROPHILS ABSOLUTE: 5.9 K/UL (ref 1.7–7.7)
NEUTROPHILS RELATIVE PERCENT: 79.4 %
OVALOCYTES: ABNORMAL
PDW BLD-RTO: 19.4 % (ref 12.4–15.4)
PLATELET # BLD: 165 K/UL (ref 135–450)
PLATELET SLIDE REVIEW: ADEQUATE
PMV BLD AUTO: 9.3 FL (ref 5–10.5)
POIKILOCYTES: ABNORMAL
POTASSIUM REFLEX MAGNESIUM: 4.1 MMOL/L (ref 3.5–5.1)
RBC # BLD: 4.73 M/UL (ref 4–5.2)
SLIDE REVIEW: ABNORMAL
SODIUM BLD-SCNC: 137 MMOL/L (ref 136–145)
WBC # BLD: 7.5 K/UL (ref 4–11)

## 2020-10-20 PROCEDURE — 36415 COLL VENOUS BLD VENIPUNCTURE: CPT

## 2020-10-20 PROCEDURE — 85025 COMPLETE CBC W/AUTO DIFF WBC: CPT

## 2020-10-20 PROCEDURE — 80048 BASIC METABOLIC PNL TOTAL CA: CPT

## 2020-10-20 PROCEDURE — 6370000000 HC RX 637 (ALT 250 FOR IP): Performed by: THORACIC SURGERY (CARDIOTHORACIC VASCULAR SURGERY)

## 2020-10-20 PROCEDURE — 6360000002 HC RX W HCPCS: Performed by: THORACIC SURGERY (CARDIOTHORACIC VASCULAR SURGERY)

## 2020-10-20 PROCEDURE — 99024 POSTOP FOLLOW-UP VISIT: CPT | Performed by: THORACIC SURGERY (CARDIOTHORACIC VASCULAR SURGERY)

## 2020-10-20 PROCEDURE — 2000000000 HC ICU R&B

## 2020-10-20 PROCEDURE — 2580000003 HC RX 258: Performed by: THORACIC SURGERY (CARDIOTHORACIC VASCULAR SURGERY)

## 2020-10-20 PROCEDURE — 71045 X-RAY EXAM CHEST 1 VIEW: CPT

## 2020-10-20 RX ADMIN — AMIODARONE HYDROCHLORIDE 200 MG: 200 TABLET ORAL at 08:40

## 2020-10-20 RX ADMIN — OXYCODONE 10 MG: 5 TABLET ORAL at 05:42

## 2020-10-20 RX ADMIN — SERTRALINE HYDROCHLORIDE 50 MG: 50 TABLET ORAL at 20:39

## 2020-10-20 RX ADMIN — LAMOTRIGINE 25 MG: 25 TABLET ORAL at 08:40

## 2020-10-20 RX ADMIN — ENOXAPARIN SODIUM 40 MG: 40 INJECTION SUBCUTANEOUS at 08:40

## 2020-10-20 RX ADMIN — MUPIROCIN: 20 OINTMENT TOPICAL at 08:41

## 2020-10-20 RX ADMIN — MUPIROCIN: 20 OINTMENT TOPICAL at 20:39

## 2020-10-20 RX ADMIN — OXYCODONE 10 MG: 5 TABLET ORAL at 14:12

## 2020-10-20 RX ADMIN — ATORVASTATIN CALCIUM 20 MG: 10 TABLET, FILM COATED ORAL at 08:40

## 2020-10-20 RX ADMIN — PANTOPRAZOLE SODIUM 40 MG: 40 TABLET, DELAYED RELEASE ORAL at 05:42

## 2020-10-20 RX ADMIN — Medication 10 ML: at 20:39

## 2020-10-20 RX ADMIN — OXYCODONE 10 MG: 5 TABLET ORAL at 10:15

## 2020-10-20 RX ADMIN — OXYCODONE 10 MG: 5 TABLET ORAL at 18:35

## 2020-10-20 RX ADMIN — Medication 10 ML: at 08:40

## 2020-10-20 RX ADMIN — SERTRALINE HYDROCHLORIDE 50 MG: 50 TABLET ORAL at 14:12

## 2020-10-20 RX ADMIN — ALPRAZOLAM 0.5 MG: 0.25 TABLET ORAL at 20:41

## 2020-10-20 RX ADMIN — SERTRALINE HYDROCHLORIDE 50 MG: 50 TABLET ORAL at 08:40

## 2020-10-20 ASSESSMENT — PAIN DESCRIPTION - PROGRESSION
CLINICAL_PROGRESSION: NOT CHANGED

## 2020-10-20 ASSESSMENT — PAIN SCALES - GENERAL
PAINLEVEL_OUTOF10: 7
PAINLEVEL_OUTOF10: 4
PAINLEVEL_OUTOF10: 9
PAINLEVEL_OUTOF10: 7

## 2020-10-20 NOTE — CARE COORDINATION
nHpredict outcome report received and reviewed with , PAVAN Wilder. Report forwarded to Manager of Case Management, C. Rodena Holstein, RN and Case Management , JOSEPH Scott Se. Report uploaded into patient's record and can be viewed under Media Tab.        Braulio URIARTEN, RN, [facility]  Care Transition Nurse   [phone]

## 2020-10-20 NOTE — PROGRESS NOTES
Physical Therapy/Occupational Therapy    Attempted to see pt for PT/OT follow up this date, although pt refuses secondary to fatigue (after having just gotten OOB with nursing) and pain. Will re-attempt on 10/21/20. Thank you.     Yuri Santoyo, PT, DPT #[id number]  Valerie Lopez, OTR/L #[id number]

## 2020-10-20 NOTE — PROGRESS NOTES
Patient seems to have slept well over night. 50 mls out in CT tube. VSS. Pain well controlled only required pain medication one time during shift.

## 2020-10-20 NOTE — CARE COORDINATION
[facility]    Referral received from  to follow for home care services. I will follow for needs, and speak with patient to verify demos.     Brittany Skelton RN, BSN CTN  [facility] [phone]

## 2020-10-20 NOTE — PROGRESS NOTES
Patient assessment complete and charted. VSS. AOx4. CT clamped at 0730 by Dr. Jose Monroe. Bed locked and in lowest position. Non-skid socks in place. Call light within reach. Patient states no further needs at this time. Will continue to monitor.

## 2020-10-20 NOTE — PROGRESS NOTES
Cardiac, Vascular and Thoracic Surgeons  Progress Note    10/20/2020 1:12 PM  Surgeon: Dr. Noah Sarah      POD#4   S/P : Left upper lobectomy    Chief complaint: Postop    Subjective: No major complaint or event overnight     Vital Signs: /66   Pulse 83   Temp 99.5 °F (37.5 °C) (Oral)   Resp 16   Ht 5' 5\" (1.651 m)   Wt 173 lb 8 oz (78.7 kg)   LMP  (LMP Unknown)   SpO2 90%   BMI 28.87 kg/m²  O2 Flow Rate (L/min): 0 L/min     I/O:      Intake/Output Summary (Last 24 hours) at 10/20/2020 1312  Last data filed at 10/20/2020 1248  Gross per 24 hour   Intake 740 ml   Output 235 ml   Net 505 ml     Exam:   Cardiovascular: S1 plus S2 no added sound  Pulmonary: Bilaterally clear no additional sound  CT:  With -80= 235 ml serosanguinous drainage, no air leak.      Labs:   CBC:   Recent Labs     10/20/20  0828   WBC 7.5   HGB 13.2   HCT 39.7   MCV 84.0        BMP:   Recent Labs     10/18/20  0631 10/20/20  0828    137   K 4.1 4.1    102   CO2 26 26   PHOS 2.0*  --    BUN 17 14   CREATININE 0.8 0.6     Scheduled Meds:    mupirocin   Nasal BID    amiodarone  200 mg Oral Daily    vitamin B-12  2,500 mcg Oral Daily    Vitamin D  1,000 Units Oral Daily    pantoprazole  40 mg Oral QAM AC    sertraline  50 mg Oral TID    atorvastatin  20 mg Oral Daily    lamoTRIgine  25 mg Oral Daily    sodium chloride flush  10 mL Intravenous 2 times per day    enoxaparin  40 mg Subcutaneous Daily     Continuous Infusions:     Patient Active Problem List   Diagnosis    Anxiety    Pain medication agreement signed    History of colon polyps    History of gastritis    Insomnia    Hyperlipidemia    Moderate episode of recurrent major depressive disorder (HCC)    Gastroesophageal reflux disease with esophagitis    Shortness of breath    Multiple thyroid nodules    Iron deficiency anemia    Colon, diverticulosis    Esophageal dysphagia    Gastritis without bleeding    Mood swings    Lung

## 2020-10-20 NOTE — PROGRESS NOTES
Patient attempted to get OOB this AM without calling for assistance. She did call RN once she realized she was unable to make it to the commode without assistance. Patient educated on the importance of calling out to nursing staff whenever she needs to get OOB. RN turned bed alarm on. Patient educated on its use.

## 2020-10-20 NOTE — FLOWSHEET NOTE
10/19/20 2000   Assessment   Charting Type Shift assessment   Neurological   Neuro (WDL) WDL   Level of Consciousness 0   [place] Coma Scale   Eye Opening 4   Best Verbal Response 5   Best Motor Response 6   [place] Coma Scale Score 15   HEENT   HEENT (WDL) X   Right Eye Intact   Left Eye Intact   Teeth Dentures upper   Respiratory   Respiratory (WDL) X   Respiratory Pattern Regular   Respiratory Depth Normal   Respiratory Quality/Effort Unlabored   Chest Assessment Chest expansion symmetrical   L Breath Sounds Clear;Diminished   R Breath Sounds Clear;Diminished   Breath Sounds   Right Upper Lobe Clear   Right Middle Lobe Diminished   Right Lower Lobe Diminished   Left Upper Lobe Clear   Left Lower Lobe Diminished   Cardiac   Cardiac (WDL) WDL   Cardiac Rhythm NSR   Rhythm Interpretation   Pulse 94   Cardiac Monitor   Telemetry Monitor On Yes   Telemetry Audible Yes   Telemetry Alarms Set Yes   Telemetry Box Number ICU monitor   Telemetry Monitor Alarm Parameters    Gastrointestinal   Abdominal (WDL) WDL   Abdomen Inspection Soft   Tenderness Nontender; Soft   RUQ Bowel Sounds Active   LUQ Bowel Sounds Active   RLQ Bowel Sounds Active   LLQ Bowel Sounds Active   Peripheral Vascular   Peripheral Vascular (WDL) WDL   Edema None   Skin Color/Condition   Skin Color/Condition (WDL) WDL   Skin Color Appropriate for ethnicity   Skin Condition/Temp Warm;Dry   Skin Integrity   Skin Integrity (WDL) X   Musculoskeletal   Musculoskeletal (WDL) X  (general weakness)   Genitourinary   Genitourinary (WDL) WDL   Flank Tenderness No   Suprapubic Tenderness No   Dysuria No   Urine Assessment   Incontinence No   Urine Color HANNA   Urine Appearance HANNA   Urine Odor No odor   Anus/Rectum   Anus/Rectum (WDL) WDL   Chest Tube 1 Left Pleural 32 Slovenian   Placement Date/Time: 10/16/20 1703   Inserted by: Maria M Clamp Number: [id number]  Chest Tube Orientation: Left  Chest Tube Location: Pleural  Size (fr): 32 Slovenian  Chest Tube Drainage System: Gravity/nonsuction water seal drainage   Suction To water seal   Chest Tube Airleak No   Drainage Description Serosanguinous   Dressing Status Clean;Dry; Intact   Dressing Type Dry dressing   Dressing Change Due 10/20/20   Site Assessment Not assessed   Surrounding Skin Unable to view   Patency Intervention Tip/Tilt   Output (ml) 25 ml   Psychosocial   Psychosocial (WDL) X  (anxious at times )   Patient Behaviors Anxious; Tearful;Cooperative   Needs Expressed Physical;Emotional   Rest/Sleep for Patient [facility]

## 2020-10-21 ENCOUNTER — APPOINTMENT (OUTPATIENT)
Dept: GENERAL RADIOLOGY | Age: 70
DRG: 164 | End: 2020-10-21
Attending: THORACIC SURGERY (CARDIOTHORACIC VASCULAR SURGERY)
Payer: MEDICARE

## 2020-10-21 VITALS
WEIGHT: 173.5 LBS | RESPIRATION RATE: 18 BRPM | BODY MASS INDEX: 28.91 KG/M2 | OXYGEN SATURATION: 92 % | HEIGHT: 65 IN | DIASTOLIC BLOOD PRESSURE: 71 MMHG | TEMPERATURE: 97.9 F | SYSTOLIC BLOOD PRESSURE: 114 MMHG | HEART RATE: 76 BPM

## 2020-10-21 PROCEDURE — 6370000000 HC RX 637 (ALT 250 FOR IP): Performed by: THORACIC SURGERY (CARDIOTHORACIC VASCULAR SURGERY)

## 2020-10-21 PROCEDURE — 71045 X-RAY EXAM CHEST 1 VIEW: CPT

## 2020-10-21 PROCEDURE — 97116 GAIT TRAINING THERAPY: CPT

## 2020-10-21 PROCEDURE — 97535 SELF CARE MNGMENT TRAINING: CPT

## 2020-10-21 PROCEDURE — 97530 THERAPEUTIC ACTIVITIES: CPT

## 2020-10-21 PROCEDURE — 99024 POSTOP FOLLOW-UP VISIT: CPT | Performed by: THORACIC SURGERY (CARDIOTHORACIC VASCULAR SURGERY)

## 2020-10-21 PROCEDURE — 2580000003 HC RX 258: Performed by: THORACIC SURGERY (CARDIOTHORACIC VASCULAR SURGERY)

## 2020-10-21 RX ORDER — CHOLECALCIFEROL (VITAMIN D3) 125 MCG
CAPSULE ORAL
Status: DISCONTINUED
Start: 2020-10-21 | End: 2020-10-21 | Stop reason: HOSPADM

## 2020-10-21 RX ORDER — AMIODARONE HYDROCHLORIDE 200 MG/1
200 TABLET ORAL DAILY
Qty: 21 TABLET | Refills: 0 | Status: SHIPPED | OUTPATIENT
Start: 2020-10-22 | End: 2020-12-08 | Stop reason: ALTCHOICE

## 2020-10-21 RX ORDER — OXYCODONE HYDROCHLORIDE 5 MG/1
5 TABLET ORAL EVERY 6 HOURS PRN
Qty: 28 TABLET | Refills: 0 | Status: SHIPPED | OUTPATIENT
Start: 2020-10-21 | End: 2020-10-28

## 2020-10-21 RX ADMIN — Medication 10 ML: at 08:56

## 2020-10-21 RX ADMIN — PANTOPRAZOLE SODIUM 40 MG: 40 TABLET, DELAYED RELEASE ORAL at 06:52

## 2020-10-21 RX ADMIN — OXYCODONE 5 MG: 5 TABLET ORAL at 12:26

## 2020-10-21 RX ADMIN — OXYCODONE 5 MG: 5 TABLET ORAL at 06:53

## 2020-10-21 RX ADMIN — AMIODARONE HYDROCHLORIDE 200 MG: 200 TABLET ORAL at 08:54

## 2020-10-21 RX ADMIN — SERTRALINE HYDROCHLORIDE 50 MG: 50 TABLET ORAL at 08:55

## 2020-10-21 RX ADMIN — Medication 1000 UNITS: at 08:54

## 2020-10-21 RX ADMIN — MUPIROCIN: 20 OINTMENT TOPICAL at 08:55

## 2020-10-21 RX ADMIN — ATORVASTATIN CALCIUM 20 MG: 10 TABLET, FILM COATED ORAL at 08:55

## 2020-10-21 RX ADMIN — LAMOTRIGINE 25 MG: 25 TABLET ORAL at 08:54

## 2020-10-21 ASSESSMENT — PAIN SCALES - GENERAL
PAINLEVEL_OUTOF10: 4
PAINLEVEL_OUTOF10: 5
PAINLEVEL_OUTOF10: 4

## 2020-10-21 ASSESSMENT — PAIN DESCRIPTION - PAIN TYPE: TYPE: SURGICAL PAIN

## 2020-10-21 ASSESSMENT — PAIN DESCRIPTION - LOCATION: LOCATION: RIB CAGE

## 2020-10-21 ASSESSMENT — PAIN DESCRIPTION - DESCRIPTORS: DESCRIPTORS: OTHER (COMMENT)

## 2020-10-21 ASSESSMENT — PAIN DESCRIPTION - ORIENTATION: ORIENTATION: LEFT

## 2020-10-21 NOTE — PROGRESS NOTES
Surgical pathology: 10/16/20 reviewed with patient     FINAL DIAGNOSIS:   A. Level 9 lymph node:      - One benign anthracotic lymph node. B. Level 10L lymph node:      - One benign anthracotic lymph node. C. Left upper lobe lung, lobectomy:      - Moderately differentiated adenocarcinoma, with mixed invasive non-mucinous adenocarcinoma and lepidic pattern.      - Largest focus of invasive adenocarcinoma is 1.4 cm, and the entire tumor mass is approximately 3.6 cm in maximal dimensions; see synoptic report and comment.      - Tumor extends close to but does not involve the inked visceral pleural surface.      - The bronchial margin and hilar vascular margins are negative for tumor.      - Five benign hilar lymph nodes with calcified old granulomas. D. Level 7L lymph node:      - One benign anthracotic lymph node. E. Level 5 lymph node:      - One benign anthracotic lymph node. Assessment:   Left upper lobe lung cancer: s/p JAMI lobectomy  Anterior chest tube removed 10/18. Chest tube removed this morning. CXR afterward is stable. She is 92% on RA. Continue pulm expansion- IS, OOBTC, ambulation. Atrial fibrillation: pt went into A-fib/RVR on 10/18 ('s). Amiodarone gtt started and pt converted back into SR. She remains in SR (HR 60-70's) and is on PO amiodarone. Dispo: home today, does not want HHC. DCP following. Lizz Matter ordered for home use per PT request.   ____________________________________________________    Elizabeth Lester CNP   10/21/20   12:24 pm    Note reviewed, events of last 24 hours reviewed along with vital signs and I/Os and patient examined. Assessment and plans discussed and are as outlined above.      Vinicius Cano MD, FACS, [facility], FACCP, [place]

## 2020-10-21 NOTE — PROGRESS NOTES
Patient taken off of telemetry monitor and IV removed, discharge instructions and follow-up appointment reviewed with patient and family member, all questions answered, signed AVS placed in chart, patient received medications from outpatient pharmacy, and patient taken via wheelchair by RN to private vehicle, signing off care, Alexander Osuna RN

## 2020-10-21 NOTE — PROGRESS NOTES
Occupational Therapy  Facility/Department: [facility] B[facility] - ICU  Daily Treatment Note  NAME: Dex Davis  : 1950  MRN: [id number]    Date of Service: 10/21/2020    Discharge Recommendations:  24 hour supervision or assist       Assessment   Performance deficits / Impairments: Decreased functional mobility ; Decreased ADL status; Decreased high-level IADLs;Decreased balance;Decreased ROM  Assessment: Pt continues to be pleasant and agreeable to therapy. Pt required increased time for all tasks due to fatigue but able to complete toileting and adls with S-SBA level. Pt reports no concerns with adls for home and will have daughter to provide 24 hour assist. Cont with POC. OT Education: OT Role;Plan of Care;ADL Adaptive Strategies;Transfer Training  Patient Education: disease specific:  importance of OOB to chair for meals & ADL's, assist from nursing/staff for transfers  REQUIRES OT FOLLOW UP: Yes  Activity Tolerance  Activity Tolerance: Patient Tolerated treatment well;Patient limited by fatigue  Safety Devices  Safety Devices in place: Yes  Type of devices: Call light within reach;Gait belt;Left in bed;Bed alarm in place;Nurse notified         Patient Diagnosis(es): The primary encounter diagnosis was Acute post-operative pain. 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 has a past medical history of Allergic rhinitis, Anxiety, Arthritis, Depression, GERD (gastroesophageal reflux disease), Hallux valgus, Headache(784.0), Herpes simplex, Irritable bowel syndrome, Kidney stones, Lung cancer, main bronchus, left ([address] Osteopenia, Other and unspecified hyperlipidemia, Pain medication agreement signed, and Thyroid disease. has a past surgical history that includes Lithotripsy; shoulder surgery; Tubal ligation; Upper gastrointestinal endoscopy; Upper gastrointestinal endoscopy (N/A, 2019);  Upper gastrointestinal endoscopy (N/A, 9/6/2019); Upper gastrointestinal endoscopy (9/6/2019); CT NEEDLE BIOPSY LUNG PERCUTANEOUS (9/3/2020); and thoracotomy (N/A, 10/16/2020). Restrictions  Restrictions/Precautions  Restrictions/Precautions: General Precautions, Fall Risk  Position Activity Restriction  Other position/activity restrictions: .      Subjective   General  Chart Reviewed: Yes  Patient assessed for rehabilitation services?: Yes  Family / Caregiver Present: No  Referring Practitioner: Alec Guerrier, ARPN, CNP  Diagnosis: Left Lung Ca; s/p Left thoracotomy; left upper lobectomy with CT 10-16-20  Subjective  Subjective: RN approved therapy  General Comment  Comments: RN cleared pt for OT eval; pt resting in bed, requesting to go to bathroom, agreeable to OT  Vital Signs  Patient Currently in Pain: Denies     Orientation  Orientation  Overall Orientation Status: Within Functional Limits     Objective    ADL  Grooming: Supervision(in stance)  Toileting: Supervision        Balance  Sitting Balance: Supervision  Standing Balance: Stand by assistance  Standing Balance  Time: ~5 minutes x2  Activity: ambulation in hallway in preparation for household distances  Functional Mobility  Functional - Mobility Device: Rolling Walker  Activity: Other  Assist Level: Contact guard assistance  Functional Mobility Comments: standing rest breaks  Toilet Transfers  Toilet - Technique: Ambulating  Equipment Used: Standard toilet  Toilet Transfer: Stand by assistance  Bed mobility  Supine to Sit: Stand by assistance  Sit to Supine: Stand by assistance  Transfers  Sit to stand: Contact guard assistance  Stand to sit: Contact guard assistance                       Cognition  Overall Cognitive Status: WNL                                         Plan   Plan  Times per week: 4-6x/ week in ICU  Current Treatment Recommendations: ROM, Balance Training, Functional Mobility Training, Safety Education & Training, Positioning, Endurance Training, Self-Care / ADL    AM-PAC Score        AM-[facility] Inpatient Daily Activity Raw Score: 18 (10/21/20 1253)  AM-PAC Inpatient ADL T-Scale Score : 38.66 (10/21/20 1253)  ADL Inpatient CMS 0-100% Score: 46.65 (10/21/20 1253)  ADL Inpatient CMS G-Code Modifier : CK (10/21/20 1253)    Goals  Short term goals  Time Frame for Short term goals: 1 week(10-26-20)  Short term goal 1: supervision with functional/toilet transfers by 10-26-20- pt is currently at a SBA (10/21)  Short term goal 2: supervision with bathroom mobility- pt is currently at a SBA (10/21)  Short term goal 3: min assist with UE/LE dressing with AE PRN pt is currently at a SBA (10/21)  Short term goal 4: supervision standing ADL's for 5 minutes goal met 10/21  Patient Goals   Patient goals : less pain to get around by myself       Therapy Time   Individual Concurrent Group Co-treatment   Time In 0954         Time Out 1018         Minutes 24         Timed Code Treatment Minutes: 24 Minutes       Julia Lewis OTR/L    If pt is unable to be seen after this session, please let this note serve as discharge summary. Please see case management note for discharge disposition. Thank you.

## 2020-10-21 NOTE — DISCHARGE SUMMARY
Cardiac, Vascular & Thoracic Surgery  Discharge Summary    Patient:  Diana Clements 1950 [id number]   Admission Date:  10/16/2020 10:30 AM  Discharge Date: 10/21/20      Principle Diagnosis: Adenocarcinoma left upper lobe lung    Secondary Diagnosis:  Active Problems:    Lung cancer, main bronchus, left ([address]  Resolved Problems:    * No resolved hospital problems. *    CT Chest scan:  (08/18/2020)  1. No acute intrapulmonary findings. 2. Slight interval increase in size of a part solid nodule within the left    upper lobe, with two separate nodular components having slightly increased in    size in comparison with the study of 08/22/2019.  This is highly concerning    for a slow growing malignancy.  Tissue sampling should be strongly considered. 3. Stable unchanged appearance of a ground-glass nodule within the right    upper lobe. 4. No acute process within the abdomen or pelvis. 5. Stable hepatic and bilateral renal cysts. 6. Bilateral nonobstructing nephrolithiasis.       9/3/2020 CT-guided needle biopsy lung:  FINAL DIAGNOSIS:   Left lung nodule, biopsy:   - Adenocarcinoma. COMMENT:   The biopsy shows alveoli lined by neoplastic cells (lepidic   pattern) and no invasive component is present. This case has been   prospectively reviewed as part of the intradepartmental    program.      Surgical Path: 10/16/2020  FINAL DIAGNOSIS:   A. Level 9 lymph node:      - One benign anthracotic lymph node. B. Level 10L lymph node:      - One benign anthracotic lymph node.      C. Left upper lobe lung, lobectomy:      - Moderately differentiated adenocarcinoma, with mixed invasive non-mucinous adenocarcinoma and lepidic pattern.      - Largest focus of invasive adenocarcinoma is 1.4 cm, and the entire tumor mass is approximately 3.6 cm in maximal dimensions; see synoptic report and comment.      - Tumor extends close to but does not involve the inked visceral pleural surface.      - The bronchial margin and hilar vascular margins are negative for tumor.      - Five benign hilar lymph nodes with calcified old granulomas. D. Level 7L lymph node:      - One benign anthracotic lymph node. E. Level 5 lymph node:      - One benign anthracotic lymph node. Procedure: 10/16/20 Left thoracotomy; left upper lobectomy;  mediastinal lymph node dissection; intercostal cryoablation levels 3, 4, 5 and 6; five-level intercostal nerve block with 0.25% Marcaine with epi, total of 30 mL utilized. History:  The patient is a 69 y.o. female with significant past medical history of thyroid nodules, HLD, and osteopenia who presents with a chief complaint of diffuse chest pain and lung nodule. Pt reports diffuse chest pain located in the left upper lung field, which radiates to right upper lung field for about 1 year. She has even undergone a Cardiologic evaluation which is negative for ischemic disease. States that she becomes short of breath after brief walks, and \"feels wheezy. \" Pain is consistently present, though flares a few times per month. Endorses nocturnal pain and diaphoresis, dyspnea, hot flashes, and blurry vision. Denies fever, sputum production, indigestion, paresthesias, dizziness. CT abd, pelv, chest (08/18/2020) revealed increasing solid nodule in medial aspect of left upper lobe, compared to previous CT imaging on (08/22/2019). CT-guided core needle biopsy (09/03/2020) revealed alveolar neoplastic cells indicative of Adenocarcinoma. Medications include iron injections, atorvastatin, lamotrigine, sertraline, famotidine, omeprazole, vitamin D, vitamin B-12. Hospital Course: The post operative period for this patient was complicated by an air leak with her chest tube. After several days it resolved and her drainage lessened, met criteria, and her chest tube was removed 10/21. CXR afterward stable. She will follow up with Dr. Radha Portillo on 10/27.  The patient was discharged on 10/21/20. Discharged Condition: stable    Disposition:  home    Discharge Medications:   Cassia Barroso   Home Medication Instructions DCI:[id number]    Printed on:10/21/20 0873   Medication Information                      ALPRAZolam (XANAX) 0.5 MG tablet  Take 0.5 mg by mouth nightly as needed for Sleep.             amiodarone (CORDARONE) 200 MG tablet  Take 1 tablet by mouth daily for 21 days             atorvastatin (LIPITOR) 20 MG tablet  Take 1 tablet by mouth daily             bisacodyl (DULCOLAX) 5 MG EC tablet  Take 1 tablet by mouth daily for 7 days             famotidine (PEPCID) 40 MG tablet  Take 1 tablet by mouth nightly as needed (gerd)             lamoTRIgine (LAMICTAL) 25 MG tablet  TAKE 3 TABLETS BY MOUTH DAILY (WITH BREAKFAST) MOOD SWINGS             oxyCODONE (ROXICODONE) 5 MG immediate release tablet  Take 1 tablet by mouth every 6 hours as needed (acute post op pain) for up to 7 days. sertraline (ZOLOFT) 50 MG tablet  Take 1 tablet by mouth 3 times daily             vitamin B-12 (CYANOCOBALAMIN) 500 MCG tablet  Take 2,500 mcg by mouth daily              vitamin D (CHOLECALCIFEROL) 1000 UNIT TABS tablet  Take 1,000 Units by mouth daily                 Patient Instructions: Activity: DO NOT LIFT, PUSH, OR PULL ANYTHING OVER 5 POUNDS FOR 6 WEEK from the day of surgery  Diet:  regular diet  Wound Care:  [facility] YOUR INCISIONS DAILY WITH A CLEAN WASHCLOTH AND ANTIBACTERIAL SOAP. Do not wash your incisions after you have cleansed other parts of your body    Follow up with Cardiothoracic Surgeon, Dr. Avina  on 10/27/20 at 10:15 am.  Follow up with Oncologist, Dr. Chely Guerrero in 1-2 weeks. BELINDA Abdul-CNP   Agree with note.       Geetha Mcneal MD, FACS, [facility], FACLILLI, [place]

## 2020-10-21 NOTE — PROGRESS NOTES
Patient back to bed for chest tube removal per MD order, site cleansed, suture removed, patient able to demonstrate holding breath prior and during removal chest tube discontinued, vasoline gauze, gauze and tegaderm placed over CT site, patient educated will need to be on bedrest for next hour will continue to monitor Kennedy

## 2020-10-21 NOTE — PROGRESS NOTES
disease. has a past surgical history that includes Lithotripsy; shoulder surgery; Tubal ligation; Upper gastrointestinal endoscopy; Upper gastrointestinal endoscopy (N/A, 9/6/2019); Upper gastrointestinal endoscopy (N/A, 9/6/2019); Upper gastrointestinal endoscopy (9/6/2019); CT NEEDLE BIOPSY LUNG PERCUTANEOUS (9/3/2020); and thoracotomy (N/A, 10/16/2020). Restrictions  Restrictions/Precautions  Restrictions/Precautions: General Precautions, Fall Risk  Position Activity Restriction  Other position/activity restrictions: Left chest tube  Subjective   General  Chart Reviewed: Yes  Response To Previous Treatment: Patient with no complaints from previous session. Family / Caregiver Present: No  Referring Practitioner: BELINDA Earl CNP  Subjective  Subjective: Pt supine in bed. Pt agreeable to therapy and wants to leave today. General Comment  Comments: cleared by nursing  Pain Screening  Patient Currently in Pain: Denies(at rest)  Vital Signs  Patient Currently in Pain: Denies(at rest)       Orientation  Orientation  Overall Orientation Status: Within Normal Limits  Cognition   Cognition  Overall Cognitive Status: WNL  Objective   Bed mobility  Supine to Sit: Stand by assistance  Sit to Supine: Stand by assistance  Scooting: Supervision  Comment: proper log roll technique and use of right UE to push off bed  Transfers  Sit to Stand: Stand by assistance;Contact guard assistance  Stand to sit: Stand by assistance;Contact guard assistance  Bed to Chair: Stand by assistance;Contact guard assistance  Ambulation  Ambulation?: Yes  More Ambulation?: No  Ambulation 1  Surface: level tile  Device: Rolling Walker  Assistance: Stand by assistance;Contact guard assistance  Quality of Gait: Pt demos decreased step length, forward trunk flexion, decreased josey, and very fearful of movement. Pt responded to cues well to normalize gait pattern.   Gait Deviations: Slow Josey;Decreased step length;Decreased step height  Distance: 175 feet  Comments: Limitedy by cautiousness  Stairs/Curb  Stairs?: Yes  Stairs  # Steps : 4  Stairs Height: 6\"  Rails: Bilateral  Device: No Device  Assistance: Stand by assistance  Comment: Very slow to complete, reciprocal pattern, no LOB     Balance  Posture: Fair  Sitting - Static: Good  Sitting - Dynamic: Good  Standing - Static: Good  Standing - Dynamic: Fair;+  Comments: with RW; pt feels unstable and used RW for support. Pt very slow and calculated with stepping. Exercises  Comments: Educated on ambulation program to use at home. AM-PAC Score  AM-PAC Inpatient Mobility Raw Score : 18 (10/21/20 1216)  AM-PAC Inpatient T-Scale Score : 43.63 (10/21/20 1216)  Mobility Inpatient CMS 0-100% Score: 46.58 (10/21/20 1216)  Mobility Inpatient CMS G-Code Modifier : CK (10/21/20 1216)        Goals  Short term goals  Time Frame for Short term goals: 10/26  Short term goal 1: Pt will complete bed mobility with min A. 10/21: GOAL MET completes bed mobility with SBA in flat bed  Short term goal 2: Pt will ambulate x 150' with LRAD with supervision by 10/23. 10/21: Pt ambulated 175 feet with RW and SBA<>CGA. Short term goal 3: Pt will complete 1 flight of stairs with CGA. 10/21: Pt completed 4 steps with bilateral rails and SBA<>CGA. Patient Goals   Patient goals : to be able to walk    Plan    Plan  Times per week: 5-7x/week in ICU  Times per day: Daily  Current Treatment Recommendations: Balance Training, Functional Mobility Training, Transfer Training, Gait Training, Stair training, Positioning, Pain Management, Home Exercise Program, Endurance Training  Safety Devices  Type of devices:  All fall risk precautions in place, Call light within reach, Patient at risk for falls, Left in bed, Nurse notified  Restraints  Initially in place: No     Therapy Time   Individual Concurrent Group Co-treatment   Time In       0954   Time Out       1019   Minutes       25   Timed Code Treatment Minutes: 25 Minutes     If the pt is discharged prior to the next treatment session, this will serve as the discharge summary.      Cookie Coronado, PT

## 2020-10-21 NOTE — PLAN OF CARE
Problem: Pain:  Goal: Pain level will decrease  Description: Pain level will decrease  10/21/2020 0918 by Cnostantino Erwin RN  Outcome: Ongoing  10/21/2020 0437 by Paulie Krishnamurthy  Outcome: Ongoing  Goal: Control of acute pain  Description: Control of acute pain  10/21/2020 0918 by Constantino Erwin RN  Outcome: Ongoing  10/21/2020 0437 by Paulie Krishnamurthy  Outcome: Ongoing  Goal: Control of chronic pain  Description: Control of chronic pain  10/21/2020 0918 by Constantino Erwin RN  Outcome: Ongoing  10/21/2020 0437 by Paulie Krishnamurthy  Outcome: Ongoing     Problem: Falls - Risk of:  Goal: Will remain free from falls  Description: Will remain free from falls  10/21/2020 0918 by Constantino Erwin RN  Outcome: Ongoing  10/21/2020 0437 by Paulie Krishnamurthy  Outcome: Ongoing  Goal: Absence of physical injury  Description: Absence of physical injury  10/21/2020 0918 by Constantino Erwin RN  Outcome: Ongoing  10/21/2020 0437 by Paulie Krishnamurthy  Outcome: Ongoing     Problem: Skin Integrity:  Goal: Will show no infection signs and symptoms  Description: Will show no infection signs and symptoms  10/21/2020 0918 by Constantino Erwin RN  Outcome: Ongoing  10/21/2020 0437 by Paulie Krishnamurthy  Outcome: Ongoing  Goal: Absence of new skin breakdown  Description: Absence of new skin breakdown  10/21/2020 0918 by Constantino Erwin RN  Outcome: Ongoing  10/21/2020 0437 by Paulie Krishnamurthy  Outcome: Ongoing

## 2020-10-21 NOTE — CARE COORDINATION
CASE MANAGEMENT DISCHARGE SUMMARY      Discharge to: home with a walker     New Durable Medical Equipment ordered/agency: New rolling walker from [employer].  No preference on DME agency    Transportation: private   Family/car: patient has notified daughter of discharge needs    Confirmed discharge plan with:   Patient: yes   Family, name and contact number: pt declined stating she has already contact her family   RN, name: Anita/Mono MILLER aware      Matilda Mercado RN

## 2020-10-23 ENCOUNTER — TELEPHONE (OUTPATIENT)
Dept: CARDIOTHORACIC SURGERY | Age: 70
End: 2020-10-23

## 2020-10-23 NOTE — TELEPHONE ENCOUNTER
Pt called with questions regarding medications. Questions answered. Took the opportunity to discuss her recovery thus far. States doing fairly well. She denies shortness of breath. She required verbal instruction on proper use of IS. Total inhaled volume 1000 ml after instruction. Recommended use of IS 10 times every 1-2 hrs while awake. She denies a cough. She is not a smoker. Incisions remained covered at this time pending assistance from a family member to remove it. Instructed on performance of arm exercises. States has fair po intake but bowels are moving without difficulty. She taking the narcotic pain med sparingly with adequate pain management.   Has f/u with Dr. Renay Penaloza on [address] 10/27/20.  Kenyon Roche

## 2020-10-26 PROBLEM — D50.0 ANEMIA DUE TO CHRONIC BLOOD LOSS: Status: ACTIVE | Noted: 2020-08-14

## 2020-10-26 PROBLEM — C34.90 NON-SMALL CELL LUNG CANCER (HCC): Status: ACTIVE | Noted: 2020-10-26

## 2020-10-26 PROBLEM — K90.9 INTESTINAL MALABSORPTION: Status: ACTIVE | Noted: 2020-10-26

## 2020-10-27 ENCOUNTER — OFFICE VISIT (OUTPATIENT)
Dept: CARDIOTHORACIC SURGERY | Age: 70
End: 2020-10-27

## 2020-10-27 VITALS
SYSTOLIC BLOOD PRESSURE: 120 MMHG | HEART RATE: 73 BPM | HEIGHT: 65 IN | DIASTOLIC BLOOD PRESSURE: 70 MMHG | WEIGHT: 170 LBS | TEMPERATURE: 98 F | BODY MASS INDEX: 28.32 KG/M2 | OXYGEN SATURATION: 97 %

## 2020-10-27 PROCEDURE — 99024 POSTOP FOLLOW-UP VISIT: CPT | Performed by: THORACIC SURGERY (CARDIOTHORACIC VASCULAR SURGERY)

## 2020-10-27 NOTE — PROGRESS NOTES
Progress Note      CC:  Post Op Follow-up    S/P left thoracotomy with left upper lobectomy for malignancy    Subjective:  she has a typical aches and pains related to a left thoracotomy. Her eating and sleeping habits are improving appropriately as is her activity levels. Vital Signs:                                                 /70 (Site: Left Upper Arm)   Pulse 73   Temp 98 °F (36.7 °C) (Infrared)   Ht 5' 5\" (1.651 m)   Wt 170 lb (77.1 kg)   LMP  (LMP Unknown)   SpO2 97%   BMI 28.29 kg/m²        CV:   Regular rate and rhythm with no rubs or murmurs. Pulm: Clear lung fields with no rales or rhonchi. Incisions:    Left chest incision is clean, dry and intact. Abd:  Soft  Ext:  No peripheral edema. Assessment/Plan:  Overall doing very well post left thoracotomy and left upper lobectomy. I discussed secondary risk prevention for cardiovascular disease with the patient. The patient may increase activities as she feels comfortable doing so. Follow-up with her PCP and Dr. Chely Guerrero as prescribed. Follow-up with me in 6 weeks.     Liliana Aguila MD  10/27/2020  11:18 AM

## 2020-10-27 NOTE — LETTER
10/27/20      Michaela Goodwin M.D., Nilsa Lucio, [facility] [place], [address] Cardiovascular and Thoracic Surgeons  [address] [address]        RE:    Ghada Miranda,   1950    Dear Carole Payan MD  [address] [facility], [address] [facility] [address],    Ghada Miranda was seen today for routine follow-up after her recent left thoracotomy with left upper lobectomy for malignancy. Attached is the postop note.         Sincerely,     Keanu Cole MD    CC: Jeanette Harrison DO

## 2020-11-02 ENCOUNTER — TELEPHONE (OUTPATIENT)
Dept: CARDIOTHORACIC SURGERY | Age: 70
End: 2020-11-02

## 2020-11-02 NOTE — TELEPHONE ENCOUNTER
S/p L thoracotomy with JAMI Lobectomy, LND performed on 10/16/20. Pt called to report that she is continuing to have sharp pain across her front chest that worsens with lying on her back. Denies fever, productive cough. Using IS with total inspired volume 2000 consistently. Feels at times like the air is not moving into her lungs. Takes Ibuprofen 200 mg twice daily. Encouraged to continue use of IS and may increase the Ibuprofen to 400-600 mg every 6 hrs prn pain. States will try. Just wanted to be sure that she is healing appropriately. Encouraged to call with any questions.  Angelika Kwan

## 2020-11-07 ENCOUNTER — TELEPHONE (OUTPATIENT)
Dept: CARDIOTHORACIC SURGERY | Age: 70
End: 2020-11-07

## 2020-11-07 NOTE — TELEPHONE ENCOUNTER
Called from service about Mrs. Perez and I called her back. She was asking for a refill of her narcotic medicines. I suggested she take 400 mg of ibuprofen TID with meals scheduled and she could have 200mg more 3 times a day PRN. She was amenable to this plan as she was using narcotics mostly for sleep at night. I encouraged her to call with any questions, or new concerns.

## 2020-11-11 ENCOUNTER — TELEPHONE (OUTPATIENT)
Dept: CARDIOTHORACIC SURGERY | Age: 70
End: 2020-11-11

## 2020-11-11 ENCOUNTER — HOSPITAL ENCOUNTER (EMERGENCY)
Age: 70
Discharge: LEFT BEFORE DISCHARGE | End: 2020-11-11
Payer: MEDICARE

## 2020-11-11 VITALS
HEART RATE: 72 BPM | BODY MASS INDEX: 28.32 KG/M2 | SYSTOLIC BLOOD PRESSURE: 141 MMHG | TEMPERATURE: 97.8 F | DIASTOLIC BLOOD PRESSURE: 84 MMHG | OXYGEN SATURATION: 96 % | HEIGHT: 65 IN | WEIGHT: 170 LBS | RESPIRATION RATE: 24 BRPM

## 2020-11-11 PROCEDURE — 93005 ELECTROCARDIOGRAM TRACING: CPT | Performed by: EMERGENCY MEDICINE

## 2020-11-11 ASSESSMENT — PAIN DESCRIPTION - LOCATION: LOCATION: CHEST

## 2020-11-11 ASSESSMENT — PAIN SCALES - GENERAL: PAINLEVEL_OUTOF10: 8

## 2020-11-11 ASSESSMENT — PAIN DESCRIPTION - PAIN TYPE: TYPE: ACUTE PAIN

## 2020-11-11 NOTE — TELEPHONE ENCOUNTER
Pt called to report that she is experiencing increased dry, non-productive cough and shortness of breath. Audible wheezing noted. Using IS every hour. Has temp 99 degrees. Spoke with Dr. Carolina Melo. Given pt age, recent lung surgery, change in respiratory status and low grade temp, pt is advised to proceed to the ER for evaluation. Report called to ER at [place].

## 2020-11-12 ENCOUNTER — TELEPHONE (OUTPATIENT)
Dept: CARDIOTHORACIC SURGERY | Age: 70
End: 2020-11-12

## 2020-11-12 ENCOUNTER — HOSPITAL ENCOUNTER (OUTPATIENT)
Dept: GENERAL RADIOLOGY | Age: 70
Discharge: HOME | End: 2020-11-12
Payer: MEDICARE

## 2020-11-12 ENCOUNTER — HOSPITAL ENCOUNTER (OUTPATIENT)
Age: 70
Discharge: HOME | End: 2020-11-12
Payer: MEDICARE

## 2020-11-12 LAB
EKG ATRIAL RATE: 67 BPM
EKG DIAGNOSIS: NORMAL
EKG P AXIS: 48 DEGREES
EKG P-R INTERVAL: 158 MS
EKG Q-T INTERVAL: 396 MS
EKG QRS DURATION: 76 MS
EKG QTC CALCULATION (BAZETT): 418 MS
EKG R AXIS: 31 DEGREES
EKG T AXIS: 42 DEGREES
EKG VENTRICULAR RATE: 67 BPM

## 2020-11-12 PROCEDURE — 71046 X-RAY EXAM CHEST 2 VIEWS: CPT

## 2020-11-12 PROCEDURE — 93010 ELECTROCARDIOGRAM REPORT: CPT | Performed by: INTERNAL MEDICINE

## 2020-11-12 NOTE — TELEPHONE ENCOUNTER
Pt sent to ER yesterday due to presence of cough, increased shortness of breath and low grade temp as she was recently s/p lobectomy and there was concern for Covid. Pt went to ER, was triaged but left before being evaluated by MD because she was \"afraid of all of the people there. \" She was afraid of ayden Covid. States doing better today. Temp 98.4. Cough and shortness of breath improved but remains concerned. Will obtain a stat CXR. Await results.

## 2020-11-12 NOTE — TELEPHONE ENCOUNTER
CXR reviewed by Dr. Caleb Justin. States CXR stable. Encourage use of IS. Pt is to get a COVID test as well. Order in [place]. Pt aware.   Alison Corrales

## 2020-11-12 NOTE — TELEPHONE ENCOUNTER
Call placed to pt regarding her condition today. Was sent to ER yesterday with shortness of breath and cough with low grade temp. Documentation suggests pt was not treated due to leaving following triage.   [facility] requesting call back to report her current condition.  Simona Bryant

## 2020-11-13 ENCOUNTER — OFFICE VISIT (OUTPATIENT)
Dept: PRIMARY CARE CLINIC | Age: 70
End: 2020-11-13
Payer: MEDICARE

## 2020-11-13 PROCEDURE — 99211 OFF/OP EST MAY X REQ PHY/QHP: CPT | Performed by: NURSE PRACTITIONER

## 2020-11-13 NOTE — PATIENT INSTRUCTIONS

## 2020-11-17 LAB — SARS-COV-2, NAA: NOT DETECTED

## 2020-12-08 ENCOUNTER — HOSPITAL ENCOUNTER (OUTPATIENT)
Dept: GENERAL RADIOLOGY | Age: 70
Discharge: HOME | End: 2020-12-08
Payer: MEDICARE

## 2020-12-08 ENCOUNTER — OFFICE VISIT (OUTPATIENT)
Dept: CARDIOTHORACIC SURGERY | Age: 70
End: 2020-12-08

## 2020-12-08 ENCOUNTER — HOSPITAL ENCOUNTER (OUTPATIENT)
Age: 70
Discharge: HOME | End: 2020-12-08
Payer: MEDICARE

## 2020-12-08 VITALS
DIASTOLIC BLOOD PRESSURE: 76 MMHG | WEIGHT: 169 LBS | OXYGEN SATURATION: 97 % | HEART RATE: 74 BPM | HEIGHT: 65 IN | BODY MASS INDEX: 28.16 KG/M2 | TEMPERATURE: 97.6 F | SYSTOLIC BLOOD PRESSURE: 138 MMHG

## 2020-12-08 PROCEDURE — 99024 POSTOP FOLLOW-UP VISIT: CPT | Performed by: THORACIC SURGERY (CARDIOTHORACIC VASCULAR SURGERY)

## 2020-12-08 PROCEDURE — 71046 X-RAY EXAM CHEST 2 VIEWS: CPT

## 2020-12-08 RX ORDER — OMEPRAZOLE 20 MG/1
20 CAPSULE, DELAYED RELEASE ORAL DAILY
COMMUNITY
End: 2021-01-21

## 2020-12-08 NOTE — LETTER
*12/08/20      Kentrell Rowan M.D., [facility], [facility], [facility] Cardiovascular and Thoracic Surgeons  [address] [facility]  [address] [id number]        RE:    Kaleb Collins,   1950    Dear Antonio Fierro MD  90 [facility]  [address] [facility], [address] [facility] [address],    Kaleb Collins was seen today for routine follow-up after her recent left thoracotomy with left upper lobectomy for malignancy. Attached is the postop note.         Sincerely,     Adonay Woodson MD    CC: Caryl Harrison,

## 2020-12-08 NOTE — PROGRESS NOTES
Progress Note      CC:  Post Op Follow-up    S/P left thoracotomy with left upper lobectomy. Subjective: She has some of the typical usual aches and pains related to her left thoracotomy. Some numbness beneath her left breast.  None of this is stopping her from her activities. She was doing better a couple of weeks ago but now she gets a little short of breath and dyspneic with activities around the house. It also elicits a cough which is nonproductive. She has no fevers or chills. Her eating and sleeping habits are improving. Vital Signs:                                                 /76 (Site: Left Upper Arm, Position: Sitting)   Pulse 74   Temp 97.6 °F (36.4 °C) (Infrared)   Ht 5' 5\" (1.651 m)   Wt 169 lb (76.7 kg)   LMP  (LMP Unknown)   SpO2 97%   BMI 28.12 kg/m²        CV:   Regular rate and rhythm with no rubs or murmurs. Pulm: Clear lung fields with no rales or rhonchi. Incisions:    Left chest incision is clean, dry and intact. Abd:  Soft  Ext:  No peripheral edema. Assessment/Plan:  Overall doing relatively well post left thoracotomy with left upper lobectomy. I discussed secondary risk prevention for cardiovascular disease with the patient. The patient may increase activities as she feels comfortable doing so. Follow-up with her PCP and Dr. Chely Guerrero as prescribed. Follow-up with me as needed. I will obtain a PA and lateral chest x-ray to make sure she is not developing a left pleural effusion which might contribute to some of her symptoms.   She also went home and started out with a lot of housecleaning type chores that may have pushed her more than she needed to right from the beginning    Liliana Aguila MD  12/8/2020  11:33 AM

## 2020-12-09 ENCOUNTER — TELEPHONE (OUTPATIENT)
Dept: CARDIOTHORACIC SURGERY | Age: 70
End: 2020-12-09

## 2020-12-28 RX ORDER — LAMOTRIGINE 25 MG/1
TABLET ORAL
Qty: 270 TABLET | Refills: 0 | Status: SHIPPED | OUTPATIENT
Start: 2020-12-28 | End: 2021-04-06

## 2020-12-28 NOTE — TELEPHONE ENCOUNTER
.  Last office visit 7/9/2020     Last written 10-6-2020 270 with 0      Next office visit scheduled Visit date not found    Requested Prescriptions     Pending Prescriptions Disp Refills    lamoTRIgine (LAMICTAL) 25 MG tablet [Pharmacy Med Name: LAMOTRIGINE 25 MG TABLET] 270 tablet 0     Sig: TAKE 3 TABLETS BY MOUTH DAILY (WITH BREAKFAST) MOOD SWINGS     f

## 2021-01-05 ENCOUNTER — OFFICE VISIT (OUTPATIENT)
Dept: FAMILY MEDICINE CLINIC | Age: 71
End: 2021-01-05
Payer: MEDICARE

## 2021-01-05 VITALS
SYSTOLIC BLOOD PRESSURE: 136 MMHG | BODY MASS INDEX: 27.66 KG/M2 | DIASTOLIC BLOOD PRESSURE: 78 MMHG | OXYGEN SATURATION: 97 % | WEIGHT: 166 LBS | HEIGHT: 65 IN | HEART RATE: 77 BPM | TEMPERATURE: 97.1 F

## 2021-01-05 DIAGNOSIS — E55.9 VITAMIN D DEFICIENCY: ICD-10-CM

## 2021-01-05 DIAGNOSIS — F33.1 MODERATE EPISODE OF RECURRENT MAJOR DEPRESSIVE DISORDER (HCC): ICD-10-CM

## 2021-01-05 DIAGNOSIS — E78.2 MIXED HYPERLIPIDEMIA: ICD-10-CM

## 2021-01-05 DIAGNOSIS — Z00.00 ANNUAL PHYSICAL EXAM: Primary | ICD-10-CM

## 2021-01-05 DIAGNOSIS — K21.00 GASTROESOPHAGEAL REFLUX DISEASE WITH ESOPHAGITIS WITHOUT HEMORRHAGE: ICD-10-CM

## 2021-01-05 LAB
CHOLESTEROL, TOTAL: 184 MG/DL (ref 0–199)
HDLC SERPL-MCNC: 60 MG/DL (ref 40–60)
LDL CHOLESTEROL CALCULATED: 100 MG/DL
TRIGL SERPL-MCNC: 122 MG/DL (ref 0–150)
VITAMIN D 25-HYDROXY: 28.9 NG/ML
VLDLC SERPL CALC-MCNC: 24 MG/DL

## 2021-01-05 PROCEDURE — G8482 FLU IMMUNIZE ORDER/ADMIN: HCPCS | Performed by: FAMILY MEDICINE

## 2021-01-05 PROCEDURE — G0439 PPPS, SUBSEQ VISIT: HCPCS | Performed by: FAMILY MEDICINE

## 2021-01-05 ASSESSMENT — ENCOUNTER SYMPTOMS
CONSTIPATION: 0
ABDOMINAL PAIN: 0
COUGH: 0
SHORTNESS OF BREATH: 0
CHEST TIGHTNESS: 0
BLOOD IN STOOL: 0

## 2021-01-05 NOTE — PROGRESS NOTES
Subjective:      Patient ID: Rajat Interiano is a 69 y.o. female. HPI  Patient in for annual examoverall doing very wellhad partial lung resection on the left in October due to carcinoma and is doing fine. GERDon medication and while she was in the hospital they did add Pepcid for her to take at night if needed. Depressiondoing very well with her medications without any ill effects. Hyperlipidemiaon medication and needs lab check. Vitamin D deficiencyon medication and needs lab checked. She had lab work in the hospital and really does not need anything repeated. Colonoscopynext one will be due in 4 years. Mammogram will be due in the next 2 to 3 months. She is up-to-date on Pneumovax. She states she is going to see orthopedic specialist in a couple of days for some problems with right knee which has a tendency to give out at times and some pain up the back of her right thigh. She denies any other issues to discuss. Prior to Visit Medications :  Medication lamoTRIgine (LAMICTAL) 25 MG tablet, Sig TAKE 3 TABLETS BY MOUTH DAILY (WITH BREAKFAST) MOOD SWINGS, Taking? Yes, Authorizing Provider Nelson Harrison, DO    Medication omeprazole (PRILOSEC) 20 MG delayed release capsule, Sig Take 20 mg by mouth daily, Taking? Yes, Authorizing Provider Historical Provider, MD    Medication famotidine (PEPCID) 20 MG tablet, Sig Take 2 tablets by mouth 2 times daily  Patient taking differently: Take 20 mg by mouth nightly , Taking? Yes, Authorizing Provider Sharon Harrison, DO    Medication ALPRAZolam (XANAX) 0.5 MG tablet, Sig Take 0.5 mg by mouth nightly as needed for Sleep., Taking? Yes, Authorizing Provider Historical Provider, MD    Medication atorvastatin (LIPITOR) 20 MG tablet, Sig Take 1 tablet by mouth daily, Taking?  Yes, Authorizing Provider Sharon Harrison, DO Medication sertraline (ZOLOFT) 50 MG tablet, Sig Take 1 tablet by mouth 3 times daily, Taking? Yes, Authorizing Provider BELINDA Montalvo - MADAI    Medication vitamin D (CHOLECALCIFEROL) 1000 UNIT TABS tablet, Sig Take 1,000 Units by mouth daily, Taking? Yes, Authorizing Provider Historical Provider, MD    Medication vitamin B-12 (CYANOCOBALAMIN) 500 MCG tablet, Sig Take 2,500 mcg by mouth daily , Taking? Yes, Authorizing Provider Historical Provider, MD      Past Medical History:  No date: Allergic rhinitis  10/10/2011: Anxiety  No date: Arthritis  3/22/2017: Depression  No date: GERD (gastroesophageal reflux disease)  No date: Hallux valgus  No date: Headache(784.0)  No date: Herpes simplex  No date: Irritable bowel syndrome  No date: Kidney stones  10/16/2020: Lung cancer, main bronchus, left ([address]  No date: Osteopenia  4/2/2013: Other and unspecified hyperlipidemia  10/10/2011: Pain medication agreement signed  No date: Thyroid disease      Comment:  nodules        Review of Systems    Review of Systems   Constitutional: Negative for fever and unexpected weight change. HENT: Negative for congestion and postnasal drip. Eyes: Negative for visual disturbance. Respiratory: Negative for cough, chest tightness and shortness of breath. Breathing is much better since her partial lung resection 2 months ago. Cardiovascular: Negative for chest pain, palpitations and leg swelling. Gastrointestinal: Negative for abdominal pain, blood in stool and constipation. Genitourinary: Positive for frequency. Negative for dysuria and hematuria. Musculoskeletal: Negative for arthralgias and myalgias. See HPIgoing to see orthopedics on Thursday. Skin: Negative for rash. Neurological: Negative for tremors and headaches. Psychiatric/Behavioral: Negative for sleep disturbance. The patient is not nervous/anxious. Doing very well with depression with medications.        Objective:

## 2021-01-07 ENCOUNTER — OFFICE VISIT (OUTPATIENT)
Dept: ORTHOPEDIC SURGERY | Age: 71
End: 2021-01-07
Payer: MEDICARE

## 2021-01-07 VITALS — HEIGHT: 65 IN | BODY MASS INDEX: 27.66 KG/M2 | WEIGHT: 166 LBS

## 2021-01-07 DIAGNOSIS — M25.461 BILATERAL KNEE EFFUSIONS: ICD-10-CM

## 2021-01-07 DIAGNOSIS — G89.29 CHRONIC PAIN OF RIGHT KNEE: Primary | ICD-10-CM

## 2021-01-07 DIAGNOSIS — M25.561 CHRONIC PAIN OF RIGHT KNEE: Primary | ICD-10-CM

## 2021-01-07 DIAGNOSIS — M17.0 BILATERAL PRIMARY OSTEOARTHRITIS OF KNEE: ICD-10-CM

## 2021-01-07 DIAGNOSIS — M25.462 BILATERAL KNEE EFFUSIONS: ICD-10-CM

## 2021-01-07 PROCEDURE — G8427 DOCREV CUR MEDS BY ELIG CLIN: HCPCS | Performed by: ORTHOPAEDIC SURGERY

## 2021-01-07 PROCEDURE — 1090F PRES/ABSN URINE INCON ASSESS: CPT | Performed by: ORTHOPAEDIC SURGERY

## 2021-01-07 PROCEDURE — G8417 CALC BMI ABV UP PARAM F/U: HCPCS | Performed by: ORTHOPAEDIC SURGERY

## 2021-01-07 PROCEDURE — 4040F PNEUMOC VAC/ADMIN/RCVD: CPT | Performed by: ORTHOPAEDIC SURGERY

## 2021-01-07 PROCEDURE — 3017F COLORECTAL CA SCREEN DOC REV: CPT | Performed by: ORTHOPAEDIC SURGERY

## 2021-01-07 PROCEDURE — 20610 DRAIN/INJ JOINT/BURSA W/O US: CPT | Performed by: ORTHOPAEDIC SURGERY

## 2021-01-07 PROCEDURE — G8400 PT W/DXA NO RESULTS DOC: HCPCS | Performed by: ORTHOPAEDIC SURGERY

## 2021-01-07 PROCEDURE — 1036F TOBACCO NON-USER: CPT | Performed by: ORTHOPAEDIC SURGERY

## 2021-01-07 PROCEDURE — G8482 FLU IMMUNIZE ORDER/ADMIN: HCPCS | Performed by: ORTHOPAEDIC SURGERY

## 2021-01-07 PROCEDURE — 99203 OFFICE O/P NEW LOW 30 MIN: CPT | Performed by: ORTHOPAEDIC SURGERY

## 2021-01-07 PROCEDURE — 1123F ACP DISCUSS/DSCN MKR DOCD: CPT | Performed by: ORTHOPAEDIC SURGERY

## 2021-01-07 RX ORDER — LIDOCAINE HYDROCHLORIDE 10 MG/ML
4 INJECTION, SOLUTION INFILTRATION; PERINEURAL ONCE
Status: COMPLETED | OUTPATIENT
Start: 2021-01-07 | End: 2021-01-07

## 2021-01-07 RX ORDER — TRIAMCINOLONE ACETONIDE 40 MG/ML
80 INJECTION, SUSPENSION INTRA-ARTICULAR; INTRAMUSCULAR ONCE
Status: COMPLETED | OUTPATIENT
Start: 2021-01-07 | End: 2021-01-07

## 2021-01-07 RX ORDER — BUPIVACAINE HYDROCHLORIDE 2.5 MG/ML
4 INJECTION, SOLUTION INFILTRATION; PERINEURAL ONCE
Status: COMPLETED | OUTPATIENT
Start: 2021-01-07 | End: 2021-01-07

## 2021-01-07 RX ADMIN — BUPIVACAINE HYDROCHLORIDE 10 MG: 2.5 INJECTION, SOLUTION INFILTRATION; PERINEURAL at 14:53

## 2021-01-07 RX ADMIN — LIDOCAINE HYDROCHLORIDE 4 ML: 10 INJECTION, SOLUTION INFILTRATION; PERINEURAL at 14:57

## 2021-01-07 RX ADMIN — TRIAMCINOLONE ACETONIDE 80 MG: 40 INJECTION, SUSPENSION INTRA-ARTICULAR; INTRAMUSCULAR at 14:54

## 2021-01-07 NOTE — PROGRESS NOTES
Dr Suyapa Degroot      Date /Time 1/7/2021       2:03 PM EST  Name Gumaro Burch             1950   Location  [facility]  MRN [id number]                Chief Complaint   Patient presents with    Knee Pain     NP RIGHT KNEE PAIN: NEW XR TODAY         History of Present Illness  Gumaro Burch is a 69 y.o. female who presents with  bilateral knee pain, right greater than left. Sent in consultation by Sarah Jorge DO. Occupation: Retired  Occupational activities: heavy lifting occasionally. Athletic/exercise activity: no sports. Injury Mechanism:  none. Worker's Comp. & legal issues:   none. Previous Treatments: Ice, Heat, NSAIDs and pain medication    Reports bilateral knee pain, right worse than left for last 2 weeks. She has had some longstanding history of right knee pain over months before. This is been low-grade. As of last 2 weeks things have gotten a lot worse. She has reported swelling in her knee which radiates the pain down and up her leg. Denies pain at nighttime. Has more pain with activity.     Past History  Past Medical History:   Diagnosis Date    Allergic rhinitis     Anxiety 10/10/2011    Arthritis     Depression 3/22/2017    GERD (gastroesophageal reflux disease)     Hallux valgus     Headache(784.0)     Herpes simplex     Irritable bowel syndrome     Kidney stones     Lung cancer, main bronchus, left ([address] 10/16/2020    Osteopenia     Other and unspecified hyperlipidemia 4/2/2013    Pain medication agreement signed 10/10/2011    Thyroid disease     nodules     Past Surgical History:   Procedure Laterality Date    CT NEEDLE BIOPSY LUNG PERCUTANEOUS  9/3/2020    CT NEEDLE BIOPSY LUNG PERCUTANEOUS 9/3/2020 [facility] CT SCAN    LITHOTRIPSY      SHOULDER SURGERY      THORACOTOMY N/A 10/16/2020    LEFT UPPER LOBECTOMY THORACOTOMY WITH MEDIASTINAL LYMPH NODE DISSECTION, CRYO ABLATION OF INTERCOSTAL NERVES performed by Audra Casper MD at [facility] TUBAL LIGATION      UPPER GASTROINTESTINAL ENDOSCOPY      gastritis    UPPER GASTROINTESTINAL ENDOSCOPY N/A 2019    EGD BIOPSY performed by Apolonia Saleem MD at [address] N/A 2019    COLON & EGD W/ ANESTHESIA performed by Apolonia Saleem MD at [facility]  2019    EGD [address] [facility] performed by Apolonia Saleem MD at [address] History     Tobacco Use    Smoking status: Former Smoker     Packs/day: 1.00     Years: 15.00     Pack years: 15.00     Quit date: 6/15/1983     Years since quittin.5    Smokeless tobacco: Never Used   Substance Use Topics    Alcohol use: No      Current Outpatient Medications on File Prior to Visit   Medication Sig Dispense Refill    lamoTRIgine (LAMICTAL) 25 MG tablet TAKE 3 TABLETS BY MOUTH DAILY (WITH BREAKFAST) MOOD SWINGS 270 tablet 0    omeprazole (PRILOSEC) 20 MG delayed release capsule Take 20 mg by mouth daily      famotidine (PEPCID) 20 MG tablet Take 2 tablets by mouth 2 times daily (Patient taking differently: Take 20 mg by mouth nightly ) 120 tablet 1    ALPRAZolam (XANAX) 0.5 MG tablet Take 0.5 mg by mouth nightly as needed for Sleep.  atorvastatin (LIPITOR) 20 MG tablet Take 1 tablet by mouth daily 90 tablet 3    sertraline (ZOLOFT) 50 MG tablet Take 1 tablet by mouth 3 times daily 90 tablet 5    vitamin D (CHOLECALCIFEROL) 1000 UNIT TABS tablet Take 1,000 Units by mouth daily      vitamin B-12 (CYANOCOBALAMIN) 500 MCG tablet Take 2,500 mcg by mouth daily        No current facility-administered medications on file prior to visit.        ASCVD 10-YEAR RISK SCORE  The 10-year ASCVD risk score (Jaquelin Sosa., et al., 2013) is: 11.4%    Values used to calculate the score:      Age: 69 years      Sex: Female      Is Non- : No      Diabetic: No      Tobacco smoker: No      Systolic Blood Pressure: 418 mmHg Is BP treated: No      HDL Cholesterol: 60 mg/dL      Total Cholesterol: 184 mg/dL     Review of Systems  10-point ROS is negative other than HPI. Physical Exam  Based off 1997 Exam Criteria  Ht 5' 5\" (1.651 m)   Wt 166 lb (75.3 kg)   LMP  (LMP Unknown)   BMI 27.62 kg/m²      Constitutional:       General: He is not in acute distress. Appearance: Normal appearance. Cardiovascular:      Rate and Rhythm: Normal rate and regular rhythm. Pulses: Normal pulses. Pulmonary:      Effort: Pulmonary effort is normal. No respiratory distress. Neurological:      Mental Status: He is alert and oriented to person, place, and time. Mental status is at baseline. Musculoskeletal:  Gait:  normal  Lumbar spine: Minimal paraspinal tenderness. No paresthesias. Louis Hip: Normal bilateral hips. Negative Stinchfield test.  Pain-free range of motion. R Knee: Tenderness medial joint space. Slight varus deformity. Minimal tenderness around the patella. Stable ligamentous exam.  Mild effusion. L Knee: Tenderness medial joint space. Slight varus deformity. Minimal tenderness around the patella. Stable ligamentous exam.  Mild effusion. Imaging  [facility]ilateral [facility]: [facility]  Radiographs: Grade 2-3 osteoarthritis present medial joint space. Grade 1-2 osteoarthritis present patellofemoral joint. Osteophyte seen. Joint space narrowing. Assessment and Plan  Marylyn Goldmann was seen today for knee pain. Diagnoses and all orders for this visit:    Chronic pain of right knee  -     XR KNEE RIGHT (MIN 4 VIEWS); Future  -     ME ARTHROCENTESIS ASPIR&/INJ MAJOR JT/BURSA W/O US  -     [facility] Physical Therapy [facility]    Bilateral primary osteoarthritis of knee  -     XR KNEE RIGHT (MIN 4 VIEWS);  Future  -     ME ARTHROCENTESIS ASPIR&/INJ MAJOR JT/BURSA W/O US  -     bupivacaine (MARCAINE) 0.25 % injection 10 mg  -     triamcinolone acetonide (KENALOG-40) injection 80 mg  -     lidocaine 1 % injection 4

## 2021-01-08 ENCOUNTER — NURSE TRIAGE (OUTPATIENT)
Dept: OTHER | Facility: CLINIC | Age: 71
End: 2021-01-08

## 2021-01-08 RX ORDER — SERTRALINE HYDROCHLORIDE 25 MG/1
TABLET, FILM COATED ORAL
Qty: 90 TABLET | Refills: 1 | Status: SHIPPED | OUTPATIENT
Start: 2021-01-08 | End: 2021-02-22

## 2021-01-08 NOTE — TELEPHONE ENCOUNTER
Reason for Disposition   Caller has NON-URGENT medication question about med that PCP or specialist prescribed and triager unable to answer question    Answer Assessment - Initial Assessment Questions  1. NAME of MEDICATION: \"What medicine are you calling about? \"      Lamotrigine 25mg three times a day, Sertraline 50mg 3 times a day    2. QUESTION: Shanique Dubonnet is your question? \"      Unsure if she is taking them properly, states she is taking 2 pills of each medication in the morning and 1 pill of each at night    3. PRESCRIBING HCP: \"Who prescribed it? \" Reason: if prescribed by specialist, call should be referred to that group. Marleen Montero NP in Dr Josh Romero office     4. SYMPTOMS: \"Do you have any symptoms? \"      Feels jittery and upset over simple things     5. SEVERITY: If symptoms are present, ask \"Are they mild, moderate or severe? \"      Were mild and have gotten worse     6. PREGNANCY:  \"Is there any chance that you are pregnant? \" \"When was your last menstrual period? \"      n/a    Protocols used: MEDICATION QUESTION CALL-ADULT-[place]    Patient called pre-service center [facility] with red flag complaint. Brief description of triage: Patient states she is unsure if she is taking prescribed medication properly, states medication is order 3 times a day, states she is taking 2 pills in the morning and 1 at night, pt states she feels jittery and gets upset over simple things, states she cannot remember how provider instructed her to take medication     Triage indicates for patient to CALL PCP TODAY. Care advice provided, patient verbalizes understanding; denies any other questions or concerns; instructed to call back for any new or worsening symptoms. Writer provided warm transfer to Katie Navas at [facility] for appointment scheduling. Attention Provider: Thank you for allowing me to participate in the care of your patient.   The patient was connected to triage in response to information

## 2021-01-08 NOTE — TELEPHONE ENCOUNTER
Pt informed. Pt wants to know if you can send in a 25mg of the sertraline to her pharmacy so she doesn't have to cut the tablet in half. Pt also wants to know if the way she was taking her sertraline, could that make her feel disoriented and spaced out?  She was taking 2 in AM and 1 in PM.

## 2021-01-08 NOTE — TELEPHONE ENCOUNTER
----- Message from Parish Elliott sent at 1/8/2021 10:11 AM EST -----  Subject: Medication Problem    QUESTIONS  Name of Medication? sertraline (ZOLOFT) 50 MG tablet  Patient-reported dosage and instructions? Patient takes 2 tablets by mouth   in the morning and 1 tablet in the evening by mouth   What question or problem do you have with the medication? Patient isn't   sure if she is taking the medicine correctly she would like to know how   and when she is suppose to take her Zoloft 50mg. She would like   clarification  Preferred Pharmacy? [facility]/PHARMACY #[id number] [facility]  Pharmacy phone number (if available)? [phone]  Additional Information for Provider? Patient can be reached at   [phone]  ---------------------------------------------------------------------------  --------------  [address]  What is the best way for the office to contact you? OK to leave message on   voicemail  Preferred Call Back Phone Number? [phone]  ---------------------------------------------------------------------------  --------------  SCRIPT ANSWERS  Relationship to Patient?  Self

## 2021-01-08 NOTE — TELEPHONE ENCOUNTER
----- Message from Parish Elliott sent at 1/8/2021 10:08 AM EST -----  Subject: Medication Problem    QUESTIONS  Name of Medication? lamoTRIgine (LAMICTAL) 25 MG tablet  Patient-reported dosage and instructions? patient takes 2 tablets in the   morning and 1 in the evening by mouth daily   What question or problem do you have with the medication? Patient isn't   sure if she is taking the medicine correctly she would like to know how   and when she is suppose to take her Lamictal 25mg. She would clarification  Preferred Pharmacy? [facility]/PHARMACY #[id number] [facility]  Pharmacy phone number (if available)? [phone]  Additional Information for Provider? Patient can be reached at   [phone]  ---------------------------------------------------------------------------  --------------  [address]  What is the best way for the office to contact you? OK to leave message on   voicemail  Preferred Call Back Phone Number? [phone]  ---------------------------------------------------------------------------  --------------  SCRIPT ANSWERS  Relationship to Patient?  Self

## 2021-01-13 ENCOUNTER — HOSPITAL ENCOUNTER (OUTPATIENT)
Dept: PHYSICAL THERAPY | Age: 71
Setting detail: THERAPIES SERIES
Discharge: HOME | End: 2021-01-13
Payer: MEDICARE

## 2021-01-13 PROCEDURE — 97161 PT EVAL LOW COMPLEX 20 MIN: CPT

## 2021-01-13 PROCEDURE — 97110 THERAPEUTIC EXERCISES: CPT

## 2021-01-13 NOTE — PLAN OF CARE
SUBJECTIVE: Patient stated complaint: Stated has history of B knee pain several months worsening over past couple weeks. Swelling in her R knee which can go down leg at times. Stated has had sciatica but this feels different. Stated MD wants her to get her quads stronger. Had injection on right side, stated pain has reduced. XR, + Grade 2-3 osteoarthritis present medial joint space. Grade 1-2 osteoarthritis present patellofemoral joint. Osteophyte seen. Joint space narrowing. Relevant Medical History: OA, lung sx 10/2020, Anxiety/depression  Functional Disability Index: LEFS 24%    Pain Scale: 2-10/10  Easing factors: rest  Provocative factors: activity     Type: [x]Constant   [x]Intermittent  []Radiating []Localized []other:     Numbness/Tingling: denies    Occupation/School: Retired    Living Status/Prior Level of Function: Independent with ADLs and IADLs. OBJECTIVE:     ROM LEFT RIGHT   HIP Flex     HIP Abd     HIP Ext     HIP IR     HIP ER     Knee ext 0 0   Knee Flex 135 118   Ankle PF     Ankle DF     Ankle In     Ankle Ev     Strength  LEFT RIGHT   HIP Flexors 5/5 4+/5   HIP Abductors     HIP Ext     Hip ER     Knee EXT (quad) 5/5 4+/5   Knee Flex (HS) 5/5 4+/5   Ankle DF     Ankle PF     Ankle Inv     Ankle EV          Circumference  Mid apex  7 cm prox             Reflexes/Sensation:    [x]Dermatomes/Myotomes intact    [x]Reflexes equal and normal bilaterally   []Other:    Joint mobility:     []Normal    []Hypo   []Hyper    Palpation: mild lateral knee    Functional Mobility/Transfers: I with transfers    Posture: WFL    Bandages/Dressings/Incisions: NA    Gait: (include devices/WB status) I no AD    Orthopedic Special Tests: NT                       [x] Patient history, allergies, meds reviewed. Medical chart reviewed. See intake form.      Review Of Systems (ROS): [x]Performed Review of systems (Integumentary, CardioPulmonary, Neurological) by intake and observation. Intake form has been scanned into medical record. Patient has been instructed to contact their primary care physician regarding ROS issues if not already being addressed at this time. Co-morbidities/Complexities (which will affect course of rehabilitation):  []None           Arthritic conditions   []Rheumatoid arthritis (M05.9)  [x]Osteoarthritis (M19.91)   Cardiovascular conditions   []Hypertension (I10)  []Hyperlipidemia (E78.5)  []Angina pectoris (I20)  []Atherosclerosis (I70)   Musculoskeletal conditions   []Disc pathology   []Congenital spine pathologies   []Prior surgical intervention  []Osteoporosis (M81.8)  []Osteopenia (M85.8)   Endocrine conditions   []Hypothyroid (E03.9)  []Hyperthyroid Gastrointestinal conditions   []Constipation (B93.57)   Metabolic conditions   []Morbid obesity (E66.01)  []Diabetes type 1(E10.65) or 2 (E11.65)   []Neuropathy (G60.9)     Pulmonary conditions   []Asthma (J45)  []Coughing   []COPD (J44.9)   Psychological Disorders  []Anxiety (F41.9)  []Depression (F32.9)   []Other:   []Other:          Barriers to/and or personal factors that will affect rehab potential:              [x]Age  []Sex              []Motivation/Lack of Motivation                        [x]Co-Morbidities              []Cognitive Function, education/learning barriers              []Environmental, home barriers              []profession/work barriers  []past PT/medical experience  [x]other:  Justification: Good understanding of HEP with gradual progression, goals of PT. Falls Risk Assessment (30 days):    [x] Falls Risk assessed and no intervention required.   [] Falls Risk assessed and Patient requires intervention due to being higher risk   TUG score (>12s at risk):     [] Falls education provided, including       G-Codes:       ASSESSMENT:   Functional Impairments: []Noted lumbar/proximal hip/LE joint hypomobility   [x]Decreased LE functional ROM   [x]Decreased core/proximal hip strength and neuromuscular control   [x]Decreased LE functional strength   [x]Reduced balance/proprioceptive control   []other:      Functional Activity Limitations (from functional questionnaire and intake)   [x]Reduced ability to tolerate prolonged functional positions   [x]Reduced ability or difficulty with changes of positions or transfers between positions   [x]Reduced ability to maintain good posture and demonstrate good body mechanics with sitting, bending, and lifting   [x]Reduced ability to sleep   [x] Reduced ability or tolerance with driving and/or computer work   [x]Reduced ability to perform lifting, carrying tasks   [x]Reduced ability to squat   [x]Reduced ability to forward bend   [x]Reduced ability to ambulate prolonged functional periods/distances/surfaces   [x]Reduced ability to ascend/descend stairs   [x]Reduced ability to run, hop, cut or jump   []other:    Participation Restrictions   [x]Reduced participation in self care activities   [x]Reduced participation in home management activities   []Reduced participation in work activities   []Reduced participation in social activities. []Reduced participation in sport/recreation activities. Classification :    []Signs/symptoms consistent with post-surgical status including decreased ROM, strength and function.    []Signs/symptoms consistent with joint sprain/strain   []Signs/symptoms consistent with patella-femoral syndrome   [x]Signs/symptoms consistent with knee OA/hip OA   []Signs/symptoms consistent with internal derangement of knee/Hip   []Signs/symptoms consistent with functional hip weakness/NMR control      []Signs/symptoms consistent with tendinitis/tendinosis    []signs/symptoms consistent with pathology which may benefit from Dry needling      []other:      Prognosis/Rehab Potential:      []Excellent   [x]Good    []Fair []Poor    Tolerance of evaluation/treatment:    []Excellent   [x]Good    []Fair   []Poor    Physical Therapy Evaluation Complexity Justification  [x] A history of present problem with:  [] no personal factors and/or comorbidities that impact the plan of care;  []1-2 personal factors and/or comorbidities that impact the plan of care  []3 personal factors and/or comorbidities that impact the plan of care  [x] An examination of body systems using standardized tests and measures addressing any of the following: body structures and functions (impairments), activity limitations, and/or participation restrictions;:  [] a total of 1-2 or more elements   [] a total of 3 or more elements   [x] a total of 4 or more elements   [x] A clinical presentation with:  [x] stable and/or uncomplicated characteristics   [] evolving clinical presentation with changing characteristics  [] unstable and unpredictable characteristics;   [x] Clinical decision making of [x] low, [] moderate, [] high complexity using standardized patient assessment instrument and/or measurable assessment of functional outcome. [x] EVAL (LOW) 14977 (typically 20 minutes face-to-face)  [] EVAL (MOD) 15161 (typically 30 minutes face-to-face)  [] EVAL (HIGH) 42765 (typically 45 minutes face-to-face)  [] RE-EVAL     PLAN:   Frequency/Duration:  1-2 days per week for 8 Weeks:  Interventions:  [x]  Therapeutic exercise including: strength training, ROM, for Lower extremity and core   [x]  NMR activation and proprioception for LE, Glutes and Core   [x]  Manual therapy as indicated for LE, Hip and spine to include: Dry Needling/IASTM, STM, PROM, Gr I-IV mobilizations, manipulation. [x] Modalities as needed that may include: thermal agents, E-stim, Biofeedback, US, iontophoresis as indicated  [x] Patient education on joint protection, postural re-education, activity modification, progression of HEP.     HEP instruction: (see scanned forms)    GOALS: Patient stated goal: Walk 30 mins to an hour. Therapist goals for Patient:   Short Term Goals: To be achieved in: 2 weeks  1. Independent in HEP and progression per patient tolerance, in order to prevent re-injury. [] Progressing: [] Met: [] Not Met: [] Adjusted     2. Patient will have a decrease in pain to facilitate improvement in movement, function, and ADLs as indicated by Functional Deficits. [] Progressing: [] Met: [] Not Met: [] Adjusted     Long Term Goals: To be achieved in: 8 weeks  1. Disability index score of 12% or less for the LEFS to assist with reaching prior level of function. [] Progressing: [] Met: [] Not Met: [] Adjusted     2. Patient will demonstrate increased AROM to 0-135 to allow for proper joint functioning as indicated by patients Functional Deficits. [] Progressing: [] Met: [] Not Met: [] Adjusted     3. Patient will demonstrate an increase in Strength to good proximal hip strength and control, within 5lb HHD in LE to allow for proper functional mobility as indicated by patients Functional Deficits. [] Progressing: [] Met: [] Not Met: [] Adjusted     4. Patient will return to functional activities without increased symptoms or restriction. [] Progressing: [] Met: [] Not Met: [] Adjusted     5.  Patient will walk 30+ mins I no AD without increased symptoms or restriction (patient specific functional goal)    [] Progressing: [] Met: [] Not Met: [] Adjusted      Electronically signed by:  Walker Burrell, PT

## 2021-01-13 NOTE — FLOWSHEET NOTE
[address] [facility], [address] [place] [address] [address]  Phone: [phone]   Fax:     [phone]      Physical Therapy Treatment Note/ Progress Report:     Date:  2021    Patient Name:  Casandra Lefort    :  1950  MRN: [id number]  Restrictions/Precautions:    Medical/Treatment Diagnosis Information:  · Diagnosis: Primary OA R knee M17. 11  Chronic pain of right knee M25.561  · Treatment Diagnosis: Right knee pain O11.996  Insurance/Certification information:  PT Insurance Information: Medicare/AARP  Physician Information:  Referring Practitioner:  Chantel Savage MD  Has the plan of care been signed (Y/N):        []  Yes  [x]  No     Date of Patient follow up with Physician:KATHYD      Is this a Progress Report:     []  Yes  [x]  No        If Yes:  Date Range for reporting period:  Beginning  Ending    Progress report will be due (10 Rx or 30 days whichever is less):        Recertification will be due (POC Duration  / 90 days whichever is less): 3/10/2021      Visit # Insurance Allowable Auth Required   1 Med Nec []  Yes []  No        Functional Scale: LEFS 24%   Date assessed:  2021      Latex Allergy:  [x]NO      []YES  Preferred Language for Healthcare:   [x]English       []other:      Pain level:  2-10/10     SUBJECTIVE:  See eval    OBJECTIVE: See eval  ? Observation:   ? Test measurements:      RESTRICTIONS/PRECAUTIONS: OA    Exercises/Interventions:   Therapeutic Ex (66251) Sets/sec Reps Notes/CUES HEP   HS stretch long sitting 30\" 3 vc    Quad sets 10\" 10 vc    SLR  10 vc    SSLR  10 vc    LAQ 2# 10 vc    HS curl  10 Blue, vc    TKE  10 Blue, vc    SLS 5-10\" 10 vc                         Pt eduation 10'  HEP with gradual progression, goals of PT, not to push through pain with ex, use of ice- pt stated understanding    Manual Intervention ([id number]) NMR re-education (87079)   CUES NEEDED                                                                   Therapeutic Activity (85987)                                                     Therapeutic Exercise and NMR EXR  [x] (45878) Provided verbal/tactile cueing for activities related to strengthening, flexibility, endurance, ROM for improvements in LE, proximal hip, and core control with self care, mobility, lifting, ambulation. [x] (93556) Provided verbal/tactile cueing for activities related to improving balance, coordination, kinesthetic sense, posture, motor skill, proprioception to assist with LE, proximal hip, and core control in self-care, mobility, lifting, ambulation and eccentric single leg control.      NMR and Therapeutic Activities:    [x] (22287 or 84363) Provided verbal/tactile cueing for activities related to improving balance, coordination, kinesthetic sense, posture, motor skill, proprioception and motor activation to allow for proper function of core, proximal hip and LE with self-care and ADLs and functional mobility.   [] (21830) Gait Re-education- Provided training and instruction to the patient for proper LE, core and proximal hip recruitment and positioning and eccentric body weight control with ambulation re-education including up and down stairs     Home Exercise Program:    [x] (71533) Reviewed/Progressed HEP activities related to strengthening, flexibility, endurance, ROM of core, proximal hip and LE for functional self-care, mobility, lifting and ambulation/stair navigation   [] (29200) Reviewed/Progressed HEP activities related to improving balance, coordination, kinesthetic sense, posture, motor skill, proprioception of core, proximal hip and LE for self-care, mobility, lifting, and ambulation/stair navigation      Manual Treatments:  PROM / STM / Oscillations-Mobs:  G-I, II, III, IV (PA's, Inf., Post.) [] (77953) Provided manual therapy to mobilize LE, proximal hip and/or LS spine soft tissue/joints for the purpose of modulating pain, promoting relaxation, increasing ROM, reducing/eliminating soft tissue swelling/inflammation/restriction, improving soft tissue extensibility and allowing for proper ROM for normal function with self-care, mobility, lifting and ambulation. Modalities:     [] GAME READY (VASO)- for significant edema, swelling, pain control. Charges:  Timed Code Treatment Minutes: 25   Total Treatment Minutes: 40      [x] EVAL (LOW) 43846 (typically 20 minutes face-to-face)  [] EVAL (MOD) 26369 (typically 30 minutes face-to-face)  [] EVAL (HIGH) 16294 (typically 45 minutes face-to-face)  [] RE-EVAL     [x] KP(86160) x  2   [] IONTO  [] NMR (74829) x     [] VASO  [] Manual (88046) x     [] Other:  [] TA x      [] Mech Traction (59172)  [] ES(attended) (60760)      [] ES (un) (48004):    ASSESSMENT:  See eval    GOALS:     Patient stated goal: Walk 30 mins to an hour. Therapist goals for Patient:   Short Term Goals: To be achieved in: 2 weeks  1. Independent in HEP and progression per patient tolerance, in order to prevent re-injury. [] Progressing: [] Met: [] Not Met: [] Adjusted     2. Patient will have a decrease in pain to facilitate improvement in movement, function, and ADLs as indicated by Functional Deficits. [] Progressing: [] Met: [] Not Met: [] Adjusted     Long Term Goals: To be achieved in: 8 weeks  1. Disability index score of 12% or less for the LEFS to assist with reaching prior level of function. [] Progressing: [] Met: [] Not Met: [] Adjusted     2. Patient will demonstrate increased AROM to 0-135 to allow for proper joint functioning as indicated by patients Functional Deficits.    [] Progressing: [] Met: [] Not Met: [] Adjusted 3. Patient will demonstrate an increase in Strength to good proximal hip strength and control, within 5lb HHD in LE to allow for proper functional mobility as indicated by patients Functional Deficits. [] Progressing: [] Met: [] Not Met: [] Adjusted     4. Patient will return to functional activities without increased symptoms or restriction. [] Progressing: [] Met: [] Not Met: [] Adjusted     5. Patient will walk 30+ mins I no AD without increased symptoms or restriction (patient specific functional goal)    [] Progressing: [] Met: [] Not Met: [] Adjusted         Overall Progression Towards Functional goals/ Treatment Progress Update:  [] Patient is progressing as expected towards functional goals listed. [] Progression is slowed due to complexities/Impairments listed. [] Progression has been slowed due to co-morbidities.   [x] Plan just implemented, too soon to assess goals progression <30days   [] Goals require adjustment due to lack of progress  [] Patient is not progressing as expected and requires additional follow up with physician  [] Other    Prognosis for POC: [x] Good [] Fair  [] Poor      Patient requires continued skilled intervention: [x] Yes  [] No    Treatment/Activity Tolerance:  [x] Patient able to complete treatment  [] Patient limited by fatigue  [] Patient limited by pain    [] Patient limited by other medical complications  [] Other:     Return to Play: (if applicable)   []  Stage 1: Intro to Strength   []  Stage 2: Return to Run and Strength   []  Stage 3: Return to Jump and Strength   []  Stage 4: Dynamic Strength and Agility   []  Stage 5: Sport Specific Training     []  Ready to Return to Play, Meets All Above Stages   []  Not Ready for Return to Sports   Comments:                          PLAN: See eval  [] Continue per plan of care [] Alter current plan (see comments above)  [x] Plan of care initiated [] Hold pending MD visit [] Discharge

## 2021-01-15 DIAGNOSIS — K21.00 GASTROESOPHAGEAL REFLUX DISEASE WITH ESOPHAGITIS: ICD-10-CM

## 2021-01-15 RX ORDER — FAMOTIDINE 40 MG/1
40 TABLET, FILM COATED ORAL 2 TIMES DAILY
Qty: 180 TABLET | Refills: 1 | Status: SHIPPED | OUTPATIENT
Start: 2021-01-15 | End: 2021-01-21

## 2021-01-15 NOTE — TELEPHONE ENCOUNTER
Refill Request     Last Seen: 1/5/2021    Last Written: Pepcid 40mg #120  1rf  11/14/2020    Next Appointment:   Future Appointments   Date Time Provider Christina Burton   4/8/2021  1:15 PM Arianne Burleson [facility]   1/6/2022 10:30 AM DO YVROSE Arrieta [facility]       Future appointment scheduled      Requested Prescriptions     Pending Prescriptions Disp Refills    famotidine (PEPCID) 40 MG tablet 180 tablet 1     Sig: Take 1 tablet by mouth 2 times daily

## 2021-01-21 DIAGNOSIS — K21.00 GASTROESOPHAGEAL REFLUX DISEASE WITH ESOPHAGITIS: ICD-10-CM

## 2021-01-21 RX ORDER — FAMOTIDINE 20 MG/1
20 TABLET, FILM COATED ORAL NIGHTLY
Qty: 90 TABLET | Refills: 1 | Status: SHIPPED | OUTPATIENT
Start: 2021-01-21 | End: 2021-06-23

## 2021-01-21 NOTE — TELEPHONE ENCOUNTER
RX sent yesterday for Famotidine 40mg but patient only takes 20mg nightly and it is working well. Asking for a new script. 40mg too small to break in half.

## 2021-02-22 ENCOUNTER — TELEPHONE (OUTPATIENT)
Dept: CARDIOTHORACIC SURGERY | Age: 71
End: 2021-02-22

## 2021-02-22 DIAGNOSIS — R06.02 SHORTNESS OF BREATH: Primary | ICD-10-CM

## 2021-02-22 NOTE — TELEPHONE ENCOUNTER
S/P left thoracotomy, JAMI lobectomy performed on 10/16/20. Final f/u was 12/8/20 at which time pt c/o numbness beneath her left breast, mild SOB and dyspnea with activity and a non productive cough. CXR at that time showed no changes aside from surgical.  Pt called today to report that for about the last two weeks she has been experiencing discomfort in her left chest. It is a sharp pain that comes and goes. It does worsen on deep inspirations. A cough elicits pain in her left breast that goes around to her back. States incisions are healed. O2 sat 98% HR 67. Denies fever. IS 3500 ml consistently. No SOB at rest.   Offered to order a CXR for evaluation of lung status. Encouraged use of Ibuprofen 400 mg every 6 hrs for two days and to use a heating pad for comfort. Instructed pt to call Dr. Judy England or Dr. Ted Ramirez for further evaluation as soon as possible. Should she need emergent evaluation she is to proceed to the ER. Voiced understanding. Pt called back shortly after above conversation and reported that Dr. Judy England is wanting her to have a CT scan of the chest. Suggested she schedule this ASAP and possibly move appt with Dr. Judy England to an earlier time. Voiced understanding.

## 2021-03-05 ENCOUNTER — TELEPHONE (OUTPATIENT)
Dept: FAMILY MEDICINE CLINIC | Age: 71
End: 2021-03-05

## 2021-03-05 DIAGNOSIS — R21 RASH OF FACE: Primary | ICD-10-CM

## 2021-03-08 ENCOUNTER — HOSPITAL ENCOUNTER (OUTPATIENT)
Dept: PHYSICAL THERAPY | Age: 71
Setting detail: THERAPIES SERIES
Discharge: HOME | End: 2021-03-08
Payer: MEDICARE

## 2021-03-08 PROCEDURE — 97164 PT RE-EVAL EST PLAN CARE: CPT | Performed by: PHYSICAL THERAPIST

## 2021-03-08 PROCEDURE — 97110 THERAPEUTIC EXERCISES: CPT | Performed by: PHYSICAL THERAPIST

## 2021-03-08 PROCEDURE — 97140 MANUAL THERAPY 1/> REGIONS: CPT | Performed by: PHYSICAL THERAPIST

## 2021-03-08 NOTE — PLAN OF CARE
[facility], [address], [address] [place] [address]  Phone: [phone]  Fax [phone]     Physical Therapy Certification    Dear Referring Practitioner: Dr. Rosella Baumgarten,    We had the pleasure of evaluating the following patient for physical therapy services at [address] [place] [address]. A summary of our findings can be found in the initial assessment below. This includes our plan of care. If you have any questions or concerns regarding these findings, please do not hesitate to contact me at the office phone number checked above. Thank you for the referral.       Physician Signature:_______________________________Date:__________________  By signing above (or electronic signature), therapists plan is approved by physician    Patient: Sana Escalante   : 1950   MRN: [id number]  Referring Physician: Referring Practitioner: Dr. Rosella Baumgarten      Evaluation Date: 3/8/2021      Medical Diagnosis Information:  Diagnosis: R knee pain, B knee OA M25.561, M17.0   Treatment Diagnosis: R knee pain M25.561                                         Insurance information: PT Insurance Information: medicare/AARP       Precautions/ Contra-indications: OA, lung sx 10/2020, Anxiety/depression    C-SSRS Triggered by Intake questionnaire (Past 2 wk assessment):   [x] No, Questionnaire did not trigger screening.   [] Yes, Patient intake triggered further evaluation      [] C-SSRS Screening completed  [] PCP notified via Plan of Care  [] Emergency services notified     Latex Allergy:  [x]NO      []YES  Preferred Language for Healthcare:   [x]English       []other:    SUBJECTIVE: Patient reports she has been R knee pain and she had a cortisone injection in January and had 1 visit of PT. She is doing her HEP 1x day. Pt reports the cortisone injection is wearing off and she has days when her knee swells up and gets warm.   She has pain with walking bending knee. occ she feels like her knee is going to give out but she denies any falls. Relevant Medical History:OA, lung sx 10/2020 for cancer , Anxiety/depression; SOB since lung sx   Functional Disability Index: LEFS 29%    Height 5 ft 5 inches  Weight 169 lbs   Pain Scale: 2-8/10  Easing factors: ice, ibprofin  Provocative factors: walking, up and down stairs      Type: [x]Constant   []Intermittent  []Radiating []Localized []other:     Numbness/Tingling: when it really hurts it tingles    Occupation/School: retired     Living Status/Prior Level of Function: Independent with ADLs and IADLs, lives alone in 2 story home with bedroom and bathroom upstairs; pt was able to walk 1.25 miles at [employer] but has been unable to secondary to lung sx and R knee pain     OBJECTIVE:     ROM LEFT RIGHT   HIP ext  +5  +5   HIP Abd     HIP Ext     HIP IR     HIP ER     Knee ext +2 +2    Knee Flex 132 132 pain post knee    Ankle PF     Ankle DF     Ankle In     Ankle Ev     Strength  LEFT RIGHT   HIP Flexors 5 4+   HIP Abductors 3+ 3   HIP Ext     Hip ER     Knee EXT (quad) 5 4+ pain    Knee Flex (HS) 5 4+ pain    Ankle DF 5 4+    Ankle PF     Ankle Inv     Ankle EV          Circumference  Mid apex  7 cm prox             Reflexes/Sensation: NT    []Dermatomes/Myotomes intact    [x]Reflexes equal and normal bilaterally   []Other:    Joint mobility:    [x]Normal    []Hypo   []Hyper    Palpation: tender with palp to distal ITB ,     Functional Mobility/Transfers: independent     Posture: genu recavatum, pes planus     Bandages/Dressings/Incisions: NA     Gait: without an AD; FWbing mild R trendelenberg     Orthopedic Special Tests: negative Donal's, negative patella grind test                       [x] Patient history, allergies, meds reviewed. Medical chart reviewed. See intake form. Review Of Systems (ROS):  [x]Performed Review of systems (Integumentary, CardioPulmonary, Neurological) by intake and observation. Intake form has been scanned into medical record. Patient has been instructed to contact their primary care physician regarding ROS issues if not already being addressed at this time. Co-morbidities/Complexities (which will affect course of rehabilitation):   []None           Arthritic conditions   []Rheumatoid arthritis (M05.9)  [x]Osteoarthritis (M19.91)   Cardiovascular conditions   []Hypertension (I10)  []Hyperlipidemia (E78.5)  []Angina pectoris (I20)  []Atherosclerosis (I70)   Musculoskeletal conditions   []Disc pathology   []Congenital spine pathologies   []Prior surgical intervention  []Osteoporosis (M81.8)  []Osteopenia (M85.8)   Endocrine conditions   []Hypothyroid (E03.9)  []Hyperthyroid Gastrointestinal conditions   []Constipation (M69.83)   Metabolic conditions   []Morbid obesity (E66.01)  []Diabetes type 1(E10.65) or 2 (E11.65)   []Neuropathy (G60.9)     Pulmonary conditions   []Asthma (J45)  []Coughing   []COPD (J44.9)   Psychological Disorders  [x]Anxiety (F41.9)  [x]Depression (F32.9)   []Other:   [x]Other: recent lung sx for cancer          Barriers to/and or personal factors that will affect rehab potential:              []Age  []Sex              []Motivation/Lack of Motivation                        []Co-Morbidities              []Cognitive Function, education/learning barriers              []Environmental, home barriers              []profession/work barriers  []past PT/medical experience  []other:  Justification:     Falls Risk Assessment (30 days):   [x] Falls Risk assessed and no intervention required.   [] Falls Risk assessed and Patient requires intervention due to being higher risk   TUG score (>12s at risk):     [] Falls education provided, including           ASSESSMENT:   Functional Impairments:     []Noted lumbar/proximal hip/LE joint hypomobility   []Decreased LE functional ROM   [x]Decreased core/proximal hip strength and neuromuscular control   [x]Decreased LE functional strength   [x]Reduced balance/proprioceptive control   []other:      Functional Activity Limitations (from functional questionnaire and intake)   []Reduced ability to tolerate prolonged functional positions   [x]Reduced ability or difficulty with changes of positions or transfers between positions   [x]Reduced ability to maintain good posture and demonstrate good body mechanics with sitting, bending, and lifting   []Reduced ability to sleep   [] Reduced ability or tolerance with driving and/or computer work   [x]Reduced ability to perform lifting, carrying tasks   [x]Reduced ability to squat   []Reduced ability to forward bend   [x]Reduced ability to ambulate prolonged functional periods/distances/surfaces   [x]Reduced ability to ascend/descend stairs   []Reduced ability to run, hop, cut or jump   []other:    Participation Restrictions   [x]Reduced participation in self care activities   [x]Reduced participation in home management activities   []Reduced participation in work activities   [x]Reduced participation in social activities. [x]Reduced participation in sport/recreation activities. Classification :    []Signs/symptoms consistent with post-surgical status including decreased ROM, strength and function.    []Signs/symptoms consistent with joint sprain/strain   []Signs/symptoms consistent with patella-femoral syndrome   [x]Signs/symptoms consistent with knee OA/hip OA   []Signs/symptoms consistent with internal derangement of knee/Hip   [x]Signs/symptoms consistent with functional hip weakness/NMR control      []Signs/symptoms consistent with tendinitis/tendinosis    []signs/symptoms consistent with pathology which may benefit from Dry needling      []other:      Prognosis/Rehab Potential:      []Excellent   [x]Good    []Fair   []Poor    Tolerance of evaluation/treatment:    []Excellent   [x]Good    []Fair   []Poor  Physical Therapy Evaluation Complexity Justification  [x] A history of present problem with: [] no personal factors and/or comorbidities that impact the plan of care;  []1-2 personal factors and/or comorbidities that impact the plan of care  [x]3 personal factors and/or comorbidities that impact the plan of care  [x] An examination of body systems using standardized tests and measures addressing any of the following: body structures and functions (impairments), activity limitations, and/or participation restrictions;:  [x] a total of 1-2 or more elements   [] a total of 3 or more elements   [] a total of 4 or more elements   [x] A clinical presentation with:  [x] stable and/or uncomplicated characteristics   [] evolving clinical presentation with changing characteristics  [] unstable and unpredictable characteristics;   [x] Clinical decision making of [x] low, [] moderate, [] high complexity using standardized patient assessment instrument and/or measurable assessment of functional outcome. [x] EVAL (LOW) 82574 (typically 20 minutes face-to-face)  [] EVAL (MOD) 21312 (typically 30 minutes face-to-face)  [] EVAL (HIGH) 78141 (typically 45 minutes face-to-face)  [] RE-EVAL       PLAN:   Frequency/Duration:  1 days per week for 4-6 Weeks:  Interventions:  [x]  Therapeutic exercise including: strength training, ROM, for Lower extremity and core   [x]  NMR activation and proprioception for LE, Glutes and Core   [x]  Manual therapy as indicated for LE, Hip and spine to include: Dry Needling/IASTM, STM, PROM, Gr I-IV mobilizations, manipulation. [x] Modalities as needed that may include: thermal agents, E-stim, Biofeedback, US, iontophoresis as indicated  [x] Patient education on joint protection, postural re-education, activity modification, progression of HEP. HEP instruction: (see scanned forms)    GOALS:  Patient stated goal: avoid knee sx   [] Progressing: [] Met: [] Not Met: [] Adjusted    Therapist goals for Patient:   Short Term Goals: To be achieved in: 2 weeks  1.  Independent in HEP and progression per patient tolerance, in order to prevent re-injury. [] Progressing: [] Met: [] Not Met: [] Adjusted  2. Patient will have a decrease in pain to facilitate improvement in movement, function, and ADLs as indicated by Functional Deficits. [] Progressing: [] Met: [] Not Met: [] Adjusted    Long Term Goals: To be achieved in: 4-6 weeks  1. Disability index score of 20% or less for the LEFS to assist with reaching prior level of function. [] Progressing: [] Met: [] Not Met: [] Adjusted  2. Patient will demonstrate an increase in Strength to good proximal hip strength and control, within 5lb HHD in LE to allow for proper functional mobility as indicated by patients Functional Deficits. [] Progressing: [] Met: [] Not Met: [] Adjusted  3. Patient will return to being able to ambulate with proper gait, ascend and descend stairs in home with reciprocal gait without increased symptoms or restriction.    [] Progressing: [] Met: [] Not Met: [] Adjusted       Electronically signed by:  Leopold Graven, PT

## 2021-03-08 NOTE — FLOWSHEET NOTE
[facility],  [address] [place]  Phone: [phone]  Fax [phone]    Physical Therapy Treatment Note/ Progress Report:           Date:  3/8/2021    Patient Name:  Tala Carreno    :  1950  MRN: [id number]  Restrictions/Precautions:    Medical/Treatment Diagnosis Information:  Diagnosis: R knee pain, B knee OA M25.561, M17.0  Treatment Diagnosis: R knee pain T89.088  Insurance/Certification information:  PT Insurance Information: medicare/AARP  Physician Information:  Referring Practitioner: Dr. Lorie Cordoba  Has the plan of care been signed (Y/N):        []  Yes  [x]  No     Date of Patient follow up with Physician: 21      Is this a Progress Report:     []  Yes  [x]  No        If Yes:  Date Range for reporting period:  Beginning 3/8/21  Ending     Progress report will be due (10 Rx or 30 days whichever is less):        Recertification will be due (POC Duration  / 90 days whichever is less): 21     Visit # Insurance Allowable Auth Required   In-person 1 BMN []  Yes []  No    Telehealth   []  Yes []  No    Total          Functional Scale: LEFS 29% 57/80   Date assessed:  3/8/21      Therapy Diagnosis/Practice Pattern: 4E     Number of Comorbidities:  []0     []1-2    [x]3+    Latex Allergy:  [x]NO      []YES  Preferred Language for Healthcare:   [x]English       []other:      Pain level:  2-8/10 R knee     SUBJECTIVE:  See eval; had PT at [facility] for 1 visit in early January     OBJECTIVE: See eval   Observation:    Test measurements:      RESTRICTIONS/PRECAUTIONS: OA, lung sx 10/2020, Anxiety/depression    Exercises/Interventions:     Therapeutic Ex (37817) Sets/sec Reps Notes/CUES   SL hip abd  2\" 2x10   Hep    SL clams  GTB 2\" x10   Hep    Supine bridge with hip abd  BTB 5\" 2x10   Hep    Standing TKE with TB  BTB 5\" 2x10   Hep                                                    Manual Intervention (10970)      Manual ITB stretching and rolling  8 min                                    NMR re-education (57287)   CUES NEEDED                                                         Therapeutic Activity (02118)                                                Therapeutic Exercise and NMR EXR  [] (04592) Provided verbal/tactile cueing for activities related to strengthening, flexibility, endurance, ROM for improvements in LE, proximal hip, and core control with self care, mobility, lifting, ambulation.  [] (65165) Provided verbal/tactile cueing for activities related to improving balance, coordination, kinesthetic sense, posture, motor skill, proprioception  to assist with LE, proximal hip, and core control in self care, mobility, lifting, ambulation and eccentric single leg control.      NMR and Therapeutic Activities:    [] (10092 or 50966) Provided verbal/tactile cueing for activities related to improving balance, coordination, kinesthetic sense, posture, motor skill, proprioception and motor activation to allow for proper function of core, proximal hip and LE with self care and ADLs  [] (13052) Gait Re-education- Provided training and instruction to the patient for proper LE, core and proximal hip recruitment and positioning and eccentric body weight control with ambulation re-education including up and down stairs     Home Exercise Program:    [x] (81275) Reviewed/Progressed HEP activities related to strengthening, flexibility, endurance, ROM of core, proximal hip and LE for functional self-care, mobility, lifting and ambulation/stair navigation   [] (75680)Reviewed/Progressed HEP activities related to improving balance, coordination, kinesthetic sense, posture, motor skill, proprioception of core, proximal hip and LE for self care, mobility, lifting, and ambulation/stair navigation      Manual Treatments:  PROM / STM / Oscillations-Mobs:  G-I, II, III, IV (PA's, Inf., Post.)  [x] (82238) Provided manual therapy to mobilize LE, proximal hip and/or LS spine soft tissue/joints for the purpose of modulating pain, promoting relaxation,  increasing ROM, reducing/eliminating soft tissue swelling/inflammation/restriction, improving soft tissue extensibility and allowing for proper ROM for normal function with self care, mobility, lifting and ambulation. Modalities:     [] GAME READY (VASO)- for significant edema, swelling, pain control. Charges:  Timed Code Treatment Minutes: 25    Total Treatment Minutes: 45      [facility] time in/time out:   (and requires time in and out for each CPT code)    [] EVAL (LOW) 37924   [] EVAL (MOD) 08388  [] EVAL (HIGH) 93861   [x] RE-EVAL     [x] VF(42349) x 1    [] IONTO  [] NMR (25439) x     [] VASO  [x] Manual (81329) x  1    [] Other:  [] TA x      [] Mech Traction (12006)  [] ES(attended) (69606)      [] ES (un) (05262):       GOALS:   Therapist goals for Patient:   Short Term Goals: To be achieved in: 2 weeks  1. Independent in HEP and progression per patient tolerance, in order to prevent re-injury. [] Progressing: [] Met: [] Not Met: [] Adjusted  2. Patient will have a decrease in pain to facilitate improvement in movement, function, and ADLs as indicated by Functional Deficits. [] Progressing: [] Met: [] Not Met: [] Adjusted    Long Term Goals: To be achieved in: 4-6 weeks  1. Disability index score of 20% or less for the LEFS to assist with reaching prior level of function. [] Progressing: [] Met: [] Not Met: [] Adjusted  2. Patient will demonstrate an increase in Strength to good proximal hip strength and control, within 5lb HHD in LE to allow for proper functional mobility as indicated by patients Functional Deficits. [] Progressing: [] Met: [] Not Met: [] Adjusted  3. Patient will return to being able to ambulate with proper gait, ascend and descend stairs in home with reciprocal gait without increased symptoms or restriction.    [] Progressing: [] Met: [] Not Met: [] Adjusted Progression Towards Functional goals:  [] Patient is progressing as expected towards functional goals listed. [] Progression is slowed due to complexities listed. [] Progression has been slowed due to co-morbidities. [x] Plan just implemented, too soon to assess goals progression  [] Other:         Overall Progression Towards Functional goals/ Treatment Progress Update:  [] Patient is progressing as expected towards functional goals listed. [] Progression is slowed due to complexities/Impairments listed. [] Progression has been slowed due to co-morbidities. [x] Plan just implemented, too soon to assess goals progression <30days   [] Goals require adjustment due to lack of progress  [] Patient is not progressing as expected and requires additional follow up with physician  [] Other    Prognosis for POC: [x] Good [] Fair  [] Poor      Patient requires continued skilled intervention: [x] Yes  [] No    Treatment/Activity Tolerance:  [x] Patient able to complete treatment  [] Patient limited by fatigue  [] Patient limited by pain    [] Patient limited by other medical complications  [] Other:     ASSESSMENT: see diandra     Return to Play: (if applicable)   []  Stage 1: Intro to Strength   []  Stage 2: Return to Run and Strength   []  Stage 3: Return to Jump and Strength   []  Stage 4: Dynamic Strength and Agility   []  Stage 5: Sport Specific Training     []  Ready to Return to Play, Meets All Above Stages   []  Not Ready for Return to Sports   Comments:                               PLAN: See eval  [] Continue per plan of care [] Alter current plan (see comments above)  [x] Plan of care initiated [] Hold pending MD visit [] Discharge      Electronically signed by:  Fina Guzmán, [id number]     Note: If patient does not return for scheduled/ recommended follow up visits, this note will serve as a discharge from care along with most recent update on progress.

## 2021-03-09 ENCOUNTER — HOSPITAL ENCOUNTER (OUTPATIENT)
Dept: CT IMAGING | Age: 71
Discharge: HOME | End: 2021-03-09
Payer: MEDICARE

## 2021-03-09 DIAGNOSIS — C34.12 CANCER OF UPPER LOBE OF LEFT LUNG (HCC): ICD-10-CM

## 2021-03-09 LAB
GFR AFRICAN AMERICAN: >60
GFR NON-AFRICAN AMERICAN: >60
PERFORMED ON: NORMAL
POC CREATININE: 0.7 MG/DL (ref 0.6–1.2)
POC SAMPLE TYPE: NORMAL

## 2021-03-09 PROCEDURE — 6360000004 HC RX CONTRAST MEDICATION: Performed by: INTERNAL MEDICINE

## 2021-03-09 PROCEDURE — 82565 ASSAY OF CREATININE: CPT

## 2021-03-09 PROCEDURE — 71260 CT THORAX DX C+: CPT

## 2021-03-09 RX ADMIN — IOPAMIDOL 60 ML: 755 INJECTION, SOLUTION INTRAVENOUS at 09:04

## 2021-03-11 ENCOUNTER — TELEPHONE (OUTPATIENT)
Dept: FAMILY MEDICINE CLINIC | Age: 71
End: 2021-03-11

## 2021-03-11 NOTE — TELEPHONE ENCOUNTER
----- Message from Aime Bear sent at 3/11/2021 11:46 AM EST -----  Subject: Appointment Request    Reason for Call: Urgent Cough Cold    QUESTIONS  Type of Appointment? Established Patient  Reason for appointment request? Available appointments did not meet   patient need  Additional Information for Provider? Pt states she has bumps on back of   throat would like to be seen today    informed pt it would be a vv appt please.  ---------------------------------------------------------------------------  --------------  CALL BACK INFO  What is the best way for the office to contact you? OK to leave message on   voicemail  Preferred Call Back Phone Number? [phone]  ---------------------------------------------------------------------------  --------------  SCRIPT ANSWERS  Relationship to Patient? Self  Appointment reason? Symptomatic  Select script based on patient symptoms? Adult Cough/Cold Symptoms [Runny   Nose   Sore Throat   Flu   Sinus   Sinus Infection   Upper Respiratory Infection [URI]   Congestion]   Did you have an injury or trauma within the past three days? No  Is your pain affecting your daily activities? No  Are you having neck stiffness   nausea or vomiting? No  Are you having fevers (100.4)   chills or sweats? No  Did your symptoms begin within the last 2 weeks? No  Have your symptoms been worsening over the past 1-2 days? Yes  Have you been diagnosed with   tested for   or told that you are suspected of having COVID-19 (Coronavirus)? No  Have you had a fever or taken medication to treat a fever within the past   3 days? No  Are you currently unable to finish sentences due to any difficulty   breathing? No  Are you unable to swallow liquids? No  Are you having fevers (100.4 or greater)   chills   or sweats? No  Do you have COPD   asthma or a chronic lung condition? No  Have your symptoms been present for more than 5 days?  Yes   Have you been diagnosed with   tested for   or told that you are suspected of having COVID-19 (Coronavirus)? No  Have you had a fever or taken medication to treat a fever within the past   3 days? No  Have you had a cough   shortness of breath or flu-like symptoms within the past 3 days? No  Do you currently have flu-like symptoms including fever or chills   cough   shortness of breath   or difficulty breathing   or new loss of taste or smell? No  (Service Expert  click yes below to proceed with [employer] As Usual   Scheduling)?  Yes

## 2021-03-12 ENCOUNTER — OFFICE VISIT (OUTPATIENT)
Dept: PRIMARY CARE CLINIC | Age: 71
End: 2021-03-12
Payer: MEDICARE

## 2021-03-12 ENCOUNTER — OFFICE VISIT (OUTPATIENT)
Dept: FAMILY MEDICINE CLINIC | Age: 71
End: 2021-03-12
Payer: MEDICARE

## 2021-03-12 VITALS
WEIGHT: 171 LBS | TEMPERATURE: 94.7 F | OXYGEN SATURATION: 97 % | BODY MASS INDEX: 28.46 KG/M2 | HEART RATE: 78 BPM | SYSTOLIC BLOOD PRESSURE: 122 MMHG | DIASTOLIC BLOOD PRESSURE: 74 MMHG

## 2021-03-12 DIAGNOSIS — J02.9 SORE THROAT: Primary | ICD-10-CM

## 2021-03-12 DIAGNOSIS — Z11.59 SCREENING FOR VIRAL DISEASE: Primary | ICD-10-CM

## 2021-03-12 LAB
S PYO AG THROAT QL: NORMAL
SARS-COV-2: NOT DETECTED

## 2021-03-12 PROCEDURE — 87880 STREP A ASSAY W/OPTIC: CPT | Performed by: FAMILY MEDICINE

## 2021-03-12 PROCEDURE — G8427 DOCREV CUR MEDS BY ELIG CLIN: HCPCS | Performed by: FAMILY MEDICINE

## 2021-03-12 PROCEDURE — G8428 CUR MEDS NOT DOCUMENT: HCPCS | Performed by: NURSE PRACTITIONER

## 2021-03-12 PROCEDURE — G8417 CALC BMI ABV UP PARAM F/U: HCPCS | Performed by: FAMILY MEDICINE

## 2021-03-12 PROCEDURE — 99211 OFF/OP EST MAY X REQ PHY/QHP: CPT | Performed by: NURSE PRACTITIONER

## 2021-03-12 PROCEDURE — 3017F COLORECTAL CA SCREEN DOC REV: CPT | Performed by: FAMILY MEDICINE

## 2021-03-12 PROCEDURE — 4040F PNEUMOC VAC/ADMIN/RCVD: CPT | Performed by: FAMILY MEDICINE

## 2021-03-12 PROCEDURE — G8400 PT W/DXA NO RESULTS DOC: HCPCS | Performed by: FAMILY MEDICINE

## 2021-03-12 PROCEDURE — 1090F PRES/ABSN URINE INCON ASSESS: CPT | Performed by: FAMILY MEDICINE

## 2021-03-12 PROCEDURE — 1036F TOBACCO NON-USER: CPT | Performed by: FAMILY MEDICINE

## 2021-03-12 PROCEDURE — 99213 OFFICE O/P EST LOW 20 MIN: CPT | Performed by: FAMILY MEDICINE

## 2021-03-12 PROCEDURE — 1123F ACP DISCUSS/DSCN MKR DOCD: CPT | Performed by: FAMILY MEDICINE

## 2021-03-12 PROCEDURE — G8482 FLU IMMUNIZE ORDER/ADMIN: HCPCS | Performed by: FAMILY MEDICINE

## 2021-03-12 PROCEDURE — G8417 CALC BMI ABV UP PARAM F/U: HCPCS | Performed by: NURSE PRACTITIONER

## 2021-03-12 PROCEDURE — 3288F FALL RISK ASSESSMENT DOCD: CPT | Performed by: FAMILY MEDICINE

## 2021-03-12 RX ORDER — FLUTICASONE PROPIONATE 50 MCG
2 SPRAY, SUSPENSION (ML) NASAL DAILY
Qty: 1 BOTTLE | Refills: 5 | Status: SHIPPED | OUTPATIENT
Start: 2021-03-12 | End: 2021-05-12

## 2021-03-12 ASSESSMENT — ENCOUNTER SYMPTOMS
COUGH: 0
SORE THROAT: 1

## 2021-03-12 NOTE — PATIENT INSTRUCTIONS

## 2021-03-12 NOTE — PROGRESS NOTES
Dominga Starr is a 69 y.o. female    Chief Complaint   Patient presents with    Oral Swelling     bumps, red, 25-30 bumps       HPI:    Pharyngitis  This is a new problem. The current episode started in the past 7 days. The problem occurs daily. The problem has been unchanged. Associated symptoms include a sore throat. Pertinent negatives include no chills or coughing. The symptoms are aggravated by drinking. She has tried nothing for the symptoms. Patient feels her taste was a very enlarged. ROS:    Review of Systems   Constitutional: Negative for chills. HENT: Positive for sore throat. Respiratory: Negative for cough. /74 (Site: Left Upper Arm, Position: Sitting, Cuff Size: Large Adult)   Pulse 78   Temp 94.7 °F (34.8 °C) (Temporal)   Wt 171 lb (77.6 kg)   LMP  (LMP Unknown)   SpO2 97%   BMI 28.46 kg/m²     Physical Exam:    Physical Exam  Constitutional:       General: She is not in acute distress. Appearance: She is well-developed and normal weight. She is not toxic-appearing. HENT:      Head: Normocephalic. Nose: Congestion present. No rhinorrhea. Mouth/Throat:      Mouth: Mucous membranes are moist.      Pharynx: Oropharynx is clear. No oropharyngeal exudate or posterior oropharyngeal erythema. Neck:      Musculoskeletal: Normal range of motion. No neck rigidity. Cardiovascular:      Rate and Rhythm: Normal rate and regular rhythm. Pulses: Normal pulses. Heart sounds: No murmur. Pulmonary:      Effort: Pulmonary effort is normal. No respiratory distress. Breath sounds: Normal breath sounds. No wheezing. Lymphadenopathy:      Cervical: No cervical adenopathy. Neurological:      Mental Status: She is alert. Psychiatric:         Mood and Affect: Mood normal.         Behavior: Behavior normal.         Thought Content:  Thought content normal.         Current Outpatient Medications   Medication Sig Dispense Refill    fluticasone (FLONASE) 50 MCG/ACT nasal spray 2 sprays by Each Nostril route daily 1 Bottle 5    sertraline (ZOLOFT) 50 MG tablet TAKE 1 TABLET BY MOUTH THREE TIMES A DAY 90 tablet 5    famotidine (PEPCID) 20 MG tablet Take 1 tablet by mouth nightly 90 tablet 1    lamoTRIgine (LAMICTAL) 25 MG tablet TAKE 3 TABLETS BY MOUTH DAILY (WITH BREAKFAST) MOOD SWINGS 270 tablet 0    ALPRAZolam (XANAX) 0.5 MG tablet Take 0.5 mg by mouth nightly as needed for Sleep.  atorvastatin (LIPITOR) 20 MG tablet Take 1 tablet by mouth daily 90 tablet 3    vitamin D (CHOLECALCIFEROL) 1000 UNIT TABS tablet Take 1,000 Units by mouth daily      vitamin B-12 (CYANOCOBALAMIN) 500 MCG tablet Take 2,500 mcg by mouth daily        No current facility-administered medications for this visit. Assessment:    1. Sore throat        Plan:    1. Sore throat  Her taste buds appear normal to me. I see just a very little sign of postnasal drip. Encouraged to use Flonase. We will check a strep. No signs of malignancy. - POCT rapid strep A  - fluticasone (FLONASE) 50 MCG/ACT nasal spray; 2 sprays by Each Nostril route daily  Dispense: 1 Bottle; Refill: 5      Patient to return to clinic if symptoms worsen or fail to improve.

## 2021-03-14 LAB — THROAT CULTURE: NORMAL

## 2021-03-17 ENCOUNTER — HOSPITAL ENCOUNTER (OUTPATIENT)
Dept: PHYSICAL THERAPY | Age: 71
Setting detail: THERAPIES SERIES
Discharge: HOME | End: 2021-03-17
Payer: MEDICARE

## 2021-03-17 NOTE — FLOWSHEET NOTE
[facility],  [address]        Physical Therapy  Cancellation/No-show Note  Patient Name:  Racquel Yepez  :  1950   Date:  3/17/2021  Cancelled visits to date: 1  No-shows to date: 0    For today's appointment patient:  [x]  Cancelled  []  Rescheduled appointment  []  No-show     Reason given by patient:  [x]  Patient ill  []  Conflicting appointment  []  No transportation    []  Conflict with work  []  No reason given  []  Other:     Comments:      Electronically signed by:  Mary Ramires, PT

## 2021-03-20 ENCOUNTER — OFFICE VISIT (OUTPATIENT)
Dept: PRIMARY CARE CLINIC | Age: 71
End: 2021-03-20
Payer: MEDICARE

## 2021-03-20 DIAGNOSIS — Z01.818 PREOP EXAMINATION: Primary | ICD-10-CM

## 2021-03-20 PROCEDURE — G8428 CUR MEDS NOT DOCUMENT: HCPCS | Performed by: NURSE PRACTITIONER

## 2021-03-20 PROCEDURE — 99211 OFF/OP EST MAY X REQ PHY/QHP: CPT | Performed by: NURSE PRACTITIONER

## 2021-03-20 PROCEDURE — G8417 CALC BMI ABV UP PARAM F/U: HCPCS | Performed by: NURSE PRACTITIONER

## 2021-03-21 LAB — SARS-COV-2, PCR: NOT DETECTED

## 2021-03-22 ENCOUNTER — HOSPITAL ENCOUNTER (OUTPATIENT)
Dept: PHYSICAL THERAPY | Age: 71
Setting detail: THERAPIES SERIES
Discharge: HOME | End: 2021-03-22
Payer: MEDICARE

## 2021-03-22 RX ORDER — SERTRALINE HYDROCHLORIDE 25 MG/1
TABLET, FILM COATED ORAL
Qty: 90 TABLET | Refills: 1 | Status: SHIPPED | OUTPATIENT
Start: 2021-03-22 | End: 2022-03-08

## 2021-03-22 NOTE — TELEPHONE ENCOUNTER
Refill Request     Last Seen: 1/5/2021    Last Written: 2/22/2021    Next Appointment:   Future Appointments   Date Time Provider Christina Josephine   3/22/2021 10:30 AM Jaida Atwood, PT [facility] AND PT Raf Meier   3/25/2021  9:30 AM [facility]   4/8/2021  1:15 PM Xander Johnson MD AND ORTHO [facility]   1/6/2022 10:30 AM DO YVROSE Arrieta       Future appointment scheduled      Requested Prescriptions     Pending Prescriptions Disp Refills    sertraline (ZOLOFT) 25 MG tablet [Pharmacy Med Name: SERTRALINE HCL 25 MG TABLET] 90 tablet 1     Sig: TAKE 3 TABLETS BY MOUTH AT BEDTIME

## 2021-03-22 NOTE — FLOWSHEET NOTE
[facility],  [address]        Physical Therapy  Cancellation/No-show Note  Patient Name:  Dominga Starr  :  1950   Date:  3/22/2021  Cancelled visits to date: 2  No-shows to date: 0    For today's appointment patient:  [x]  Cancelled  []  Rescheduled appointment  []  No-show     Reason given by patient:  []  Patient ill  []  Conflicting appointment  []  No transportation    []  Conflict with work  [x]  No reason given  []  Other:     Comments:      Electronically signed by:  Ryan Meraz, [id number]

## 2021-03-25 ENCOUNTER — HOSPITAL ENCOUNTER (OUTPATIENT)
Dept: GENERAL RADIOLOGY | Age: 71
Discharge: HOME | End: 2021-03-25
Payer: MEDICARE

## 2021-03-25 ENCOUNTER — HOSPITAL ENCOUNTER (OUTPATIENT)
Dept: CT IMAGING | Age: 71
Discharge: HOME | End: 2021-03-25
Payer: MEDICARE

## 2021-03-25 VITALS
SYSTOLIC BLOOD PRESSURE: 146 MMHG | WEIGHT: 171 LBS | HEIGHT: 65 IN | DIASTOLIC BLOOD PRESSURE: 77 MMHG | OXYGEN SATURATION: 96 % | RESPIRATION RATE: 18 BRPM | HEART RATE: 67 BPM | TEMPERATURE: 69 F | BODY MASS INDEX: 28.49 KG/M2

## 2021-03-25 DIAGNOSIS — C34.12 SQUAMOUS CELL CARCINOMA OF BRONCHUS IN LEFT UPPER LOBE (HCC): ICD-10-CM

## 2021-03-25 LAB
HCT VFR BLD CALC: 42.3 % (ref 36–48)
HEMOGLOBIN: 14.5 G/DL (ref 12–16)
INR BLD: 1.08 (ref 0.86–1.14)
MCH RBC QN AUTO: 29.6 PG (ref 26–34)
MCHC RBC AUTO-ENTMCNC: 34.3 G/DL (ref 31–36)
MCV RBC AUTO: 86.4 FL (ref 80–100)
PDW BLD-RTO: 13.2 % (ref 12.4–15.4)
PLATELET # BLD: 184 K/UL (ref 135–450)
PMV BLD AUTO: 8.5 FL (ref 5–10.5)
PROTHROMBIN TIME: 12.5 SEC (ref 10–13.2)
RBC # BLD: 4.9 M/UL (ref 4–5.2)
WBC # BLD: 5.5 K/UL (ref 4–11)

## 2021-03-25 PROCEDURE — 6360000002 HC RX W HCPCS: Performed by: RADIOLOGY

## 2021-03-25 PROCEDURE — 7100000011 HC PHASE II RECOVERY - ADDTL 15 MIN

## 2021-03-25 PROCEDURE — 7100000010 HC PHASE II RECOVERY - FIRST 15 MIN

## 2021-03-25 PROCEDURE — 2709999900 CT NEEDLE BIOPSY LUNG PERCUTANEOUS W IMAGING GUIDANCE

## 2021-03-25 PROCEDURE — 71045 X-RAY EXAM CHEST 1 VIEW: CPT

## 2021-03-25 PROCEDURE — 85610 PROTHROMBIN TIME: CPT

## 2021-03-25 PROCEDURE — 85027 COMPLETE CBC AUTOMATED: CPT

## 2021-03-25 PROCEDURE — 77012 CT SCAN FOR NEEDLE BIOPSY: CPT

## 2021-03-25 PROCEDURE — 88305 TISSUE EXAM BY PATHOLOGIST: CPT

## 2021-03-25 PROCEDURE — 36415 COLL VENOUS BLD VENIPUNCTURE: CPT

## 2021-03-25 RX ORDER — FENTANYL CITRATE 50 UG/ML
INJECTION, SOLUTION INTRAMUSCULAR; INTRAVENOUS
Status: COMPLETED | OUTPATIENT
Start: 2021-03-25 | End: 2021-03-25

## 2021-03-25 RX ORDER — MIDAZOLAM HYDROCHLORIDE 5 MG/ML
INJECTION INTRAMUSCULAR; INTRAVENOUS
Status: COMPLETED | OUTPATIENT
Start: 2021-03-25 | End: 2021-03-25

## 2021-03-25 RX ORDER — ACETAMINOPHEN 325 MG/1
650 TABLET ORAL EVERY 4 HOURS PRN
Status: DISCONTINUED | OUTPATIENT
Start: 2021-03-25 | End: 2021-03-26 | Stop reason: HOSPADM

## 2021-03-25 RX ADMIN — Medication 50 MCG: at 09:53

## 2021-03-25 RX ADMIN — MIDAZOLAM HYDROCHLORIDE 1 MG: 5 INJECTION, SOLUTION INTRAMUSCULAR; INTRAVENOUS at 09:53

## 2021-03-25 ASSESSMENT — PAIN SCALES - GENERAL: PAINLEVEL_OUTOF10: 0

## 2021-03-25 NOTE — PROGRESS NOTES
Pt c/o pain 10 out of 10 right upper back when taking a deep breath. Breath sounds clear bilaterally. Chest xray negative. MD at bedside. No new orders.

## 2021-03-25 NOTE — PROGRESS NOTES
Pt returned to hospitals post procedure. Pt denies needs/pain. VSS. Pt resting with eyes closed, call light in reach.

## 2021-03-25 NOTE — PRE SEDATION
Sedation Pre-Procedure Note    Patient Name: Rajat Pollard   YOB: 1950  Room/Bed: Room/bed info not found  Medical Record Number: [id number]  Date: 3/25/2021   Time: 10:27 AM       Indication:  Right lung lesion here for biopsy. Consent: I have discussed with the patient and/or the patient representative the indication, alternatives, and the possible risks and/or complications of the planned procedure and the anesthesia methods. The patient and/or patient representative appear to understand and agree to proceed. Vital Signs:   Vitals:    03/25/21 1017   BP: (!) 145/79   Pulse: 69   Resp: 20   Temp:    SpO2: 98%       Past Medical History:   has a past medical history of Allergic rhinitis, Anxiety, Arthritis, Depression, GERD (gastroesophageal reflux disease), Hallux valgus, Headache(784.0), Herpes simplex, Irritable bowel syndrome, Kidney stones, Lung cancer, main bronchus, left (HCC), Osteopenia, Other and unspecified hyperlipidemia, Pain medication agreement signed, and Thyroid disease. Past Surgical History:   has a past surgical history that includes Lithotripsy; shoulder surgery; Tubal ligation; Upper gastrointestinal endoscopy; Upper gastrointestinal endoscopy (N/A, 9/6/2019); Upper gastrointestinal endoscopy (N/A, 9/6/2019); Upper gastrointestinal endoscopy (9/6/2019); CT NEEDLE BIOPSY LUNG PERCUTANEOUS (9/3/2020); and thoracotomy (N/A, 10/16/2020). Medications:   Scheduled Meds:   Continuous Infusions:   PRN Meds:   Home Meds:   Prior to Admission medications    Medication Sig Start Date End Date Taking?  Authorizing Provider   sertraline (ZOLOFT) 25 MG tablet TAKE 3 TABLETS BY MOUTH AT BEDTIME 3/22/21  Yes Nelson Harrison, DO   fluticasone (FLONASE) 50 MCG/ACT nasal spray 2 sprays by Each Nostril route daily 3/12/21  Yes Sara Huggins, DO   famotidine (PEPCID) 20 MG tablet Take 1 tablet by mouth nightly 1/21/21  Yes Nelson Harrison, DO   lamoTRIgine (LAMICTAL) 25 MG tablet TAKE 3 TABLETS BY MOUTH DAILY (WITH BREAKFAST) MOOD SWINGS 12/28/20  Yes Nelson Harrison DO   ALPRAZolam (XANAX) 0.5 MG tablet Take 0.5 mg by mouth nightly as needed for Sleep. Yes Historical Provider, MD   atorvastatin (LIPITOR) 20 MG tablet Take 1 tablet by mouth daily 8/24/20  Yes Natalie Harrison DO   vitamin D (CHOLECALCIFEROL) 1000 UNIT TABS tablet Take 1,000 Units by mouth daily   Yes Historical Provider, MD   vitamin B-12 (CYANOCOBALAMIN) 500 MCG tablet Take 2,500 mcg by mouth daily    Yes Historical Provider, MD     Coumadin Use Last 7 Days:  no  Antiplatelet drug therapy use last 7 days: no  Other anticoagulant use last 7 days: no  Additional Medication Information:  n/a      Pre-Sedation Documentation and Exam:   I have reviewed the patient's history and review of systems.     Mallampati Airway Assessment:  Mallampati Class II - (soft palate, fauces & uvula are visible)    Prior History of Anesthesia Complications:   none    ASA Classification:  Class 2 - A normal healthy patient with mild systemic disease    Sedation/ Anesthesia Plan:   intravenous sedation    Medications Planned:   midazolam (Versed) intravenously and fentanyl intravenously    Patient is an appropriate candidate for plan of sedation: yes    Electronically signed by Nereyda Cuevas MD on 3/25/2021 at 10:27 AM

## 2021-04-04 DIAGNOSIS — R45.86 MOOD SWINGS: ICD-10-CM

## 2021-04-05 NOTE — TELEPHONE ENCOUNTER
Refill Request     Last Seen: 1/5/2021    Last Written: 12/28/20 #270 x Chelsey Weldon    Next Appointment:   Future Appointments   Date Time Provider Christina Burton   4/29/2021  1:15 PM Sho Saldivar MD AND ORTHO [facility]   1/6/2022 10:30 AM DO YVROSE Arrieta [facility]       Future appointment scheduled      Requested Prescriptions     Pending Prescriptions Disp Refills    lamoTRIgine (LAMICTAL) 25 MG tablet [Pharmacy Med Name: LAMOTRIGINE 25 MG TABLET] 270 tablet 0     Sig: TAKE 3 TABLETS BY MOUTH DAILY (WITH BREAKFAST) MOOD SWINGS

## 2021-04-06 ENCOUNTER — TELEPHONE (OUTPATIENT)
Dept: PULMONOLOGY | Age: 71
End: 2021-04-06

## 2021-04-06 DIAGNOSIS — R91.1 LUNG NODULE: Primary | ICD-10-CM

## 2021-04-06 DIAGNOSIS — C34.02 LUNG CANCER, MAIN BRONCHUS, LEFT (HCC): ICD-10-CM

## 2021-04-06 RX ORDER — LAMOTRIGINE 25 MG/1
TABLET ORAL
Qty: 270 TABLET | Refills: 0 | Status: SHIPPED | OUTPATIENT
Start: 2021-04-06 | End: 2021-10-13

## 2021-04-07 NOTE — TELEPHONE ENCOUNTER
Reviewed images, highly suspicious for right upper lobe adenocarcinoma with growing groundglass opacity. In spite of negative needle biopsy, would consider frozen section and completion lobectomy on the opposite side. She is anxious, but agreed to see Dr. Johnny Zamorano. The left upper lobe adenocarcinoma did show lipidic pattern. Patient being referred to Dr. Johnny Zamorano. No

## 2021-04-13 ENCOUNTER — OFFICE VISIT (OUTPATIENT)
Dept: CARDIOTHORACIC SURGERY | Age: 71
End: 2021-04-13
Payer: MEDICARE

## 2021-04-13 VITALS
SYSTOLIC BLOOD PRESSURE: 146 MMHG | OXYGEN SATURATION: 97 % | WEIGHT: 171 LBS | BODY MASS INDEX: 28.49 KG/M2 | DIASTOLIC BLOOD PRESSURE: 80 MMHG | HEART RATE: 79 BPM | TEMPERATURE: 98.3 F | HEIGHT: 65 IN

## 2021-04-13 DIAGNOSIS — Z85.118 HISTORY OF MALIGNANT NEOPLASM OF UPPER LOBE BRONCHUS OR LUNG: ICD-10-CM

## 2021-04-13 DIAGNOSIS — R91.1 PULMONARY NODULE: Primary | ICD-10-CM

## 2021-04-13 DIAGNOSIS — Z90.2 S/P LOBECTOMY OF LUNG: ICD-10-CM

## 2021-04-13 DIAGNOSIS — Z87.891 HISTORY OF TOBACCO USE: ICD-10-CM

## 2021-04-13 PROCEDURE — 4040F PNEUMOC VAC/ADMIN/RCVD: CPT | Performed by: THORACIC SURGERY (CARDIOTHORACIC VASCULAR SURGERY)

## 2021-04-13 PROCEDURE — G8400 PT W/DXA NO RESULTS DOC: HCPCS | Performed by: THORACIC SURGERY (CARDIOTHORACIC VASCULAR SURGERY)

## 2021-04-13 PROCEDURE — 1036F TOBACCO NON-USER: CPT | Performed by: THORACIC SURGERY (CARDIOTHORACIC VASCULAR SURGERY)

## 2021-04-13 PROCEDURE — G8417 CALC BMI ABV UP PARAM F/U: HCPCS | Performed by: THORACIC SURGERY (CARDIOTHORACIC VASCULAR SURGERY)

## 2021-04-13 PROCEDURE — 1123F ACP DISCUSS/DSCN MKR DOCD: CPT | Performed by: THORACIC SURGERY (CARDIOTHORACIC VASCULAR SURGERY)

## 2021-04-13 PROCEDURE — 1090F PRES/ABSN URINE INCON ASSESS: CPT | Performed by: THORACIC SURGERY (CARDIOTHORACIC VASCULAR SURGERY)

## 2021-04-13 PROCEDURE — 3017F COLORECTAL CA SCREEN DOC REV: CPT | Performed by: THORACIC SURGERY (CARDIOTHORACIC VASCULAR SURGERY)

## 2021-04-13 PROCEDURE — G8427 DOCREV CUR MEDS BY ELIG CLIN: HCPCS | Performed by: THORACIC SURGERY (CARDIOTHORACIC VASCULAR SURGERY)

## 2021-04-13 PROCEDURE — 99215 OFFICE O/P EST HI 40 MIN: CPT | Performed by: THORACIC SURGERY (CARDIOTHORACIC VASCULAR SURGERY)

## 2021-04-13 NOTE — LETTER
[facility] Cardiothoracic Surgery  [address] [address]  Phone: [phone]  Fax: [phone]    Yohana Chan MD        April 13, 2021       Patient: Brigid Wei   MR Number: [id number]   YOB: 1950   Date of Visit: 4/13/2021       Dear Dr. Karen Shannon:    Thank you for the request for consultation for Fadi Tamez to me for the evaluation of her enlarging right upper lobe groundglass opacity post left upper lobectomy for malignancy. Below are the relevant portions of my assessment and plan of care. If you have questions, please do not hesitate to call me. I look forward to following Sean Mills along with you. Sincerely,        Ramsey Cervantes MD    CC providers:  Beverly Lynn DO  [address] [facility] [address]  [address], [address]   [address] [facility] [address]  Via In [facility] [address]

## 2021-04-13 NOTE — PROGRESS NOTES
Department of Cardiovascular & Thoracic Surgery  History and Physical          DIAGNOSIS:      ICD-10-CM    1. Pulmonary nodule  R91.1    2. History of tobacco use  Z87.891    3. S/P lobectomy of lung  Z90.2    4. History of malignant neoplasm of upper lobe bronchus or lung  Z85.118        CHIEF COMPLAINT:    Chief Complaint   Patient presents with   [facility] Patient     Mrs. Joana Higuera is being seen, at the request of Dr. Ann Marie Campbell, for evaluation of a right lung nodule. She previously underwent a left thoracotomy with a left upper lobectomy in October, 2020. History Obtained From:  patient, electronic medical record    HISTORY OF PRESENT ILLNESS:      The patient is a 69 y.o. female with significant past medical history of left upper lobe lung cancer for which she underwent a lobectomy last fall. She has been known to have a nonsolid mass in the right upper lobe of her lung at least since 2011. It has enlarged between 2019 and now and she presents for surgical evaluation. She has no chest pain. No cough or hemoptysis. No fevers or chills. No history of Covid-19. She has a history of tobacco use but quit many years ago.     Past Medical History:    Past Medical History:   Diagnosis Date    Allergic rhinitis     Anxiety 10/10/2011    Arthritis     Depression 3/22/2017    GERD (gastroesophageal reflux disease)     Hallux valgus     Headache(784.0)     Herpes simplex     Irritable bowel syndrome     Kidney stones     Lung cancer, main bronchus, left ([address] 10/16/2020    Osteopenia     Other and unspecified hyperlipidemia 4/2/2013    Pain medication agreement signed 10/10/2011    Thyroid disease     nodules       Past Surgical History:    Past Surgical History:   Procedure Laterality Date    CT NEEDLE BIOPSY LUNG PERCUTANEOUS  9/3/2020    CT NEEDLE BIOPSY LUNG PERCUTANEOUS 9/3/2020 [facility] SCAN    CT NEEDLE BIOPSY LUNG PERCUTANEOUS  3/25/2021    CT NEEDLE BIOPSY LUNG PERCUTANEOUS 3/25/2021 Guero MD Sonya [address] [facility] CT SCAN    LITHOTRIPSY      SHOULDER SURGERY      THORACOTOMY N/A 10/16/2020    LEFT UPPER LOBECTOMY THORACOTOMY WITH MEDIASTINAL LYMPH NODE DISSECTION, CRYO ABLATION OF INTERCOSTAL NERVES performed by Vicki Rapp MD at [facility] ENDOSCOPY      gastritis    UPPER GASTROINTESTINAL ENDOSCOPY N/A 9/6/2019    EGD BIOPSY performed by Allison Armstrong MD at [address] [facility] N/A 9/6/2019    COLON & EGD W/ ANESTHESIA performed by Allison Armstrong MD at [facility] [facility]  9/6/2019    EGD [facility] performed by Allison Armstrong MD at [facility] ENDOSCOPY       Medications:   Current Outpatient Medications   Medication Sig Dispense Refill    lamoTRIgine (LAMICTAL) 25 MG tablet TAKE 3 TABLETS BY MOUTH DAILY (WITH BREAKFAST) MOOD SWINGS 270 tablet 0    sertraline (ZOLOFT) 25 MG tablet TAKE 3 TABLETS BY MOUTH AT BEDTIME 90 tablet 1    fluticasone (FLONASE) 50 MCG/ACT nasal spray 2 sprays by Each Nostril route daily 1 Bottle 5    famotidine (PEPCID) 20 MG tablet Take 1 tablet by mouth nightly 90 tablet 1    ALPRAZolam (XANAX) 0.5 MG tablet Take 0.5 mg by mouth nightly as needed for Sleep.  atorvastatin (LIPITOR) 20 MG tablet Take 1 tablet by mouth daily 90 tablet 3    vitamin D (CHOLECALCIFEROL) 1000 UNIT TABS tablet Take 1,000 Units by mouth daily      vitamin B-12 (CYANOCOBALAMIN) 500 MCG tablet Take 2,500 mcg by mouth daily        No current facility-administered medications for this visit. Allergies:  Sulfa antibiotics and Codeine    Social History:    TOBACCO:  quit tobacco use 39 years ago   ETOH:   reports no history of alcohol use. CAFFEINE ABUSE:  No  DRUGS:   reports no history of drug use.   LIFESTYLE: Active  MARITAL STATUS:    OCCUPATION:  Retired     Family History:        Problem Relation Age of Onset    Asthma Mother     Heart Abdomen:  normal bowel sounds, non-tender, aorta normal and bruits absent. No hepatosplenomegaly or masses. Musculoskeletal:  Back is straight and non-tender, full ROM of upper and lower extremities. No kyphosis or scoliosis    Extremities:  Warm, pink, no clubbing, cyanosis, petechiae, ischemia, or deformities. No peripheral edema lower extremities. Skin: no rashes, no ecchymoses, no petechiae, no nodules, no jaundice    Neurological/Psychiatric: oriented, normal mood, no focal deficits, CN II-XII intact    DATA:  11/11/2020 EKG: Normal sinus rhythm    CBC:   Lab Results   Component Value Date    WBC 5.5 03/25/2021    RBC 4.90 03/25/2021    HGB 14.5 03/25/2021    HCT 42.3 03/25/2021    MCV 86.4 03/25/2021    MCH 29.6 03/25/2021    MCHC 34.3 03/25/2021    RDW 13.2 03/25/2021     03/25/2021    MPV 8.5 03/25/2021     CMP:    Lab Results   Component Value Date     10/20/2020    K 4.1 10/20/2020     10/20/2020    CO2 26 10/20/2020    BUN 14 10/20/2020    CREATININE 0.7 03/09/2021    CREATININE 0.6 10/20/2020    GFRAA >60 03/09/2021    GFRAA >60 08/02/2012    AGRATIO 1.7 10/18/2020    LABGLOM >60 03/09/2021    GLUCOSE 108 10/20/2020    PROT 6.1 10/18/2020    PROT 7.2 08/02/2012    LABALBU 3.8 10/18/2020    CALCIUM 8.7 10/20/2020    BILITOT 0.7 10/18/2020    ALKPHOS 67 10/18/2020    AST 24 10/18/2020    ALT 16 10/18/2020     Hepatic Function Panel:    Lab Results   Component Value Date    ALKPHOS 67 10/18/2020    ALT 16 10/18/2020    AST 24 10/18/2020    PROT 6.1 10/18/2020    PROT 7.2 08/02/2012    BILITOT 0.7 10/18/2020    BILIDIR <0.2 06/26/2017    IBILI see below 06/26/2017    LABALBU 3.8 10/18/2020       3/25/2021 CXRAY: No acute cardiopulmonary disease    3/9/2021, 8/22/2019, 10/18/2011 CT Chest scan:  I have independently reviewed and interpreted this study and based my surgical recommendations to the patient on those findings. I agree with the following:   Lungs/pleura:  There has been interval left upper lobectomy. Audra Fiore is a tiny   collection of extrapleural air anteriorly which is presumably postoperative. There is a tiny left-sided pleural effusion as well.       2.1 cm right apical ovoid ground-glass type opacity is not significantly   changed since 08/22/2019. [place] it is enlarged when compared to 10/18/2011   where it measuring nearly 1 cm. This ovoid groundglass opacity is slightly larger compared to 2019 and significantly larger compared to 2011.    3/25/2021 CT-guided biopsy shows inflammatory tissue no evidence of malignancy. ASSESSMENT AND PLAN:    76-year-old female with a history of lung cancer status post left upper lobectomy last fall and a remote history of tobacco use presents with a very slowly enlarging ovoid groundglass opacity in her right upper lobe of her lung. This could very much be consistent with a bronchoalveolar carcinoma though this is not proven by CT-guided needle biopsy at this time. The patient has noted some diminished lung capacity after her last operation at which time she had a lobectomy completed. She is hesitant to proceed with surgical intervention because of the pain and breathing issues related to her last operation. Any procedure at this time on her right upper lobe would result in a wedge resection and not a formal lobectomy as a patient with a bilobectomy's on opposite sides tend to have significant endurance issues from a respiratory standpoint. She will let me know how she wishes to proceed in the next couple of days. Her son was with her today and was able to ask all the questions he wished and the patient and her family will discuss how she wishes to proceed. PFTs if she wishes to proceed.     Talita Self MD  FACS,  [facility], [facility]  4/13/2021  11:06 AM

## 2021-04-22 NOTE — TELEPHONE ENCOUNTER
----- Message from Chelsie Keyes sent at 4/22/2021  1:10 PM EDT -----  Subject: Message to Provider    QUESTIONS  Information for Provider? Pt is having complications with new anxiety med. Pt say it's making her irritable and having mood swings. Pt would like for   provider to prescribe new med. Pt would also like to speak with provider. Please have provider or nurse give pt a follow up call.   ---------------------------------------------------------------------------  --------------  [address]  What is the best way for the office to contact you? OK to leave message on   voicemail  Preferred Call Back Phone Number? [phone]  ---------------------------------------------------------------------------  --------------  SCRIPT ANSWERS  Relationship to Patient?  Self

## 2021-04-23 NOTE — TELEPHONE ENCOUNTER
Taper Lamictal--reduce by 1 every 2 days--will take 6 days to stop--call or send me a note on Wed AM.

## 2021-04-29 ENCOUNTER — OFFICE VISIT (OUTPATIENT)
Dept: ORTHOPEDIC SURGERY | Age: 71
End: 2021-04-29
Payer: MEDICARE

## 2021-04-29 VITALS — WEIGHT: 171 LBS | BODY MASS INDEX: 28.49 KG/M2 | HEIGHT: 65 IN

## 2021-04-29 DIAGNOSIS — M17.11 PRIMARY OSTEOARTHRITIS OF RIGHT KNEE: Primary | ICD-10-CM

## 2021-04-29 PROCEDURE — 1123F ACP DISCUSS/DSCN MKR DOCD: CPT | Performed by: ORTHOPAEDIC SURGERY

## 2021-04-29 PROCEDURE — 20610 DRAIN/INJ JOINT/BURSA W/O US: CPT | Performed by: ORTHOPAEDIC SURGERY

## 2021-04-29 PROCEDURE — G8417 CALC BMI ABV UP PARAM F/U: HCPCS | Performed by: ORTHOPAEDIC SURGERY

## 2021-04-29 PROCEDURE — 4040F PNEUMOC VAC/ADMIN/RCVD: CPT | Performed by: ORTHOPAEDIC SURGERY

## 2021-04-29 PROCEDURE — 1036F TOBACCO NON-USER: CPT | Performed by: ORTHOPAEDIC SURGERY

## 2021-04-29 PROCEDURE — 3017F COLORECTAL CA SCREEN DOC REV: CPT | Performed by: ORTHOPAEDIC SURGERY

## 2021-04-29 PROCEDURE — 99213 OFFICE O/P EST LOW 20 MIN: CPT | Performed by: ORTHOPAEDIC SURGERY

## 2021-04-29 PROCEDURE — G8400 PT W/DXA NO RESULTS DOC: HCPCS | Performed by: ORTHOPAEDIC SURGERY

## 2021-04-29 PROCEDURE — G8427 DOCREV CUR MEDS BY ELIG CLIN: HCPCS | Performed by: ORTHOPAEDIC SURGERY

## 2021-04-29 PROCEDURE — 1090F PRES/ABSN URINE INCON ASSESS: CPT | Performed by: ORTHOPAEDIC SURGERY

## 2021-04-29 RX ORDER — LIDOCAINE HYDROCHLORIDE 10 MG/ML
40 INJECTION, SOLUTION INFILTRATION; PERINEURAL ONCE
Status: COMPLETED | OUTPATIENT
Start: 2021-04-29 | End: 2021-04-29

## 2021-04-29 RX ORDER — BUPIVACAINE HYDROCHLORIDE 2.5 MG/ML
10 INJECTION, SOLUTION INFILTRATION; PERINEURAL ONCE
Status: COMPLETED | OUTPATIENT
Start: 2021-04-29 | End: 2021-04-29

## 2021-04-29 RX ORDER — TRIAMCINOLONE ACETONIDE 40 MG/ML
80 INJECTION, SUSPENSION INTRA-ARTICULAR; INTRAMUSCULAR ONCE
Status: COMPLETED | OUTPATIENT
Start: 2021-04-29 | End: 2021-04-29

## 2021-04-29 RX ADMIN — TRIAMCINOLONE ACETONIDE 80 MG: 40 INJECTION, SUSPENSION INTRA-ARTICULAR; INTRAMUSCULAR at 13:43

## 2021-04-29 RX ADMIN — BUPIVACAINE HYDROCHLORIDE 10 MG: 2.5 INJECTION, SOLUTION INFILTRATION; PERINEURAL at 13:42

## 2021-04-29 RX ADMIN — LIDOCAINE HYDROCHLORIDE 40 MG: 10 INJECTION, SOLUTION INFILTRATION; PERINEURAL at 13:43

## 2021-04-29 NOTE — PROGRESS NOTES
Dr Sho Saldivar      Date /Time 4/29/2021       2:03 PM EST  Name Kalyani Duran             1950   Location  [facility]  MRN [id number]                Chief Complaint   Patient presents with    Knee Pain     R knee  - last seen 01/07/21 Cortisone - extremley helpful interested in antoher i        History of Present Illness  Kalyani Duran is a 69 y.o. female who presents with  bilateral knee pain, right greater than left. she is here for follow-up. She has persistent right knee pain. Her right knee injection helped tremendously compared to her preinjection state. Pain is returned somewhat recently. She is interested in another injection today. Previous history:  Reports bilateral knee pain, right worse than left for last 2 weeks. She has had some longstanding history of right knee pain over months before. This is been low-grade. As of last 2 weeks things have gotten a lot worse. She has reported swelling in her knee which radiates the pain down and up her leg. Denies pain at nighttime. Has more pain with activity.     Past History  Past Medical History:   Diagnosis Date    Allergic rhinitis     Anxiety 10/10/2011    Arthritis     Depression 3/22/2017    GERD (gastroesophageal reflux disease)     Hallux valgus     Headache(784.0)     Herpes simplex     Irritable bowel syndrome     Kidney stones     Lung cancer, main bronchus, left ([address] 10/16/2020    Osteopenia     Other and unspecified hyperlipidemia 4/2/2013    Pain medication agreement signed 10/10/2011    Thyroid disease     nodules     Past Surgical History:   Procedure Laterality Date    CT NEEDLE BIOPSY LUNG PERCUTANEOUS  9/3/2020    CT NEEDLE BIOPSY LUNG PERCUTANEOUS 9/3/2020 [facility] CT SCAN    CT NEEDLE BIOPSY LUNG PERCUTANEOUS  3/25/2021    CT NEEDLE BIOPSY LUNG PERCUTANEOUS 3/25/2021 [facility]ine Sw[facility] CT SCAN    LITHOTRIPSY      SHOULDER SURGERY      THORACOTOMY N/A 10/16/2020    LEFT UPPER LOBECTOMY THORACOTOMY WITH MEDIASTINAL LYMPH NODE DISSECTION, CRYO ABLATION OF INTERCOSTAL NERVES performed by Andrews Lui MD at [facility] ENDOSCOPY      gastritis    UPPER GASTROINTESTINAL ENDOSCOPY N/A 2019    EGD BIOPSY performed by David Vale MD at [address] N/A 2019    COLON & EGD W/ ANESTHESIA performed by David Vale MD at [address]  2019    [address]GD [address] performed by David Vale MD at [address] [facility] History     Tobacco Use    Smoking status: Former Smoker     Packs/day: 1.00     Years: 15.00     Pack years: 15.00     Quit date: 6/15/1983     Years since quittin.8    Smokeless tobacco: Never Used   Substance Use Topics    Alcohol use: No      Current Outpatient Medications on File Prior to Visit   Medication Sig Dispense Refill    lamoTRIgine (LAMICTAL) 25 MG tablet TAKE 3 TABLETS BY MOUTH DAILY (WITH BREAKFAST) MOOD SWINGS 270 tablet 0    sertraline (ZOLOFT) 25 MG tablet TAKE 3 TABLETS BY MOUTH AT BEDTIME 90 tablet 1    fluticasone (FLONASE) 50 MCG/ACT nasal spray 2 sprays by Each Nostril route daily 1 Bottle 5    famotidine (PEPCID) 20 MG tablet Take 1 tablet by mouth nightly 90 tablet 1    ALPRAZolam (XANAX) 0.5 MG tablet Take 0.5 mg by mouth nightly as needed for Sleep.  atorvastatin (LIPITOR) 20 MG tablet Take 1 tablet by mouth daily 90 tablet 3    vitamin D (CHOLECALCIFEROL) 1000 UNIT TABS tablet Take 1,000 Units by mouth daily      vitamin B-12 (CYANOCOBALAMIN) 500 MCG tablet Take 2,500 mcg by mouth daily        No current facility-administered medications on file prior to visit.        ASCVD 10-YEAR RISK SCORE  The 10-year ASCVD risk score (Riley Martinez, et al., 2013) is: 13%    Values used to calculate the score:      Age: 69 years      Sex: Female      Is Non- : No Diabetic: No      Tobacco smoker: No      Systolic Blood Pressure: 641 mmHg      Is BP treated: No      HDL Cholesterol: 60 mg/dL      Total Cholesterol: 184 mg/dL     Review of Systems  10-point ROS is negative other than HPI. Physical Exam  Based off 1997 Exam Criteria  Ht 5' 5\" (1.651 m)   Wt 171 lb (77.6 kg)   LMP  (LMP Unknown)   BMI 28.46 kg/m²      Constitutional:       General: He is not in acute distress. Appearance: Normal appearance. Cardiovascular:      Rate and Rhythm: Normal rate and regular rhythm. Pulses: Normal pulses. Pulmonary:      Effort: Pulmonary effort is normal. No respiratory distress. Neurological:      Mental Status: He is alert and oriented to person, place, and time. Mental status is at baseline. Musculoskeletal:  Gait:  normal  Lumbar spine: Minimal paraspinal tenderness. No paresthesias. Louis Hip: Normal bilateral hips. Negative Stinchfield test.  Pain-free range of motion. R Knee: Tenderness medial joint space. Slight varus deformity. Minimal tenderness around the patella. Stable ligamentous exam.  Mild effusion. L Knee: Tenderness medial joint space. Slight varus deformity. Minimal tenderness around the patella. Stable ligamentous exam.  Mild effusion. Unchanged exam from previous images. Imaging  Bilateral Knee: [address] [facility]  Radiographs: Grade 2-3 osteoarthritis present medial joint space. Grade 1-2 osteoarthritis present patellofemoral joint. Osteophyte seen. Joint space narrowing. No new x-rays taken today, previous ones reviewed. Assessment and Plan  Shanique Schneider was seen today for knee pain.     Diagnoses and all orders for this visit:    Primary osteoarthritis of right knee  -     AK ARTHROCENTESIS ASPIR&/INJ MAJOR JT/BURSA W/O US  -     bupivacaine (MARCAINE) 0.25 % injection 10 mg  -     lidocaine 1 % injection 40 mg  -     triamcinolone acetonide (KENALOG-40) injection 80 mg      I discussed with Jake Soto that her history, symptoms, signs and imaging are most consistent with knee arthritis. We reviewed the natural history of these conditions and treatment options ranging from conservative measures (rest, icing, activity modification, physical therapy, pain meds, cortisone injection) to surgical options. In terms of treatment, I recommended continuing with rest, icing, avoidance of painful activities, NSAIDs or pain meds as tolerated, and home therapy. We discussed surgical options as well, should conservative measures fail. Right Knee Cortisone Injection CPT 84266   Consent was obtained after discussion of the risks, benefits, alternatives, including, but not limited to bleeding, pain, infection, skin disruption or discoloration. Laterality was confirmed (timeout). The knee was prepped with alcohol.  A formulation of 2cc of 40mg/ml Kenalog, 4cc of 1% lidocaine, 4cc of 0.25% sensoricaine was injected into the knee joint space with a 25 gauge needle without difficulty. The site was cleaned and dressed with a band aid.  She tolerated this well and there were no complications. Electronically signed by Nasra Garcia MD on 4/29/2021 at 2:19 PM  This dictation was generated by voice recognition computer software. Although all attempts are made to edit the dictation for accuracy, there may be errors in the transcription that are not intended.

## 2021-05-05 ENCOUNTER — TELEPHONE (OUTPATIENT)
Dept: FAMILY MEDICINE CLINIC | Age: 71
End: 2021-05-05

## 2021-05-05 NOTE — TELEPHONE ENCOUNTER
The prescription that was sent in says 3 tablets every morning--I am assuming she has been on this all along--if not before she starts taking it I need to know how long she has been off the medicine.

## 2021-05-05 NOTE — TELEPHONE ENCOUNTER
----- Message from Eran Ashely sent at 5/5/2021  3:29 PM EDT -----  Subject: Medication Problem    QUESTIONS  Name of Medication? lamoTRIgine (LAMICTAL) 25 MG tablet  Patient-reported dosage and instructions? 25mg   What question or problem do you have with the medication? Pt needs an   update on what dosage to take. Preferred [facility]acy? [facility], [place] - [address]  Pharmacy phone number (if available)? [phone]  Additional Information for Provider?   ---------------------------------------------------------------------------  --------------  CALL BACK INFO  What is the best way for the office to contact you? OK to leave message on   voicemail  Preferred Call Back Phone Number? [phone]  ---------------------------------------------------------------------------  --------------  SCRIPT ANSWERS  Relationship to Patient?  Self

## 2021-05-06 ENCOUNTER — TELEPHONE (OUTPATIENT)
Dept: FAMILY MEDICINE CLINIC | Age: 71
End: 2021-05-06

## 2021-05-06 DIAGNOSIS — F41.9 ANXIETY: Primary | ICD-10-CM

## 2021-05-06 RX ORDER — LORAZEPAM 0.5 MG/1
TABLET ORAL
Qty: 30 TABLET | Refills: 0 | Status: SHIPPED | OUTPATIENT
Start: 2021-05-06 | End: 2021-05-12

## 2021-05-06 NOTE — TELEPHONE ENCOUNTER
Pt calling because she wants to talk to Dr. Cleopatra Layne or Macy Rosas to talk to them about her medication and how it is making her feel.  Please Advise

## 2021-05-06 NOTE — TELEPHONE ENCOUNTER
----- Message from Lor Gonzalo sent at 5/6/2021  1:34 PM EDT -----  Subject: Message to Provider    QUESTIONS  Information for Provider? Would like for MD to call her back. Patient is   experiencing anxiety.  ---------------------------------------------------------------------------  --------------  CALL BACK INFO  What is the best way for the office to contact you? OK to leave message on   voicemail  Preferred Call Back Phone Number? [phone]  ---------------------------------------------------------------------------  --------------  SCRIPT ANSWERS  Relationship to Patient?  Self

## 2021-05-07 DIAGNOSIS — Z01.818 PRE-OP TESTING: Primary | ICD-10-CM

## 2021-05-10 ENCOUNTER — TELEPHONE (OUTPATIENT)
Dept: CARDIOTHORACIC SURGERY | Age: 71
End: 2021-05-10

## 2021-05-10 ENCOUNTER — PREP FOR PROCEDURE (OUTPATIENT)
Dept: CARDIOTHORACIC SURGERY | Age: 71
End: 2021-05-10

## 2021-05-10 DIAGNOSIS — C34.12 MALIGNANT NEOPLASM OF UPPER LOBE OF LEFT LUNG (HCC): Primary | ICD-10-CM

## 2021-05-10 DIAGNOSIS — Z01.818 PRE-OP TESTING: ICD-10-CM

## 2021-05-10 NOTE — TELEPHONE ENCOUNTER
Spoke with patient regarding surgery scheduled for 5/19/2021 at 10:00 am with arrival time of 8:00 am at [facility] with Dr. Frankie Greer. Mayra Lazar to include: right video assisted thoracoscopic surgery with wedge excision of right upper lobe nodule. Patient to complete pulmonary function testing on 5/17/2021 at 4:00 pm with arrival time of 3:30 pm at [facility]. with instructions of No food or drinks 1 hour prior, No smoking, inhaler or bronchodilator usage or caffeine 4 hours prior relayed to patient. Patient to complete a COVID swab in preparation for PFT's and surgery on 5/13/2021 at 8:50 am at [facility] with pre-admit lab work completion to follow. Patient instructed not to eat or drink after midnight the day of surgery and to call our office with any questions or concerns.

## 2021-05-11 ENCOUNTER — ANESTHESIA EVENT (OUTPATIENT)
Dept: OPERATING ROOM | Age: 71
DRG: 165 | End: 2021-05-11
Payer: MEDICARE

## 2021-05-11 RX ORDER — SODIUM CHLORIDE 0.9 % (FLUSH) 0.9 %
5-40 SYRINGE (ML) INJECTION EVERY 12 HOURS SCHEDULED
Status: CANCELLED | OUTPATIENT
Start: 2021-05-11

## 2021-05-11 RX ORDER — SODIUM CHLORIDE 0.9 % (FLUSH) 0.9 %
5-40 SYRINGE (ML) INJECTION PRN
Status: CANCELLED | OUTPATIENT
Start: 2021-05-11

## 2021-05-11 RX ORDER — SODIUM CHLORIDE 9 MG/ML
25 INJECTION, SOLUTION INTRAVENOUS PRN
Status: CANCELLED | OUTPATIENT
Start: 2021-05-11

## 2021-05-11 NOTE — PROGRESS NOTES
Neomia Labor    Age 69 y.o.    female    1950    MRN [id number]    5/19/2021  Arrival Time_____________  OR Time____________[address]     Procedure(s):  RIGHT VIDEO ASSISTED THORACOSCOPIC SURGERY WITH WEDGE EXCISION OF RIGHT UPPER LOBE NODULE                      General     Surgeon(s):  Carla Pruitt, MD      DAY ADMIT ___  SDS/OP ___  OUTPT IN BED ___         Phone [phone] (home)    PCP _____________________ Phone_________________ Epic ( ) Epic CE ( ) Appt ________    ADDITIONAL INFO __________________________________ Cardio/Consult _____________    NOTES _____________________________________________________________________    ____________________________________________________________________________    PAT APPT DATE:________ TIME: ________  FAXED QAD: _______  (__) H&P w/ hospitalist  ____________________________________________________________________________    COVID TEST: Date/Location______________        NURSING HISTORY COMPLETE: _______  (__) CBC       (__) W/ DIFF ___________  (__)  ECHO    __________  (__) Hgb A1C    ___________  (__) CHEST X RAY   __________  (__) LIPID PROFILE  ___________  (__) EKG   __________  (__) PT/PTT   ___________  (__) PFT's   __________  (__) BMP   ___________  (__) CAROTIDS  __________  (__) CMP   ___________  (__) VEIN MAPPING  __________  (__) U/A   ___________  (__) HISTORY & PHYSICAL __________  (__) URINE C & S  ___________  (__) CARDIAC CLEARANCE __________  (__) U/A W/ FLEX  ___________  (__) PULM.  CLEARANCE __________  (__) SERUM PREGNANCY ___________  (__) Check Epic DOS orders __________  (__) TYPE & SCREEN ________ repeat ( ) (__)  __________________ __________  (__) ALBUMIN   ___________  (__)  __________________ __________  (__) TRANSFERRIN  ___________  (__)  __________________ __________  (__) LIVER PROFILE  ___________  (__)  __________________ __________  (__) CARBOXY HGB  ___________  (__) URINE PREG DOS __________  (__) NICOTINE & MET. ___________  (__) BLOOD SUGAR DOS __________  (__) PREALBUMIN  ___________    (__) MRSA NASAL SWAB ___________  (__) BLOOD THINNERS __________  (__) ACE/ ARBS: _____________________    (__) BETABLOCKERS ___________________

## 2021-05-13 ENCOUNTER — HOSPITAL ENCOUNTER (OUTPATIENT)
Dept: PREADMISSION TESTING | Age: 71
Discharge: HOME | End: 2021-05-17
Payer: MEDICARE

## 2021-05-13 ENCOUNTER — HOSPITAL ENCOUNTER (OUTPATIENT)
Dept: GENERAL RADIOLOGY | Age: 71
Discharge: HOME | End: 2021-05-13
Payer: MEDICARE

## 2021-05-13 ENCOUNTER — OFFICE VISIT (OUTPATIENT)
Dept: PRIMARY CARE CLINIC | Age: 71
End: 2021-05-13
Payer: MEDICARE

## 2021-05-13 DIAGNOSIS — C34.12 MALIGNANT NEOPLASM OF UPPER LOBE OF LEFT LUNG (HCC): ICD-10-CM

## 2021-05-13 DIAGNOSIS — Z01.818 PRE-OP TESTING: ICD-10-CM

## 2021-05-13 DIAGNOSIS — Z01.818 PREOP TESTING: Primary | ICD-10-CM

## 2021-05-13 LAB
ABO/RH: NORMAL
ANION GAP SERPL CALCULATED.3IONS-SCNC: 15 MMOL/L (ref 3–16)
ANTIBODY SCREEN: NORMAL
BASOPHILS ABSOLUTE: 0 K/UL (ref 0–0.2)
BASOPHILS RELATIVE PERCENT: 0.3 %
BILIRUBIN URINE: NEGATIVE
BLOOD, URINE: ABNORMAL
BUN BLDV-MCNC: 17 MG/DL (ref 7–20)
CALCIUM SERPL-MCNC: 9.4 MG/DL (ref 8.3–10.6)
CHLORIDE BLD-SCNC: 104 MMOL/L (ref 99–110)
CLARITY: CLEAR
CO2: 24 MMOL/L (ref 21–32)
COLOR: YELLOW
CREAT SERPL-MCNC: 0.7 MG/DL (ref 0.6–1.2)
EKG ATRIAL RATE: 66 BPM
EKG DIAGNOSIS: NORMAL
EKG P AXIS: 71 DEGREES
EKG P-R INTERVAL: 148 MS
EKG Q-T INTERVAL: 386 MS
EKG QRS DURATION: 84 MS
EKG QTC CALCULATION (BAZETT): 404 MS
EKG R AXIS: 29 DEGREES
EKG T AXIS: 26 DEGREES
EKG VENTRICULAR RATE: 66 BPM
EOSINOPHILS ABSOLUTE: 0.1 K/UL (ref 0–0.6)
EOSINOPHILS RELATIVE PERCENT: 0.8 %
EPITHELIAL CELLS, UA: ABNORMAL /HPF (ref 0–5)
GFR AFRICAN AMERICAN: >60
GFR NON-AFRICAN AMERICAN: >60
GLUCOSE BLD-MCNC: 95 MG/DL (ref 70–99)
GLUCOSE URINE: NEGATIVE MG/DL
HCT VFR BLD CALC: 46.1 % (ref 36–48)
HEMOGLOBIN: 15.7 G/DL (ref 12–16)
KETONES, URINE: NEGATIVE MG/DL
LEUKOCYTE ESTERASE, URINE: NEGATIVE
LYMPHOCYTES ABSOLUTE: 2 K/UL (ref 1–5.1)
LYMPHOCYTES RELATIVE PERCENT: 23.5 %
MCH RBC QN AUTO: 29.4 PG (ref 26–34)
MCHC RBC AUTO-ENTMCNC: 34 G/DL (ref 31–36)
MCV RBC AUTO: 86.7 FL (ref 80–100)
MICROSCOPIC EXAMINATION: YES
MONOCYTES ABSOLUTE: 0.5 K/UL (ref 0–1.3)
MONOCYTES RELATIVE PERCENT: 5.5 %
MUCUS: ABNORMAL /LPF
NEUTROPHILS ABSOLUTE: 5.8 K/UL (ref 1.7–7.7)
NEUTROPHILS RELATIVE PERCENT: 69.9 %
NITRITE, URINE: NEGATIVE
PDW BLD-RTO: 13.5 % (ref 12.4–15.4)
PH UA: 5.5 (ref 5–8)
PLATELET # BLD: 162 K/UL (ref 135–450)
PMV BLD AUTO: 9.3 FL (ref 5–10.5)
POTASSIUM SERPL-SCNC: 4 MMOL/L (ref 3.5–5.1)
PROTEIN UA: NEGATIVE MG/DL
RBC # BLD: 5.32 M/UL (ref 4–5.2)
RBC UA: ABNORMAL /HPF (ref 0–4)
SODIUM BLD-SCNC: 143 MMOL/L (ref 136–145)
SPECIFIC GRAVITY UA: 1.02 (ref 1–1.03)
URINE REFLEX TO CULTURE: ABNORMAL
URINE TYPE: ABNORMAL
UROBILINOGEN, URINE: 0.2 E.U./DL
WBC # BLD: 8.3 K/UL (ref 4–11)
WBC UA: ABNORMAL /HPF (ref 0–5)

## 2021-05-13 PROCEDURE — 71046 X-RAY EXAM CHEST 2 VIEWS: CPT

## 2021-05-13 PROCEDURE — 86901 BLOOD TYPING SEROLOGIC RH(D): CPT

## 2021-05-13 PROCEDURE — G8417 CALC BMI ABV UP PARAM F/U: HCPCS | Performed by: NURSE PRACTITIONER

## 2021-05-13 PROCEDURE — 81001 URINALYSIS AUTO W/SCOPE: CPT

## 2021-05-13 PROCEDURE — 93005 ELECTROCARDIOGRAM TRACING: CPT | Performed by: THORACIC SURGERY (CARDIOTHORACIC VASCULAR SURGERY)

## 2021-05-13 PROCEDURE — 99211 OFF/OP EST MAY X REQ PHY/QHP: CPT | Performed by: NURSE PRACTITIONER

## 2021-05-13 PROCEDURE — 85025 COMPLETE CBC W/AUTO DIFF WBC: CPT

## 2021-05-13 PROCEDURE — 86850 RBC ANTIBODY SCREEN: CPT

## 2021-05-13 PROCEDURE — 86900 BLOOD TYPING SEROLOGIC ABO: CPT

## 2021-05-13 PROCEDURE — 36415 COLL VENOUS BLD VENIPUNCTURE: CPT

## 2021-05-13 PROCEDURE — 93010 ELECTROCARDIOGRAM REPORT: CPT | Performed by: INTERNAL MEDICINE

## 2021-05-13 PROCEDURE — 80048 BASIC METABOLIC PNL TOTAL CA: CPT

## 2021-05-13 PROCEDURE — G8428 CUR MEDS NOT DOCUMENT: HCPCS | Performed by: NURSE PRACTITIONER

## 2021-05-13 NOTE — PROGRESS NOTES
Kalyani Roger received a viral test for COVID-19. They were educated on isolation and quarantine as appropriate. For any symptoms, they were directed to seek care from their PCP, given contact information to establish with a doctor, directed to an urgent care or the emergency room.

## 2021-05-13 NOTE — PATIENT INSTRUCTIONS

## 2021-05-14 LAB — SARS-COV-2: NOT DETECTED

## 2021-05-17 ENCOUNTER — HOSPITAL ENCOUNTER (OUTPATIENT)
Dept: PULMONOLOGY | Age: 71
Discharge: HOME | End: 2021-05-17
Payer: MEDICARE

## 2021-05-17 DIAGNOSIS — Z01.818 PRE-OP TESTING: ICD-10-CM

## 2021-05-17 PROCEDURE — 94726 PLETHYSMOGRAPHY LUNG VOLUMES: CPT

## 2021-05-17 PROCEDURE — 94729 DIFFUSING CAPACITY: CPT

## 2021-05-17 PROCEDURE — 6360000002 HC RX W HCPCS: Performed by: THORACIC SURGERY (CARDIOTHORACIC VASCULAR SURGERY)

## 2021-05-17 PROCEDURE — 94664 DEMO&/EVAL PT USE INHALER: CPT

## 2021-05-17 PROCEDURE — 94761 N-INVAS EAR/PLS OXIMETRY MLT: CPT

## 2021-05-17 PROCEDURE — 94060 EVALUATION OF WHEEZING: CPT

## 2021-05-17 RX ORDER — ALBUTEROL SULFATE 2.5 MG/3ML
2.5 SOLUTION RESPIRATORY (INHALATION) ONCE
Status: COMPLETED | OUTPATIENT
Start: 2021-05-17 | End: 2021-05-17

## 2021-05-17 RX ADMIN — ALBUTEROL SULFATE 2.5 MG: 2.5 SOLUTION RESPIRATORY (INHALATION) at 16:46

## 2021-05-19 ENCOUNTER — ANESTHESIA (OUTPATIENT)
Dept: OPERATING ROOM | Age: 71
DRG: 165 | End: 2021-05-19
Payer: MEDICARE

## 2021-05-19 NOTE — PROCEDURES
[address]               [address] [place], [address]                               PULMONARY FUNCTION    PATIENT NAME: Eleanor Oneal                    :        1950  MED REC NO:   [id number]                          ROOM:  ACCOUNT NO:   [de-identified]                           ADMIT DATE: 2021  PROVIDER:     Samantha Ceja MD    DATE OF PROCEDURE:  2021    FINDINGS:  Spirometry for this patient shows an FEV1 of 2.37 which is  102% of predicted and a forced vital capacity of 2.23 which is 105% of  predicted, giving a ratio of 73. There was no response to  bronchodilators. Lungs volumes were in the normal range. Diffusing  capacity also was in the normal range. CONCLUSION:  Normal spirometry and lung volumes.         Leena Wagner MD    D: 2021 10:41:11       T: 2021 16:31:27     [email or web address]  Job#: [id number]     Doc#: [id number]    CC:

## 2021-05-20 ENCOUNTER — HOSPITAL ENCOUNTER (INPATIENT)
Age: 71
LOS: 4 days | Discharge: HOME | DRG: 165 | End: 2021-05-24
Attending: THORACIC SURGERY (CARDIOTHORACIC VASCULAR SURGERY) | Admitting: THORACIC SURGERY (CARDIOTHORACIC VASCULAR SURGERY)
Payer: MEDICARE

## 2021-05-20 ENCOUNTER — APPOINTMENT (OUTPATIENT)
Dept: GENERAL RADIOLOGY | Age: 71
DRG: 165 | End: 2021-05-20
Attending: THORACIC SURGERY (CARDIOTHORACIC VASCULAR SURGERY)
Payer: MEDICARE

## 2021-05-20 VITALS — SYSTOLIC BLOOD PRESSURE: 109 MMHG | DIASTOLIC BLOOD PRESSURE: 72 MMHG | OXYGEN SATURATION: 99 %

## 2021-05-20 DIAGNOSIS — G89.18 ACUTE POST-OPERATIVE PAIN: Primary | ICD-10-CM

## 2021-05-20 DIAGNOSIS — R91.1 NODULE OF RIGHT LUNG: ICD-10-CM

## 2021-05-20 LAB
ABO/RH: NORMAL
ANTIBODY SCREEN: NORMAL
HCT VFR BLD CALC: 42.5 % (ref 36–48)
HCT VFR BLD CALC: 42.8 % (ref 36–48)
HEMOGLOBIN: 14.2 G/DL (ref 12–16)
HEMOGLOBIN: 14.4 G/DL (ref 12–16)
MCH RBC QN AUTO: 28.8 PG (ref 26–34)
MCH RBC QN AUTO: 28.9 PG (ref 26–34)
MCHC RBC AUTO-ENTMCNC: 33.2 G/DL (ref 31–36)
MCHC RBC AUTO-ENTMCNC: 33.8 G/DL (ref 31–36)
MCV RBC AUTO: 85.5 FL (ref 80–100)
MCV RBC AUTO: 86.7 FL (ref 80–100)
PDW BLD-RTO: 13 % (ref 12.4–15.4)
PDW BLD-RTO: 13.2 % (ref 12.4–15.4)
PLATELET # BLD: 130 K/UL (ref 135–450)
PLATELET # BLD: 139 K/UL (ref 135–450)
PMV BLD AUTO: 8.9 FL (ref 5–10.5)
PMV BLD AUTO: 9.3 FL (ref 5–10.5)
RBC # BLD: 4.94 M/UL (ref 4–5.2)
RBC # BLD: 4.98 M/UL (ref 4–5.2)
WBC # BLD: 4.9 K/UL (ref 4–11)
WBC # BLD: 9.7 K/UL (ref 4–11)

## 2021-05-20 PROCEDURE — 2700000000 HC OXYGEN THERAPY PER DAY

## 2021-05-20 PROCEDURE — 3700000000 HC ANESTHESIA ATTENDED CARE: Performed by: THORACIC SURGERY (CARDIOTHORACIC VASCULAR SURGERY)

## 2021-05-20 PROCEDURE — 3700000001 HC ADD 15 MINUTES (ANESTHESIA): Performed by: THORACIC SURGERY (CARDIOTHORACIC VASCULAR SURGERY)

## 2021-05-20 PROCEDURE — 86901 BLOOD TYPING SEROLOGIC RH(D): CPT

## 2021-05-20 PROCEDURE — C9290 INJ, BUPIVACAINE LIPOSOME: HCPCS | Performed by: THORACIC SURGERY (CARDIOTHORACIC VASCULAR SURGERY)

## 2021-05-20 PROCEDURE — 6370000000 HC RX 637 (ALT 250 FOR IP): Performed by: THORACIC SURGERY (CARDIOTHORACIC VASCULAR SURGERY)

## 2021-05-20 PROCEDURE — 2500000003 HC RX 250 WO HCPCS: Performed by: NURSE ANESTHETIST, CERTIFIED REGISTERED

## 2021-05-20 PROCEDURE — 85027 COMPLETE CBC AUTOMATED: CPT

## 2021-05-20 PROCEDURE — 88307 TISSUE EXAM BY PATHOLOGIST: CPT

## 2021-05-20 PROCEDURE — 6360000002 HC RX W HCPCS: Performed by: NURSE ANESTHETIST, CERTIFIED REGISTERED

## 2021-05-20 PROCEDURE — 2580000003 HC RX 258: Performed by: ANESTHESIOLOGY

## 2021-05-20 PROCEDURE — 2720000010 HC SURG SUPPLY STERILE: Performed by: THORACIC SURGERY (CARDIOTHORACIC VASCULAR SURGERY)

## 2021-05-20 PROCEDURE — 2709999900 HC NON-CHARGEABLE SUPPLY: Performed by: THORACIC SURGERY (CARDIOTHORACIC VASCULAR SURGERY)

## 2021-05-20 PROCEDURE — 94640 AIRWAY INHALATION TREATMENT: CPT

## 2021-05-20 PROCEDURE — 86900 BLOOD TYPING SEROLOGIC ABO: CPT

## 2021-05-20 PROCEDURE — 94669 MECHANICAL CHEST WALL OSCILL: CPT

## 2021-05-20 PROCEDURE — 94761 N-INVAS EAR/PLS OXIMETRY MLT: CPT

## 2021-05-20 PROCEDURE — 7100000000 HC PACU RECOVERY - FIRST 15 MIN: Performed by: THORACIC SURGERY (CARDIOTHORACIC VASCULAR SURGERY)

## 2021-05-20 PROCEDURE — 6360000002 HC RX W HCPCS: Performed by: ANESTHESIOLOGY

## 2021-05-20 PROCEDURE — 6360000002 HC RX W HCPCS: Performed by: THORACIC SURGERY (CARDIOTHORACIC VASCULAR SURGERY)

## 2021-05-20 PROCEDURE — 2500000003 HC RX 250 WO HCPCS: Performed by: THORACIC SURGERY (CARDIOTHORACIC VASCULAR SURGERY)

## 2021-05-20 PROCEDURE — 3600000018 HC SURGERY OHS ADDTL 15MIN: Performed by: THORACIC SURGERY (CARDIOTHORACIC VASCULAR SURGERY)

## 2021-05-20 PROCEDURE — 36415 COLL VENOUS BLD VENIPUNCTURE: CPT

## 2021-05-20 PROCEDURE — 2500000003 HC RX 250 WO HCPCS: Performed by: ANESTHESIOLOGY

## 2021-05-20 PROCEDURE — 71045 X-RAY EXAM CHEST 1 VIEW: CPT

## 2021-05-20 PROCEDURE — 2000000000 HC ICU R&B

## 2021-05-20 PROCEDURE — 32669 THORACOSCOPY REMOVE SEGMENT: CPT | Performed by: THORACIC SURGERY (CARDIOTHORACIC VASCULAR SURGERY)

## 2021-05-20 PROCEDURE — C1729 CATH, DRAINAGE: HCPCS | Performed by: THORACIC SURGERY (CARDIOTHORACIC VASCULAR SURGERY)

## 2021-05-20 PROCEDURE — 2580000003 HC RX 258: Performed by: THORACIC SURGERY (CARDIOTHORACIC VASCULAR SURGERY)

## 2021-05-20 PROCEDURE — 0BBC4ZZ EXCISION OF RIGHT UPPER LUNG LOBE, PERCUTANEOUS ENDOSCOPIC APPROACH: ICD-10-PCS | Performed by: THORACIC SURGERY (CARDIOTHORACIC VASCULAR SURGERY)

## 2021-05-20 PROCEDURE — 3600000008 HC SURGERY OHS BASE: Performed by: THORACIC SURGERY (CARDIOTHORACIC VASCULAR SURGERY)

## 2021-05-20 PROCEDURE — 7100000001 HC PACU RECOVERY - ADDTL 15 MIN: Performed by: THORACIC SURGERY (CARDIOTHORACIC VASCULAR SURGERY)

## 2021-05-20 PROCEDURE — 2580000003 HC RX 258: Performed by: NURSE ANESTHETIST, CERTIFIED REGISTERED

## 2021-05-20 PROCEDURE — 86850 RBC ANTIBODY SCREEN: CPT

## 2021-05-20 RX ORDER — OXYCODONE HYDROCHLORIDE AND ACETAMINOPHEN 5; 325 MG/1; MG/1
1 TABLET ORAL PRN
Status: DISCONTINUED | OUTPATIENT
Start: 2021-05-20 | End: 2021-05-20

## 2021-05-20 RX ORDER — SODIUM CHLORIDE 9 MG/ML
25 INJECTION, SOLUTION INTRAVENOUS PRN
Status: DISCONTINUED | OUTPATIENT
Start: 2021-05-20 | End: 2021-05-24 | Stop reason: HOSPADM

## 2021-05-20 RX ORDER — MORPHINE SULFATE 4 MG/ML
4 INJECTION, SOLUTION INTRAMUSCULAR; INTRAVENOUS
Status: DISCONTINUED | OUTPATIENT
Start: 2021-05-20 | End: 2021-05-24 | Stop reason: HOSPADM

## 2021-05-20 RX ORDER — ACETAMINOPHEN 325 MG/1
650 TABLET ORAL EVERY 4 HOURS PRN
Status: DISCONTINUED | OUTPATIENT
Start: 2021-05-20 | End: 2021-05-24 | Stop reason: HOSPADM

## 2021-05-20 RX ORDER — EPHEDRINE SULFATE 50 MG/ML
INJECTION INTRAVENOUS PRN
Status: DISCONTINUED | OUTPATIENT
Start: 2021-05-20 | End: 2021-05-20 | Stop reason: SDUPTHER

## 2021-05-20 RX ORDER — SODIUM CHLORIDE, SODIUM LACTATE, POTASSIUM CHLORIDE, CALCIUM CHLORIDE 600; 310; 30; 20 MG/100ML; MG/100ML; MG/100ML; MG/100ML
INJECTION, SOLUTION INTRAVENOUS CONTINUOUS
Status: DISCONTINUED | OUTPATIENT
Start: 2021-05-20 | End: 2021-05-20

## 2021-05-20 RX ORDER — SODIUM CHLORIDE 9 MG/ML
25 INJECTION, SOLUTION INTRAVENOUS PRN
Status: DISCONTINUED | OUTPATIENT
Start: 2021-05-20 | End: 2021-05-20

## 2021-05-20 RX ORDER — SODIUM CHLORIDE 0.9 % (FLUSH) 0.9 %
10 SYRINGE (ML) INJECTION PRN
Status: DISCONTINUED | OUTPATIENT
Start: 2021-05-20 | End: 2021-05-20

## 2021-05-20 RX ORDER — SODIUM CHLORIDE 0.9 % (FLUSH) 0.9 %
10 SYRINGE (ML) INJECTION EVERY 12 HOURS SCHEDULED
Status: DISCONTINUED | OUTPATIENT
Start: 2021-05-20 | End: 2021-05-24 | Stop reason: HOSPADM

## 2021-05-20 RX ORDER — SODIUM CHLORIDE 0.9 % (FLUSH) 0.9 %
10 SYRINGE (ML) INJECTION PRN
Status: DISCONTINUED | OUTPATIENT
Start: 2021-05-20 | End: 2021-05-24 | Stop reason: HOSPADM

## 2021-05-20 RX ORDER — MORPHINE SULFATE 2 MG/ML
2 INJECTION, SOLUTION INTRAMUSCULAR; INTRAVENOUS EVERY 5 MIN PRN
Status: DISCONTINUED | OUTPATIENT
Start: 2021-05-20 | End: 2021-05-20

## 2021-05-20 RX ORDER — ATORVASTATIN CALCIUM 10 MG/1
20 TABLET, FILM COATED ORAL DAILY
Status: DISCONTINUED | OUTPATIENT
Start: 2021-05-20 | End: 2021-05-24 | Stop reason: HOSPADM

## 2021-05-20 RX ORDER — ALBUTEROL SULFATE 2.5 MG/3ML
2.5 SOLUTION RESPIRATORY (INHALATION)
Status: DISCONTINUED | OUTPATIENT
Start: 2021-05-20 | End: 2021-05-24 | Stop reason: HOSPADM

## 2021-05-20 RX ORDER — ONDANSETRON 2 MG/ML
4 INJECTION INTRAMUSCULAR; INTRAVENOUS PRN
Status: DISCONTINUED | OUTPATIENT
Start: 2021-05-20 | End: 2021-05-20

## 2021-05-20 RX ORDER — HYDRALAZINE HYDROCHLORIDE 20 MG/ML
5 INJECTION INTRAMUSCULAR; INTRAVENOUS EVERY 10 MIN PRN
Status: DISCONTINUED | OUTPATIENT
Start: 2021-05-20 | End: 2021-05-20

## 2021-05-20 RX ORDER — LABETALOL HYDROCHLORIDE 5 MG/ML
5 INJECTION, SOLUTION INTRAVENOUS EVERY 10 MIN PRN
Status: DISCONTINUED | OUTPATIENT
Start: 2021-05-20 | End: 2021-05-20

## 2021-05-20 RX ORDER — SODIUM CHLORIDE, SODIUM LACTATE, POTASSIUM CHLORIDE, CALCIUM CHLORIDE 600; 310; 30; 20 MG/100ML; MG/100ML; MG/100ML; MG/100ML
INJECTION, SOLUTION INTRAVENOUS CONTINUOUS PRN
Status: DISCONTINUED | OUTPATIENT
Start: 2021-05-20 | End: 2021-05-20 | Stop reason: SDUPTHER

## 2021-05-20 RX ORDER — LIDOCAINE HYDROCHLORIDE 20 MG/ML
INJECTION, SOLUTION EPIDURAL; INFILTRATION; INTRACAUDAL; PERINEURAL PRN
Status: DISCONTINUED | OUTPATIENT
Start: 2021-05-20 | End: 2021-05-20 | Stop reason: SDUPTHER

## 2021-05-20 RX ORDER — DEXTROSE, SODIUM CHLORIDE, AND POTASSIUM CHLORIDE 5; .45; .15 G/100ML; G/100ML; G/100ML
INJECTION INTRAVENOUS CONTINUOUS
Status: DISCONTINUED | OUTPATIENT
Start: 2021-05-20 | End: 2021-05-21

## 2021-05-20 RX ORDER — SODIUM CHLORIDE 0.9 % (FLUSH) 0.9 %
5-40 SYRINGE (ML) INJECTION PRN
Status: DISCONTINUED | OUTPATIENT
Start: 2021-05-20 | End: 2021-05-20

## 2021-05-20 RX ORDER — DEXAMETHASONE SODIUM PHOSPHATE 4 MG/ML
INJECTION, SOLUTION INTRA-ARTICULAR; INTRALESIONAL; INTRAMUSCULAR; INTRAVENOUS; SOFT TISSUE PRN
Status: DISCONTINUED | OUTPATIENT
Start: 2021-05-20 | End: 2021-05-20 | Stop reason: SDUPTHER

## 2021-05-20 RX ORDER — MIDAZOLAM HYDROCHLORIDE 1 MG/ML
2 INJECTION INTRAMUSCULAR; INTRAVENOUS ONCE
Status: DISCONTINUED | OUTPATIENT
Start: 2021-05-20 | End: 2021-05-24 | Stop reason: HOSPADM

## 2021-05-20 RX ORDER — MORPHINE SULFATE 2 MG/ML
1 INJECTION, SOLUTION INTRAMUSCULAR; INTRAVENOUS EVERY 5 MIN PRN
Status: DISCONTINUED | OUTPATIENT
Start: 2021-05-20 | End: 2021-05-20

## 2021-05-20 RX ORDER — ROCURONIUM BROMIDE 10 MG/ML
INJECTION, SOLUTION INTRAVENOUS PRN
Status: DISCONTINUED | OUTPATIENT
Start: 2021-05-20 | End: 2021-05-20 | Stop reason: SDUPTHER

## 2021-05-20 RX ORDER — FENTANYL CITRATE 50 UG/ML
INJECTION, SOLUTION INTRAMUSCULAR; INTRAVENOUS PRN
Status: DISCONTINUED | OUTPATIENT
Start: 2021-05-20 | End: 2021-05-20 | Stop reason: SDUPTHER

## 2021-05-20 RX ORDER — SODIUM CHLORIDE 0.9 % (FLUSH) 0.9 %
5-40 SYRINGE (ML) INJECTION EVERY 12 HOURS SCHEDULED
Status: DISCONTINUED | OUTPATIENT
Start: 2021-05-20 | End: 2021-05-20

## 2021-05-20 RX ORDER — MEPERIDINE HYDROCHLORIDE 50 MG/ML
12.5 INJECTION INTRAMUSCULAR; INTRAVENOUS; SUBCUTANEOUS EVERY 5 MIN PRN
Status: DISCONTINUED | OUTPATIENT
Start: 2021-05-20 | End: 2021-05-20

## 2021-05-20 RX ORDER — DIPHENHYDRAMINE HYDROCHLORIDE 50 MG/ML
12.5 INJECTION INTRAMUSCULAR; INTRAVENOUS
Status: DISCONTINUED | OUTPATIENT
Start: 2021-05-20 | End: 2021-05-20

## 2021-05-20 RX ORDER — OXYCODONE HYDROCHLORIDE 5 MG/1
10 TABLET ORAL EVERY 4 HOURS PRN
Status: DISCONTINUED | OUTPATIENT
Start: 2021-05-20 | End: 2021-05-24 | Stop reason: HOSPADM

## 2021-05-20 RX ORDER — POLYETHYLENE GLYCOL 3350 17 G/17G
17 POWDER, FOR SOLUTION ORAL DAILY
Status: DISCONTINUED | OUTPATIENT
Start: 2021-05-20 | End: 2021-05-24 | Stop reason: HOSPADM

## 2021-05-20 RX ORDER — MIDAZOLAM HYDROCHLORIDE 1 MG/ML
INJECTION INTRAMUSCULAR; INTRAVENOUS PRN
Status: DISCONTINUED | OUTPATIENT
Start: 2021-05-20 | End: 2021-05-20 | Stop reason: SDUPTHER

## 2021-05-20 RX ORDER — PROMETHAZINE HYDROCHLORIDE 25 MG/ML
6.25 INJECTION, SOLUTION INTRAMUSCULAR; INTRAVENOUS
Status: DISCONTINUED | OUTPATIENT
Start: 2021-05-20 | End: 2021-05-20

## 2021-05-20 RX ORDER — MORPHINE SULFATE 2 MG/ML
2 INJECTION, SOLUTION INTRAMUSCULAR; INTRAVENOUS
Status: DISCONTINUED | OUTPATIENT
Start: 2021-05-20 | End: 2021-05-24 | Stop reason: HOSPADM

## 2021-05-20 RX ORDER — OXYCODONE HYDROCHLORIDE 5 MG/1
5 TABLET ORAL EVERY 4 HOURS PRN
Status: DISCONTINUED | OUTPATIENT
Start: 2021-05-20 | End: 2021-05-24 | Stop reason: HOSPADM

## 2021-05-20 RX ORDER — FAMOTIDINE 20 MG/1
20 TABLET, FILM COATED ORAL NIGHTLY
Status: DISCONTINUED | OUTPATIENT
Start: 2021-05-20 | End: 2021-05-24 | Stop reason: HOSPADM

## 2021-05-20 RX ORDER — LAMOTRIGINE 25 MG/1
50 TABLET ORAL DAILY
Status: DISCONTINUED | OUTPATIENT
Start: 2021-05-20 | End: 2021-05-24 | Stop reason: HOSPADM

## 2021-05-20 RX ORDER — OXYCODONE HYDROCHLORIDE AND ACETAMINOPHEN 5; 325 MG/1; MG/1
2 TABLET ORAL PRN
Status: DISCONTINUED | OUTPATIENT
Start: 2021-05-20 | End: 2021-05-20

## 2021-05-20 RX ORDER — PROPOFOL 10 MG/ML
INJECTION, EMULSION INTRAVENOUS PRN
Status: DISCONTINUED | OUTPATIENT
Start: 2021-05-20 | End: 2021-05-20 | Stop reason: SDUPTHER

## 2021-05-20 RX ORDER — ONDANSETRON 2 MG/ML
INJECTION INTRAMUSCULAR; INTRAVENOUS PRN
Status: DISCONTINUED | OUTPATIENT
Start: 2021-05-20 | End: 2021-05-20 | Stop reason: SDUPTHER

## 2021-05-20 RX ORDER — SODIUM CHLORIDE 0.9 % (FLUSH) 0.9 %
10 SYRINGE (ML) INJECTION EVERY 12 HOURS SCHEDULED
Status: DISCONTINUED | OUTPATIENT
Start: 2021-05-20 | End: 2021-05-20

## 2021-05-20 RX ADMIN — SODIUM CHLORIDE, SODIUM LACTATE, POTASSIUM CHLORIDE, AND CALCIUM CHLORIDE: .6; .31; .03; .02 INJECTION, SOLUTION INTRAVENOUS at 11:20

## 2021-05-20 RX ADMIN — CEFAZOLIN 2000 MG: 10 INJECTION, POWDER, FOR SOLUTION INTRAVENOUS at 21:07

## 2021-05-20 RX ADMIN — SERTRALINE HYDROCHLORIDE 75 MG: 50 TABLET ORAL at 18:17

## 2021-05-20 RX ADMIN — FENTANYL CITRATE 25 MCG: 50 INJECTION INTRAMUSCULAR; INTRAVENOUS at 13:35

## 2021-05-20 RX ADMIN — ONDANSETRON 4 MG: 2 INJECTION INTRAMUSCULAR; INTRAVENOUS at 14:47

## 2021-05-20 RX ADMIN — SODIUM CHLORIDE, SODIUM LACTATE, POTASSIUM CHLORIDE, AND CALCIUM CHLORIDE: .6; .31; .03; .02 INJECTION, SOLUTION INTRAVENOUS at 12:35

## 2021-05-20 RX ADMIN — HYDROMORPHONE HYDROCHLORIDE 0.5 MG: 1 INJECTION, SOLUTION INTRAMUSCULAR; INTRAVENOUS; SUBCUTANEOUS at 15:24

## 2021-05-20 RX ADMIN — EPHEDRINE SULFATE 10 MG: 50 INJECTION INTRAVENOUS at 12:43

## 2021-05-20 RX ADMIN — MIDAZOLAM HYDROCHLORIDE 2 MG: 2 INJECTION, SOLUTION INTRAMUSCULAR; INTRAVENOUS at 12:24

## 2021-05-20 RX ADMIN — HYDROMORPHONE HYDROCHLORIDE 0.5 MG: 1 INJECTION, SOLUTION INTRAMUSCULAR; INTRAVENOUS; SUBCUTANEOUS at 14:40

## 2021-05-20 RX ADMIN — FENTANYL CITRATE 25 MCG: 50 INJECTION INTRAMUSCULAR; INTRAVENOUS at 13:49

## 2021-05-20 RX ADMIN — PROPOFOL 200 MG: 10 INJECTION, EMULSION INTRAVENOUS at 12:32

## 2021-05-20 RX ADMIN — POTASSIUM CHLORIDE, DEXTROSE MONOHYDRATE AND SODIUM CHLORIDE: 150; 5; 450 INJECTION, SOLUTION INTRAVENOUS at 18:25

## 2021-05-20 RX ADMIN — ATORVASTATIN CALCIUM 20 MG: 10 TABLET, FILM COATED ORAL at 18:17

## 2021-05-20 RX ADMIN — DEXAMETHASONE SODIUM PHOSPHATE 8 MG: 4 INJECTION, SOLUTION INTRAMUSCULAR; INTRAVENOUS at 13:22

## 2021-05-20 RX ADMIN — MEPERIDINE HYDROCHLORIDE 12.5 MG: 50 INJECTION, SOLUTION INTRAMUSCULAR; INTRAVENOUS; SUBCUTANEOUS at 14:30

## 2021-05-20 RX ADMIN — MORPHINE SULFATE 2 MG: 2 INJECTION, SOLUTION INTRAMUSCULAR; INTRAVENOUS at 19:59

## 2021-05-20 RX ADMIN — FAMOTIDINE 20 MG: 10 INJECTION, SOLUTION INTRAVENOUS at 12:20

## 2021-05-20 RX ADMIN — ONDANSETRON 4 MG: 2 INJECTION INTRAMUSCULAR; INTRAVENOUS at 13:22

## 2021-05-20 RX ADMIN — SUGAMMADEX 200 MG: 100 INJECTION, SOLUTION INTRAVENOUS at 13:35

## 2021-05-20 RX ADMIN — SODIUM CHLORIDE, SODIUM LACTATE, POTASSIUM CHLORIDE, AND CALCIUM CHLORIDE: .6; .31; .03; .02 INJECTION, SOLUTION INTRAVENOUS at 12:25

## 2021-05-20 RX ADMIN — ALBUTEROL SULFATE 2.5 MG: 2.5 SOLUTION RESPIRATORY (INHALATION) at 20:28

## 2021-05-20 RX ADMIN — SODIUM CHLORIDE, POTASSIUM CHLORIDE, SODIUM LACTATE AND CALCIUM CHLORIDE: 600; 310; 30; 20 INJECTION, SOLUTION INTRAVENOUS at 12:20

## 2021-05-20 RX ADMIN — ROCURONIUM BROMIDE 50 MG: 10 SOLUTION INTRAVENOUS at 12:32

## 2021-05-20 RX ADMIN — FENTANYL CITRATE 75 MCG: 50 INJECTION INTRAMUSCULAR; INTRAVENOUS at 12:32

## 2021-05-20 RX ADMIN — LIDOCAINE HYDROCHLORIDE 50 MG: 20 INJECTION, SOLUTION EPIDURAL; INFILTRATION; INTRACAUDAL; PERINEURAL at 12:32

## 2021-05-20 RX ADMIN — LAMOTRIGINE 50 MG: 25 TABLET ORAL at 18:17

## 2021-05-20 RX ADMIN — Medication 10 ML: at 19:59

## 2021-05-20 RX ADMIN — FENTANYL CITRATE 25 MCG: 50 INJECTION INTRAMUSCULAR; INTRAVENOUS at 13:44

## 2021-05-20 RX ADMIN — FAMOTIDINE 20 MG: 20 TABLET ORAL at 19:59

## 2021-05-20 ASSESSMENT — PULMONARY FUNCTION TESTS
PIF_VALUE: 2
PIF_VALUE: 34
PIF_VALUE: 1
PIF_VALUE: 20
PIF_VALUE: 20
PIF_VALUE: 18
PIF_VALUE: 38
PIF_VALUE: 14
PIF_VALUE: 20
PIF_VALUE: 38
PIF_VALUE: 37
PIF_VALUE: 39
PIF_VALUE: 2
PIF_VALUE: 1
PIF_VALUE: 2
PIF_VALUE: 2
PIF_VALUE: 22
PIF_VALUE: 35
PIF_VALUE: 32
PIF_VALUE: 9
PIF_VALUE: 37
PIF_VALUE: 20
PIF_VALUE: 21
PIF_VALUE: 20
PIF_VALUE: 8
PIF_VALUE: 39
PIF_VALUE: 18
PIF_VALUE: 30
PIF_VALUE: 34
PIF_VALUE: 39
PIF_VALUE: 36
PIF_VALUE: 39
PIF_VALUE: 33
PIF_VALUE: 40
PIF_VALUE: 1
PIF_VALUE: 40
PIF_VALUE: 19
PIF_VALUE: 38
PIF_VALUE: 2
PIF_VALUE: 40
PIF_VALUE: 39
PIF_VALUE: 34
PIF_VALUE: 30
PIF_VALUE: 1
PIF_VALUE: 2
PIF_VALUE: 1
PIF_VALUE: 20
PIF_VALUE: 24
PIF_VALUE: 1

## 2021-05-20 ASSESSMENT — PAIN DESCRIPTION - PAIN TYPE
TYPE: SURGICAL PAIN
TYPE: SURGICAL PAIN

## 2021-05-20 ASSESSMENT — PAIN DESCRIPTION - PROGRESSION
CLINICAL_PROGRESSION: NOT CHANGED
CLINICAL_PROGRESSION: NOT CHANGED

## 2021-05-20 ASSESSMENT — PAIN DESCRIPTION - FREQUENCY: FREQUENCY: CONTINUOUS

## 2021-05-20 ASSESSMENT — PAIN DESCRIPTION - ORIENTATION: ORIENTATION: RIGHT

## 2021-05-20 ASSESSMENT — PAIN DESCRIPTION - LOCATION
LOCATION: CHEST
LOCATION: CHEST

## 2021-05-20 ASSESSMENT — PAIN DESCRIPTION - DIRECTION
RADIATING_TOWARDS: SHOULDER
RADIATING_TOWARDS: SHOULDER

## 2021-05-20 ASSESSMENT — PAIN DESCRIPTION - ONSET
ONSET: SUDDEN
ONSET: ON-GOING

## 2021-05-20 ASSESSMENT — PAIN DESCRIPTION - DESCRIPTORS: DESCRIPTORS: SHARP

## 2021-05-20 ASSESSMENT — ENCOUNTER SYMPTOMS: SHORTNESS OF BREATH: 1

## 2021-05-20 ASSESSMENT — PAIN - FUNCTIONAL ASSESSMENT: PAIN_FUNCTIONAL_ASSESSMENT: 0-10

## 2021-05-20 ASSESSMENT — PAIN SCALES - GENERAL
PAINLEVEL_OUTOF10: 2
PAINLEVEL_OUTOF10: 10
PAINLEVEL_OUTOF10: 8
PAINLEVEL_OUTOF10: 7

## 2021-05-20 NOTE — ANESTHESIA PRE PROCEDURE
Department of Anesthesiology  Preprocedure Note       Name:  Beulah Arce   Age:  69 y.o.  :  1950                                          MRN:  [id number]         Date:  2021      Surgeon: Frannie Ramirez):  Radha Deng MD    Procedure: Procedure(s):  RIGHT VIDEO ASSISTED THORACOSCOPIC SURGERY WITH WEDGE EXCISION OF RIGHT UPPER LOBE NODULE    Medications prior to admission:   Prior to Admission medications    Medication Sig Start Date End Date Taking?  Authorizing Provider   lamoTRIgine (LAMICTAL) 25 MG tablet TAKE 3 TABLETS BY MOUTH DAILY (WITH BREAKFAST) MOOD SWINGS  Patient taking differently: Take 50 mg by mouth daily  21   Nelson Harrison DO   sertraline (ZOLOFT) 25 MG tablet TAKE 3 TABLETS BY MOUTH AT BEDTIME  Patient taking differently: Take 75 mg by mouth daily  3/22/21   Nelson Harrison DO   famotidine (PEPCID) 20 MG tablet Take 1 tablet by mouth nightly 21   Nelson Harrison DO   atorvastatin (LIPITOR) 20 MG tablet Take 1 tablet by mouth daily  Patient taking differently: Take 20 mg by mouth nightly  20   Nelson Harrison DO   vitamin D (CHOLECALCIFEROL) 1000 UNIT TABS tablet Take 1,000 Units by mouth daily    Historical Provider, MD   vitamin B-12 (CYANOCOBALAMIN) 500 MCG tablet Take 2,500 mcg by mouth daily     Historical Provider, MD       Current medications:    Current Facility-Administered Medications   Medication Dose Route Frequency Provider Last Rate Last Admin    lactated ringers infusion   Intravenous Continuous Corazon Meier MD        sodium chloride flush 0.9 % injection 10 mL  10 mL Intravenous 2 times per day Corazon Meier MD        sodium chloride flush 0.9 % injection 10 mL  10 mL Intravenous PRN Corazon Meier MD        0.9 % sodium chloride infusion  25 mL Intravenous PRN Corazon Meier MD        famotidine (PEPCID) injection 20 mg  20 mg Intravenous Once Corazon Meier MD        0.9 % sodium chloride infusion  25 mL Intravenous PRN Antonino Lima MD        ceFAZolin (ANCEF) 2000 mg in dextrose 5 % 100 mL IVPB  2,000 mg Intravenous On Call to Samir W Lisseth Hayes MD        sodium chloride flush 0.9 % injection 5-40 mL  5-40 mL Intravenous 2 times per day Antonino Lima MD        sodium chloride flush 0.9 % injection 5-40 mL  5-40 mL Intravenous PRN Antonino Lima MD           Allergies:     Allergies   Allergen Reactions    Sulfa Antibiotics     Codeine Nausea Only and Other (See Comments)     Significant headaches       Problem List:    Patient Active Problem List   Diagnosis Code    Anxiety F41.9    Pain medication agreement signed Z02.89    History of colon polyps Z86.010    History of gastritis Z87.19    Insomnia G47.00    Hyperlipidemia E78.5    Moderate episode of recurrent major depressive disorder ([facility] F33.1    Gastroesophageal reflux disease with esophagitis K21.00    Shortness of breath R06.02    Multiple thyroid nodules E04.2    Iron deficiency anemia D50.9    Colon, diverticulosis K57.30    Esophageal dysphagia R13.10    Gastritis without bleeding K29.70    Mood swings R45.86    Lung cancer, main bronchus, left (HCC) C34.02    Anemia due to chronic blood loss D50.0    Intestinal malabsorption K90.9    Non-small cell lung cancer (HCC) C34.90    Nodule of right lung R91.1       Past Medical History:        Diagnosis Date    Allergic rhinitis     Anxiety 10/10/2011    Arthritis     Depression 3/22/2017    GERD (gastroesophageal reflux disease)     Hallux valgus     Headache(784.0)     Herpes simplex     Irritable bowel syndrome     Kidney stones     Lung cancer, main bronchus, left ([facility] 10/16/2020    Osteopenia     Other and unspecified hyperlipidemia 4/2/2013    Pain medication agreement signed 10/10/2011    Thyroid disease     nodules    Wears dentures        Past Surgical History:        Procedure Laterality Date    CT NEEDLE BIOPSY LUNG PERCUTANEOUS 9/3/2020    CT NEEDLE BIOPSY LUNG PERCUTANEOUS 9/3/2020 [facility] CT SCAN    CT NEEDLE BIOPSY LUNG PERCUTANEOUS  3/25/2021    CT NEEDLE BIOPSY LUNG PERCUTANEOUS 3/25/2021 Medina Bray MD [place] CT SCAN    LITHOTRIPSY      SHOULDER SURGERY      THORACOTOMY N/A 10/16/2020    LEFT UPPER LOBECTOMY THORACOTOMY WITH MEDIASTINAL LYMPH NODE DISSECTION, CRYO ABLATION OF INTERCOSTAL NERVES performed by Ismael Carvalho MD at [facility] ENDOSCOPY      gastritis    UPPER GASTROINTESTINAL ENDOSCOPY N/A 2019    EGD BIOPSY performed by Zenon Rojas MD at [place] [address] N/A 2019    COLON & EGD W/ ANESTHESIA performed by Zenon Rojas MD at [place] [address]  2019    EGD [address] [facility] performed by Zenon Rojas MD at [facility]. [address] History:    Social History     Tobacco Use    Smoking status: Former Smoker     Packs/day: 1.00     Years: 15.00     Pack years: 15.00     Quit date: 6/15/1983     Years since quittin.9    Smokeless tobacco: Never Used   Substance Use Topics    Alcohol use: No                                Counseling given: Not Answered      Vital Signs (Current):   Vitals:    21 1315   Weight: 171 lb (77.6 kg)   Height: 5' 5\" (1.651 m)                                              BP Readings from Last 3 Encounters:   21 (!) 146/80   21 (!) 146/77   21 122/74       NPO Status:                                                                                 BMI:   Wt Readings from Last 3 Encounters:   21 171 lb (77.6 kg)   21 171 lb (77.6 kg)   21 171 lb (77.6 kg)     Body mass index is 28.46 kg/m².     CBC:   Lab Results   Component Value Date    WBC 8.3 2021    RBC 5.32 2021    HGB 15.7 2021    HCT 46.1 2021    MCV 86.7 2021    RDW 13.5 2021     2021 CMP:   Lab Results   Component Value Date     05/13/2021    K 4.0 05/13/2021    K 4.1 10/20/2020     05/13/2021    CO2 24 05/13/2021    BUN 17 05/13/2021    CREATININE 0.7 05/13/2021    GFRAA >60 05/13/2021    GFRAA >60 08/02/2012    AGRATIO 1.7 10/18/2020    LABGLOM >60 05/13/2021    GLUCOSE 95 05/13/2021    PROT 6.1 10/18/2020    PROT 7.2 08/02/2012    CALCIUM 9.4 05/13/2021    BILITOT 0.7 10/18/2020    ALKPHOS 67 10/18/2020    AST 24 10/18/2020    ALT 16 10/18/2020       POC Tests: No results for input(s): POCGLU, POCNA, POCK, POCCL, POCBUN, POCHEMO, POCHCT in the last 72 hours. Coags:   Lab Results   Component Value Date    PROTIME 12.5 03/25/2021    INR 1.08 03/25/2021    APTT 31.0 10/08/2020       HCG (If Applicable): No results found for: PREGTESTUR, PREGSERUM, HCG, HCGQUANT     ABGs:   Lab Results   Component Value Date    PHART 7.383 10/08/2020    PO2ART 92.0 10/08/2020    SRJ0LAM 38.2 10/08/2020    SXB7MQL 22.2 10/08/2020    BEART -2.4 10/08/2020    U2IJJUGH 97.2 10/08/2020        Type & Screen (If Applicable):  No results found for: LABABO, LABRH    Drug/Infectious Status (If Applicable):  No results found for: HIV, HEPCAB    COVID-19 Screening (If Applicable):   Lab Results   Component Value Date    COVID19 Not Detected 05/13/2021    COVID19 Not Detected 03/20/2021           Anesthesia Evaluation  Patient summary reviewed and Nursing notes reviewed  Airway: Mallampati: III  TM distance: >3 FB   Neck ROM: full  Mouth opening: > = 3 FB Dental:    (+) upper dentures      Pulmonary:normal exam  breath sounds clear to auscultation  (+) shortness of breath:                             Cardiovascular:Negative CV ROS    (+) hyperlipidemia        Rhythm: regular  Rate: normal                    Neuro/Psych:   Negative Neuro/Psych ROS  (+) headaches:, psychiatric history:depression/anxiety             GI/Hepatic/Renal:   (+) GERD:,           Endo/Other:    (+) malignancy/cancer (Lung cancer).

## 2021-05-20 NOTE — H&P
Department of Cardiovascular & Thoracic Surgery  History and Physical           DIAGNOSIS:        ICD-10-CM     1. Pulmonary nodule  R91.1     2. History of tobacco use  Z87.891     3. S/P lobectomy of lung  Z90.2     4. History of malignant neoplasm of upper lobe bronchus or lung  Z85. 118           CHIEF COMPLAINT:    Chief Complaint   Patient presents with    New Patient       Mrs. Kenia Allison is being seen, at the request of Dr. Negar Castillo, for evaluation of a right lung nodule. She previously underwent a left thoracotomy with a left upper lobectomy in October, 2020.         History Obtained From:  patient, electronic medical record     HISTORY OF PRESENT ILLNESS:       The patient is a 69 y.o. female with significant past medical history of left upper lobe lung cancer for which she underwent a lobectomy last fall. She has been known to have a nonsolid mass in the right upper lobe of her lung at least since 2011. It has enlarged between 2019 and now and she presents for surgical evaluation. She has no chest pain. No cough or hemoptysis. No fevers or chills. No history of Covid-19.   She has a history of tobacco use but quit many years ago.     Past Medical History:    Past Medical History        Past Medical History:   Diagnosis Date    Allergic rhinitis      Anxiety 10/10/2011    Arthritis      Depression 3/22/2017    GERD (gastroesophageal reflux disease)      Hallux valgus      Headache(784.0)      Herpes simplex      Irritable bowel syndrome      Kidney stones      Lung cancer, main bronchus, left ([address] 10/16/2020    Osteopenia      Other and unspecified hyperlipidemia 4/2/2013    Pain medication agreement signed 10/10/2011    Thyroid disease       nodules            Past Surgical History:    Past Surgical History         Past Surgical History:   Procedure Laterality Date    CT NEEDLE BIOPSY LUNG PERCUTANEOUS   9/3/2020     CT NEEDLE BIOPSY LUNG PERCUTANEOUS 9/3/2020 [facility] SCAN    CT NEEDLE BIOPSY LUNG PERCUTANEOUS   3/25/2021     CT NEEDLE BIOPSY LUNG PERCUTANEOUS 3/25/2021 Elvira Gavin MD [address] [facility] CT SCAN    LITHOTRIPSY        SHOULDER SURGERY        THORACOTOMY N/A 10/16/2020     LEFT UPPER LOBECTOMY THORACOTOMY WITH MEDIASTINAL LYMPH NODE DISSECTION, CRYO ABLATION OF INTERCOSTAL NERVES performed by Sabiha Rayo MD at [address]  ENDOSCOPY         gastritis    UPPER GASTROINTESTINAL ENDOSCOPY N/A 9/6/2019     EGD BIOPSY performed by Michelle White MD at [address] N/A 9/6/2019     COLON & EGD W/ ANESTHESIA performed by Michelle White MD at [address]   9/6/2019     EGD [facility] performed by Michelle White MD at [address]            Medications:   Current Facility-Administered Medications          Current Outpatient Medications   Medication Sig Dispense Refill    lamoTRIgine (LAMICTAL) 25 MG tablet TAKE 3 TABLETS BY MOUTH DAILY (WITH BREAKFAST) MOOD SWINGS 270 tablet 0    sertraline (ZOLOFT) 25 MG tablet TAKE 3 TABLETS BY MOUTH AT BEDTIME 90 tablet 1    fluticasone (FLONASE) 50 MCG/ACT nasal spray 2 sprays by Each Nostril route daily 1 Bottle 5    famotidine (PEPCID) 20 MG tablet Take 1 tablet by mouth nightly 90 tablet 1    ALPRAZolam (XANAX) 0.5 MG tablet Take 0.5 mg by mouth nightly as needed for Sleep.        atorvastatin (LIPITOR) 20 MG tablet Take 1 tablet by mouth daily 90 tablet 3    vitamin D (CHOLECALCIFEROL) 1000 UNIT TABS tablet Take 1,000 Units by mouth daily        vitamin B-12 (CYANOCOBALAMIN) 500 MCG tablet Take 2,500 mcg by mouth daily           No current facility-administered medications for this visit.             Allergies:  Sulfa antibiotics and Codeine     Social History:    TOBACCO:  quit tobacco use 39 years ago   ETOH:   reports no history of alcohol use.   CAFFEINE ABUSE:  No  DRUGS:   reports no history of drug use. LIFESTYLE: Active  MARITAL STATUS:    OCCUPATION:  Retired      Family History:    Family History             Problem Relation Age of Onset    Asthma Mother      Heart Disease Father              REVIEW OF SYSTEMS:  I have reviewed and agree with the following:     Constitutional:  No night sweats, headaches, weight loss. Eyes:  No glaucoma. No cataracts s/p removal.   ENMT:  No nosebleeds, deviated septum. Cardiac:  No arrhythmias. Vascular:  No claudication, varicosities. GI:  No PUD, heartburn. :  No kidney stones, frequent UTIs  Musculoskeletal:  + arthritis worse in the knees. No gout. Respiratory:  No SOB, emphysema, asthma. Integumentary:  No dermatitis, itching, rash. Neurological:  No stroke, TIAs, seizures. Psychiatric:  + depression, anxiety. Endocrine: No diabetes, thyroid issues. Hematologic:  No bleeding, easy bruising. Immunologic:  + known cancer- lung and skin. No steroid therapies.     PHYSICAL EXAM:     VITALS:  BP (!) 146/80 (Site: Right Upper Arm, Position: Sitting, Cuff Size: Medium Adult)   Pulse 79   Temp 98.3 °F (36.8 °C) (Temporal)   Ht 5' 5\" (1.651 m)   Wt 171 lb (77.6 kg)   LMP  (LMP Unknown)   SpO2 97%   BMI 28.46 kg/m²   RUE BP     LUE BP     Constitutional:   Well-developed and nourished female. In no acute distress. No obesity.     Eyes:  lids and lashes normal, pupils equal, round and reactive to light, extra ocular muscles intact, sclera clear, conjunctiva normal     Head/ENT:   normal teeth, gums, & palate. Moist mucous membranes. No cyanosis or pallor.     Neck:  supple, symmetrical, trachea midline, no lymphadenopathy, no jugular venous distension, no carotid bruits and MASSES:  no masses. No thyromegaly.     Lungs:  no increased work of breathing, good air exchange, no retractions and clear to auscultation, no crackles or wheezing.   No tactile fremitus     Cardiovascular:  regular rate and rhythm, S1, S2 normal, no discuss how she wishes to proceed.

## 2021-05-20 NOTE — BRIEF OP NOTE
Brief Postoperative Note      Patient: Timur Hudson  YOB: 1950  MRN: [id number]    Date of Procedure: 5/20/2021    Pre-Op Diagnosis: RIGHT UPPER LOBE LUNG NODULE    Post-Op Diagnosis: Same       Procedure(s):  RIGHT VIDEO ASSISTED THORACOSCOPIC SURGERY WITH WEDGE EXCISION OF RIGHT UPPER LOBE NODULE    Surgeon(s):  Demar Dominguez MD    Assistant:  First Assistant: Shwetha Nicholas    Anesthesia: General    Estimated Blood Loss (mL): Minimal    Complications: None    Specimens:   ID Type Source Tests Collected by Time Destination   A : RIGHT UPPER LOBE POSTERIOR SEGMENT Tissue Lung SURGICAL PATHOLOGY Demar Dominguez MD 5/20/2021 1330        Implants:  * No implants in log *      Drains:   Chest Tube 1 Right Pleural 24 Filipino (Active)   Suction To water seal 05/20/21 1402   Chest Tube Airleak No 05/20/21 1402   Drainage Description Other (Comment) 05/20/21 1402   Dressing Status Clean;Dry; Intact 05/20/21 1402       Urethral Catheter Latex 16 fr (Active)   Urine Color Yellow 05/20/21 1402   Urine Appearance Clear 05/20/21 1402       [REMOVED] Chest Tube 1 Left Pleural 32 Filipino (Removed)       Findings:     Electronically signed by Demar Dominguez MD on 5/20/2021 at 2:50 PM

## 2021-05-20 NOTE — ANESTHESIA POSTPROCEDURE EVALUATION
Department of Anesthesiology  Postprocedure Note    Patient: Jake Soto  MRN: [id number]  YOB: 1950  Date of evaluation: 5/20/2021  Time:  3:48 PM     Procedure Summary     Date: 05/20/21 Room / [facility]: [facility] [address] [place] [address]    Anesthesia Start: 8950 Anesthesia Stop: 1406    Procedure: RIGHT VIDEO ASSISTED THORACOSCOPIC SURGERY WITH WEDGE EXCISION OF RIGHT UPPER LOBE NODULE (Right Chest) Diagnosis:       Nodule of right lung      (RIGHT UPPER LOBE LUNG NODULE)    Surgeons: Roula Rowland MD Responsible Provider: Ambrose Cheng MD    Anesthesia Type: general ASA Status: 3          Anesthesia Type: general    Brenda Phase I: Brenda Score: 9    Brenda Phase II:      Last vitals: Reviewed and per EMR flowsheets.        Anesthesia Post Evaluation    Patient location during evaluation: PACU  Patient participation: complete - patient participated  Level of consciousness: awake and alert  Pain score: 0  Airway patency: patent  Nausea & Vomiting: no nausea and no vomiting  Complications: no  Cardiovascular status: blood pressure returned to baseline  Respiratory status: acceptable  Hydration status: stable

## 2021-05-20 NOTE — FLOWSHEET NOTE
05/20/21 1524   Pain Assessment   Pain Assessment 0-10   Pain Level 7   Patient's Stated Pain Goal 4   Pain Type Surgical pain   Pain Location Chest   Pain Orientation Right   Pain Radiating Towards shoulder   Pain Descriptors Sharp   Pain Frequency Continuous   Pain Onset On-going   Clinical Progression Not changed   Non-Pharmaceutical Pain Intervention(s) Emotional support  (Dilaudid 0.5 mg)

## 2021-05-20 NOTE — FLOWSHEET NOTE
05/20/21 0410   Pain Assessment   Pain Assessment 0-10   Pain Level 10   Patient's Stated Pain Goal 4   Pain Type Surgical pain   Pain Location Chest   Pain Orientation Right   Pain Radiating Towards shoulder   Pain Descriptors Sharp   Pain Frequency Continuous   Pain Onset Sudden   Clinical Progression Gradually worsening   Non-Pharmaceutical Pain Intervention(s) Emotional support  (Dilaudid 0.5 mg IV given)

## 2021-05-20 NOTE — PROGRESS NOTES
Pt brought to PACU. Report obtained from OR RN and anesthesia. Pt placed on monitor SR and O2 at 4L with and LMA.  Right CT to water seal.

## 2021-05-20 NOTE — PROGRESS NOTES
A: TC to patient's daughter to notify of patient's room number, but there was no answer, also looked in the surgical waiting area but she was not there.

## 2021-05-21 ENCOUNTER — APPOINTMENT (OUTPATIENT)
Dept: GENERAL RADIOLOGY | Age: 71
DRG: 165 | End: 2021-05-21
Attending: THORACIC SURGERY (CARDIOTHORACIC VASCULAR SURGERY)
Payer: MEDICARE

## 2021-05-21 LAB
A/G RATIO: 1.5 (ref 1.1–2.2)
ALBUMIN SERPL-MCNC: 3.4 G/DL (ref 3.4–5)
ALP BLD-CCNC: 78 U/L (ref 40–129)
ALT SERPL-CCNC: 20 U/L (ref 10–40)
ANION GAP SERPL CALCULATED.3IONS-SCNC: 9 MMOL/L (ref 3–16)
AST SERPL-CCNC: 25 U/L (ref 15–37)
BILIRUB SERPL-MCNC: 0.4 MG/DL (ref 0–1)
BUN BLDV-MCNC: 10 MG/DL (ref 7–20)
CALCIUM SERPL-MCNC: 8.9 MG/DL (ref 8.3–10.6)
CHLORIDE BLD-SCNC: 104 MMOL/L (ref 99–110)
CO2: 27 MMOL/L (ref 21–32)
CREAT SERPL-MCNC: 0.7 MG/DL (ref 0.6–1.2)
GFR AFRICAN AMERICAN: >60
GFR NON-AFRICAN AMERICAN: >60
GLOBULIN: 2.3 G/DL
GLUCOSE BLD-MCNC: 168 MG/DL (ref 70–99)
HCT VFR BLD CALC: 38.6 % (ref 36–48)
HEMOGLOBIN: 12.8 G/DL (ref 12–16)
MCH RBC QN AUTO: 28.7 PG (ref 26–34)
MCHC RBC AUTO-ENTMCNC: 33.1 G/DL (ref 31–36)
MCV RBC AUTO: 86.8 FL (ref 80–100)
PDW BLD-RTO: 13.1 % (ref 12.4–15.4)
PLATELET # BLD: 136 K/UL (ref 135–450)
PMV BLD AUTO: 9 FL (ref 5–10.5)
POTASSIUM REFLEX MAGNESIUM: 4.8 MMOL/L (ref 3.5–5.1)
RBC # BLD: 4.45 M/UL (ref 4–5.2)
SODIUM BLD-SCNC: 140 MMOL/L (ref 136–145)
TOTAL PROTEIN: 5.7 G/DL (ref 6.4–8.2)
WBC # BLD: 11.3 K/UL (ref 4–11)

## 2021-05-21 PROCEDURE — 6360000002 HC RX W HCPCS: Performed by: THORACIC SURGERY (CARDIOTHORACIC VASCULAR SURGERY)

## 2021-05-21 PROCEDURE — 6370000000 HC RX 637 (ALT 250 FOR IP): Performed by: THORACIC SURGERY (CARDIOTHORACIC VASCULAR SURGERY)

## 2021-05-21 PROCEDURE — 2580000003 HC RX 258: Performed by: THORACIC SURGERY (CARDIOTHORACIC VASCULAR SURGERY)

## 2021-05-21 PROCEDURE — 2700000000 HC OXYGEN THERAPY PER DAY

## 2021-05-21 PROCEDURE — 94640 AIRWAY INHALATION TREATMENT: CPT

## 2021-05-21 PROCEDURE — 94150 VITAL CAPACITY TEST: CPT

## 2021-05-21 PROCEDURE — 94669 MECHANICAL CHEST WALL OSCILL: CPT

## 2021-05-21 PROCEDURE — 80053 COMPREHEN METABOLIC PANEL: CPT

## 2021-05-21 PROCEDURE — 71045 X-RAY EXAM CHEST 1 VIEW: CPT

## 2021-05-21 PROCEDURE — 85027 COMPLETE CBC AUTOMATED: CPT

## 2021-05-21 PROCEDURE — 2000000000 HC ICU R&B

## 2021-05-21 PROCEDURE — 36415 COLL VENOUS BLD VENIPUNCTURE: CPT

## 2021-05-21 PROCEDURE — 94761 N-INVAS EAR/PLS OXIMETRY MLT: CPT

## 2021-05-21 PROCEDURE — 6370000000 HC RX 637 (ALT 250 FOR IP): Performed by: NURSE PRACTITIONER

## 2021-05-21 PROCEDURE — 99024 POSTOP FOLLOW-UP VISIT: CPT | Performed by: NURSE PRACTITIONER

## 2021-05-21 PROCEDURE — 2500000003 HC RX 250 WO HCPCS: Performed by: THORACIC SURGERY (CARDIOTHORACIC VASCULAR SURGERY)

## 2021-05-21 RX ORDER — MECOBALAMIN 5000 MCG
5 TABLET,DISINTEGRATING ORAL NIGHTLY PRN
Status: DISCONTINUED | OUTPATIENT
Start: 2021-05-21 | End: 2021-05-24 | Stop reason: HOSPADM

## 2021-05-21 RX ADMIN — ALBUTEROL SULFATE 2.5 MG: 2.5 SOLUTION RESPIRATORY (INHALATION) at 12:06

## 2021-05-21 RX ADMIN — Medication 10 ML: at 20:41

## 2021-05-21 RX ADMIN — OXYCODONE 10 MG: 5 TABLET ORAL at 20:40

## 2021-05-21 RX ADMIN — ALBUTEROL SULFATE 2.5 MG: 2.5 SOLUTION RESPIRATORY (INHALATION) at 20:45

## 2021-05-21 RX ADMIN — Medication 5 MG: at 20:40

## 2021-05-21 RX ADMIN — POTASSIUM CHLORIDE, DEXTROSE MONOHYDRATE AND SODIUM CHLORIDE: 150; 5; 450 INJECTION, SOLUTION INTRAVENOUS at 04:12

## 2021-05-21 RX ADMIN — ENOXAPARIN SODIUM 40 MG: 40 INJECTION SUBCUTANEOUS at 08:28

## 2021-05-21 RX ADMIN — OXYCODONE 5 MG: 5 TABLET ORAL at 08:27

## 2021-05-21 RX ADMIN — POLYETHYLENE GLYCOL 3350 17 G: 17 POWDER, FOR SOLUTION ORAL at 08:29

## 2021-05-21 RX ADMIN — ACETAMINOPHEN 650 MG: 325 TABLET ORAL at 14:00

## 2021-05-21 RX ADMIN — CEFAZOLIN 2000 MG: 10 INJECTION, POWDER, FOR SOLUTION INTRAVENOUS at 04:12

## 2021-05-21 RX ADMIN — SERTRALINE HYDROCHLORIDE 75 MG: 50 TABLET ORAL at 08:27

## 2021-05-21 RX ADMIN — OXYCODONE 5 MG: 5 TABLET ORAL at 14:00

## 2021-05-21 RX ADMIN — ALBUTEROL SULFATE 2.5 MG: 2.5 SOLUTION RESPIRATORY (INHALATION) at 08:11

## 2021-05-21 RX ADMIN — MORPHINE SULFATE 2 MG: 2 INJECTION, SOLUTION INTRAMUSCULAR; INTRAVENOUS at 12:42

## 2021-05-21 RX ADMIN — MUPIROCIN: 20 OINTMENT TOPICAL at 08:31

## 2021-05-21 RX ADMIN — OXYCODONE 5 MG: 5 TABLET ORAL at 01:33

## 2021-05-21 RX ADMIN — LAMOTRIGINE 50 MG: 25 TABLET ORAL at 08:28

## 2021-05-21 RX ADMIN — FAMOTIDINE 20 MG: 20 TABLET ORAL at 20:40

## 2021-05-21 RX ADMIN — ATORVASTATIN CALCIUM 20 MG: 10 TABLET, FILM COATED ORAL at 08:27

## 2021-05-21 ASSESSMENT — PAIN SCALES - GENERAL
PAINLEVEL_OUTOF10: 9
PAINLEVEL_OUTOF10: 3
PAINLEVEL_OUTOF10: 7
PAINLEVEL_OUTOF10: 9
PAINLEVEL_OUTOF10: 7

## 2021-05-21 ASSESSMENT — PAIN DESCRIPTION - PROGRESSION
CLINICAL_PROGRESSION: NOT CHANGED
CLINICAL_PROGRESSION: OTHER (COMMENT)

## 2021-05-21 ASSESSMENT — PAIN DESCRIPTION - ONSET: ONSET: ON-GOING

## 2021-05-21 ASSESSMENT — PAIN DESCRIPTION - LOCATION: LOCATION: CHEST

## 2021-05-21 ASSESSMENT — PAIN DESCRIPTION - DESCRIPTORS: DESCRIPTORS: SHARP

## 2021-05-21 NOTE — PROGRESS NOTES
CVTS Thoracic Progress Note:          CC:  Post op follow up 5/20/21 RIGHT VIDEO ASSISTED THORACOSCOPIC SURGERY WITH WEDGE EXCISION OF RIGHT UPPER LOBE NODULE     Subj: awake, sitting up in bed, moderate post op pain. Obj:    Blood pressure (!) 102/53, pulse 74, temperature 98.5 °F (36.9 °C), temperature source Oral, resp. rate 21, height 5' 5\" (1.651 m), weight 171 lb (77.6 kg), SpO2 94 %, not currently breastfeeding. Lungs CTAB   Abdomen soft, non-tender    Incision w/ occlusive dressing, CDI    Chest tube with 30-56= 86 ml serosanguinous drainage in last 16 hours/no airleak    Diagnostics:   Recent Labs     05/20/21  1200 05/20/21  1520 05/21/21  0421   WBC 4.9 9.7 11.3*   HGB 14.4 14.2 12.8   HCT 42.5 42.8 38.6    130* 136                                                                  Recent Labs     05/21/21  0421      K 4.8      CO2 27   BUN 10   CREATININE 0.7   GLUCOSE 168*     CXR: 5/21/21   Impression   Small right lateral apical pneumothorax, 7 mm.       Decreased fluid and vascular congestion. Assess/Plan:   RUL lung nodule: s/p wedge resection  Needs expansion- IS, OOBTC, ambulation. Placing chest tube to water seal this morning. PRN pain meds  CXR in AM    Depression: continue lamictal and zoloft     HLD: continue lipitor     GERD: continue pepcid    Prophy: miralax and lovenox  ______________________________________________________________    Chelo Martinez, BELINDA - CNP  5/21/2021  9:38 AM  Note reviewed, events of last 24 hours reviewed along with vital signs and I/Os and patient examined. Assessment and plans discussed and are as outlined above.      Demar Pearce MD, FACS, [facility] [place], [place], [place]

## 2021-05-21 NOTE — PROGRESS NOTES
Patient's oxygen taken off at this time due to an SpO2 of 98% on 1 LPM nasal cannula. SpO2 currently 96% and patient does not show any signs/symptoms of respiratory distress. RN notified of this change.      Electronically signed by Pollo Batista RCP, RRT, RRT-ACCS on 5/21/2021 at 8:23 AM

## 2021-05-22 ENCOUNTER — APPOINTMENT (OUTPATIENT)
Dept: GENERAL RADIOLOGY | Age: 71
DRG: 165 | End: 2021-05-22
Attending: THORACIC SURGERY (CARDIOTHORACIC VASCULAR SURGERY)
Payer: MEDICARE

## 2021-05-22 PROCEDURE — 94640 AIRWAY INHALATION TREATMENT: CPT

## 2021-05-22 PROCEDURE — 94669 MECHANICAL CHEST WALL OSCILL: CPT

## 2021-05-22 PROCEDURE — 2000000000 HC ICU R&B

## 2021-05-22 PROCEDURE — 94761 N-INVAS EAR/PLS OXIMETRY MLT: CPT

## 2021-05-22 PROCEDURE — 6370000000 HC RX 637 (ALT 250 FOR IP): Performed by: THORACIC SURGERY (CARDIOTHORACIC VASCULAR SURGERY)

## 2021-05-22 PROCEDURE — 6360000002 HC RX W HCPCS: Performed by: THORACIC SURGERY (CARDIOTHORACIC VASCULAR SURGERY)

## 2021-05-22 PROCEDURE — 2580000003 HC RX 258: Performed by: THORACIC SURGERY (CARDIOTHORACIC VASCULAR SURGERY)

## 2021-05-22 PROCEDURE — 2700000000 HC OXYGEN THERAPY PER DAY

## 2021-05-22 PROCEDURE — 71045 X-RAY EXAM CHEST 1 VIEW: CPT

## 2021-05-22 RX ADMIN — ALBUTEROL SULFATE 2.5 MG: 2.5 SOLUTION RESPIRATORY (INHALATION) at 15:50

## 2021-05-22 RX ADMIN — MORPHINE SULFATE 2 MG: 2 INJECTION, SOLUTION INTRAMUSCULAR; INTRAVENOUS at 18:01

## 2021-05-22 RX ADMIN — SERTRALINE HYDROCHLORIDE 75 MG: 50 TABLET ORAL at 09:46

## 2021-05-22 RX ADMIN — BISACODYL 5 MG: 5 TABLET, COATED ORAL at 09:48

## 2021-05-22 RX ADMIN — OXYCODONE 10 MG: 5 TABLET ORAL at 06:11

## 2021-05-22 RX ADMIN — ATORVASTATIN CALCIUM 20 MG: 10 TABLET, FILM COATED ORAL at 09:46

## 2021-05-22 RX ADMIN — MAGNESIUM HYDROXIDE 30 ML: 2400 SUSPENSION ORAL at 18:01

## 2021-05-22 RX ADMIN — MUPIROCIN: 20 OINTMENT TOPICAL at 09:45

## 2021-05-22 RX ADMIN — Medication 10 ML: at 09:47

## 2021-05-22 RX ADMIN — MUPIROCIN: 20 OINTMENT TOPICAL at 20:38

## 2021-05-22 RX ADMIN — ALBUTEROL SULFATE 2.5 MG: 2.5 SOLUTION RESPIRATORY (INHALATION) at 07:51

## 2021-05-22 RX ADMIN — POLYETHYLENE GLYCOL 3350 17 G: 17 POWDER, FOR SOLUTION ORAL at 09:45

## 2021-05-22 RX ADMIN — FAMOTIDINE 20 MG: 20 TABLET ORAL at 20:48

## 2021-05-22 RX ADMIN — ENOXAPARIN SODIUM 40 MG: 40 INJECTION SUBCUTANEOUS at 09:47

## 2021-05-22 RX ADMIN — LAMOTRIGINE 50 MG: 25 TABLET ORAL at 09:46

## 2021-05-22 RX ADMIN — Medication 10 ML: at 20:38

## 2021-05-22 ASSESSMENT — PAIN SCALES - GENERAL
PAINLEVEL_OUTOF10: 7
PAINLEVEL_OUTOF10: 7

## 2021-05-22 ASSESSMENT — PAIN DESCRIPTION - PROGRESSION
CLINICAL_PROGRESSION: OTHER (COMMENT)

## 2021-05-22 NOTE — PROGRESS NOTES
Pt lying in bed on her side. Pt states she is having pain but states she doesn't need anything that her nurse gave her something to help. Pt repositioned and states she is comfortable. Her dressing is clean and dry. Will continue to monitor.  Kristal Mehta RN

## 2021-05-23 ENCOUNTER — APPOINTMENT (OUTPATIENT)
Dept: GENERAL RADIOLOGY | Age: 71
DRG: 165 | End: 2021-05-23
Attending: THORACIC SURGERY (CARDIOTHORACIC VASCULAR SURGERY)
Payer: MEDICARE

## 2021-05-23 PROCEDURE — 6360000002 HC RX W HCPCS: Performed by: THORACIC SURGERY (CARDIOTHORACIC VASCULAR SURGERY)

## 2021-05-23 PROCEDURE — 71045 X-RAY EXAM CHEST 1 VIEW: CPT

## 2021-05-23 PROCEDURE — 2000000000 HC ICU R&B

## 2021-05-23 PROCEDURE — 94640 AIRWAY INHALATION TREATMENT: CPT

## 2021-05-23 PROCEDURE — 6370000000 HC RX 637 (ALT 250 FOR IP): Performed by: THORACIC SURGERY (CARDIOTHORACIC VASCULAR SURGERY)

## 2021-05-23 PROCEDURE — 2580000003 HC RX 258: Performed by: THORACIC SURGERY (CARDIOTHORACIC VASCULAR SURGERY)

## 2021-05-23 PROCEDURE — 94669 MECHANICAL CHEST WALL OSCILL: CPT

## 2021-05-23 RX ADMIN — BISACODYL 5 MG: 5 TABLET, COATED ORAL at 08:12

## 2021-05-23 RX ADMIN — Medication 10 ML: at 19:57

## 2021-05-23 RX ADMIN — MUPIROCIN: 20 OINTMENT TOPICAL at 19:58

## 2021-05-23 RX ADMIN — LAMOTRIGINE 50 MG: 25 TABLET ORAL at 08:12

## 2021-05-23 RX ADMIN — POLYETHYLENE GLYCOL 3350 17 G: 17 POWDER, FOR SOLUTION ORAL at 08:13

## 2021-05-23 RX ADMIN — Medication 10 ML: at 10:34

## 2021-05-23 RX ADMIN — ALBUTEROL SULFATE 2.5 MG: 2.5 SOLUTION RESPIRATORY (INHALATION) at 20:54

## 2021-05-23 RX ADMIN — MORPHINE SULFATE 2 MG: 2 INJECTION, SOLUTION INTRAMUSCULAR; INTRAVENOUS at 20:49

## 2021-05-23 RX ADMIN — MUPIROCIN: 20 OINTMENT TOPICAL at 08:13

## 2021-05-23 RX ADMIN — ALBUTEROL SULFATE 2.5 MG: 2.5 SOLUTION RESPIRATORY (INHALATION) at 11:57

## 2021-05-23 RX ADMIN — SERTRALINE HYDROCHLORIDE 75 MG: 50 TABLET ORAL at 08:12

## 2021-05-23 RX ADMIN — MORPHINE SULFATE 4 MG: 4 INJECTION, SOLUTION INTRAMUSCULAR; INTRAVENOUS at 02:53

## 2021-05-23 RX ADMIN — ENOXAPARIN SODIUM 40 MG: 40 INJECTION SUBCUTANEOUS at 08:13

## 2021-05-23 RX ADMIN — FAMOTIDINE 20 MG: 20 TABLET ORAL at 19:58

## 2021-05-23 RX ADMIN — ATORVASTATIN CALCIUM 20 MG: 10 TABLET, FILM COATED ORAL at 08:12

## 2021-05-23 RX ADMIN — MORPHINE SULFATE 2 MG: 2 INJECTION, SOLUTION INTRAMUSCULAR; INTRAVENOUS at 10:34

## 2021-05-23 RX ADMIN — ACETAMINOPHEN 650 MG: 325 TABLET ORAL at 19:57

## 2021-05-23 ASSESSMENT — PAIN DESCRIPTION - PROGRESSION
CLINICAL_PROGRESSION: OTHER (COMMENT)

## 2021-05-23 ASSESSMENT — PAIN SCALES - GENERAL
PAINLEVEL_OUTOF10: 7
PAINLEVEL_OUTOF10: 7
PAINLEVEL_OUTOF10: 6

## 2021-05-23 NOTE — PROGRESS NOTES
CC: Post op follow up 5/20/21 RIGHT VIDEO ASSISTED THORACOSCOPIC SURGERY WITH WEDGE EXCISION OF RIGHT UPPER LOBE NODULE    Did well overnight, no major complaints  Tidaling on the chest tube canister without obvious air leak. Alert oriented x3  Chest clear to auscultation bilateral, diminished breath sounds in the right upper field    Plan:  On today's chest x-ray there is a stable 1.5 cm apical pneumothorax  Plan for clamping chest tube and get an x-ray in a.m. Wean oxygen as tolerated to maintain sats above 92%, still wears oxygen intermittently specifically after activity. Bowel regimen for constipation, still no BM, passing flatus  Tylenol 1000 mg every 6 hours for pain control try to minimize narcotics. Out of bed ambulate.

## 2021-05-23 NOTE — PROGRESS NOTES
Pt up to the bathroom to void. Back to bed and pt c/o ain 10/10 with movement and 7/10 with rest. Pt states oxycodone upsets her stomach. Med with morphine per MAR. Continue to monitor.  Burton Rudolph RN

## 2021-05-23 NOTE — PROGRESS NOTES
Pt up to the bathroom multiple times. Pt c/o needing to move her bowels. Pt passing gas. Pt states she is sleeping a little better. And hoping to get a good nights sleep. Continue to monitor.  Gab Schwarz RN

## 2021-05-24 ENCOUNTER — APPOINTMENT (OUTPATIENT)
Dept: GENERAL RADIOLOGY | Age: 71
DRG: 165 | End: 2021-05-24
Attending: THORACIC SURGERY (CARDIOTHORACIC VASCULAR SURGERY)
Payer: MEDICARE

## 2021-05-24 VITALS
WEIGHT: 168.87 LBS | BODY MASS INDEX: 28.14 KG/M2 | HEIGHT: 65 IN | SYSTOLIC BLOOD PRESSURE: 150 MMHG | OXYGEN SATURATION: 95 % | TEMPERATURE: 98.3 F | RESPIRATION RATE: 16 BRPM | HEART RATE: 95 BPM | DIASTOLIC BLOOD PRESSURE: 69 MMHG

## 2021-05-24 PROCEDURE — 6370000000 HC RX 637 (ALT 250 FOR IP): Performed by: THORACIC SURGERY (CARDIOTHORACIC VASCULAR SURGERY)

## 2021-05-24 PROCEDURE — 2580000003 HC RX 258: Performed by: THORACIC SURGERY (CARDIOTHORACIC VASCULAR SURGERY)

## 2021-05-24 PROCEDURE — 6360000002 HC RX W HCPCS: Performed by: THORACIC SURGERY (CARDIOTHORACIC VASCULAR SURGERY)

## 2021-05-24 PROCEDURE — 71045 X-RAY EXAM CHEST 1 VIEW: CPT

## 2021-05-24 PROCEDURE — 99024 POSTOP FOLLOW-UP VISIT: CPT | Performed by: NURSE PRACTITIONER

## 2021-05-24 RX ORDER — OXYCODONE HYDROCHLORIDE 5 MG/1
5 TABLET ORAL EVERY 6 HOURS PRN
Qty: 28 TABLET | Refills: 0 | Status: SHIPPED | OUTPATIENT
Start: 2021-05-24 | End: 2021-05-31

## 2021-05-24 RX ORDER — ATORVASTATIN CALCIUM 20 MG/1
20 TABLET, FILM COATED ORAL NIGHTLY
COMMUNITY
End: 2021-07-21 | Stop reason: SDUPTHER

## 2021-05-24 RX ADMIN — SERTRALINE HYDROCHLORIDE 75 MG: 50 TABLET ORAL at 09:24

## 2021-05-24 RX ADMIN — LAMOTRIGINE 50 MG: 25 TABLET ORAL at 09:26

## 2021-05-24 RX ADMIN — ATORVASTATIN CALCIUM 20 MG: 10 TABLET, FILM COATED ORAL at 09:24

## 2021-05-24 RX ADMIN — Medication 10 ML: at 09:34

## 2021-05-24 RX ADMIN — ACETAMINOPHEN 650 MG: 325 TABLET ORAL at 00:18

## 2021-05-24 RX ADMIN — ENOXAPARIN SODIUM 40 MG: 40 INJECTION SUBCUTANEOUS at 09:24

## 2021-05-24 RX ADMIN — POLYETHYLENE GLYCOL 3350 17 G: 17 POWDER, FOR SOLUTION ORAL at 09:24

## 2021-05-24 RX ADMIN — ACETAMINOPHEN 650 MG: 325 TABLET ORAL at 04:11

## 2021-05-24 RX ADMIN — MUPIROCIN: 20 OINTMENT TOPICAL at 09:24

## 2021-05-24 ASSESSMENT — PAIN SCALES - GENERAL
PAINLEVEL_OUTOF10: 0
PAINLEVEL_OUTOF10: 0
PAINLEVEL_OUTOF10: 5

## 2021-05-24 NOTE — PROGRESS NOTES
Chest tubes meet criteria to remove placed per Kyle Hahn NP. No air leak and no crepitus noted. Pt instructed on procedure. Dressings removed. Incision within normal limits. Site cleansed and prepped per protocol. Chest tubes X 1 removed without difficulty. Vaseline gauze and dry sterile dressing applied. Bilateral breath sounds audible. O2Sats 94% on room air. Patient tolerated well. Chest order for 0945.

## 2021-05-24 NOTE — DISCHARGE INSTR - COC
Continuity of Care Form    Patient Name: Elvira Marte   :  1950  MRN:  [id number]    Admit date:  2021  Discharge date:  ***    Code Status Order: Full Code   Advance Directives:   Advance Care Flowsheet Documentation     Date/Time Healthcare Directive Type of Healthcare Directive Copy in [address] Agent's Name Healthcare Agent's Phone Number    21 1213  Yes, patient has an advance directive for healthcare treatment  --  No, copy requested from family  --  -- --    21 1423  Yes, patient has an advance directive for healthcare treatment  Living will;Durable power of  for health care  --  Adult [address] --          Admitting Physician:  Talita Self MD  PCP: Dahiana Rincon DO    Discharging Nurse: [facility] Unit/Room#: 5294/0297-26  Discharging Unit Phone Number: ***    Emergency Contact:   Extended Emergency Contact Information  Primary Emergency Contact: DannValerie   [address] Phone: [phone]  Relation: Child  Secondary Emergency Contact: [address] Phone: [phone]  Relation: Child    Past Surgical History:  Past Surgical History:   Procedure Laterality Date    CT NEEDLE BIOPSY LUNG PERCUTANEOUS  9/3/2020    CT NEEDLE BIOPSY LUNG PERCUTANEOUS 9/3/2020 [address] SCAN    CT NEEDLE BIOPSY LUNG PERCUTANEOUS  3/25/2021    CT NEEDLE BIOPSY LUNG PERCUTANEOUS 3/25/2021 Mihaela Chavez MD [facility] CT SCAN    LITHOTRIPSY      SHOULDER SURGERY      THORACOSCOPY Right 2021    RIGHT VIDEO ASSISTED THORACOSCOPIC SURGERY WITH WEDGE EXCISION OF RIGHT UPPER LOBE NODULE performed by Derian Samson MD at [place] [address] 10/16/2020    LEFT UPPER LOBECTOMY THORACOTOMY WITH MEDIASTINAL LYMPH NODE DISSECTION, CRYO ABLATION OF INTERCOSTAL NERVES performed by Talita Self MD at [address] [place] [address] gastritis    UPPER GASTROINTESTINAL ENDOSCOPY N/A 9/6/2019    EGD BIOPSY performed by Bassem Pennington MD at [address] N/A 9/6/2019    COLON & EGD W/ ANESTHESIA performed by Bassem Pennington MD at [address]  9/6/2019    EGD DILATION [place] performed by Bassem Pennington MD at [address]       Immunization History:   Immunization History   Administered Date(s) Administered    Influenza 10/10/2011    Influenza, High Dose (Fluzone 65 yrs and older) 09/22/2017, 11/10/2020    Influenza, High-dose, Arieoffrey Edith, 65 yrs +, IM (Fluzone) 11/10/2020    Influenza, Arieoffjesse Edith, 6 mo and older, IM, PF (Flulaval, Fluarix) 02/25/2019    Influenza, Quadv, IM, PF (6 mo and older Fluzone, Flulaval, Fluarix, and 3 yrs and older Afluria) 02/25/2019    Influenza, Triv, inactivated, subunit, adjuvanted, IM (Fluad 65 yrs and older) 10/24/2019    Pneumococcal Conjugate 13-valent (Housigp38) 03/05/2018    Pneumococcal Polysaccharide (Ozwounezf88) 01/01/2009, 05/01/2015    Tdap (Boostrix, Adacel) 01/01/2001       Active Problems:  Patient Active Problem List   Diagnosis Code    Anxiety F41.9    Pain medication agreement signed Z02.89    History of colon polyps Z86.010    History of gastritis Z87.19    Insomnia G47.00    Hyperlipidemia E78.5    Moderate episode of recurrent major depressive disorder ([facility] F33.1    Gastroesophageal reflux disease with esophagitis K21.00    Shortness of breath R06.02    Multiple thyroid nodules E04.2    Iron deficiency anemia D50.9    Colon, diverticulosis K57.30    Esophageal dysphagia R13.10    Gastritis without bleeding K29.70    Mood swings R45.86    Lung cancer, main bronchus, left (HCC) C34.02    Anemia due to chronic blood loss D50.0    Intestinal malabsorption K90.9    Non-small cell lung cancer (HCC) C34.90    Nodule of right lung R91.1       Isolation/Infection:   Isolation No Isolation        Patient Infection Status     Infection Onset Added Last Indicated Last Indicated By Review Planned Expiration Resolved Resolved By    None active    Resolved    COVID-19 Rule Out 20 COVID-19 Ambulatory (Ordered)   20 Rule-Out Test Resulted          Nurse Assessment:  Last Vital Signs: BP (!) 150/69   Pulse 95   Temp 98.3 °F (36.8 °C) (Oral)   Resp 16   Ht 5' 5\" (1.651 m)   Wt 168 lb 14 oz (76.6 kg)   LMP  (LMP Unknown)   SpO2 95%   BMI 28.10 kg/m²     Last documented pain score (0-10 scale): Pain Level: 0  Last Weight:   Wt Readings from Last 1 Encounters:   21 168 lb 14 oz (76.6 kg)     Mental Status:  {IP PT MENTAL STATUS:}    IV Access:  { CONSTANCE IV ACCESS:342473291}    Nursing Mobility/ADLs:  Walking   {P DME SGOU:403410029}  Transfer  {[facility] DME FPZZ:992259783}  Bathing  {P DME PVE}  Dressing  {P DME IIHM:261000410}  Toileting  {[facility] DME ALLX:285443563}  Feeding  {[facility] DME AVIB:167266207}  Med Admin  {[facility] DME VTOU:289983509}  Med Delivery   { CONSTANCE MED Delivery:414346419}    Wound Care Documentation and Therapy:        Elimination:  Continence:   · Bowel: {YES / [place]:55990}  · Bladder: {YES / HM:97669}  Urinary Catheter: {Urinary Catheter:764120509}   Colostomy/Ileostomy/Ileal Conduit: {YES / BR:11045}       Date of Last BM: ***    Intake/Output Summary (Last 24 hours) at 2021 1059  Last data filed at 2021 0000  Gross per 24 hour   Intake 480 ml   Output 20 ml   Net 460 ml     I/O last 3 completed shifts:   In: 18 [P.O.:480]  Out: 20 [Chest Tube:20]    Safety Concerns:     [address] [employer] Safety Concerns:352824176}    Impairments/Disabilities:      [address] [employer] Impairments/Disabilities:653577889}    Nutrition Therapy:  Current Nutrition Therapy:   [employer] Diet List:736669028}    Routes of Feeding: {CHP DME Other Feedings:734442399}  Liquids: {Slp liquid thickness:02754}  Daily Fluid Restriction: {CHP DME Yes amt example:161324836}  Last Modified Barium Swallow with Video (Video Swallowing Test): {Done Not Done BPST:252467522}    Treatments at the Time of Hospital Discharge:   Respiratory Treatments: ***  Oxygen Therapy:  {Therapy; copd oxygen:02485}  Ventilator:    {[facility] Vent VEZK:102617771}    Rehab Therapies: {THERAPEUTIC INTERVENTION:5388622475}  Weight Bearing Status/Restrictions: {[facility] Weight Bearin}  Other Medical Equipment (for information only, NOT a DME order):  {EQUIPMENT:895907416}  Other Treatments: ***    Patient's personal belongings (please select all that are sent with patient):  {[facility] DME Belongings:405142192}    RN SIGNATURE:  {Esignature:623084601}    CASE MANAGEMENT/SOCIAL WORK SECTION    Inpatient Status Date: ***    Readmission Risk Assessment Score:  Readmission Risk              Risk of Unplanned Readmission:  12           Discharging to Facility/ Agency   · Name:   · Address:  · Phone:  · Fax:    Dialysis Facility (if applicable)   · Name:  · Address:  · Dialysis Schedule:  · Phone:  · Fax:    / signature: {Esignature:261747331}    PHYSICIAN SECTION    Prognosis: {Prognosis:7765174202}    Condition at Discharge: [facility] Patient Condition:399101116}    Rehab Potential (if transferring to Rehab): {Prognosis:3087189260}    Recommended Labs or Other Treatments After Discharge: ***    Physician Certification: I certify the above information and transfer of Windy Baer  is necessary for the continuing treatment of the diagnosis listed and that she requires {Admit to Appropriate Level of Care:14074} for {GREATER/LESS:210574523} 30 days.      Update Admission H&P: {CHP DME Changes in GOQFD:198431611}    PHYSICIAN SIGNATURE:  {Esignature:218321473}

## 2021-05-24 NOTE — PROGRESS NOTES
Patient given discharge instructions, follow up appointment and medications. Patient acknowledges all information given and signed Discharged summary. IV removed in right forearm removed. Tegaderm placed on site. Will continue to monitor.

## 2021-05-24 NOTE — CARE COORDINATION
CASE MANAGEMENT DISCHARGE SUMMARY    Discharge to: Home without home care per pt's choice    Transportation:    Family/car: pt arranged    Confirmed discharge plan with:   Patient: no, RN going over discharge instructions at this time. Writer spoke with Sharan NP, she confirmed above plan.   LAUREL Bradley-RN

## 2021-05-24 NOTE — DISCHARGE SUMMARY
Cardiac, Vascular & Thoracic Surgery  Discharge Summary    Patient:  Zenaida Arguelles 1950 [id number]   Admission Date:  5/20/2021 10:33 AM  Discharge Date:  5/24/21    Principle Diagnosis: RUL lung nodule     Secondary Diagnosis:  Active Problems:    Nodule of right lung  Resolved Problems:    * No resolved hospital problems. *    CT Chest: 3/09/21   Impression   No significant change in 2.1 cm ovoid right apical ground-glass type opacity. 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  If   not previously performed further evaluation with biopsy is suggested. 5/20/21 surgical path pending     Procedure: 5/20/21 RIGHT VIDEO ASSISTED THORACOSCOPIC SURGERY WITH WEDGE EXCISION OF RIGHT UPPER LOBE NODULE     History:  The patient is a 69 y.o. female with significant past medical history of left upper lobe lung cancer for which she underwent a lobectomy last fall.  She has been known to have a nonsolid mass in the right upper lobe of her lung at least since 2011.  It has enlarged between 2019 and now and she presents for surgical evaluation.  She has no chest pain.  No cough or hemoptysis.  No fevers or chills.  No history of Covid-19.  She has a history of tobacco use but quit many years ago. Hospital Course: The post operative period for this patient was uncomplicated. The patient was discharged on 5/24/21 and will follow up with Dr. Annia Salvador on 6/1/21. Surgical pathology will be reviewed at her follow up appointment. Discharged Condition: stable    Disposition:  Home; declined [facility] as her daughter will be able to help her with dressing changes, etc at home.      Discharge Medications:   Joan Jack   Home Medication Instructions FVD:[id number]    Printed on:05/24/21 1039   Medication Information                      atorvastatin (LIPITOR) 20 MG tablet  Take 20 mg by mouth nightly

## 2021-05-24 NOTE — PROGRESS NOTES
Patient is found sitting on the side of the bed in the dark room. Per patient she just went to the bathroom, instructed pt to use the call light for assistance prior to getting out of bed, and turn the lights on to prevent fall. Patient verbalized understanding.

## 2021-05-25 ENCOUNTER — CARE COORDINATION (OUTPATIENT)
Dept: CASE MANAGEMENT | Age: 71
End: 2021-05-25

## 2021-05-25 NOTE — CARE COORDINATION
Alex 45 Transitions Initial Follow Up Call    Call within 2 business days of discharge: Yes    Patient: Isa Brown Patient : 1950   MRN: [id number]  Reason for Admission: Lung nodule s/p thoracoscopic surgery with wedge excision   Discharge Date: 21 RARS: Readmission Risk Score: 12      Last Discharge [address] [address]       Complaint Diagnosis Description Type Department Provider    21  Acute post-operative pain . .. Admission (Discharged) Dulce Elliott MD           Attempted to reach patient via phone for initial post hospital transition call. VM left stating purpose of call along with my contact information requesting a return call.           Care Transitions 24 Hour Call    Do you have all of your prescriptions and are they filled?: Yes  Do you have support at home?: Child, Grandchild  Are you an active caregiver in your home?: No  Care Transitions Interventions         Follow Up  Future Appointments   Date Time Provider Christina Burton   2021 10:45 AM Zaira Brown MD [facility]   2021  1:15 PM Jennifer Gomez MD [facility]   2022 10:30 AM Devon De Guzmangm DO Christy [facility]  [address] - [place]       Timmy Viera, PAUL

## 2021-05-26 ENCOUNTER — CARE COORDINATION (OUTPATIENT)
Dept: CASE MANAGEMENT | Age: 71
End: 2021-05-26

## 2021-05-26 DIAGNOSIS — R06.02 SHORTNESS OF BREATH: Primary | ICD-10-CM

## 2021-05-26 PROCEDURE — 1111F DSCHRG MED/CURRENT MED MERGE: CPT | Performed by: FAMILY MEDICINE

## 2021-05-26 NOTE — CARE COORDINATION
[address] [address] Transitions Initial Follow Up Call    Call within 2 business days of discharge: Yes    Patient: Jania Arechiga Patient : 1950   MRN: [id number]  Reason for Admission: Lung Nodule s/p thoraoscopic surgery    Discharge Date: 21 RARS: Readmission Risk Score: 12      Last Discharge [address] [address]       Complaint Diagnosis Description Type Department Provider    21  Acute post-operative pain . .. Admission (Discharged) Ashia Orta MD           Spoke with: [address]: [facility]       Transitions of Care Initial Call    Was this an external facility discharge? No Discharge Facility: n/a    Challenges to be reviewed by the provider   Additional needs identified to be addressed with provider No  none             Method of communication with provider : none      Advance Care Planning:   Does patient have an Advance Directive:  reviewed and current. Was this a readmission? No  Patient stated reason for admission: lung nodule   Patients top risk factors for readmission: medical condition-lung nodule     Care Transition Nurse (CTN) contacted the patient by telephone to perform post hospital discharge assessment. Verified name and  with patient as identifiers. Provided introduction to self, and explanation of the CTN role. CTN reviewed discharge instructions, medical action plan and red flags with patient who verbalized understanding. Patient given an opportunity to ask questions and does not have any further questions or concerns at this time. Were discharge instructions available to patient? Yes. Reviewed appropriate site of care based on symptoms and resources available to patient including: PCP, Specialist and When to call 911. The patient agrees to contact the PCP office for questions related to their healthcare. Medication reconciliation was performed with patient, who verbalizes understanding of administration of home medications.  Advised

## 2021-05-27 ENCOUNTER — TELEPHONE (OUTPATIENT)
Dept: CARDIOTHORACIC SURGERY | Age: 71
End: 2021-05-27

## 2021-05-27 NOTE — TELEPHONE ENCOUNTER
Spoke w/pt at length.  + IS, no cough, no SOB. No Fever. States home [facility] 'went out and spent a couple days in [facility] at a hotel - so I am doing fine!'. Com[facility]; aware of date/time /location. Will call with any concerning symptoms.

## 2021-05-28 ENCOUNTER — TELEPHONE (OUTPATIENT)
Dept: FAMILY MEDICINE CLINIC | Age: 71
End: 2021-05-28

## 2021-05-28 NOTE — TELEPHONE ENCOUNTER
Luci Johnson 45 Transitions Initial Follow Up Call    Outreach made within 2 business days of discharge: Yes    Patient: Viral Snyder Patient : 1950   MRN: [id number]  Reason for Admission: There are no discharge diagnoses documented for the most recent discharge.   Discharge Date: 21       Spoke with: Patient had surgery, no follow up needed with PCP       Scheduled appointment with PCP within 7-14 days    Follow Up  Future Appointments   Date Time Provider Christina Burton   2021 10:15 AM MD FORTINO Resendez [facility]   2021  1:15 PM Lauro Tello MD [facility]   2022 10:30 AM DO YVROSE Arrieta

## 2021-06-03 ENCOUNTER — CARE COORDINATION (OUTPATIENT)
Dept: CASE MANAGEMENT | Age: 71
End: 2021-06-03

## 2021-06-03 NOTE — OP NOTE
pulmonary artery branch was dissected and transected. Next, the fissure was completed posteriorly by using Endo ZAN. Next, demarcations of the venous return into the posterior segment was  _Seen and cautery was used to demarcate the posterior segment, thoracoscopic Endo ZAN stapler purple color 60 x2 was used to transect the posterior segment of the right upper lobe. The Posterior segment was removed in Endobag and sent for Pathology for      Copious amount of irrigation was done. No active bleeding was seen. Intercostal nerve block was inserted starting from the level 3 all the  way down to level 11 and 2.5 mL. Next, copious amount of irrigation of  the lung was insufflated. Counts we were all told as correct. All other ports were closed. The  patient was dispositioned to recovery room stable condition without any  complication.         Óscar Pina MD    D: 06/02/2021 13:05:28       T: 06/02/2021 15:21:10     [id number]  Job#: [id number]     Doc#: [id number]

## 2021-06-03 NOTE — CARE COORDINATION
Alex 45 Transitions Follow Up Call    6/3/2021    Patient: Manjeet Camarena  Patient : 1950   MRN: [id number]  Reason for Admission: thoracoscopic surgery with wedge excision   Discharge Date: 21 RARS: Readmission Risk Score: 12         Spoke with: Manjeet Camarena    Spoke with patient who reported she is doing alright still has soreness in side at incision sites. Patient reported no redness, swelling, or drainage noted at incision sites and reported ibuprofen helps relieve the pain. Patient has apt with surgeon tomorrow. Denies any acute needs at present time. Agreeable to f/u calls. Educated on the use of urgent care or physicians 24 hr access line if assistance is needed after hours & that they can always contact their home care provider to request a nurse visit even if it isn't their regularly scheduled day for their nurse to visit. Care Transitions Subsequent and Final Call    Subsequent and Final Calls  Do you have any ongoing symptoms?: No  Have your medications changed?: No  Do you have any questions related to your medications?: No  Do you currently have any active services?: No  Do you have any needs or concerns that I can assist you with?: No  Identified Barriers: None  Care Transitions Interventions  Other Interventions:            Follow Up  Future Appointments   Date Time Provider Christina Burton   2021  2:00 PM Casandra Barry MD [facility]   2021  1:15 PM Yared Lugo MD [facility]   2022 10:30 AM DO YVROSE Casillas  Elizabeth Tang RN

## 2021-06-04 ENCOUNTER — OFFICE VISIT (OUTPATIENT)
Dept: CARDIOTHORACIC SURGERY | Age: 71
End: 2021-06-04

## 2021-06-04 VITALS
DIASTOLIC BLOOD PRESSURE: 84 MMHG | SYSTOLIC BLOOD PRESSURE: 136 MMHG | BODY MASS INDEX: 28.32 KG/M2 | HEIGHT: 65 IN | WEIGHT: 170 LBS | HEART RATE: 87 BPM | OXYGEN SATURATION: 97 % | TEMPERATURE: 98 F

## 2021-06-04 DIAGNOSIS — R91.1 PULMONARY NODULE: Primary | ICD-10-CM

## 2021-06-04 PROCEDURE — 99024 POSTOP FOLLOW-UP VISIT: CPT | Performed by: THORACIC SURGERY (CARDIOTHORACIC VASCULAR SURGERY)

## 2021-06-05 NOTE — PROGRESS NOTES
Cardiac, Vascular and Thoracic Surgeons  Clinic Note     6/5/2021 6:56 PM  Surgeon:  Marthena Spurling     S/P : Status post posterior right upper lobe segmentectomy  Chief complaint :  Subjective:  Ms. Roseanne Montoya     Vital Signs: /84 (Site: Left Upper Arm, Position: Sitting)   Pulse 87   Temp 98 °F (36.7 °C) (Temporal)   Ht 5' 5\" (1.651 m)   Wt 170 lb (77.1 kg)   LMP  (LMP Unknown)   SpO2 97%   BMI 28.29 kg/m²      I/O:  No intake or output data in the 24 hours ending 06/05/21 1856    Exam:   Cardiovascular: S1 plus S2 +0 no additional sound  Pulmonary: Bilaterally clear no additional sound  Pathology  Technical processing at [facility], [facility] [address] [phone]     FINAL DIAGNOSIS:     Right upper lobe posterior segment lung nodule, wedge excision:   - Benign lung tissue with chronic inflammation, increased intraalveolar   macrophages, and reactive change. - Negative for malignancy. COMMENT:    The inflammatory pattern is most consistent with respiratory   bronchiolitis. Clinical/radiologic correlation is recommended. BUCCA/BUCCA       Preoperative Diagnosis:  Right upper lobe lung nodule   Postoperative Diagnosis:  Right upper lobe lung nodule     Labs:   CBC: No results for input(s): WBC, HGB, HCT, MCV, PLT in the last 72 hours. BMP: No results for input(s): NA, K, CL, CO2, PHOS, BUN, CREATININE in the last 72 hours. Invalid input(s): CA  PT/INR: No results for input(s): PROTIME, INR in the last 72 hours. APTT: No results for input(s): APTT in the last 72 hours.     Scheduled Meds:     Patient Active Problem List   Diagnosis    Anxiety    Pain medication agreement signed    History of colon polyps    History of gastritis    Insomnia    Hyperlipidemia    Moderate episode of recurrent major depressive disorder (HCC)    Gastroesophageal reflux disease with esophagitis    Shortness of breath    Multiple thyroid nodules    Iron deficiency anemia    Colon, diverticulosis    Esophageal dysphagia    Gastritis without bleeding    Mood swings    Lung cancer, main bronchus, left (HCC)    Anemia due to chronic blood loss    Intestinal malabsorption    Non-small cell lung cancer (HCC)    Nodule of right lung       Assessment/Plan:   1. CT scan 3 months follow-up with pulmonary Dr. Ludwin Magana MD FACS

## 2021-06-10 ENCOUNTER — CARE COORDINATION (OUTPATIENT)
Dept: CASE MANAGEMENT | Age: 71
End: 2021-06-10

## 2021-06-10 NOTE — CARE COORDINATION
Alex 45 Transitions Follow Up Call    6/10/2021    Patient: Rogelio Hudson  Patient : 1950   MRN: [id number]  Reason for Admission: lung ca  Discharge Date: 21 RARS: Readmission Risk Score: 12         Spoke with: Rogelio Hudson    Spoke with patient who reported she is doing pretty good and had follow up with surgeon who reported everything was healing well. Patient reported her incision sites are healing well. Patient reported she has a slight pain in her shoulder back area and will follow up with doctor should pain persist or worsen. Patient reported she does get mild sob with exertion but overall feels well. Denies any acute needs at present time. Agreeable to f/u calls. Educated on the use of urgent care or physicians 24 hr access line if assistance is needed after hours & that they can always contact their home care provider to request a nurse visit even if it isn't their regularly scheduled day for their nurse to visit. Care Transitions Subsequent and Final Call    Subsequent and Final Calls  Do you have any ongoing symptoms?: No  Have your medications changed?: No  Do you have any questions related to your medications?: No  Do you currently have any active services?: No  Do you have any needs or concerns that I can assist you with?: No  Identified Barriers: None  Care Transitions Interventions  Other Interventions:            Follow Up  Future Appointments   Date Time Provider Christina Burton   2021  1:15 PM Ji Miner MD [facility]   2022 10:30 AM Myer Angelucci Heindl, DO [facility] - [place] Robley Redwood, RN

## 2021-06-16 ENCOUNTER — NURSE TRIAGE (OUTPATIENT)
Dept: OTHER | Facility: CLINIC | Age: 71
End: 2021-06-16

## 2021-06-16 ENCOUNTER — TELEPHONE (OUTPATIENT)
Dept: CARDIOTHORACIC SURGERY | Age: 71
End: 2021-06-16

## 2021-06-16 NOTE — TELEPHONE ENCOUNTER
Pt called to report that about 4-5 days ago she began to not feel well. States she feels she is not able to take deep breaths as she has some pain on deep inspiration. IS volume 3500 cc. Afebrile. Non productive cough. O2 sat 94-96%. Has been taking Tylenol and Advil for pain. States she is crying all the time. She is fearful that her lungs are filling up with fluid or that she has pneumonia. Discussed that her inspired lung volume is good for after surgery, she is afebrile and cough is nonproductive that pneumonia is not suspected as this time. Recommended that she take Advil x 1 and Tylenol x 1 every 8 hours to see if this helps with pain management. States she is crying all the time as well. She is presently on low dose Zoloft. Recommended that she f/u with PCP regarding depression as soon as possible. Pt states feels better after talking and will continue to use her IS and internal percussion. She will take the Tylenol and Advil and get in contact with her PCP.    Continue to monitor.  Andreas Villanueva

## 2021-06-16 NOTE — TELEPHONE ENCOUNTER
Reason for Disposition   Patient wants to be seen    Answer Assessment - Initial Assessment Questions  1. ONSET: \"When did the cough begin? \"       06/13/2021    2. SEVERITY: \"How bad is the cough today? \"   Mild    3. RESPIRATORY DISTRESS: \"Describe your breathing. \"   Pain with coughing    4. FEVER: \"Do you have a fever? \" If so, ask: \"What is your temperature, how was it measured, and when did it start? \"   none    5. HEMOPTYSIS: \"Are you coughing up any blood? \" If so ask: \"How much? \" (flecks, streaks, tablespoons, etc.)  None    6. TREATMENT: \"What have you done so far to treat the cough? \" (e.g., meds, fluids, humidifier)     Nothing    7. CARDIAC HISTORY: \"Do you have any history of heart disease? \" (e.g., heart attack, congestive heart failure)   None    8. LUNG HISTORY: \"Do you have any history of lung disease? \"  (e.g., pulmonary embolus, asthma, emphysema)    Pulmonary nodule removal last month, lobes removed from left lung 10/2020    9. PE RISK FACTORS: \"Do you have a history of blood clots? \" (or: recent major surgery, recent prolonged travel, bedridden)  Hospitalization after surgery for pulmonary nodule removal on 5/19/2021 for 5 days    10. OTHER SYMPTOMS: \"Do you have any other symptoms? (e.g., runny nose, wheezing, chest pain)    A little wheezing off/on, chest pain with cough    11. PREGNANCY: \"Is there any chance you are pregnant? \" \"When was your last menstrual period? \"  No    12. TRAVEL: \"Have you traveled out of the country in the last month? \" (e.g., travel history, exposures)  no    Protocols used: COUGH-ADULT-[place]    [facility] Harjinder with Red Flag Complaint. Brief description of triage: cough, chest pain with coughing    Triage indicates for patient to be seen today or tomorrow. Advised that VV may be only option.     Care advice provided, patient verbalizes understanding; denies any other questions or concerns; instructed to call back for any new or worsening symptoms. Writer provided warm transfer to Joselin Addison at [facility] for appointment scheduling. Attention Provider: Thank you for allowing me to participate in the care of your patient. The patient was connected to triage in response to information provided to the ECC. Please do not respond through this encounter as the response is not directed to a shared pool.

## 2021-06-21 NOTE — TELEPHONE ENCOUNTER
Sounds like if she has not been seen yet that she should be at least going to urgent care assuming she cannot get in to see us in the next 48 hours.

## 2021-06-23 ENCOUNTER — OFFICE VISIT (OUTPATIENT)
Dept: FAMILY MEDICINE CLINIC | Age: 71
End: 2021-06-23
Payer: MEDICARE

## 2021-06-23 VITALS
OXYGEN SATURATION: 97 % | TEMPERATURE: 98.2 F | HEART RATE: 86 BPM | HEIGHT: 65 IN | DIASTOLIC BLOOD PRESSURE: 80 MMHG | SYSTOLIC BLOOD PRESSURE: 142 MMHG | BODY MASS INDEX: 28.66 KG/M2 | WEIGHT: 172 LBS

## 2021-06-23 DIAGNOSIS — R05.8 NON-PRODUCTIVE COUGH: Primary | ICD-10-CM

## 2021-06-23 DIAGNOSIS — F41.9 ANXIETY: ICD-10-CM

## 2021-06-23 DIAGNOSIS — R09.82 PND (POST-NASAL DRIP): ICD-10-CM

## 2021-06-23 PROBLEM — C34.90 NON-SMALL CELL LUNG CANCER (HCC): Status: RESOLVED | Noted: 2020-10-26 | Resolved: 2021-06-23

## 2021-06-23 PROBLEM — C34.02 LUNG CANCER, MAIN BRONCHUS, LEFT (HCC): Status: RESOLVED | Noted: 2020-10-16 | Resolved: 2021-06-23

## 2021-06-23 PROCEDURE — G8427 DOCREV CUR MEDS BY ELIG CLIN: HCPCS | Performed by: FAMILY MEDICINE

## 2021-06-23 PROCEDURE — 4040F PNEUMOC VAC/ADMIN/RCVD: CPT | Performed by: FAMILY MEDICINE

## 2021-06-23 PROCEDURE — G8417 CALC BMI ABV UP PARAM F/U: HCPCS | Performed by: FAMILY MEDICINE

## 2021-06-23 PROCEDURE — G8400 PT W/DXA NO RESULTS DOC: HCPCS | Performed by: FAMILY MEDICINE

## 2021-06-23 PROCEDURE — 1123F ACP DISCUSS/DSCN MKR DOCD: CPT | Performed by: FAMILY MEDICINE

## 2021-06-23 PROCEDURE — 99213 OFFICE O/P EST LOW 20 MIN: CPT | Performed by: FAMILY MEDICINE

## 2021-06-23 PROCEDURE — 3017F COLORECTAL CA SCREEN DOC REV: CPT | Performed by: FAMILY MEDICINE

## 2021-06-23 PROCEDURE — 1036F TOBACCO NON-USER: CPT | Performed by: FAMILY MEDICINE

## 2021-06-23 PROCEDURE — 1111F DSCHRG MED/CURRENT MED MERGE: CPT | Performed by: FAMILY MEDICINE

## 2021-06-23 PROCEDURE — 1090F PRES/ABSN URINE INCON ASSESS: CPT | Performed by: FAMILY MEDICINE

## 2021-06-23 RX ORDER — OMEPRAZOLE 20 MG/1
20 CAPSULE, DELAYED RELEASE ORAL DAILY
COMMUNITY
End: 2021-06-23 | Stop reason: SDUPTHER

## 2021-06-23 RX ORDER — OMEPRAZOLE 20 MG/1
CAPSULE, DELAYED RELEASE ORAL
Qty: 180 CAPSULE | Refills: 1 | Status: SHIPPED | OUTPATIENT
Start: 2021-06-23 | End: 2021-12-14

## 2021-06-23 RX ORDER — ALPRAZOLAM 0.5 MG/1
0.5 TABLET ORAL DAILY PRN
Qty: 30 TABLET | Refills: 0 | Status: SHIPPED | OUTPATIENT
Start: 2021-06-23 | End: 2021-07-23

## 2021-06-23 SDOH — ECONOMIC STABILITY: FOOD INSECURITY: WITHIN THE PAST 12 MONTHS, YOU WORRIED THAT YOUR FOOD WOULD RUN OUT BEFORE YOU GOT MONEY TO BUY MORE.: NEVER TRUE

## 2021-06-23 SDOH — ECONOMIC STABILITY: FOOD INSECURITY: WITHIN THE PAST 12 MONTHS, THE FOOD YOU BOUGHT JUST DIDN'T LAST AND YOU DIDN'T HAVE MONEY TO GET MORE.: NEVER TRUE

## 2021-06-23 SDOH — ECONOMIC STABILITY: TRANSPORTATION INSECURITY
IN THE PAST 12 MONTHS, HAS LACK OF TRANSPORTATION KEPT YOU FROM MEETINGS, WORK, OR FROM GETTING THINGS NEEDED FOR DAILY LIVING?: NO

## 2021-06-23 SDOH — ECONOMIC STABILITY: TRANSPORTATION INSECURITY
IN THE PAST 12 MONTHS, HAS THE LACK OF TRANSPORTATION KEPT YOU FROM MEDICAL APPOINTMENTS OR FROM GETTING MEDICATIONS?: NO

## 2021-06-23 ASSESSMENT — ENCOUNTER SYMPTOMS
CHEST TIGHTNESS: 1
NAUSEA: 0
COUGH: 1
WHEEZING: 1
RHINORRHEA: 0
VOMITING: 0
SINUS PRESSURE: 0
DIARRHEA: 0

## 2021-06-23 ASSESSMENT — SOCIAL DETERMINANTS OF HEALTH (SDOH): HOW HARD IS IT FOR YOU TO PAY FOR THE VERY BASICS LIKE FOOD, HOUSING, MEDICAL CARE, AND HEATING?: NOT HARD AT ALL

## 2021-06-23 NOTE — PATIENT INSTRUCTIONS
Non-productive cough  Pickup generic Mucinex DM take the maximum dose allowed on the bottlelots of water during the daykeep me posted on your progress. PND (post-nasal drip)  As above  Other orders  -     omeprazole (PRILOSEC) 20 MG delayed release capsule; 1 bid  This was approximately a 20-minute visit with the patient. Her consultations with oncology, pathology reports.

## 2021-06-24 ENCOUNTER — CARE COORDINATION (OUTPATIENT)
Dept: CASE MANAGEMENT | Age: 71
End: 2021-06-24

## 2021-06-28 ENCOUNTER — TELEPHONE (OUTPATIENT)
Dept: ORTHOPEDIC SURGERY | Age: 71
End: 2021-06-28

## 2021-06-28 NOTE — TELEPHONE ENCOUNTER
Message received from afterhours:    CLRS NAME:KORY KUMAR  PTS  NAME:SAME  REG  DR:  Jose Mandujano  :    PH#:[phone] / [phone]  MSG: PLEASE CALL

## 2021-06-30 ENCOUNTER — HOSPITAL ENCOUNTER (OUTPATIENT)
Age: 71
Discharge: HOME | End: 2021-06-30
Payer: MEDICARE

## 2021-06-30 ENCOUNTER — OFFICE VISIT (OUTPATIENT)
Dept: ORTHOPEDIC SURGERY | Age: 71
End: 2021-06-30
Payer: MEDICARE

## 2021-06-30 VITALS — HEIGHT: 65 IN | BODY MASS INDEX: 28.66 KG/M2 | WEIGHT: 172 LBS

## 2021-06-30 DIAGNOSIS — G89.29 CHRONIC PAIN OF RIGHT KNEE: ICD-10-CM

## 2021-06-30 DIAGNOSIS — M25.561 CHRONIC PAIN OF RIGHT KNEE: ICD-10-CM

## 2021-06-30 DIAGNOSIS — M17.11 PRIMARY OSTEOARTHRITIS OF RIGHT KNEE: ICD-10-CM

## 2021-06-30 DIAGNOSIS — M17.11 PRIMARY OSTEOARTHRITIS OF RIGHT KNEE: Primary | ICD-10-CM

## 2021-06-30 DIAGNOSIS — M25.461 EFFUSION OF BURSA OF RIGHT KNEE: ICD-10-CM

## 2021-06-30 LAB
BASOPHILS ABSOLUTE: 0 K/UL (ref 0–0.2)
BASOPHILS RELATIVE PERCENT: 0.5 %
C-REACTIVE PROTEIN: 3.2 MG/L (ref 0–5.1)
EOSINOPHILS ABSOLUTE: 0.2 K/UL (ref 0–0.6)
EOSINOPHILS RELATIVE PERCENT: 3.1 %
HCT VFR BLD CALC: 42.1 % (ref 36–48)
HEMOGLOBIN: 14.4 G/DL (ref 12–16)
LYMPHOCYTES ABSOLUTE: 2 K/UL (ref 1–5.1)
LYMPHOCYTES RELATIVE PERCENT: 27.3 %
MCH RBC QN AUTO: 28.6 PG (ref 26–34)
MCHC RBC AUTO-ENTMCNC: 34.2 G/DL (ref 31–36)
MCV RBC AUTO: 83.8 FL (ref 80–100)
MONOCYTES ABSOLUTE: 0.4 K/UL (ref 0–1.3)
MONOCYTES RELATIVE PERCENT: 5.1 %
NEUTROPHILS ABSOLUTE: 4.7 K/UL (ref 1.7–7.7)
NEUTROPHILS RELATIVE PERCENT: 64 %
PDW BLD-RTO: 13.7 % (ref 12.4–15.4)
PLATELET # BLD: 220 K/UL (ref 135–450)
PMV BLD AUTO: 9.4 FL (ref 5–10.5)
RBC # BLD: 5.02 M/UL (ref 4–5.2)
SEDIMENTATION RATE, ERYTHROCYTE: 10 MM/HR (ref 0–30)
WBC # BLD: 7.4 K/UL (ref 4–11)

## 2021-06-30 PROCEDURE — G8400 PT W/DXA NO RESULTS DOC: HCPCS | Performed by: PHYSICIAN ASSISTANT

## 2021-06-30 PROCEDURE — 1123F ACP DISCUSS/DSCN MKR DOCD: CPT | Performed by: PHYSICIAN ASSISTANT

## 2021-06-30 PROCEDURE — 36415 COLL VENOUS BLD VENIPUNCTURE: CPT

## 2021-06-30 PROCEDURE — 4040F PNEUMOC VAC/ADMIN/RCVD: CPT | Performed by: PHYSICIAN ASSISTANT

## 2021-06-30 PROCEDURE — 85025 COMPLETE CBC W/AUTO DIFF WBC: CPT

## 2021-06-30 PROCEDURE — G8427 DOCREV CUR MEDS BY ELIG CLIN: HCPCS | Performed by: PHYSICIAN ASSISTANT

## 2021-06-30 PROCEDURE — 99214 OFFICE O/P EST MOD 30 MIN: CPT | Performed by: PHYSICIAN ASSISTANT

## 2021-06-30 PROCEDURE — 3017F COLORECTAL CA SCREEN DOC REV: CPT | Performed by: PHYSICIAN ASSISTANT

## 2021-06-30 PROCEDURE — 1036F TOBACCO NON-USER: CPT | Performed by: PHYSICIAN ASSISTANT

## 2021-06-30 PROCEDURE — 85652 RBC SED RATE AUTOMATED: CPT

## 2021-06-30 PROCEDURE — 86140 C-REACTIVE PROTEIN: CPT

## 2021-06-30 PROCEDURE — 20611 DRAIN/INJ JOINT/BURSA W/US: CPT | Performed by: PHYSICIAN ASSISTANT

## 2021-06-30 PROCEDURE — 1090F PRES/ABSN URINE INCON ASSESS: CPT | Performed by: PHYSICIAN ASSISTANT

## 2021-06-30 PROCEDURE — G8417 CALC BMI ABV UP PARAM F/U: HCPCS | Performed by: PHYSICIAN ASSISTANT

## 2021-06-30 RX ORDER — LIDOCAINE HYDROCHLORIDE 10 MG/ML
40 INJECTION, SOLUTION INFILTRATION; PERINEURAL ONCE
Status: COMPLETED | OUTPATIENT
Start: 2021-06-30 | End: 2021-06-30

## 2021-06-30 RX ADMIN — LIDOCAINE HYDROCHLORIDE 40 MG: 10 INJECTION, SOLUTION INFILTRATION; PERINEURAL at 14:30

## 2021-06-30 NOTE — PROGRESS NOTES
Thyroid disease     nodules    Wears dentures      Past Surgical History:   Procedure Laterality Date    CT NEEDLE BIOPSY LUNG PERCUTANEOUS  9/3/2020    CT NEEDLE BIOPSY LUNG PERCUTANEOUS 9/3/2020 [address] SCAN    CT NEEDLE BIOPSY LUNG PERCUTANEOUS  3/25/2021    CT NEEDLE BIOPSY LUNG PERCUTANEOUS 3/25/2021 Avril Rose MD [place] CT SCAN    LITHOTRIPSY      SHOULDER SURGERY      THORACOSCOPY Right 2021    RIGHT VIDEO ASSISTED THORACOSCOPIC SURGERY WITH WEDGE EXCISION OF RIGHT UPPER LOBE NODULE performed by Franki Leggett MD at [address] [address] N/A 10/16/2020    LEFT UPPER LOBECTOMY THORACOTOMY WITH MEDIASTINAL LYMPH NODE DISSECTION, CRYO ABLATION OF INTERCOSTAL NERVES performed by Manan Garza MD at [facility] ENDOSCOPY      gastritis    UPPER GASTROINTESTINAL ENDOSCOPY N/A 2019    EGD BIOPSY performed by Verner Pilsner, MD at [address] [place] [address] 2019    COLON & EGD W/ ANESTHESIA performed by Verner Pilsner, MD at [address] [place] [address]  2019    EGD [facility] performed by Verner Pilsner, MD at [address] [place] [address] History     Tobacco Use    Smoking status: Former Smoker     Packs/day: 1.00     Years: 15.00     Pack years: 15.00     Quit date: 6/15/1983     Years since quittin.0    Smokeless tobacco: Never Used   Substance Use Topics    Alcohol use: No      Current Outpatient Medications on File Prior to Visit   Medication Sig Dispense Refill    omeprazole (PRILOSEC) 20 MG delayed release capsule 1 bid 180 capsule 1    ALPRAZolam (XANAX) 0.5 MG tablet Take 1 tablet by mouth daily as needed for Anxiety for up to 30 days.  30 tablet 0    atorvastatin (LIPITOR) 20 MG tablet Take 20 mg by mouth nightly      lamoTRIgine (LAMICTAL) 25 MG tablet TAKE 3 TABLETS BY MOUTH DAILY (WITH BREAKFAST) MOOD SWINGS 270 tablet 0    sertraline (ZOLOFT) 25 MG tablet TAKE 3 TABLETS BY MOUTH AT BEDTIME 90 tablet 1     No current facility-administered medications on file prior to visit. ASCVD 10-YEAR RISK SCORE  The 10-year ASCVD risk score (Dave Marroquin, et al., 2013) is: 12.3%    Values used to calculate the score:      Age: 69 years      Sex: Female      Is Non- : No      Diabetic: No      Tobacco smoker: No      Systolic Blood Pressure: 372 mmHg      Is BP treated: No      HDL Cholesterol: 60 mg/dL      Total Cholesterol: 184 mg/dL     Review of Systems  10-point ROS is negative other than HPI. Physical Exam  Based off 1997 Exam Criteria  Ht 5' 5\" (1.651 m)   Wt 172 lb (78 kg)   LMP  (LMP Unknown)   BMI 28.62 kg/m²      Constitutional:       General: He is not in acute distress. Appearance: Normal appearance. Cardiovascular:      Rate and Rhythm: Normal rate and regular rhythm. Pulses: Normal pulses. Pulmonary:      Effort: Pulmonary effort is normal. No respiratory distress. Neurological:      Mental Status: He is alert and oriented to person, place, and time. Mental status is at baseline. Musculoskeletal:  Gait:  normal  Lumbar spine: Minimal paraspinal tenderness. No paresthesias. Louis Hip: Normal bilateral hips. Negative Stinchfield test.  Pain-free range of motion. R Knee: Tenderness medial joint space. Slight varus deformity. Minimal tenderness around the patella. Stable ligamentous exam.  Moderate effusion. L Knee: Tenderness medial joint space. Slight varus deformity. Minimal tenderness around the patella. Stable ligamentous exam.  Mild effusion. Unchanged exam from previous images. Imaging  Bilateral Knee: [facility]     Previous Radiographs: Grade 2-3 osteoarthritis present medial joint space. Grade 1-2 osteoarthritis present patellofemoral joint. Osteophyte seen. Joint space narrowing. No new x-rays taken today, previous ones reviewed.     Assessment and Jag Menjivar was seen today for knee pain. Diagnoses and all orders for this visit:    Primary osteoarthritis of right knee  -     US ARTHR/ASP/INJ MAJOR JNT/BURSA RIGHT; Future    Chronic pain of right knee  -     US ARTHR/ASP/INJ MAJOR JNT/BURSA RIGHT; Future    Effusion of bursa of right knee    Other orders  -     lidocaine 1 % injection 40 mg        Patient does have a significant knee joint effusion. She denies any fever or chills. I believe that infection is very unlikely with the timing of her swelling and a cortisone injection. She most likely has had just a flare of her arthritis symptoms and swelling. We will aspirate her knee today and send it for anaerobic cultures, aerobic cultures, cell count, Gram stain, and crystals. I will also order acute phase reactants. I will call her on Friday to give her the results. If they are negative for infection we have discussed proceeding with a series of viscosupplementation injections. I discussed in detail the risks, benefits, and complications of an aspiration/injection which included but is not limited to infection, skin reactions, hot swollen joints, and anaphylaxis with the patient. The patient verbalized good understanding and gave informed consent for the right knee procedure. The patient was placed in the supine position on the exam table and the skin was prepped using sterile alcohol solution. The superiolateral aspect of the right knee was prepped and draped with sterile technique. The skin and subcutaneous tissues were anesthestized with 5 mL of 1% lidocaine, injected with a 22-gauge needle. After approximately 5 minutes, an 18-gauge needle attached to a 60-ml syringe was inserted into the knee. Then, 40 ml of [yellowish fluid/blood/purulent fluid] was aspirated. The syringe was removed .   Technique: Under sterile conditions a SonWebdyn ultrasound unit with a variable frequency (6.0-15.0 MHz) linear transducer was used to localize the placement of a 22-gauge needle into the Knee. Findings: Successful needle placement for Knee Aspiration/injection. Final images were taken and saved for permanent record. The patient tolerated the injection well. The patient was instructed to call the office immediately if there is any pain, redness, warmth, fever, or chills. I discussed with Emperatriz Hesham that her history, symptoms, signs and imaging are most consistent with knee arthritis. We reviewed the natural history of these conditions and treatment options ranging from conservative measures (rest, icing, activity modification, physical therapy, pain meds, cortisone injection) to surgical options. After testing is completed, patient will make a follow up appointment to review imaging. In terms of treatment, I recommended continuing with rest, icing, avoidance of painful activities, NSAIDs or pain meds as tolerated, and physical therapy. We discussed surgical options as well, should conservative measures fail. Electronically signed by Go Duron PA-C on 6/30/2021 at 1:55 PM  This dictation was generated by voice recognition computer software. Although all attempts are made to edit the dictation for accuracy, there may be errors in the transcription that are not intended.

## 2021-07-08 ENCOUNTER — CARE COORDINATION (OUTPATIENT)
Dept: CARE COORDINATION | Age: 71
End: 2021-07-08

## 2021-07-08 NOTE — CARE COORDINATION
LAUREL called and explained role/ program to patient who declined at this time.  LAUREL will remove herself off the care team.

## 2021-07-12 ENCOUNTER — TELEPHONE (OUTPATIENT)
Dept: ORTHOPEDIC SURGERY | Age: 71
End: 2021-07-12

## 2021-07-16 DIAGNOSIS — K21.00 GASTROESOPHAGEAL REFLUX DISEASE WITH ESOPHAGITIS: ICD-10-CM

## 2021-07-16 RX ORDER — FAMOTIDINE 20 MG/1
TABLET, FILM COATED ORAL
Qty: 90 TABLET | Refills: 1 | Status: SHIPPED | OUTPATIENT
Start: 2021-07-16 | End: 2021-08-25

## 2021-07-16 NOTE — TELEPHONE ENCOUNTER
Refill Request     Last Seen: Last Seen Department: 6/23/2021  Last Seen by PCP: 6/23/2021    Last Written: 5/20/2021    Next Appointment:   Future Appointments   Date Time Provider Christina Burton   7/22/2021 11:15 AM Mayra Domingo MD [facility]   1/6/2022 10:30 AM DO YVROSE Arrieta  [place] [facility]       Future appointment scheduled      Requested Prescriptions     Pending Prescriptions Disp Refills    famotidine (PEPCID) 20 MG tablet [Pharmacy Med Name: FAMOTIDINE 20 MG TABLET] 90 tablet 1     Sig: TAKE 1 TABLET BY MOUTH EVERY DAY AT NIGHT

## 2021-07-21 RX ORDER — ATORVASTATIN CALCIUM 20 MG/1
20 TABLET, FILM COATED ORAL NIGHTLY
Qty: 90 TABLET | Refills: 1 | Status: SHIPPED | OUTPATIENT
Start: 2021-07-21 | End: 2021-12-23

## 2021-07-21 NOTE — TELEPHONE ENCOUNTER
Last office visit 6/23/2021     Last written 5/24/2021    Next office visit scheduled 1/6/2022    Requested Prescriptions     Pending Prescriptions Disp Refills    atorvastatin (LIPITOR) 20 MG tablet 30 tablet 3     Sig: Take 1 tablet by mouth nightly

## 2021-07-22 ENCOUNTER — OFFICE VISIT (OUTPATIENT)
Dept: ORTHOPEDIC SURGERY | Age: 71
End: 2021-07-22
Payer: MEDICARE

## 2021-07-22 DIAGNOSIS — M17.11 PRIMARY OSTEOARTHRITIS OF RIGHT KNEE: Primary | ICD-10-CM

## 2021-07-22 PROCEDURE — 20611 DRAIN/INJ JOINT/BURSA W/US: CPT | Performed by: ORTHOPAEDIC SURGERY

## 2021-07-22 RX ORDER — HYALURONATE SODIUM 10 MG/ML
20 SYRINGE (ML) INTRAARTICULAR ONCE
Status: COMPLETED | OUTPATIENT
Start: 2021-07-22 | End: 2021-07-22

## 2021-07-22 RX ADMIN — Medication 20 MG: at 10:55

## 2021-07-22 NOTE — PROGRESS NOTES
Diagnosis: Right knee osteoarthritis    Procedure: Right knee viscosupplementation #1    The patient is symptomatic from osteoarthritis of the right knee joint with documented radiological signs of arthritis. The patient has also failed 3 months of conservative treatment including home exercise, education, Tylenol and/or NSAIDs use. The patient was offered a Visco supplementation today. Risks, benefits, and alternatives to the injections were discussed in detail with the patient. The risks discussed included but are not limited to infection, skin reactions, hot swollen joints, and anaphylaxis. The patient gave verbal informed consent for the injection. The patient's skin was prepped with  3 sterile gauze  pads soaked with alcohol solution and the knee joint was injected with 2 mL of Euflexxa intra-articularly under sterile conditions. Technique: Under sterile conditions a [employer] ultrasound unit with a variable frequency (6.0-15.0 MHz) linear transducer was used to localize the placement of a 22-gauge needle into the knee joint. Findings: Successful needle placement for intra-articular Visco supplementation injection. Final images were taken and saved for permanent record. The patient tolerated the injection reasonably well. The patient was given instructions to ice the kne and avoid strenuous activities for 24-48 hours. The patient was instructed to call the office immediately if there is increased pain, redness, warmth, fever, or chills. We will see the patient back in one week for their second injection.

## 2021-07-29 ENCOUNTER — OFFICE VISIT (OUTPATIENT)
Dept: ORTHOPEDIC SURGERY | Age: 71
End: 2021-07-29
Payer: MEDICARE

## 2021-07-29 DIAGNOSIS — M17.11 PRIMARY OSTEOARTHRITIS OF RIGHT KNEE: Primary | ICD-10-CM

## 2021-07-29 PROCEDURE — 20611 DRAIN/INJ JOINT/BURSA W/US: CPT | Performed by: ORTHOPAEDIC SURGERY

## 2021-07-29 RX ORDER — HYALURONATE SODIUM 10 MG/ML
20 SYRINGE (ML) INTRAARTICULAR ONCE
Status: COMPLETED | OUTPATIENT
Start: 2021-07-29 | End: 2021-07-29

## 2021-07-29 RX ADMIN — Medication 20 MG: at 09:54

## 2021-07-29 NOTE — PROGRESS NOTES
Diagnosis: Right knee osteoarthritis    Procedure: Right knee viscosupplementation #2    The patient returns today for their second Euflexxa injection in the right knee. The risks, benefits, and complications of the injections were again discussed in detail with the patient. The risks discussed included but are not limited to infection, skin reactions, hot swollen joints, and anaphylaxis. The patient gave verbal informed consent for the injection. The patient skin was prepped with 3 sterile gauze pads soaked with alcohol solution and the knee joint was injected with 2 mL of Euflexxa intra-articularly under sterile conditions . Technique: Under sterile conditions a [employer] ultrasound unit with a variable frequency (6.0-15.0 MHz) linear transducer was used to localize the placement of a 22-gauge needle into the kehinde joint. Findings: Successful needle placement for intra-articular Visco supplementation injection. Final images were taken and saved for permanent record. The patient tolerated the injection reasonably well. The patient was given instructions to ice the the knee and avoid strenuous activities for 24-48 hours. The patient was instructed to call the office immediately if there is increased pain, redness, warmth, fever, or chills. We will see the patient back in one week for the third injection.

## 2021-08-02 ENCOUNTER — TELEPHONE (OUTPATIENT)
Dept: CARDIOTHORACIC SURGERY | Age: 71
End: 2021-08-02

## 2021-08-02 NOTE — TELEPHONE ENCOUNTER
Spoke with patient regarding schedule of CT chest without contrast on 8/23/2021 at 9:30 am with arrival time of 9:00 am at [facility] for RUL wedge excision follow up scan. Patient instructed to follow up with Dr. Patricia Davidson afterwards. Per patient, Dr. Patricia Davidson no longer associated with [address] [place] [address] and I have recommended Dr. Bam Mitchell. Patient will call to schedule appointment.

## 2021-08-05 ENCOUNTER — OFFICE VISIT (OUTPATIENT)
Dept: ORTHOPEDIC SURGERY | Age: 71
End: 2021-08-05
Payer: MEDICARE

## 2021-08-05 DIAGNOSIS — M17.11 PRIMARY OSTEOARTHRITIS OF RIGHT KNEE: Primary | ICD-10-CM

## 2021-08-05 PROCEDURE — 20611 DRAIN/INJ JOINT/BURSA W/US: CPT | Performed by: ORTHOPAEDIC SURGERY

## 2021-08-05 RX ORDER — HYALURONATE SODIUM 10 MG/ML
20 SYRINGE (ML) INTRAARTICULAR ONCE
Status: COMPLETED | OUTPATIENT
Start: 2021-08-05 | End: 2021-08-05

## 2021-08-05 RX ADMIN — Medication 20 MG: at 09:55

## 2021-08-05 NOTE — PROGRESS NOTES
Diagnosis: Right knee osteoarthritis    Procedure: Right knee viscosupplementation #3    The patient returns today for their third and final Euflexxa injection in the right knee. The risks, benefits, and complications of the injections were again discussed in detail with the patient. The risks discussed include but are not limited to infection, skin reactions, hot swollen joints, and anaphylaxis. The patient gave verbal informed consent for the injection. The patient's skin was prepped with 3 sterile gauze pads soaked with alcohol solution and the knee joint was injected with 2 mL of Euflexxa intra-articularly under sterile conditions. Technique: Under sterile conditions a [employer] ultrasound unit with a variable frequency (6.0-15.0 MHz) linear transducer was used to localize the placement of a 22-gauge needle into the knee joint. Findings: Successful needle placement for intra-articular Visco supplementation injection. Final images were taken and saved for permanent record. The patient tolerated the injection reasonably well. The patient was given instructions to ice of the knee and avoid strenuous activity for 24-48 hours. The patient was instructed to call the office immediately if there is increased pain, redness, warmth, fever, or chills. We will see the patient back on an as-needed basis  from this point.

## 2021-08-23 ENCOUNTER — HOSPITAL ENCOUNTER (OUTPATIENT)
Dept: CT IMAGING | Age: 71
Discharge: HOME | End: 2021-08-23
Payer: MEDICARE

## 2021-08-23 DIAGNOSIS — R91.1 PULMONARY NODULE: ICD-10-CM

## 2021-08-23 PROCEDURE — 71250 CT THORAX DX C-: CPT

## 2021-08-24 ENCOUNTER — OFFICE VISIT (OUTPATIENT)
Dept: ORTHOPEDIC SURGERY | Age: 71
End: 2021-08-24
Payer: MEDICARE

## 2021-08-24 VITALS — HEIGHT: 65 IN | BODY MASS INDEX: 28.66 KG/M2 | WEIGHT: 172 LBS

## 2021-08-24 DIAGNOSIS — M17.11 PRIMARY OSTEOARTHRITIS OF RIGHT KNEE: Primary | ICD-10-CM

## 2021-08-24 DIAGNOSIS — G89.29 CHRONIC PAIN OF RIGHT KNEE: ICD-10-CM

## 2021-08-24 DIAGNOSIS — M25.461 BILATERAL KNEE EFFUSIONS: ICD-10-CM

## 2021-08-24 DIAGNOSIS — M25.462 BILATERAL KNEE EFFUSIONS: ICD-10-CM

## 2021-08-24 DIAGNOSIS — M25.561 CHRONIC PAIN OF RIGHT KNEE: ICD-10-CM

## 2021-08-24 PROCEDURE — 20610 DRAIN/INJ JOINT/BURSA W/O US: CPT | Performed by: ORTHOPAEDIC SURGERY

## 2021-08-24 RX ORDER — BUPIVACAINE HYDROCHLORIDE 2.5 MG/ML
10 INJECTION, SOLUTION INFILTRATION; PERINEURAL ONCE
Status: COMPLETED | OUTPATIENT
Start: 2021-08-24 | End: 2021-08-24

## 2021-08-24 RX ORDER — TRIAMCINOLONE ACETONIDE 40 MG/ML
80 INJECTION, SUSPENSION INTRA-ARTICULAR; INTRAMUSCULAR ONCE
Status: COMPLETED | OUTPATIENT
Start: 2021-08-24 | End: 2021-08-24

## 2021-08-24 RX ORDER — LIDOCAINE HYDROCHLORIDE 10 MG/ML
40 INJECTION, SOLUTION INFILTRATION; PERINEURAL ONCE
Status: COMPLETED | OUTPATIENT
Start: 2021-08-24 | End: 2021-08-24

## 2021-08-24 RX ADMIN — TRIAMCINOLONE ACETONIDE 80 MG: 40 INJECTION, SUSPENSION INTRA-ARTICULAR; INTRAMUSCULAR at 10:08

## 2021-08-24 RX ADMIN — LIDOCAINE HYDROCHLORIDE 40 MG: 10 INJECTION, SOLUTION INFILTRATION; PERINEURAL at 10:09

## 2021-08-24 RX ADMIN — BUPIVACAINE HYDROCHLORIDE 10 MG: 2.5 INJECTION, SOLUTION INFILTRATION; PERINEURAL at 10:08

## 2021-08-24 NOTE — PROGRESS NOTES
Dr Annette Gamble      Date /Time 8/24/2021       2:03 PM EST  Name Milena Torres             1950   Location  [address] [place] [address]  MRN [id number]                Chief Complaint   Patient presents with    Knee Pain     CK RIGHT KNEE         History of Present Illness  Milena Torres is a 66 y.o. female who presents with  bilateral knee pain, right greater than left. Patient did complete her series of Visco septation injections approximately 3+ weeks ago. She is continuing to have pain and swelling mostly involving her right knee. She denies any fever or chills. No new injury or trauma. Previous history: Patient presents the office today for follow-up visit. She received a cortisone injection for right knee osteoarthritis approximately 2 months ago. She did well for the first 2 months with decreased pain and swelling. She is very happy with the results. Then approximately a week ago the knee became extremely swollen and painful without new injury or trauma. Pain symptoms are mostly medial but also lateral.  She denies any fever or chills. Previous history: She is here for follow-up. She has persistent right knee pain. Her right knee injection helped tremendously compared to her preinjection state. Pain is returned somewhat recently. She is interested in another injection today. Previous history:  Reports bilateral knee pain, right worse than left for last 2 weeks. She has had some longstanding history of right knee pain over months before. This is been low-grade. As of last 2 weeks things have gotten a lot worse. She has reported swelling in her knee which radiates the pain down and up her leg. Denies pain at nighttime. Has more pain with activity.     Past History  Past Medical History:   Diagnosis Date    Allergic rhinitis     Anxiety 10/10/2011    Arthritis     Depression 3/22/2017    GERD (gastroesophageal reflux disease)     Hallux valgus     Headache(784.0)     Herpes simplex     Irritable bowel syndrome     Kidney stones     Lung cancer, main bronchus, left ([address] 10/16/2020    Osteopenia     Other and unspecified hyperlipidemia 2013    Pain medication agreement signed 10/10/2011    Thyroid disease     nodules    Wears dentures      Past Surgical History:   Procedure Laterality Date    CT NEEDLE BIOPSY LUNG PERCUTANEOUS  9/3/2020    CT NEEDLE BIOPSY LUNG PERCUTANEOUS 9/3/2020 [facility] CT SCAN    CT NEEDLE BIOPSY LUNG PERCUTANEOUS  3/25/2021    CT NEEDLE BIOPSY LUNG PERCUTANEOUS 3/25/2021 Gosia Gonzalez [facility] CT SCAN    LITHOTRIPSY      SHOULDER SURGERY      THORACOSCOPY Right 2021    RIGHT VIDEO ASSISTED THORACOSCOPIC SURGERY WITH WEDGE EXCISION OF RIGHT UPPER LOBE NODULE performed by Ayse Navarro MD at [place] [address] 10/16/2020    LEFT UPPER LOBECTOMY THORACOTOMY WITH MEDIASTINAL LYMPH NODE DISSECTION, CRYO ABLATION OF INTERCOSTAL NERVES performed by Polo Greene MD at [facility]      gastritis    UPPER GASTROINTESTINAL ENDOSCOPY N/A 2019    EGD BIOPSY performed by Luba Mejia MD at [facility] 2019    COLON & EGD W/ ANESTHESIA performed by Luba Mejia MD at [address] [facility]  2019    EGD [facility] performed by Luba Mejia MD at [address] [facility] History     Tobacco Use    Smoking status: Former Smoker     Packs/day: 1.00     Years: 15.00     Pack years: 15.00     Quit date: 6/15/1983     Years since quittin.2    Smokeless tobacco: Never Used   Substance Use Topics    Alcohol use: No      Current Outpatient Medications on File Prior to Visit   Medication Sig Dispense Refill    atorvastatin (LIPITOR) 20 MG tablet Take 1 tablet by mouth nightly 90 tablet 1    famotidine (PEPCID) 20 MG tablet TAKE 1 TABLET BY MOUTH EVERY DAY AT NIGHT 90 tablet 1    omeprazole (PRILOSEC) 20 MG delayed release capsule 1 bid 180 capsule 1    lamoTRIgine (LAMICTAL) 25 MG tablet TAKE 3 TABLETS BY MOUTH DAILY (WITH BREAKFAST) MOOD SWINGS 270 tablet 0    sertraline (ZOLOFT) 25 MG tablet TAKE 3 TABLETS BY MOUTH AT BEDTIME 90 tablet 1     No current facility-administered medications on file prior to visit. ASCVD 10-YEAR RISK SCORE  The 10-year ASCVD risk score (Sabi Westfall, et al., 2013) is: 13.8%    Values used to calculate the score:      Age: 66 years      Sex: Female      Is Non- : No      Diabetic: No      Tobacco smoker: No      Systolic Blood Pressure: 191 mmHg      Is BP treated: No      HDL Cholesterol: 60 mg/dL      Total Cholesterol: 184 mg/dL     Review of Systems  10-point ROS is negative other than HPI. Physical Exam  Based off 1997 Exam Criteria  Ht 5' 5\" (1.651 m)   Wt 172 lb (78 kg)   LMP  (LMP Unknown)   BMI 28.62 kg/m²      Constitutional:       General: He is not in acute distress. Appearance: Normal appearance. Cardiovascular:      Rate and Rhythm: Normal rate and regular rhythm. Pulses: Normal pulses. Pulmonary:      Effort: Pulmonary effort is normal. No respiratory distress. Neurological:      Mental Status: He is alert and oriented to person, place, and time. Mental status is at baseline. Musculoskeletal:  Gait:  normal  Lumbar spine: Minimal paraspinal tenderness. No paresthesias. Louis Hip: Normal bilateral hips. Negative Stinchfield test.  Pain-free range of motion. R Knee: Tenderness medial joint space. Slight varus deformity. Minimal tenderness around the patella. Stable ligamentous exam.  Moderate effusion. L Knee: Tenderness medial joint space. Slight varus deformity. Minimal tenderness around the patella. Stable ligamentous exam.  Mild effusion. Unchanged exam from previous images. Imaging  Bilateral Knee:  [address] [facility]     Previous Radiographs: Grade 2-3 osteoarthritis present medial joint space. Grade 1-2 osteoarthritis present patellofemoral joint. Osteophyte seen. Joint space narrowing. No new x-rays taken today, previous ones reviewed. Assessment and Plan  Kristy Seals was seen today for knee pain. Diagnoses and all orders for this visit:    Primary osteoarthritis of right knee  -     Cancel: US ARTHR/ASP/INJ MAJOR JNT/BURSA RIGHT; Future  -     bupivacaine (MARCAINE) 0.25 % injection 10 mg  -     lidocaine 1 % injection 40 mg  -     triamcinolone acetonide (KENALOG-40) injection 80 mg  -     XR KNEE RIGHT (MIN 4 VIEWS); Future  -     OK ARTHROCENTESIS ASPIR&/INJ MAJOR JT/BURSA W/O US    Chronic pain of right knee    Bilateral knee effusions        Patient does have a significant knee joint effusion. She denies any fever or chills. I believe that infection is very unlikely with the timing of her swelling. She most likely has had just a flare of her arthritis symptoms and swelling. We will perform an intra-articular cortisone injection today into her right knee. She will follow-up in 3 months or sooner if problems arise. Patient is also have a long conversation concerning surgical intervention. This range from partial knee replacement to total knee arthroplasty. We have also discussed weight loss in terms of controlling pain and swelling. I discussed in detail the risks, benefits and complications of aninjection which included but are not limited to infection, skin reactions, hot swollen joint, and anaphylaxis with the patient. The patient verbalized understanding and gave informed consent for the right knee injection. The patient is placed supine on table with a bolster underneath knee. Knee prepped with Betadine/Sterile alcohol solution. A sterile 22-gauge needle was inserted into the knee and the mixture of 2ml knealog 40mg/cc, 4 mL of 1% plain lidocaine, and 4 cc .25% bupivacaine was injected under sterile technique.  The needle was withdrawn and the puncture site sealed with a Band-Aid. The patient tolerated the injection well. The patient was instructed to call the office immediately if there is any pain, redness, warmth, fever, or chills. I discussed with Damaris Franco that her history, symptoms, signs and imaging are most consistent with knee arthritis. We reviewed the natural history of these conditions and treatment options ranging from conservative measures (rest, icing, activity modification, physical therapy, pain meds, cortisone injection) to surgical options. In terms of treatment, I recommended continuing with rest, icing, avoidance of painful activities, NSAIDs or pain meds as tolerated, and physical therapy. Electronically signed by Artem Haynes MD on 8/24/2021 at 1:08 PM  This dictation was generated by voice recognition computer software. Although all attempts are made to edit the dictation for accuracy, there may be errors in the transcription that are not intended.

## 2021-08-25 ENCOUNTER — OFFICE VISIT (OUTPATIENT)
Dept: PULMONOLOGY | Age: 71
End: 2021-08-25
Payer: MEDICARE

## 2021-08-25 VITALS
RESPIRATION RATE: 16 BRPM | BODY MASS INDEX: 27.99 KG/M2 | HEART RATE: 77 BPM | TEMPERATURE: 98.7 F | HEIGHT: 65 IN | OXYGEN SATURATION: 96 % | WEIGHT: 168 LBS | SYSTOLIC BLOOD PRESSURE: 147 MMHG | DIASTOLIC BLOOD PRESSURE: 72 MMHG

## 2021-08-25 DIAGNOSIS — C34.12 MALIGNANT NEOPLASM OF UPPER LOBE OF LEFT LUNG (HCC): Primary | ICD-10-CM

## 2021-08-25 DIAGNOSIS — R91.1 LUNG NODULE: ICD-10-CM

## 2021-08-25 DIAGNOSIS — Z87.891 FORMER SMOKER: ICD-10-CM

## 2021-08-25 PROCEDURE — G8400 PT W/DXA NO RESULTS DOC: HCPCS | Performed by: INTERNAL MEDICINE

## 2021-08-25 PROCEDURE — 99213 OFFICE O/P EST LOW 20 MIN: CPT | Performed by: INTERNAL MEDICINE

## 2021-08-25 PROCEDURE — 1036F TOBACCO NON-USER: CPT | Performed by: INTERNAL MEDICINE

## 2021-08-25 PROCEDURE — G8427 DOCREV CUR MEDS BY ELIG CLIN: HCPCS | Performed by: INTERNAL MEDICINE

## 2021-08-25 PROCEDURE — 1090F PRES/ABSN URINE INCON ASSESS: CPT | Performed by: INTERNAL MEDICINE

## 2021-08-25 PROCEDURE — 3017F COLORECTAL CA SCREEN DOC REV: CPT | Performed by: INTERNAL MEDICINE

## 2021-08-25 PROCEDURE — G8417 CALC BMI ABV UP PARAM F/U: HCPCS | Performed by: INTERNAL MEDICINE

## 2021-08-25 PROCEDURE — 1123F ACP DISCUSS/DSCN MKR DOCD: CPT | Performed by: INTERNAL MEDICINE

## 2021-08-25 PROCEDURE — 4040F PNEUMOC VAC/ADMIN/RCVD: CPT | Performed by: INTERNAL MEDICINE

## 2021-08-25 RX ORDER — MULTIVIT-MIN/IRON/FOLIC ACID/K 18-600-40
CAPSULE ORAL
COMMUNITY

## 2021-08-25 RX ORDER — UBIDECARENONE 75 MG
50 CAPSULE ORAL DAILY
COMMUNITY

## 2021-08-25 NOTE — PROGRESS NOTES
MA Communication:   The following orders are received by verbal communication from Madeleine Stephens MD    Orders include:  FU  1 yr        CT

## 2021-08-25 NOTE — PROGRESS NOTES
Pulmonary Outpatient Note   Adiel Ivan MD       8/25/2021    1. Malignant neoplasm of upper lobe of left lung (HCC)    2. Lung nodule    3. Former smoker          ASSESSMENT/PLAN:  Left upper lobe lung cancer. Status post lobectomy  Right upper lobe lung nodule. Status post wedge resection, benign. Does have tiny lung nodules, follow-up CT in 1 year. Images discussed with the patient and her daughter  Former smoker. PFT without significant obstructive defect  Prophylaxis. Has received both doses of her COVID-19 vaccine, Pneumovax, Prevnar. Continue with annual flu shots. RTC with CT chest in 1 year, call earlier if symptomatic    Orders Placed This Encounter   Procedures    CT CHEST WO CONTRAST       No follow-ups on file. Chief Complaint:   Follow-up and Results (CT)       HPI: Fabien PritchettJg Sultana is a 66y.o. year old female here for follow-up, accompanied by her daughter, Keyla Leavitt. Her PCP is Dr. Ector Hall. The patient was last seen by Dr. Noreen Pedroza on 8/22/2019. The patient has been seen by Dr. Bharti Hilario. The patient underwent left upper lobe lobectomy for lung cancer in October 2020. She had a suspicious right upper lobe lung lesion, underwent nondiagnostic biopsy in March, then underwent VATS with wedge resection. This turned out to be a benign lesion with chronic inflammation. She was hospitalized from 5/20 through 5/24. The patient is feeling fairly well, but does get out of breath easily. He is able to walk 1-2 blocks, but does get out of breath when walking up steps. In the cold, she has to stop occasionally. The patient smoked for about 10 years from age 23, usually average 1 PPD. She has not smoked since 1980. She eats well, her weight has been stable. She denies GI or  complaints. She does not sleep very well, but always worries about everything according to her daughter. She is active doing gardening.   There is no other change in her medical or surgical history since her last visit, the rest of her ROS was negative. CT chest 8/23/2021  Mediastinum: Calcification of thoracic aorta.  Trace pericardial effusion. Calcified mediastinal lymph nodes, in keeping with granulomatous disease. Within the mediastinum, no pathologic lymphadenopathy by size criteria. Hilar evaluation is limited without contrast.  5 mm hypodense left thyroid   nodule, of the size were ultrasound is not mandatory.  No axillary adenopathy.       Lungs/pleura: Since prior examination, patient is status post right upper   lobe wedge resection.  Previously demonstrated 2.1 cm ground-glass nodule in   the right upper lobe is no longer seen.  Linear parenchymal band is seen   along the margin of the staple line.  Additional linear and ground-glass   opacity is seen.  3 mm noncalcified right upper lobe paramediastinal nodule   is likely similar to prior given differences in lung positioning.  3 mm   nodule along the plane of right major fissure on series 2, image 48, new as   compared to prior.  3 mm nodule within the superior segment of the right   lower lobe is not substantially changed.  Status post left upper lobectomy. Small left pleural effusion.  No pneumothorax.  Filling defect within the   anterior trachea within the lower neck, typically mucoid deposition, less   commonly polyps.  No pneumothorax.       Upper Abdomen: 3 4 mm low-density lesions at the hepatic dome and within the   right hepatic lobe are too small to characterize though grossly similar to   prior.  Calcified granulomas within the spleen.  Subcentimeter hypodense   lesion within the central spleen is similar to prior.       Soft Tissues/Bones: Degenerative change of the spine.  Hemangioma within T6. PFT 5/17/2021  Spirometry for this patient shows an FEV1 of 2.37 which is 102% of predicted and a forced vital capacity of 2.23 which is 105% of predicted, giving a ratio of 73. There was no response to bronchodilators.   Lungs since quittin.2    Smokeless tobacco: Never Used   Substance Use Topics    Alcohol use: No       Family History   Problem Relation Age of Onset    Asthma Mother     Heart Disease Father          Current Outpatient Medications:     vitamin B-12 (CYANOCOBALAMIN) 100 MCG tablet, Take 50 mcg by mouth daily, Disp: , Rfl:     Cholecalciferol (VITAMIN D) 50 MCG ( UT) CAPS capsule, Take by mouth, Disp: , Rfl:     atorvastatin (LIPITOR) 20 MG tablet, Take 1 tablet by mouth nightly, Disp: 90 tablet, Rfl: 1    omeprazole (PRILOSEC) 20 MG delayed release capsule, 1 bid, Disp: 180 capsule, Rfl: 1    lamoTRIgine (LAMICTAL) 25 MG tablet, TAKE 3 TABLETS BY MOUTH DAILY (WITH BREAKFAST) MOOD SWINGS, Disp: 270 tablet, Rfl: 0    sertraline (ZOLOFT) 25 MG tablet, TAKE 3 TABLETS BY MOUTH AT BEDTIME, Disp: 90 tablet, Rfl: 1    Sulfa antibiotics and Codeine    Vitals:    21 1254   BP: (!) 147/72   Site: Left Upper Arm   Position: Sitting   Cuff Size: Medium Adult   Pulse: 77   Resp: 16   Temp: 98.7 °F (37.1 °C)   TempSrc: Oral   SpO2: 96%   Weight: 168 lb (76.2 kg)   Height: 5' 5\" (1.651 m)       Review of Systems   Respiratory: Positive for shortness of breath. All other systems reviewed and are negative. Physical Exam  Vitals reviewed. Constitutional:       General: She is not in acute distress. Appearance: She is well-developed. She is not diaphoretic. HENT:      Head: Normocephalic and atraumatic. Nose: Nose normal.      Mouth/Throat:      Pharynx: No oropharyngeal exudate. Comments: Class III airway, upper denture, lower jaw edentulous  Eyes:      General: No scleral icterus. Right eye: No discharge. Left eye: No discharge. Conjunctiva/sclera: Conjunctivae normal.      Pupils: Pupils are equal, round, and reactive to light. Neck:      Thyroid: No thyromegaly. Vascular: No JVD. Trachea: No tracheal deviation.    Cardiovascular:      Rate and Rhythm:

## 2021-08-25 NOTE — PATIENT INSTRUCTIONS
Remember to bring a list of pulmonary medications and any CPAP or BiPAP machines to your next appointment with the office. Please keep all of your future appointments scheduled by [address]. Out of respect for other patients and providers, you may be asked to reschedule your appointment if you arrive later than your scheduled appointment time. Appointments cancelled less than 24hrs in advance will be considered a no show. Patients with three missed appointments within 1 year or four missed appointments within 2 years can be dismissed from the practice. Please be aware that our physicians are required to work in the Intensive Care Unit at [facility].  Your appointment may need to be rescheduled if they are designated to work during your appointment time. You may receive a survey regarding the care you received during your visit. Your input is valuable to us. We encourage you to complete and return your survey. We hope you will choose us in the future for your healthcare needs. Pt instructed of all future appointment dates & times, including radiology, labs, procedures & referrals. If procedures were scheduled preparation instructions provided. Instructions on future appointments with [facility] were given.

## 2021-08-30 ASSESSMENT — ENCOUNTER SYMPTOMS: SHORTNESS OF BREATH: 1

## 2021-10-13 DIAGNOSIS — R45.86 MOOD SWINGS: ICD-10-CM

## 2021-10-13 RX ORDER — LAMOTRIGINE 25 MG/1
TABLET ORAL
Qty: 270 TABLET | Refills: 0 | Status: SHIPPED | OUTPATIENT
Start: 2021-10-13 | End: 2022-03-17 | Stop reason: SDUPTHER

## 2021-10-13 NOTE — TELEPHONE ENCOUNTER
Refill Request     Last Seen: Last Seen Department: 6/23/2021  Last Seen by PCP: 6/23/2021    Last Written: 4/6/21    Next Appointment:   Future Appointments   Date Time Provider Christina Burton   1/6/2022 10:30 AM DO Y[facility]ROSE [place] - [place]   8/25/2022 11:30 AM [facility] CT T [place] CT Nagi Sloan   8/25/2022  1:15 PM Antoni Antony MD AND ARUN H[facility]K       Future appointment scheduled      Requested Prescriptions     Pending Prescriptions Disp Refills    lamoTRIgine (LAMICTAL) 25 MG tablet [Pharmacy Med Name: LAMOTRIGINE 25 MG TABLET] 270 tablet 0     Sig: TAKE 3 TABLETS BY MOUTH DAILY (WITH BREAKFAST) MOOD SWINGS

## 2021-12-14 RX ORDER — OMEPRAZOLE 20 MG/1
CAPSULE, DELAYED RELEASE ORAL
Qty: 180 CAPSULE | Refills: 1 | Status: SHIPPED | OUTPATIENT
Start: 2021-12-14 | End: 2022-06-08

## 2021-12-23 RX ORDER — ATORVASTATIN CALCIUM 20 MG/1
TABLET, FILM COATED ORAL
Qty: 90 TABLET | Refills: 1 | Status: SHIPPED | OUTPATIENT
Start: 2021-12-23 | End: 2022-07-05 | Stop reason: SDUPTHER

## 2021-12-23 NOTE — TELEPHONE ENCOUNTER
Refill Request     Last Seen: Last Seen Department: 6/23/2021  Last Seen by PCP: 6/23/2021    Last Written: 7/21/2021 #90 x 1    Next Appointment:   Future Appointments   Date Time Provider Christina Josephine   1/6/2022 10:30 AM DO Y[facility]ROSE Rose  [place] - [facility]   8/25/2022 11:30 AM [facility] CT VCT [facility] CT [facility]   8/25/2022  1:15 PM Amber Wahl MD AND PULM [facility]       Future appointment scheduled      Requested Prescriptions     Pending Prescriptions Disp Refills    atorvastatin (LIPITOR) 20 MG tablet [Pharmacy Med Name: ATORVASTATIN 20 MG TABLET] 90 tablet 1     Sig: TAKE 1 TABLET BY MOUTH EVERY DAY AT NIGHT

## 2022-02-17 ENCOUNTER — TELEMEDICINE (OUTPATIENT)
Dept: FAMILY MEDICINE CLINIC | Age: 72
End: 2022-02-17
Payer: MEDICARE

## 2022-02-17 DIAGNOSIS — Z00.00 MEDICARE ANNUAL WELLNESS VISIT, SUBSEQUENT: Primary | ICD-10-CM

## 2022-02-17 PROCEDURE — G0439 PPPS, SUBSEQ VISIT: HCPCS | Performed by: FAMILY MEDICINE

## 2022-02-17 PROCEDURE — 3017F COLORECTAL CA SCREEN DOC REV: CPT | Performed by: FAMILY MEDICINE

## 2022-02-17 PROCEDURE — 1123F ACP DISCUSS/DSCN MKR DOCD: CPT | Performed by: FAMILY MEDICINE

## 2022-02-17 PROCEDURE — 4040F PNEUMOC VAC/ADMIN/RCVD: CPT | Performed by: FAMILY MEDICINE

## 2022-02-17 ASSESSMENT — PATIENT HEALTH QUESTIONNAIRE - PHQ9
2. FEELING DOWN, DEPRESSED OR HOPELESS: 0
SUM OF ALL RESPONSES TO PHQ QUESTIONS 1-9: 1
SUM OF ALL RESPONSES TO PHQ9 QUESTIONS 1 & 2: 1
SUM OF ALL RESPONSES TO PHQ QUESTIONS 1-9: 1
SUM OF ALL RESPONSES TO PHQ QUESTIONS 1-9: 1
1. LITTLE INTEREST OR PLEASURE IN DOING THINGS: 1
SUM OF ALL RESPONSES TO PHQ QUESTIONS 1-9: 1

## 2022-02-17 ASSESSMENT — LIFESTYLE VARIABLES: HOW OFTEN DO YOU HAVE A DRINK CONTAINING ALCOHOL: NEVER

## 2022-02-17 NOTE — PATIENT INSTRUCTIONS
Personalized Preventive Plan for Khadijah [facility] 2/17/2022  Medicare offers a range of preventive health benefits. Some of the tests and screenings are paid in full while other may be subject to a deductible, co-insurance, and/or copay. Some of these benefits include a comprehensive review of your medical history including lifestyle, illnesses that may run in your family, and various assessments and screenings as appropriate. After reviewing your medical record and screening and assessments performed today your provider may have ordered immunizations, labs, imaging, and/or referrals for you. A list of these orders (if applicable) as well as your Preventive Care list are included within your After Visit Summary for your review. Other Preventive Recommendations:    · A preventive eye exam performed by an eye specialist is recommended every 1-2 years to screen for glaucoma; cataracts, macular degeneration, and other eye disorders. · A preventive dental visit is recommended every 6 months. · Try to get at least 150 minutes of exercise per week or 10,000 steps per day on a pedometer . · Order or download the FREE \"Exercise & Physical Activity: Your Everyday Guide\" from [employer]. Call 5-885.148.7561 or search [employer] online. · You need 2181-5607 mg of calcium and 5180-5475 IU of vitamin D per day. It is possible to meet your calcium requirement with diet alone, but a vitamin D supplement is usually necessary to meet this goal.  · When exposed to the sun, use a sunscreen that protects against both UVA and UVB radiation with an SPF of 30 or greater. Reapply every 2 to 3 hours or after sweating, drying off with a towel, or swimming. · Always wear a seat belt when traveling in a car. Always wear a helmet when riding a bicycle or motorcycle. Personalized Preventive Plan for Khadijah [facility] 2/17/2022  Medicare offers a range of preventive health benefits.  Some

## 2022-02-17 NOTE — PROGRESS NOTES
Medicare Annual Wellness Visit    Jose Marin is here for Medicare 1 Wilfredo Ugarte was seen today for medicare awv. Diagnoses and all orders for this visit:    Medicare annual wellness visit, subsequent         Recommendations for Preventive Services Due: see orders and patient instructions/AVS.  Recommended screening schedule for the next 5-10 years is provided to the patient in written form: see Patient Instructions/AVS.     No follow-ups on file. Reviewed and updated this visit by clinical staff:  Tobacco  Allergies  Meds  Med Hx  Surg Hx  Soc Hx  Fam Hx      Subjective       Patient's complete Health Risk Assessment and screening values have been reviewed and are found in Flowsheets. The following problems were reviewed today and where indicated follow up appointments were made and/or referrals ordered. Positive Risk Factor Screenings with Interventions:                  No Positive Risk Factors identified today. Objective      Patient-Reported Vitals  Patient-Reported Systolic (Top): 021 mmHg  Patient-Reported Diastolic (Bottom): 87 mmHg  Patient-Reported Pulse: 70  Patient-Reported Weight: 162lb  Patient-Reported Height: 5' 5\"  Patient-Reported Pulse Oximetry: 97             Allergies   Allergen Reactions    Sulfa Antibiotics     Codeine Nausea Only and Other (See Comments)     Significant headaches     Prior to Visit Medications    Medication Sig Taking?  Authorizing Provider   atorvastatin (LIPITOR) 20 MG tablet TAKE 1 TABLET BY MOUTH EVERY DAY AT NIGHT  Mercedes Do, APRN - CNP   omeprazole (PRILOSEC) 20 MG delayed release capsule TAKE 1 CAPSULE BY MOUTH TWICE A DAY  Sara Huggins DO   lamoTRIgine (LAMICTAL) 25 MG tablet TAKE 3 TABLETS BY MOUTH DAILY (WITH BREAKFAST) MOOD SWINGS  Cecile Middleton, APRN - CNP   vitamin B-12 (CYANOCOBALAMIN) 100 MCG tablet Take 50 mcg by mouth daily  Historical Provider, MD   Cholecalciferol (VITAMIN D) 50 MCG (2000 UT) CAPS

## 2022-03-07 NOTE — TELEPHONE ENCOUNTER
Refill Request     Last Seen: Last Seen Department: 2/17/2022  Last Seen by PCP: 6/23/2021    Last Written: 3/22/21 90 tablet 1 refill     Next Appointment:   Future Appointments   Date Time Provider Christina Burton   7/11/2022 11:20 AM Jamarcus Hansen MD [facility]   8/25/2022 11:30 AM [facility] CT VCT [facility] CT [employer] to [employer] to schedule appointment.          Requested Prescriptions     Pending Prescriptions Disp Refills    sertraline (ZOLOFT) 50 MG tablet [Pharmacy Med Name: SERTRALINE HCL 50 MG TABLET] 270 tablet 1     Sig: TAKE 1 TABLET BY MOUTH THREE TIMES A DAY

## 2022-03-17 DIAGNOSIS — R45.86 MOOD SWINGS: ICD-10-CM

## 2022-03-17 RX ORDER — LAMOTRIGINE 25 MG/1
TABLET ORAL
Qty: 270 TABLET | Refills: 0 | Status: SHIPPED | OUTPATIENT
Start: 2022-03-17 | End: 2022-06-07

## 2022-03-17 NOTE — TELEPHONE ENCOUNTER
Refill Request     Last Seen: Last Seen Department: 2/17/2022  Last Seen by PCP: 6/23/2021    Last Written: 10/13/21 270 tablet  0 refill     Next Appointment:   Future Appointments   Date Time Provider Christina Josephine   7/11/2022 11:20 AM Willi Taylor MD AND [facility]   8/25/2022 11:30 AM [facility] CT VCT [facility] CT [employer] to [employer] to schedule appointment.          Requested Prescriptions     Pending Prescriptions Disp Refills    lamoTRIgine (LAMICTAL) 25 MG tablet 270 tablet 0

## 2022-04-27 ENCOUNTER — TELEPHONE (OUTPATIENT)
Dept: FAMILY MEDICINE CLINIC | Age: 72
End: 2022-04-27

## 2022-04-27 NOTE — TELEPHONE ENCOUNTER
Patient called in and states she has been getting anxious, edgy, and more irritated with people in the last week.  She wants to know if she needs to change one of the doses on her meds either zoloft or lamictal. Please advise

## 2022-06-06 NOTE — TELEPHONE ENCOUNTER
.  Refill Request     CONFIRM preferrred pharmacy with the patient. If Mail Order Rx - Pend for 90 day refill.       Last Seen: Last Seen Department: 2/17/2022  Last Seen by PCP: 6/23/2021    Last Written: 3-8-22 270 with 0     Next Appointment:   Future Appointments   Date Time Provider Christina Burton   7/11/2022 11:20 AM Angi Seo MD [facility]   8/25/2022 11:30 AM DEE CT [facility] CT Kendrick Johnson         Requested Prescriptions     Pending Prescriptions Disp Refills    sertraline (ZOLOFT) 50 MG tablet [Pharmacy Med Name: SERTRALINE HCL 50 MG TABLET] 270 tablet 1     Sig: TAKE 1 TABLET BY MOUTH THREE TIMES A DAY

## 2022-06-07 DIAGNOSIS — F32.89 OTHER DEPRESSION: Primary | ICD-10-CM

## 2022-06-07 DIAGNOSIS — R45.86 MOOD SWINGS: ICD-10-CM

## 2022-06-07 RX ORDER — LAMOTRIGINE 25 MG/1
TABLET ORAL
Qty: 270 TABLET | Refills: 0 | Status: SHIPPED | OUTPATIENT
Start: 2022-06-07 | End: 2022-08-06

## 2022-06-07 NOTE — TELEPHONE ENCOUNTER
Refill Request     CONFIRM preferrred pharmacy with the patient. If Mail Order Rx - Pend for 90 day refill.       Last Seen: Last Seen Department: 2/17/2022  Last Seen by PCP: 6/23/2021    Last Written: 3/17/2022    Next Appointment:   Future Appointments   Date Time Provider Christina Burton   7/11/2022 11:20 AM Nila Rios MD [facility]   8/25/2022 11:30 AM DEE CT [facility] CT Aman Hubbard             Requested Prescriptions     Pending Prescriptions Disp Refills    lamoTRIgine (LAMICTAL) 25 MG tablet [Pharmacy Med Name: LAMOTRIGINE 25 MG TABLET] 270 tablet 0     Sig: TAKE 3 TABLETS BY MOUTH DAILY (WITH BREAKFAST) MOOD SWINGS

## 2022-06-08 RX ORDER — OMEPRAZOLE 20 MG/1
CAPSULE, DELAYED RELEASE ORAL
Qty: 180 CAPSULE | Refills: 0 | Status: SHIPPED | OUTPATIENT
Start: 2022-06-08 | End: 2022-09-04

## 2022-06-08 NOTE — TELEPHONE ENCOUNTER
Refill Request     CONFIRM preferrred pharmacy with the patient. If Mail Order Rx - Pend for 90 day refill.       Last Seen: Last Seen Department: 2/17/2022  Last Seen by PCP: 2/17/2022    Last Written: 12/14/2021 180 Capsule 1 Refill    Next Appointment:   Future Appointments   Date Time Provider Christina Burton   7/11/2022 11:20 AM Nila Rios MD [facility]   8/1/2022  3:30 PM DO YVROSE Krueger  [place] [facility] [place]   8/25/2022 11:30 AM [facility] CT VCT [facility] CT Aman Hubbard       Future appointment scheduled      Requested Prescriptions     Pending Prescriptions Disp Refills    omeprazole (PRILOSEC) 20 MG delayed release capsule [Pharmacy Med Name: OMEPRAZOLE DR 20 MG CAPSULE] 180 capsule 1     Sig: TAKE 1 CAPSULE BY MOUTH TWICE A DAY

## 2022-06-21 ENCOUNTER — TELEPHONE (OUTPATIENT)
Dept: PULMONOLOGY | Age: 72
End: 2022-06-21

## 2022-06-21 RX ORDER — PREDNISONE 20 MG/1
20 TABLET ORAL 2 TIMES DAILY
Qty: 10 TABLET | Refills: 0 | Status: SHIPPED | OUTPATIENT
Start: 2022-06-21 | End: 2022-06-26

## 2022-06-21 NOTE — TELEPHONE ENCOUNTER
Patient was contacted, and relate Dr Ty Mcgowan verbalize understanding,and will call to the office if not improvement.

## 2022-06-21 NOTE — TELEPHONE ENCOUNTER
Reviewed my last note, images and the message. Called in prednisone for 5 days.   If she is not improving, will repeat CT chest earlier, he has a scan scheduled for 8/25

## 2022-06-21 NOTE — TELEPHONE ENCOUNTER
Patient call stated she been having a dry cough that is hurting her back,ribs,also wheezing ,SOB, tiredness,lighheadache. For about 3 weeks,pt denied fever,her CO2 is 95%,pt is asking is she should have a CT scan.     Please advise

## 2022-07-05 RX ORDER — ATORVASTATIN CALCIUM 20 MG/1
TABLET, FILM COATED ORAL
Qty: 90 TABLET | Refills: 1 | Status: SHIPPED | OUTPATIENT
Start: 2022-07-05

## 2022-07-05 NOTE — TELEPHONE ENCOUNTER
Refill Request     CONFIRM preferrred pharmacy with the patient. If Mail Order Rx - Pend for 90 day refill.       Last Seen: Last Seen Department: 2/17/2022  Last Seen by PCP: 6/23/2021    Last Written: 12/23/21    Next Appointment:   Future Appointments   Date Time Provider Christina Burton   7/13/2022  2:00 PM Leonardo Kaufman MD [facility]   8/1/2022  3:30 PM DO YVROSE Hodges  [place] - [place]   8/25/2022 11:30 AM [facility] CT [facility] CT [facility]       Future appointment scheduled      Requested Prescriptions     Pending Prescriptions Disp Refills    atorvastatin (LIPITOR) 20 MG tablet 90 tablet 1     Sig: TAKE 1 TABLET BY MOUTH EVERY DAY AT NIGHT

## 2022-07-11 ENCOUNTER — TELEPHONE (OUTPATIENT)
Dept: FAMILY MEDICINE CLINIC | Age: 72
End: 2022-07-11

## 2022-07-11 ENCOUNTER — TELEPHONE (OUTPATIENT)
Dept: PULMONOLOGY | Age: 72
End: 2022-07-11

## 2022-07-11 DIAGNOSIS — K21.00 GASTROESOPHAGEAL REFLUX DISEASE WITH ESOPHAGITIS WITHOUT HEMORRHAGE: Primary | ICD-10-CM

## 2022-07-11 NOTE — TELEPHONE ENCOUNTER
----- Message from [address] sent at 7/11/2022  9:56 AM EDT -----  Subject: Referral Request    Reason for referral request? Requesting a referral for a GI Doctor to   address upper and lower GI issues (reflux and bowels)  Provider patient wants to be referred to(if known):     Provider Phone Number(if known):     Additional Information for Provider? patient are worried about dehydration     ---------------------------------------------------------------------------  --------------  Warden Cisneros INFO    [phone]; OK to leave message on voicemail  ---------------------------------------------------------------------------  --------------

## 2022-07-11 NOTE — TELEPHONE ENCOUNTER
Patient is needing a order for a CT from surgery follow up, she needs this put in today so she can get it tomorrow before her appointment on Wed. Please advise.  Thanks

## 2022-07-18 ENCOUNTER — HOSPITAL ENCOUNTER (OUTPATIENT)
Dept: CT IMAGING | Age: 72
Discharge: HOME | End: 2022-07-18
Payer: MEDICARE

## 2022-07-18 DIAGNOSIS — C34.12 MALIGNANT NEOPLASM OF UPPER LOBE OF LEFT LUNG (HCC): ICD-10-CM

## 2022-07-18 DIAGNOSIS — Z87.891 FORMER SMOKER: ICD-10-CM

## 2022-07-18 DIAGNOSIS — R91.1 LUNG NODULE: ICD-10-CM

## 2022-07-18 PROCEDURE — 71250 CT THORAX DX C-: CPT

## 2022-07-19 ENCOUNTER — OFFICE VISIT (OUTPATIENT)
Dept: PULMONOLOGY | Age: 72
End: 2022-07-19
Payer: MEDICARE

## 2022-07-19 ENCOUNTER — TELEPHONE (OUTPATIENT)
Dept: PULMONOLOGY | Age: 72
End: 2022-07-19

## 2022-07-19 VITALS
HEIGHT: 65 IN | WEIGHT: 166.6 LBS | TEMPERATURE: 98 F | SYSTOLIC BLOOD PRESSURE: 138 MMHG | BODY MASS INDEX: 27.76 KG/M2 | OXYGEN SATURATION: 96 % | RESPIRATION RATE: 16 BRPM | DIASTOLIC BLOOD PRESSURE: 71 MMHG | HEART RATE: 71 BPM

## 2022-07-19 DIAGNOSIS — Z87.891 FORMER SMOKER: ICD-10-CM

## 2022-07-19 DIAGNOSIS — Z90.2 HISTORY OF LOBECTOMY OF LUNG: ICD-10-CM

## 2022-07-19 DIAGNOSIS — C34.12 MALIGNANT NEOPLASM OF UPPER LOBE OF LEFT LUNG (HCC): Primary | ICD-10-CM

## 2022-07-19 DIAGNOSIS — R06.02 SHORTNESS OF BREATH: ICD-10-CM

## 2022-07-19 DIAGNOSIS — R91.8 LUNG NODULES: ICD-10-CM

## 2022-07-19 PROCEDURE — 1123F ACP DISCUSS/DSCN MKR DOCD: CPT | Performed by: INTERNAL MEDICINE

## 2022-07-19 PROCEDURE — 99213 OFFICE O/P EST LOW 20 MIN: CPT | Performed by: INTERNAL MEDICINE

## 2022-07-19 RX ORDER — ALBUTEROL SULFATE 90 UG/1
2 AEROSOL, METERED RESPIRATORY (INHALATION) 4 TIMES DAILY PRN
Qty: 54 G | Refills: 5 | Status: SHIPPED | OUTPATIENT
Start: 2022-07-19

## 2022-07-19 ASSESSMENT — ENCOUNTER SYMPTOMS
DIARRHEA: 1
SHORTNESS OF BREATH: 1

## 2022-07-19 NOTE — PROGRESS NOTES
MA Communication:   The following orders are received by verbal communication from Epifanio Alegre MD    Orders include: FU 1 yr with CT

## 2022-07-19 NOTE — PROGRESS NOTES
Pulmonary Outpatient Note   Aristides Torres MD       7/19/2022    1. Malignant neoplasm of upper lobe of left lung ([facility] [address]    2. History of lobectomy of lung    3. Lung nodules    4. Shortness of breath    5. Former smoker          ASSESSMENT/PLAN:  Left upper lobe lung cancer. Status post lobectomy, reviewed images with the patient. No evidence of recurrence  Right upper lobe lung nodule. Status post wedge resection, benign. Stable scar. Does have tiny lung nodules, slightly smaller, no new lesions. Shortness of breath. She says she is short of breath climbing up steps. Gave her an albuterol inhaler to try. Her PFT after her surgery showed very good lung function. If she does not feel any difference, can stop the inhaler  Former smoker. PFT without significant obstructive defect. We will continue with annual CT monitoring  Prophylaxis. Has received both doses of her COVID-19 vaccine and a booster dose, Pneumovax, Prevnar. Recommend she should take the second booster dose as well. Continue with annual flu shots. RTC with CT chest in 1 year, call earlier if symptomatic      Orders Placed This Encounter   Procedures    CT CHEST WO CONTRAST       No follow-ups on file. Chief Complaint:   Follow-up and Results (CT)       HPI: Chen SolisJg Suarez is a 66y.o. year old female here for follow-up. Her PCP is Dr. Tobi Fabian. Gia Cabrera presents for annual follow-up, was last seen on 8/25/2021. She is a former smoker with left upper lobe lobectomy for adenocarcinoma left upper lobe in 2020, then wedge resection for a right upper lobe nodule in 2021 that was benign. She did not meet criteria for COPD. She returns with annual CT chest.  The patient's other medical problems include GERD, anxiety, gastritis, colon polyps, hyperlipidemia, insomnia, depression, and deficiency anemia, IBS, thyroid nodules. The patient continues to abstain from smoking.   She has possible shortness of breath with activity, denies significant cough or wheezing. She does have reflux, has a slightly hoarse voice, is going to see a gastroenterologist, will be undergoing upper and lower endoscopy. She has a fair appetite, has had no further weight loss after her surgery. She is eating reasonably well. She denies GI or  complaints. She is using vitamin B12 and vitamin D for health benefits, was not prescribed these. The rest of her ROS was negative. There was no other change in her medical or surgical history since her last visit. CT chest 7/18/2022  Mediastinum: Heart size is normal. Aorta and pulmonary arteries are normal.   Superior mediastinum is normal. No lymph node enlargement within the chest.   The esophagus tapers normally. Lungs/pleura: Prior surgical change of left upper lobectomy in 2020 and wedge   resection in the right upper lobe in 2021. The airways are otherwise patent. Stability of a small left pleural effusion following prior surgery. Stable   pattern of fibrotic scarring along the prior wedge resection site in the   right upper lobe. The 3 mm right upper lobe paramediastinal nodule described   on prior imaging has resolved. Stable 3 mm nodule along the right major   fissure. Stable 3 mm nodule in the superior segment of the right lower lobe. Upper Abdomen: Adrenal glands are normal.  No significant findings in the   visualized upper abdomen. Soft Tissues/Bones: No skeletal abnormalities are appreciated within the   chest.           Impression   1. History of lung cancer with prior surgical changes bilaterally. There is   no evidence of residual or recurrent malignancy on current CT. 2. Stable pattern of fibrotic change along wedge resection in the right upper   lobe. 3. Several tiny nodules are stable or improved from prior exam.           The patient was last seen by Dr. Deric Sullivan on 8/22/2019. The patient has been seen by Dr. Nevaeh Hong.   The patient underwent left upper lobe lobectomy for lung cancer in October 2020. She had a suspicious right upper lobe lung lesion, underwent nondiagnostic biopsy in March, then underwent VATS with wedge resection. This turned out to be a benign lesion with chronic inflammation. She was hospitalized from 5/20 through 5/24. The patient is feeling fairly well, but does get out of breath easily. He is able to walk 1-2 blocks, but does get out of breath when walking up steps. In the cold, she has to stop occasionally. The patient smoked for about 10 years from age 23, usually average 1 PPD. She has not smoked since 1980. She eats well, her weight has been stable. She denies GI or  complaints. She does not sleep very well, but always worries about everything according to her daughter. She is active doing gardening. There is no other change in her medical or surgical history since her last visit, the rest of her ROS was negative. CT chest 8/23/2021  Mediastinum: Calcification of thoracic aorta. Trace pericardial effusion. Calcified mediastinal lymph nodes, in keeping with granulomatous disease. Within the mediastinum, no pathologic lymphadenopathy by size criteria. Hilar evaluation is limited without contrast.  5 mm hypodense left thyroid   nodule, of the size were ultrasound is not mandatory. No axillary adenopathy. Lungs/pleura: Since prior examination, patient is status post right upper   lobe wedge resection. Previously demonstrated 2.1 cm ground-glass nodule in   the right upper lobe is no longer seen. Linear parenchymal band is seen   along the margin of the staple line. Additional linear and ground-glass   opacity is seen. 3 mm noncalcified right upper lobe paramediastinal nodule   is likely similar to prior given differences in lung positioning. 3 mm   nodule along the plane of right major fissure on series 2, image 48, new as   compared to prior.   3 mm nodule within the superior segment of the right   lower lobe is not substantially changed. Status post left upper lobectomy. Small left pleural effusion. No pneumothorax. Filling defect within the   anterior trachea within the lower neck, typically mucoid deposition, less   commonly polyps. No pneumothorax. Upper Abdomen: 3 4 mm low-density lesions at the hepatic dome and within the   right hepatic lobe are too small to characterize though grossly similar to   prior. Calcified granulomas within the spleen. Subcentimeter hypodense   lesion within the central spleen is similar to prior. Soft Tissues/Bones: Degenerative change of the spine. Hemangioma within T6. PFT 5/17/2021  Spirometry for this patient shows an FEV1 of 2.37 which is 102% of predicted and a forced vital capacity of 2.23 which is 105% of predicted, giving a ratio of 73. There was no response to bronchodilators. Lungs volumes were in the normal range. Diffusing capacity also was in the normal range.     Past Medical History:   Diagnosis Date    Allergic rhinitis     Anxiety 10/10/2011    Arthritis     Depression 3/22/2017    GERD (gastroesophageal reflux disease)     Hallux valgus     Headache(784.0)     Herpes simplex     Irritable bowel syndrome     Kidney stones     Lung cancer, main bronchus, left ([address] 10/16/2020    Osteopenia     Other and unspecified hyperlipidemia 4/2/2013    Pain medication agreement signed 10/10/2011    Thyroid disease     nodules    Wears dentures        Past Surgical History:   Procedure Laterality Date    CT NEEDLE BIOPSY LUNG PERCUTANEOUS  9/3/2020    CT NEEDLE BIOPSY LUNG PERCUTANEOUS 9/3/2020 [facility] CT SCAN    CT NEEDLE BIOPSY LUNG PERCUTANEOUS  3/25/2021    CT NEEDLE BIOPSY LUNG PERCUTANEOUS 3/25/2021 Randall Ledesma MD [facility] CT SCAN    LITHOTRIPSY      SHOULDER SURGERY      THORACOSCOPY Right 5/20/2021    RIGHT VIDEO ASSISTED THORACOSCOPIC SURGERY WITH WEDGE EXCISION OF RIGHT UPPER LOBE NODULE performed by Mary Beasley MD at [address] [place] [address] 10/16/2020    LEFT UPPER LOBECTOMY THORACOTOMY WITH MEDIASTINAL LYMPH NODE DISSECTION, CRYO ABLATION OF INTERCOSTAL NERVES performed by David Gray MD at [facility]      gastritis    UPPER GASTROINTESTINAL ENDOSCOPY N/A 2019    EGD BIOPSY performed by Jm Oates MD at [address] [place] [address] N/A 2019    COLON & EGD W/ ANESTHESIA performed by Jm Oates MD at [address] [place] [address]  2019    EGD [facility] performed by Jm Oates MD at [address] [place] [address] History     Tobacco Use    Smoking status: Former     Packs/day: 1.00     Years: 15.00     Pack years: 15.00     Types: Cigarettes     Start date: 1970     Quit date: 6/15/1983     Years since quittin.1    Smokeless tobacco: Never   Substance Use Topics    Alcohol use: No       Family History   Problem Relation Age of Onset    Asthma Mother     Heart Disease Father          Current Outpatient Medications:     albuterol sulfate HFA (VENTOLIN HFA) 108 (90 Base) MCG/ACT inhaler, Inhale 2 puffs into the lungs 4 times daily as needed for Wheezing, Disp: 54 g, Rfl: 5    atorvastatin (LIPITOR) 20 MG tablet, TAKE 1 TABLET BY MOUTH EVERY DAY AT NIGHT, Disp: 90 tablet, Rfl: 1    omeprazole (PRILOSEC) 20 MG delayed release capsule, TAKE 1 CAPSULE BY MOUTH TWICE A DAY, Disp: 180 capsule, Rfl: 0    sertraline (ZOLOFT) 50 MG tablet, TAKE 1 TABLET BY MOUTH THREE TIMES A DAY, Disp: 270 tablet, Rfl: 1    lamoTRIgine (LAMICTAL) 25 MG tablet, TAKE 3 TABLETS BY MOUTH DAILY (WITH BREAKFAST) MOOD SWINGS, Disp: 270 tablet, Rfl: 0    vitamin B-12 (CYANOCOBALAMIN) 100 MCG tablet, Take 50 mcg by mouth daily, Disp: , Rfl:     Cholecalciferol (VITAMIN D) 50 MCG ( UT) CAPS capsule, Take by mouth, Disp: , Rfl:     Sulfa antibiotics and Codeine    Vitals:    22 1312   BP: 138/71   Site: Left Upper Arm Position: Sitting   Cuff Size: Large Adult   Pulse: 71   Resp: 16   Temp: 98 °F (36.7 °C)   TempSrc: Temporal   SpO2: 96%   Weight: 166 lb 9.6 oz (75.6 kg)   Height: 5' 5\" (1.651 m)       Review of Systems   Respiratory:  Positive for shortness of breath. Gastrointestinal:  Positive for diarrhea (After eating). Occasional GE reflux   Psychiatric/Behavioral:  Positive for sleep disturbance. Anxiety   All other systems reviewed and are negative. Physical Exam  Vitals reviewed. Constitutional:       General: She is not in acute distress. Appearance: She is well-developed. She is not ill-appearing, toxic-appearing or diaphoretic. Comments: Pleasant, averagely built, appears anxious   HENT:      Head: Normocephalic and atraumatic. Nose: Nose normal. No congestion or rhinorrhea. Comments: Upper and lower dentures, class II airway     Mouth/Throat:      Mouth: Mucous membranes are moist.      Pharynx: Oropharynx is clear. No oropharyngeal exudate. Comments: Class III airway, upper denture, lower jaw edentulous  Eyes:      General: No scleral icterus. Right eye: No discharge. Left eye: No discharge. Extraocular Movements: Extraocular movements intact. Conjunctiva/sclera: Conjunctivae normal.      Pupils: Pupils are equal, round, and reactive to light. Neck:      Thyroid: No thyromegaly. Vascular: No JVD. Trachea: No tracheal deviation. Cardiovascular:      Rate and Rhythm: Normal rate and regular rhythm. Pulses: Normal pulses. Heart sounds: Normal heart sounds. No murmur heard. No friction rub. No gallop. Pulmonary:      Effort: Pulmonary effort is normal. No respiratory distress. Breath sounds: Normal breath sounds. No stridor. No wheezing, rhonchi or rales. Musculoskeletal:      Right lower leg: No edema. Left lower leg: No edema. Lymphadenopathy:      Cervical: No cervical adenopathy.    Skin:     General: Skin is warm and dry. Coloration: Skin is not jaundiced or pale. Findings: No bruising, erythema, lesion or rash. Neurological:      Mental Status: She is alert and oriented to person, place, and time.       Gait: Gait normal.   Psychiatric:         Mood and Affect: Mood normal.         Behavior: Behavior normal.

## 2022-07-19 NOTE — PATIENT INSTRUCTIONS
Remember to bring a list of pulmonary medications and any CPAP or BiPAP machines to your next appointment with the office. Please keep all of your future appointments scheduled by [address], [facility] office. Out of respect for other patients and providers, you may be asked to reschedule your appointment if you arrive later than your scheduled appointment time. Appointments cancelled less than 24hrs in advance will be considered a no show. Patients with three missed appointments within 1 year or four missed appointments within 2 years can be dismissed from the practice. Please be aware that our physicians are required to work in the Intensive Care Unit at [facility].  Your appointment may need to be rescheduled if they are designated to work during your appointment time. You may receive a survey regarding the care you received during your visit. Your input is valuable to us. We encourage you to complete and return your survey. We hope you will choose us in the future for your healthcare needs. Pt instructed of all future appointment dates & times, including radiology, labs, procedures & referrals. If procedures were scheduled preparation instructions provided. Instructions on future appointments with [facility] were given.

## 2022-07-25 ENCOUNTER — ANESTHESIA (OUTPATIENT)
Dept: ENDOSCOPY | Age: 72
End: 2022-07-25
Payer: MEDICARE

## 2022-07-25 ENCOUNTER — HOSPITAL ENCOUNTER (OUTPATIENT)
Age: 72
Setting detail: OUTPATIENT SURGERY
Discharge: HOME | End: 2022-07-25
Attending: INTERNAL MEDICINE | Admitting: INTERNAL MEDICINE
Payer: MEDICARE

## 2022-07-25 ENCOUNTER — ANESTHESIA EVENT (OUTPATIENT)
Dept: ENDOSCOPY | Age: 72
End: 2022-07-25
Payer: MEDICARE

## 2022-07-25 VITALS
WEIGHT: 166 LBS | OXYGEN SATURATION: 96 % | HEIGHT: 65 IN | SYSTOLIC BLOOD PRESSURE: 126 MMHG | HEART RATE: 70 BPM | RESPIRATION RATE: 18 BRPM | TEMPERATURE: 97.7 F | BODY MASS INDEX: 27.66 KG/M2 | DIASTOLIC BLOOD PRESSURE: 70 MMHG

## 2022-07-25 DIAGNOSIS — Z86.010 HISTORY OF COLON POLYPS: ICD-10-CM

## 2022-07-25 DIAGNOSIS — K21.9 GASTROESOPHAGEAL REFLUX DISEASE, UNSPECIFIED WHETHER ESOPHAGITIS PRESENT: ICD-10-CM

## 2022-07-25 PROCEDURE — 2500000003 HC RX 250 WO HCPCS: Performed by: REGISTERED NURSE

## 2022-07-25 PROCEDURE — 3609017700 HC EGD DILATION GASTRIC/DUODENAL STRICTURE: Performed by: INTERNAL MEDICINE

## 2022-07-25 PROCEDURE — C1769 GUIDE WIRE: HCPCS | Performed by: INTERNAL MEDICINE

## 2022-07-25 PROCEDURE — 88305 TISSUE EXAM BY PATHOLOGIST: CPT

## 2022-07-25 PROCEDURE — 3700000001 HC ADD 15 MINUTES (ANESTHESIA): Performed by: INTERNAL MEDICINE

## 2022-07-25 PROCEDURE — 6370000000 HC RX 637 (ALT 250 FOR IP): Performed by: INTERNAL MEDICINE

## 2022-07-25 PROCEDURE — 2709999900 HC NON-CHARGEABLE SUPPLY: Performed by: INTERNAL MEDICINE

## 2022-07-25 PROCEDURE — 3700000000 HC ANESTHESIA ATTENDED CARE: Performed by: INTERNAL MEDICINE

## 2022-07-25 PROCEDURE — 3609010300 HC COLONOSCOPY W/BIOPSY SINGLE/MULTIPLE: Performed by: INTERNAL MEDICINE

## 2022-07-25 PROCEDURE — 3609012400 HC EGD TRANSORAL BIOPSY SINGLE/MULTIPLE: Performed by: INTERNAL MEDICINE

## 2022-07-25 PROCEDURE — 6360000002 HC RX W HCPCS: Performed by: REGISTERED NURSE

## 2022-07-25 PROCEDURE — 2580000003 HC RX 258: Performed by: REGISTERED NURSE

## 2022-07-25 PROCEDURE — 2580000003 HC RX 258: Performed by: INTERNAL MEDICINE

## 2022-07-25 PROCEDURE — 7100000010 HC PHASE II RECOVERY - FIRST 15 MIN: Performed by: INTERNAL MEDICINE

## 2022-07-25 PROCEDURE — 7100000011 HC PHASE II RECOVERY - ADDTL 15 MIN: Performed by: INTERNAL MEDICINE

## 2022-07-25 PROCEDURE — 3609010600 HC COLONOSCOPY POLYPECTOMY SNARE/COLD BIOPSY: Performed by: INTERNAL MEDICINE

## 2022-07-25 RX ORDER — SIMETHICONE 20 MG/.3ML
EMULSION ORAL PRN
Status: DISCONTINUED | OUTPATIENT
Start: 2022-07-25 | End: 2022-07-25 | Stop reason: ALTCHOICE

## 2022-07-25 RX ORDER — PROPOFOL 10 MG/ML
INJECTION, EMULSION INTRAVENOUS PRN
Status: DISCONTINUED | OUTPATIENT
Start: 2022-07-25 | End: 2022-07-25 | Stop reason: SDUPTHER

## 2022-07-25 RX ORDER — SODIUM CHLORIDE, SODIUM LACTATE, POTASSIUM CHLORIDE, CALCIUM CHLORIDE 600; 310; 30; 20 MG/100ML; MG/100ML; MG/100ML; MG/100ML
INJECTION, SOLUTION INTRAVENOUS CONTINUOUS PRN
Status: DISCONTINUED | OUTPATIENT
Start: 2022-07-25 | End: 2022-07-25 | Stop reason: SDUPTHER

## 2022-07-25 RX ORDER — LIDOCAINE HYDROCHLORIDE 10 MG/ML
0.1 INJECTION, SOLUTION EPIDURAL; INFILTRATION; INTRACAUDAL; PERINEURAL
Status: DISCONTINUED | OUTPATIENT
Start: 2022-07-25 | End: 2022-07-25 | Stop reason: HOSPADM

## 2022-07-25 RX ORDER — SODIUM CHLORIDE, SODIUM LACTATE, POTASSIUM CHLORIDE, CALCIUM CHLORIDE 600; 310; 30; 20 MG/100ML; MG/100ML; MG/100ML; MG/100ML
INJECTION, SOLUTION INTRAVENOUS ONCE
Status: COMPLETED | OUTPATIENT
Start: 2022-07-25 | End: 2022-07-25

## 2022-07-25 RX ADMIN — PROPOFOL 40 MG: 10 INJECTION, EMULSION INTRAVENOUS at 12:27

## 2022-07-25 RX ADMIN — PROPOFOL 40 MG: 10 INJECTION, EMULSION INTRAVENOUS at 12:25

## 2022-07-25 RX ADMIN — PROPOFOL 40 MG: 10 INJECTION, EMULSION INTRAVENOUS at 12:23

## 2022-07-25 RX ADMIN — SODIUM CHLORIDE, POTASSIUM CHLORIDE, SODIUM LACTATE AND CALCIUM CHLORIDE: 600; 310; 30; 20 INJECTION, SOLUTION INTRAVENOUS at 11:03

## 2022-07-25 RX ADMIN — PROPOFOL 30 MG: 10 INJECTION, EMULSION INTRAVENOUS at 12:45

## 2022-07-25 RX ADMIN — SODIUM CHLORIDE, SODIUM LACTATE, POTASSIUM CHLORIDE, AND CALCIUM CHLORIDE: .6; .31; .03; .02 INJECTION, SOLUTION INTRAVENOUS at 12:13

## 2022-07-25 RX ADMIN — PROPOFOL 40 MG: 10 INJECTION, EMULSION INTRAVENOUS at 12:19

## 2022-07-25 RX ADMIN — PROPOFOL 60 MG: 10 INJECTION, EMULSION INTRAVENOUS at 12:17

## 2022-07-25 RX ADMIN — PROPOFOL 40 MG: 10 INJECTION, EMULSION INTRAVENOUS at 12:21

## 2022-07-25 RX ADMIN — PROPOFOL 30 MG: 10 INJECTION, EMULSION INTRAVENOUS at 12:41

## 2022-07-25 RX ADMIN — LIDOCAINE HYDROCHLORIDE 80 MG: 10 INJECTION, SOLUTION INFILTRATION; PERINEURAL at 12:15

## 2022-07-25 RX ADMIN — PROPOFOL 30 MG: 10 INJECTION, EMULSION INTRAVENOUS at 12:33

## 2022-07-25 ASSESSMENT — PAIN SCALES - GENERAL
PAINLEVEL_OUTOF10: 0

## 2022-07-25 ASSESSMENT — ENCOUNTER SYMPTOMS: SHORTNESS OF BREATH: 1

## 2022-07-25 ASSESSMENT — PAIN - FUNCTIONAL ASSESSMENT: PAIN_FUNCTIONAL_ASSESSMENT: 0-10

## 2022-07-25 NOTE — H&P
[address]   Pre-operative History and Physical    Patient: Audrey Mcnamara  : 1950  Acct#:     HISTORY OF PRESENT ILLNESS:    The patient is a 66 y.o. female who presents for EGD and colonoscopy for dysphagia and diarrhea. Prior in 2019. Esophagus normal duodenum normal stomach normal biopsies negative for celiac disease and H. pylori was dilated [address]. Colonoscopy moderate complexity diverticulosis in the sigmoid otherwise normal recommended repeat in 5 years.     Indications:     Past Medical History:        Diagnosis Date    Allergic rhinitis     Anxiety 10/10/2011    Arthritis     Depression 3/22/2017    GERD (gastroesophageal reflux disease)     Hallux valgus     Headache(784.0)     Herpes simplex     Irritable bowel syndrome     Kidney stones     Lung cancer, main bronchus, left ([address] 10/16/2020    Osteopenia     Other and unspecified hyperlipidemia 2013    Pain medication agreement signed 10/10/2011    Thyroid disease     nodules    Wears dentures       Past Surgical History:        Procedure Laterality Date    CT NEEDLE BIOPSY LUNG PERCUTANEOUS  9/3/2020    CT NEEDLE BIOPSY LUNG PERCUTANEOUS 9/3/2020 [facility] CT SCAN    CT NEEDLE BIOPSY LUNG PERCUTANEOUS  3/25/2021    CT NEEDLE BIOPSY LUNG PERCUTANEOUS 3/25/2021 Ashley Cervantes MD [facility] CT SCAN    LITHOTRIPSY      SHOULDER SURGERY      THORACOSCOPY Right 2021    RIGHT VIDEO ASSISTED THORACOSCOPIC SURGERY WITH WEDGE EXCISION OF RIGHT UPPER LOBE NODULE performed by Mile Yost MD at [facility] N/A 10/16/2020    LEFT UPPER LOBECTOMY THORACOTOMY WITH MEDIASTINAL LYMPH NODE DISSECTION, CRYO ABLATION OF INTERCOSTAL NERVES performed by Kennedi Abrams MD at . [facility] ENDOSCOPY      gastritis    UPPER GASTROINTESTINAL ENDOSCOPY N/A 2019    EGD BIOPSY performed by Deuce Oliveira MD at [address] 2019    COLON & EGD W/ ANESTHESIA performed by Cecille Yang MD at [facility]  2019    EGD DILATION Mallony performed by Cecille Yang MD at [address] [place] [address]      Medications Prior to Admission:   No current facility-administered medications on file prior to encounter. Current Outpatient Medications on File Prior to Encounter   Medication Sig Dispense Refill    atorvastatin (LIPITOR) 20 MG tablet TAKE 1 TABLET BY MOUTH EVERY DAY AT NIGHT 90 tablet 1    omeprazole (PRILOSEC) 20 MG delayed release capsule TAKE 1 CAPSULE BY MOUTH TWICE A  capsule 0    sertraline (ZOLOFT) 50 MG tablet TAKE 1 TABLET BY MOUTH THREE TIMES A  tablet 1    lamoTRIgine (LAMICTAL) 25 MG tablet TAKE 3 TABLETS BY MOUTH DAILY (WITH BREAKFAST) MOOD SWINGS 270 tablet 0    vitamin B-12 (CYANOCOBALAMIN) 100 MCG tablet Take 50 mcg by mouth daily      Cholecalciferol (VITAMIN D) 50 MCG ( UT) CAPS capsule Take by mouth          Allergies:  Sulfa antibiotics and Codeine    Social History:   Social History     Socioeconomic History    Marital status:       Spouse name: Not on file    Number of children: Not on file    Years of education: Not on file    Highest education level: Not on file   Occupational History    Not on file   Tobacco Use    Smoking status: Former     Packs/day: 1.00     Years: 15.00     Pack years: 15.00     Types: Cigarettes     Start date: 1970     Quit date: 6/15/1983     Years since quittin.1    Smokeless tobacco: Never   Vaping Use    Vaping Use: Never used   Substance and Sexual Activity    Alcohol use: No    Drug use: No    Sexual activity: Yes     Partners: Male   Other Topics Concern    Not on file   Social History Narrative    Not on file     Social Determinants of Health     Financial Resource Strain: Not on file   Food Insecurity: Not on file   Transportation Needs: Not on file   Physical Activity: Sufficiently Active    Days of Exercise per Week: 7 days    Minutes of Exercise per Session: 30 min   Stress: Not on file   Social Connections: Not on file   Intimate Partner Violence: Not on file   Housing Stability: Not on file      Family History:       Problem Relation Age of Onset    Asthma Mother     Heart Disease Father         PHYSICAL EXAM:      BP (!) 148/77   Pulse 86   Temp 97.7 °F (36.5 °C) (Temporal)   Resp 20   Ht 5' 5\" (1.651 m)   Wt 166 lb (75.3 kg)   LMP  (LMP Unknown)   SpO2 95%   BMI 27.62 kg/m²  I        Heart:  Normal apical impulse, regular rate and rhythm, normal S1 and S2, no S3 or S4, and no murmur noted    Lungs:  No increased work of breathing, good air exchange, clear to auscultation bilaterally, no crackles or wheezing    Abdomen:  No scars, normal bowel sounds, soft, non-distended, non-tender, no masses palpated, no hepatosplenomegally      ASA Class  ASA 3 - Patient with moderate systemic disease with functional limitations    Mallampati Class: 2      ASSESSMENT AND PLAN:    1. Patient is a suitable candidate for endoscopic procedure and attendant anesthesia  2. Risks, benefits, alternatives of procedure discussed in detail with patient including risks of bleeding, infection, perforation, risks of sedation, risks of missed lesions. The patient wishes to proceed.

## 2022-07-25 NOTE — PROCEDURES
Endoscopy Note    Patient: Norma Hull  : 1950      Procedure: Esophagogastroduodenoscopy with bx     Date:  2022     Surgeon:  Rita Greene MD     Referring Physician:  Jenelle Schulz DO      History:  The patient is a 66 y.o. female who presents for EGD and colonoscopy for dysphagia and diarrhea. Prior in 2019. Esophagus normal duodenum normal stomach normal biopsies negative for celiac disease and H. pylori was dilated [address]. Colonoscopy moderate complexity diverticulosis in the sigmoid otherwise normal recommended repeat in 5 years. INDICATION: dysphagia      ASA: 2  SEDATION: MAC         Operative Surgeon: Rita Greene MD  Scope Type: gastroscope      Preoperative Diagnosis: dysphagia, GERD  Postoperative Diagnosis: Small hiatal hernia    Procedure Performed: EGD    Procedure Details:    With the patient in left lateral position the endoscope was passed through the hypopharynx into the esophagus. The scope as then passed through the esophagus to the second portion of the duodenum. All visualized portions were carefully inspected. The gastric air was suctioned and the scope as removed. Patient tolerated the procedure well. Findings and maneuvers are discussed below. Complications:  None  Estimated Blood Loss: minimal to none    Post Operative Findings:   Esophagus: Esophageal mucosa was normal throughout the entire esophagus. It was widely patent. There is no stricture seen. At the end of the procedure a [address] savory dilation was performed with mild resistance. Stomach: Normal-appearing small hiatal hernia    Duodenum: Biopsies were taken to rule out celiac disease. Normal villous structure. Plan: Continue acid suppression. Consider esophagram for continued dysphagia. Signed By: MD Rita Jerry MD,   [facility]  [phone]    Please note that some or all of this record was generated using voice recognition software.  If there are any questions about the content of this document, please contact the author as some errors in translation may have occurred. Colonoscopy Procedure  Note          Patient: Baudilio Dennis  : 1950      Procedure: Colonoscopy with cold snare polypectomy and biopsy    Date:  2022    Primary Care Physician: Tello Langley DO     Operative surgeon: Lauryn Calvillo MD  Previous Colonoscopy: 2019 no polyps at that time  Consent: I explained and discussed the risk, benefits and alternatives for the procedure with the patient and obtained the patient's consent for the procedure. We discussed the specific risks including bleeding, perforation, post-procedure abdominal pain, and missed lesions which could lead to interval colorectal cancers. History:  The patient is a 66 y.o. female who presents for EGD and colonoscopy for dysphagia and diarrhea. Prior in 2019. Esophagus normal duodenum normal stomach normal biopsies negative for celiac disease and H. pylori was dilated [address]. Colonoscopy moderate complexity diverticulosis in the sigmoid otherwise normal recommended repeat in 5 years.         Past Medical History:   Diagnosis Date    Allergic rhinitis     Anxiety 10/10/2011    Arthritis     Depression 3/22/2017    GERD (gastroesophageal reflux disease)     Hallux valgus     Headache(784.0)     Herpes simplex     Irritable bowel syndrome     Kidney stones     Lung cancer, main bronchus, left ([address] 10/16/2020    Osteopenia     Other and unspecified hyperlipidemia 2013    Pain medication agreement signed 10/10/2011    Thyroid disease     nodules    Wears dentures       Preoperative Diagnosis: Gastroesophageal reflux disease, unspecified whether esophagitis present [K21.9] diarrhea  Post Operative Diagnosis: Colon polyps diverticulosis  ASA: 3  SEDATION: MAC      Procedures Performed: Colonoscopy   Scope Type: Pediatric 190    Procedure Details:      With the patient in left lateral decubitus position the endoscope was inserted through the anorectal area into the rectum. The scope was then advanced through the length of the colon to the cecum and terminal ileum. The quality of preparation was good. The scope was carefully withdrawn with careful inspection. Images were taken of the multiple segments of colon, cecum, IC valve, rectum, terminal ileum. Retroflexion was preformed in the cecum and rectum. Cecum Intubated: Yes  EBL: minimal to none  Complications:  no complications were noted  Post-operative Findings: Perianal exam unremarkable, digital rectal exam unremarkable, retroflexion the rectum demonstrated small internal hemorrhoids    The colonic mucosa was normal-appearing biopsies were taken to rule out microscopic colitis    2 separate sessile polyps removed from the transverse colon ranging from 3 to 7 mm in size cold snare resection retrieval complete    Sigmoid diverticulosis    Plan: Repeat colonoscopy in 5 years for colon cancer screening purposes if still in good health. Follow-up biopsies. Signed By: MD Kathleen Amezquita MD,   [facility]  7/25/2022      Please note that some or all of this record was generated using voice recognition software. If there are any questions about the content of this document, please contact the author as some errors in translation may have occurred.

## 2022-07-25 NOTE — ANESTHESIA POSTPROCEDURE EVALUATION
Department of Anesthesiology  Postprocedure Note    Patient: Santy Reyna  MRN: [id number]  YOB: 1950  Date of evaluation: 7/25/2022      Procedure Summary     Date: 07/25/22 Room / Location: [address] [place] [address] [address]    Anesthesia Start: 0087 Anesthesia Stop: 1253    Procedures:       COLONOSCOPY WITH BIOPSY      EGD BIOPSY      EGD ESOPHAGOGASTRODUODENOSCOPY DILATATION      COLONOSCOPY POLYPECTOMY SNARE/COLD BIOPSY Diagnosis:       Gastroesophageal reflux disease, unspecified whether esophagitis present      History of colon polyps      (Gastroesophageal reflux disease, unspecified whether esophagitis present [K21.9] History of colon polyps [Z86.010])    Surgeons: Lars Gonzalez MD Responsible Provider: Gracie Erazo MD    Anesthesia Type: MAC ASA Status: 2          Anesthesia Type: MAC    Brenda Phase I: Brenda Score: 10    Brenda Phase II: Brenda Score: 10      Anesthesia Post Evaluation    Comments: Postoperative Anesthesia Note    Name:    Santy Reyna  MRN:      [id number]    Patient Vitals in the past 12 hrs:  07/25/22 1325, BP:126/70, Pulse:70, Resp:18, SpO2:96 %  07/25/22 1313, BP:128/70, Pulse:64, Resp:18, SpO2:97 %  07/25/22 1304, BP:126/71, Pulse:65, Resp:18, SpO2:96 %  07/25/22 1254, BP:121/66, Pulse:75, Resp:18, SpO2:97 %  07/25/22 1052, BP:(!) 148/77, Temp:97.7 °F (36.5 °C), Temp src:Temporal, Pulse:86, Resp:20, SpO2:95 %, Height:5' 5\" (1.651 m), Weight:166 lb (75.3 kg)     LABS:    CBC  Lab Results       Component                Value               Date/Time                  WBC                      7.4                 06/30/2021 02:36 PM        HGB                      14.4                06/30/2021 02:36 PM        HCT                      42.1                06/30/2021 02:36 PM        PLT                      220                 06/30/2021 02:36 PM   RENAL  Lab Results       Component                Value               Date/Time                  NA 140                 05/21/2021 04:21 AM        K                        4.8                 05/21/2021 04:21 AM        CL                       104                 05/21/2021 04:21 AM        CO2                      27                  05/21/2021 04:21 AM        BUN                      10                  05/21/2021 04:21 AM        CREATININE               0.7                 05/21/2021 04:21 AM        GLUCOSE                  168 (H)             05/21/2021 04:21 AM   COAGS  Lab Results       Component                Value               Date/Time                  PROTIME                  12.5                03/25/2021 08:47 AM        INR                      1.08                03/25/2021 08:47 AM        APTT                     31.0                10/08/2020 08:57 AM     Intake & Output:  @08COTY@    Nausea & Vomiting:  No    Level of Consciousness:  Awake    Pain Assessment:  Adequate analgesia    Anesthesia Complications:  No apparent anesthetic complications    SUMMARY      Vital signs stable  OK to discharge from Stage I post anesthesia care.   Care transferred from Anesthesiology department on discharge from perioperative area

## 2022-07-25 NOTE — DISCHARGE INSTRUCTIONS
PATIENT INSTRUCTIONS  POST-SEDATION    [facility]          IMMEDIATELY FOLLOWING PROCEDURE:    Do not drive or operate machinery for the first twenty four hours after surgery. Do not make any important decisions for twenty four hours after surgery or while taking narcotic pain medications or sedatives. You should NOT BE LEFT UNATTENDED OR ALONE. A responsible adult should be with you for the rest of the day of your procedure and also during the night for your protection and safety. You may experience some light headedness. Rest at home with activity as tolerated. You may not need to go to bed, but it is important to rest for the next 24 hours. You should not engage in athletic sports such as basketball, volleyball, jogging, skating, or activities requiring refined motor skills for 24 hours. If you develop intractable nausea and vomiting or a severe headache please notify your doctor immediately. You are not expected to have any fever, but if you feel warm, take your temperature. If you have a fever 101 degrees or higher, call your doctor. If you have had an Endoscopy:   *Eat lightly for your first meal and gradually resume your normal / prescribed diet. *If you have had a colonoscopy, do not expect a normal bowel movement for approximately three days due to the cleansing of the large intestine prior to colonoscopy. ONCE YOU ARE HOME, IF YOU SHOULD HAVE:  Difficulty in breathing, persistent nausea or vomiting, bleeding you feel is excessive, or pain that is unusual, increased abdominal bloating, or any swelling, fever / chills, call your physician. If you cannot contact your physician, but feel that your signs and symptoms need a physician's attention, go to the Emergency Department. FOLLOW-UP:    Please follow up with @PCP@ as scheduled or needed. Dr. Zakiya Ram MD will call you with the biopsy findings. Call Dr. Zakiya Ram MD if there are any GI concerns. [phone]    Repeat Colonoscopy based on pathology. You may be receiving a follow up phone call to ask about your care. Colonoscopy: What to Expect at [facility]  After you have a colonoscopy, you will stay at the clinic for 1 to 2 hours until the medicines wear off. Then you can go home. But you will need to arrange for a ride. Your doctor will tell you when you can eat and do your other usual activities. Your doctor will talk to you about when you will need your next colonoscopy. Your doctor can help you decide how often you need to be checked. This will depend on the results of your test and your risk for colorectal cancer. After the test, you may be bloated or have gas pains. You may need to pass gas. If a biopsy was done or a polyp was removed, you may have streaks of blood in your stool (feces) for a few days. Problems such as heavy rectal bleeding may not occur until several weeks after the test. This isn't common. But it can happen after polyps are removed. This care sheet gives you a general idea about how long it will take for you to recover. But each person recovers at a different pace. Follow the steps below to get better as quickly as possible. How can you care for yourself at home? Activity    Rest when you feel tired. You can do your normal activities when it feels okay to do so. Diet    Follow your doctor's directions for eating. Unless your doctor has told you not to, drink plenty of fluids. This helps to replace the fluids that were lost during the colon prep. Do not drink alcohol. Medicines    Your doctor will tell you if and when you can restart your medicines. He or she will also give you instructions about taking any new medicines. If you take blood thinners, such as warfarin (Coumadin), clopidogrel (Plavix), or aspirin, be sure to talk to your doctor. He or she will tell you if and when to start taking those medicines again.  Make sure that you understand exactly what your doctor wants you to do. If polyps were removed or a biopsy was done during the test, your doctor may tell you not to take aspirin or other anti-inflammatory medicines for a few days. These include ibuprofen (Advil, Motrin) and naproxen (Aleve). Other instructions    For your safety, do not drive or operate machinery until the medicine wears off and you can think clearly. Your doctor may tell you not to drive or operate machinery until the day after your test.     Do not sign legal documents or make major decisions until the medicine wears off and you can think clearly. The anesthesia can make it hard for you to fully understand what you are agreeing to. Follow-up care is a key part of your treatment and safety. Be sure to make and go to all appointments, and call your doctor if you are having problems. It's also a good idea to know your test results and keep a list of the medicines you take. When should you call for help? Call 911 anytime you think you may need emergency care. For example, call if:    You passed out (lost consciousness). You pass maroon or bloody stools. You have trouble breathing. Call your doctor now or seek immediate medical care if:    You have pain that does not get better after you take pain medicine. You are sick to your stomach or cannot drink fluids. You have new or worse belly pain. You have blood in your stools. You have a fever. You cannot pass stools or gas. Watch closely for changes in your health, and be sure to contact your doctor if you have any problems. Where can you learn more? Go to [email or web address]://nando.[employer]. org and sign in to your [employer] [facility] account. Enter E264 in the [employer] box to learn more about \"Colonoscopy: What to Expect at Home. \"          Upper GI Endoscopy: What to Expect at Home  Your Recovery  After you have an endoscopy, you will stay at the hospital or clinic for 1 to 2 hours. This will allow the medicine to wear off. You will be able to go home after your doctor or nurse checks to make sure you are not having any problems. You may have to stay overnight if you had treatment during the test. You may have a sore throat for a day or two after the test.  This care sheet gives you a general idea about what to expect after the test.  How can you care for yourself at home? Activity  Rest as much as you need to after you go home. You should be able to go back to your usual activities the day after the test.  Diet  Follow your doctor's directions for eating after the test.  Drink plenty of fluids (unless your doctor has told you not to). Medications  If you have a sore throat the day after the test, use an over-the-counter spray to numb your throat. Follow-up care is a key part of your treatment and safety. Be sure to make and go to all appointments, and call your doctor if you are having problems. It's also a good idea to know your test results and keep a list of the medicines you take. When should you call for help? Call 911 anytime you think you may need emergency care. For example, call if:    You passed out (loses consciousness). You have trouble breathing. You pass maroon or bloody stools. Call your doctor now or seek immediate medical care if:    You have pain that does not get better after your take pain medicine. You have new or worse belly pain. You have blood in your stools. You are sick to your stomach and cannot keep fluids down. You have a fever. You cannot pass stools or gas. Watch closely for changes in your health, and be sure to contact your doctor if:    Your throat still hurts after a day or two. You do not get better as expected. Where can you learn more? Go to https://chpeshaniqueeb.[employer]. org and sign in to your [facility] [facility] account.  Enter R260 in the [employer] box to learn more about \"Upper GI Endoscopy: What to Expect at Home. \"     If you do not have an account, please click on the \"Sign Up Now\" link. Current as of: May 12, 2017  Content Version: 11.6  © 4635-0708 [employer]. Care instructions adapted under license by [facility] ([facility]). If you have questions about a medical condition or this instruction, always ask your healthcare professional. [address] any warranty or liability for your use of this information.

## 2022-07-25 NOTE — PROGRESS NOTES
Discharge instructions reviewed with patient/responsible adult and understanding verbalized. Discharge instructions signed and copies given. Patient discharged home with belongings.   Up to bathroom with assist voided without diffiuculty

## 2022-07-25 NOTE — ANESTHESIA PRE PROCEDURE
Department of Anesthesiology  Preprocedure Note       Name:  Monica Navas   Age:  66 y.o.  :  1950                                          MRN:  [id number]         Date:  2022      Surgeon: Jovi Johnson):  Mary Morrell MD    Procedure: Procedure(s):  COLONOSCOPY  EGD    Medications prior to admission:   Prior to Admission medications    Medication Sig Start Date End Date Taking? Authorizing Provider   albuterol sulfate HFA (VENTOLIN HFA) 108 (90 Base) MCG/ACT inhaler Inhale 2 puffs into the lungs 4 times daily as needed for Wheezing 22   Buddy Jones MD   atorvastatin (LIPITOR) 20 MG tablet TAKE 1 TABLET BY MOUTH EVERY DAY AT NIGHT 22   Nelson Harrison DO   omeprazole (PRILOSEC) 20 MG delayed release capsule TAKE 1 CAPSULE BY MOUTH TWICE A DAY 22   Nelson Harrison DO   sertraline (ZOLOFT) 50 MG tablet TAKE 1 TABLET BY MOUTH THREE TIMES A DAY 22   Malena Woods, APRN - CNP   lamoTRIgine (LAMICTAL) 25 MG tablet TAKE 3 TABLETS BY MOUTH DAILY (WITH BREAKFAST) MOOD SWINGS 22   Nelson Harrison DO   vitamin B-12 (CYANOCOBALAMIN) 100 MCG tablet Take 50 mcg by mouth daily    Historical Provider, MD   Cholecalciferol (VITAMIN D) 50 MCG (2000) CAPS capsule Take by mouth    Historical Provider, MD       Current medications:    Current Facility-Administered Medications   Medication Dose Route Frequency Provider Last Rate Last Admin    lidocaine PF 1 % injection 0.1 mL  0.1 mL IntraDERmal Once PRN Mary Morrell MD           Allergies:     Allergies   Allergen Reactions    Sulfa Antibiotics     Codeine Nausea Only and Other (See Comments)     Significant headaches       Problem List:    Patient Active Problem List   Diagnosis Code    Anxiety F41.9    Pain medication agreement signed Z02.89    History of colon polyps Z86.010    History of gastritis Z87.19    Insomnia G47.00    Hyperlipidemia E78.5    Moderate episode of recurrent major depressive disorder ([facility] [address] F33.1    Gastroesophageal reflux disease with esophagitis K21.00    Shortness of breath R06.02    Multiple thyroid nodules E04.2    Iron deficiency anemia D50.9    Colon, diverticulosis K57.30    Esophageal dysphagia R13.19    Gastritis without bleeding K29.70    Mood swings R45.86    Anemia due to chronic blood loss D50.0    Intestinal malabsorption K90.9    Nodule of right lung R91.1       Past Medical History:        Diagnosis Date    Allergic rhinitis     Anxiety 10/10/2011    Arthritis     Depression 3/22/2017    GERD (gastroesophageal reflux disease)     Hallux valgus     Headache(784.0)     Herpes simplex     Irritable bowel syndrome     Kidney stones     Lung cancer, main bronchus, left ([address] 10/16/2020    Osteopenia     Other and unspecified hyperlipidemia 4/2/2013    Pain medication agreement signed 10/10/2011    Thyroid disease     nodules    Wears dentures        Past Surgical History:        Procedure Laterality Date    CT NEEDLE BIOPSY LUNG PERCUTANEOUS  9/3/2020    CT NEEDLE BIOPSY LUNG PERCUTANEOUS 9/3/2020 [address] SCAN    CT NEEDLE BIOPSY LUNG PERCUTANEOUS  3/25/2021    CT NEEDLE BIOPSY LUNG PERCUTANEOUS 3/25/2021 Marlen Seay MD [facility] CT SCAN    LITHOTRIPSY      SHOULDER SURGERY      THORACOSCOPY Right 5/20/2021    RIGHT VIDEO ASSISTED THORACOSCOPIC SURGERY WITH WEDGE EXCISION OF RIGHT UPPER LOBE NODULE performed by Anabell Ruff MD at [facility] N/A 10/16/2020    LEFT UPPER LOBECTOMY THORACOTOMY WITH MEDIASTINAL LYMPH NODE DISSECTION, CRYO ABLATION OF INTERCOSTAL NERVES performed by David Gray MD at [facility] ENDOSCOPY      gastritis    UPPER GASTROINTESTINAL ENDOSCOPY N/A 9/6/2019    EGD BIOPSY performed by Jm Oates MD at [address] N/A 9/6/2019    COLON & EGD W/ ANESTHESIA performed by Jm Oates MD at [address] [facility] GASTROINTESTINAL ENDOSCOPY  2019    EGD DILATION Mallony performed by Mis Mujica MD at [address] [place] [address] History:    Social History     Tobacco Use    Smoking status: Former     Packs/day: 1.00     Years: 15.00     Pack years: 15.00     Types: Cigarettes     Start date: 1970     Quit date: 6/15/1983     Years since quittin.1    Smokeless tobacco: Never   Substance Use Topics    Alcohol use: No                                Counseling given: Not Answered      Vital Signs (Current):   Vitals:    22 1052   BP: (!) 148/77   Pulse: 86   Resp: 20   Temp: 97.7 °F (36.5 °C)   TempSrc: Temporal   SpO2: 95%   Weight: 166 lb (75.3 kg)   Height: 5' 5\" (1.651 m)                                              BP Readings from Last 3 Encounters:   22 (!) 148/77   22 138/71   21 (!) 147/72       NPO Status: Time of last liquid consumption: 530                        Time of last solid consumption:                         Date of last liquid consumption: 22                        Date of last solid food consumption: 22    BMI:   Wt Readings from Last 3 Encounters:   22 166 lb (75.3 kg)   22 166 lb 9.6 oz (75.6 kg)   21 168 lb (76.2 kg)     Body mass index is 27.62 kg/m².     CBC:   Lab Results   Component Value Date/Time    WBC 7.4 2021 02:36 PM    RBC 5.02 2021 02:36 PM    HGB 14.4 2021 02:36 PM    HCT 42.1 2021 02:36 PM    MCV 83.8 2021 02:36 PM    RDW 13.7 2021 02:36 PM     2021 02:36 PM       CMP:   Lab Results   Component Value Date/Time     2021 04:21 AM    K 4.8 2021 04:21 AM     2021 04:21 AM    CO2 27 2021 04:21 AM    BUN 10 2021 04:21 AM    CREATININE 0.7 2021 04:21 AM    GFRAA >60 2021 04:21 AM    GFRAA >60 2012 11:16 AM    AGRATIO 1.5 2021 04:21 AM    LABGLOM >60 2021 04:21 AM    GLUCOSE 168 2021 04:21 AM    PROT 5.7 05/21/2021 04:21 AM    PROT 7.2 08/02/2012 11:16 AM    CALCIUM 8.9 05/21/2021 04:21 AM    BILITOT 0.4 05/21/2021 04:21 AM    ALKPHOS 78 05/21/2021 04:21 AM    AST 25 05/21/2021 04:21 AM    ALT 20 05/21/2021 04:21 AM       POC Tests: No results for input(s): POCGLU, POCNA, POCK, POCCL, POCBUN, POCHEMO, POCHCT in the last 72 hours. Coags:   Lab Results   Component Value Date/Time    PROTIME 12.5 03/25/2021 08:47 AM    INR 1.08 03/25/2021 08:47 AM    APTT 31.0 10/08/2020 08:57 AM       HCG (If Applicable): No results found for: PREGTESTUR, PREGSERUM, HCG, HCGQUANT     ABGs:   Lab Results   Component Value Date/Time    PHART 7.383 10/08/2020 09:15 AM    PO2ART 92.0 10/08/2020 09:15 AM    MCA2FZT 38.2 10/08/2020 09:15 AM    JVE6VJV 22.2 10/08/2020 09:15 AM    BEART -2.4 10/08/2020 09:15 AM    I9PFISZJ 97.2 10/08/2020 09:15 AM        Type & Screen (If Applicable):  No results found for: LABABO, LABRH    Drug/Infectious Status (If Applicable):  No results found for: HIV, HEPCAB    COVID-19 Screening (If Applicable):   Lab Results   Component Value Date/Time    COVID19 Not Detected 05/13/2021 08:53 AM    COVID19 Not Detected 03/20/2021 09:27 AM           Anesthesia Evaluation  Patient summary reviewed and Nursing notes reviewed  Airway: Mallampati: II  TM distance: >3 FB   Neck ROM: full  Mouth opening: > = 3 FB   Dental: normal exam         Pulmonary:normal exam  breath sounds clear to auscultation  (+) shortness of breath:                             Cardiovascular:Negative CV ROS            Rhythm: regular  Rate: normal                    Neuro/Psych:   (+) headaches:, psychiatric history:            GI/Hepatic/Renal:   (+) GERD:,           Endo/Other: Negative Endo/Other ROS                    Abdominal:             Vascular: negative vascular ROS. Other Findings:           Anesthesia Plan      MAC     ASA 2       Induction: intravenous.       Anesthetic plan and risks discussed with patient. Plan discussed with CRNA.                     Debra Gonzalez MD   7/25/2022

## 2022-08-05 DIAGNOSIS — R45.86 MOOD SWINGS: ICD-10-CM

## 2022-08-05 DIAGNOSIS — F32.89 OTHER DEPRESSION: ICD-10-CM

## 2022-08-05 NOTE — TELEPHONE ENCOUNTER
Refill Request     CONFIRM preferrred pharmacy with the patient. If Mail Order Rx - Pend for 90 day refill. Last Seen: Last Seen Department: 2/17/2022  Last Seen by PCP: 6/23/2021    Last Written: 6/7/22 #270 no refills     Next Appointment:   Future Appointments   Date Time Provider Christina Burton   7/19/2023  2:00 PM Whitney Reyes MD AND [facility]       When is pt due to be seen?  She has not been seen physically in office since 6/2021      Requested Prescriptions     Pending Prescriptions Disp Refills    lamoTRIgine (LAMICTAL) 25 MG tablet [Pharmacy Med Name: LAMOTRIGINE 25 MG TABLET] 270 tablet 0     Sig: TAKE 3 TABLETS BY MOUTH DAILY (WITH BREAKFAST) MOOD SWINGS

## 2022-08-06 RX ORDER — LAMOTRIGINE 25 MG/1
TABLET ORAL
Qty: 270 TABLET | Refills: 1 | Status: SHIPPED | OUTPATIENT
Start: 2022-08-06

## 2022-08-17 ENCOUNTER — TELEPHONE (OUTPATIENT)
Dept: FAMILY MEDICINE CLINIC | Age: 72
End: 2022-08-17

## 2022-08-17 DIAGNOSIS — R45.86 MOOD SWINGS: ICD-10-CM

## 2022-08-17 DIAGNOSIS — F32.89 OTHER DEPRESSION: ICD-10-CM

## 2022-08-17 NOTE — TELEPHONE ENCOUNTER
----- Message from Michaelpeterson Sagar sent at 8/17/2022  3:38 PM EDT -----  Subject: Refill Request    QUESTIONS  Name of Medication? lamoTRIgine (LAMICTAL) 25 MG tablet  Patient-reported dosage and instructions? 2 in the morning, 2 at night  How many days do you have left? 1  Preferred Pharmacy? [facility]/PHARMACY #[id number]  Pharmacy phone number (if available)? [phone]  Additional Information for Provider? Patient has run out of pills since   increasing the dosage. Please send a new script with correct dosage. ---------------------------------------------------------------------------  --------------  Kamaljit STOREY  What is the best way for the office to contact you? OK to leave message on   voicemail  Preferred Call Back Phone Number? [phone]  ---------------------------------------------------------------------------  --------------  SCRIPT ANSWERS  Relationship to Patient?  Self

## 2022-08-24 ENCOUNTER — TELEPHONE (OUTPATIENT)
Dept: PULMONOLOGY | Age: 72
End: 2022-08-24

## 2022-08-24 NOTE — TELEPHONE ENCOUNTER
Alan Peralta would like for Dr. Brigitte Erwin to call her. [phone]. She wants to make sure she understood her problems with breathing. Pt also said she seen a heart doctor and they want to run all kinds of test.  Before she does that she wants to speak with Dr. Brigitte Erwin.   Please Advise

## 2022-08-30 NOTE — TELEPHONE ENCOUNTER
Spoke to Ezekiel Agarwal about her recent appointment with cardiology. I personally reviewed cardiology note and tests that were ordered. She was seen for possible atrial fibrillation. Discussed how atrial fibrillation can cause SOB. Her prior PFT was normal and did not show COPD. She has had no recurrence of lung cancer on her recent CT. Her SOB symptoms may very well be due to her heart. Testing ordered by cardiology is appropriate. Deuce Law verbalized understanding and thanked for care.

## 2022-09-04 RX ORDER — OMEPRAZOLE 20 MG/1
CAPSULE, DELAYED RELEASE ORAL
Qty: 180 CAPSULE | Refills: 0 | Status: SHIPPED | OUTPATIENT
Start: 2022-09-04

## 2023-01-04 RX ORDER — ATORVASTATIN CALCIUM 20 MG/1
TABLET, FILM COATED ORAL
Qty: 90 TABLET | Refills: 1 | Status: SHIPPED | OUTPATIENT
Start: 2023-01-04

## 2023-01-04 NOTE — TELEPHONE ENCOUNTER
.Refill Request     CONFIRM preferrred pharmacy with the patient. If Mail Order Rx - Pend for 90 day refill. Last Seen: Last Seen Department: 2/17/2022  Last Seen by PCP: 6/23/2021    Last Written: 7-5-22 90 with 1     If no future appointment scheduled, route STAFF MESSAGE with patient name to the [facility] for scheduling. Next Appointment:   Future Appointments   Date Time Provider Christina Burton   7/19/2023  1:00 PM [facility] CT T [facility] CT Juliano Corea   7/19/2023  2:00 PM Violetta Cunningham MD [facility]       Message sent to [address] [place] [address] to schedule appt with patient?   YES      Requested Prescriptions     Pending Prescriptions Disp Refills    atorvastatin (LIPITOR) 20 MG tablet [Pharmacy Med Name: ATORVASTATIN 20 MG TABLET] 90 tablet 1     Sig: TAKE 1 TABLET BY MOUTH EVERY DAY AT NIGHT

## 2023-01-08 DIAGNOSIS — R45.86 MOOD SWINGS: ICD-10-CM

## 2023-01-08 DIAGNOSIS — F32.89 OTHER DEPRESSION: ICD-10-CM

## 2023-01-09 RX ORDER — LAMOTRIGINE 25 MG/1
TABLET ORAL
Qty: 270 TABLET | Refills: 1 | Status: SHIPPED | OUTPATIENT
Start: 2023-01-09

## 2023-01-09 NOTE — TELEPHONE ENCOUNTER
Refill Request     CONFIRM preferrred pharmacy with the patient. If Mail Order Rx - Pend for 90 day refill. Last Seen: Last Seen Department: 2/17/2022  Last Seen by PCP: 6/23/2021    Last Written: 8/6/2022    If no future appointment scheduled, route STAFF MESSAGE with patient name to the [facility] for scheduling. Next Appointment:   Future Appointments   Date Time Provider Christina Burton   7/19/2023  1:00 PM [facility] CT T [facility] CT Mendoza Santizo   7/19/2023  2:00 PM Brandon Booth MD [facility]       Message sent to [address] [place] [address] to schedule appt with patient?   NO      Requested Prescriptions     Pending Prescriptions Disp Refills    lamoTRIgine (LAMICTAL) 25 MG tablet [Pharmacy Med Name: LAMOTRIGINE 25 MG TABLET] 270 tablet 1     Sig: TAKE 3 TABLETS BY MOUTH DAILY (WITH BREAKFAST) FOR MOOD SWINGS

## 2023-01-16 ENCOUNTER — OFFICE VISIT (OUTPATIENT)
Dept: FAMILY MEDICINE CLINIC | Age: 73
End: 2023-01-16
Payer: MEDICARE

## 2023-01-16 ENCOUNTER — TELEPHONE (OUTPATIENT)
Dept: FAMILY MEDICINE CLINIC | Age: 73
End: 2023-01-16

## 2023-01-16 VITALS — SYSTOLIC BLOOD PRESSURE: 126 MMHG | DIASTOLIC BLOOD PRESSURE: 80 MMHG | HEART RATE: 72 BPM | OXYGEN SATURATION: 96 %

## 2023-01-16 DIAGNOSIS — R13.10 DYSPHAGIA, UNSPECIFIED TYPE: Primary | ICD-10-CM

## 2023-01-16 PROCEDURE — G8417 CALC BMI ABV UP PARAM F/U: HCPCS | Performed by: NURSE PRACTITIONER

## 2023-01-16 PROCEDURE — G8400 PT W/DXA NO RESULTS DOC: HCPCS | Performed by: NURSE PRACTITIONER

## 2023-01-16 PROCEDURE — G8427 DOCREV CUR MEDS BY ELIG CLIN: HCPCS | Performed by: NURSE PRACTITIONER

## 2023-01-16 PROCEDURE — 3017F COLORECTAL CA SCREEN DOC REV: CPT | Performed by: NURSE PRACTITIONER

## 2023-01-16 PROCEDURE — G8484 FLU IMMUNIZE NO ADMIN: HCPCS | Performed by: NURSE PRACTITIONER

## 2023-01-16 PROCEDURE — 1036F TOBACCO NON-USER: CPT | Performed by: NURSE PRACTITIONER

## 2023-01-16 PROCEDURE — 1123F ACP DISCUSS/DSCN MKR DOCD: CPT | Performed by: NURSE PRACTITIONER

## 2023-01-16 PROCEDURE — 1090F PRES/ABSN URINE INCON ASSESS: CPT | Performed by: NURSE PRACTITIONER

## 2023-01-16 PROCEDURE — 99213 OFFICE O/P EST LOW 20 MIN: CPT | Performed by: NURSE PRACTITIONER

## 2023-01-16 RX ORDER — OMEPRAZOLE 20 MG/1
CAPSULE, DELAYED RELEASE ORAL
Qty: 180 CAPSULE | Refills: 0 | Status: SHIPPED | OUTPATIENT
Start: 2023-01-16

## 2023-01-16 RX ORDER — OMEPRAZOLE 20 MG/1
CAPSULE, DELAYED RELEASE ORAL
Qty: 180 CAPSULE | Refills: 0 | Status: SHIPPED | OUTPATIENT
Start: 2023-01-16 | End: 2023-01-16

## 2023-01-16 SDOH — ECONOMIC STABILITY: FOOD INSECURITY: WITHIN THE PAST 12 MONTHS, YOU WORRIED THAT YOUR FOOD WOULD RUN OUT BEFORE YOU GOT MONEY TO BUY MORE.: NEVER TRUE

## 2023-01-16 SDOH — ECONOMIC STABILITY: FOOD INSECURITY: WITHIN THE PAST 12 MONTHS, THE FOOD YOU BOUGHT JUST DIDN'T LAST AND YOU DIDN'T HAVE MONEY TO GET MORE.: NEVER TRUE

## 2023-01-16 ASSESSMENT — PATIENT HEALTH QUESTIONNAIRE - PHQ9
5. POOR APPETITE OR OVEREATING: 2
2. FEELING DOWN, DEPRESSED OR HOPELESS: 2
SUM OF ALL RESPONSES TO PHQ QUESTIONS 1-9: 10
SUM OF ALL RESPONSES TO PHQ QUESTIONS 1-9: 10
7. TROUBLE CONCENTRATING ON THINGS, SUCH AS READING THE NEWSPAPER OR WATCHING TELEVISION: 0
3. TROUBLE FALLING OR STAYING ASLEEP: 2
8. MOVING OR SPEAKING SO SLOWLY THAT OTHER PEOPLE COULD HAVE NOTICED. OR THE OPPOSITE, BEING SO FIGETY OR RESTLESS THAT YOU HAVE BEEN MOVING AROUND A LOT MORE THAN USUAL: 0
1. LITTLE INTEREST OR PLEASURE IN DOING THINGS: 2
6. FEELING BAD ABOUT YOURSELF - OR THAT YOU ARE A FAILURE OR HAVE LET YOURSELF OR YOUR FAMILY DOWN: 0
SUM OF ALL RESPONSES TO PHQ QUESTIONS 1-9: 10
10. IF YOU CHECKED OFF ANY PROBLEMS, HOW DIFFICULT HAVE THESE PROBLEMS MADE IT FOR YOU TO DO YOUR WORK, TAKE CARE OF THINGS AT HOME, OR GET ALONG WITH OTHER PEOPLE: 0
4. FEELING TIRED OR HAVING LITTLE ENERGY: 2
SUM OF ALL RESPONSES TO PHQ9 QUESTIONS 1 & 2: 4
SUM OF ALL RESPONSES TO PHQ QUESTIONS 1-9: 10
9. THOUGHTS THAT YOU WOULD BE BETTER OFF DEAD, OR OF HURTING YOURSELF: 0

## 2023-01-16 ASSESSMENT — ENCOUNTER SYMPTOMS
NAUSEA: 0
CONSTIPATION: 0
COUGH: 1
CHEST TIGHTNESS: 0
VOMITING: 0
WHEEZING: 0
SHORTNESS OF BREATH: 0
DIARRHEA: 0

## 2023-01-16 ASSESSMENT — ANXIETY QUESTIONNAIRES
6. BECOMING EASILY ANNOYED OR IRRITABLE: 2
4. TROUBLE RELAXING: 1
2. NOT BEING ABLE TO STOP OR CONTROL WORRYING: 1
7. FEELING AFRAID AS IF SOMETHING AWFUL MIGHT HAPPEN: 0
1. FEELING NERVOUS, ANXIOUS, OR ON EDGE: 3
IF YOU CHECKED OFF ANY PROBLEMS ON THIS QUESTIONNAIRE, HOW DIFFICULT HAVE THESE PROBLEMS MADE IT FOR YOU TO DO YOUR WORK, TAKE CARE OF THINGS AT HOME, OR GET ALONG WITH OTHER PEOPLE: NOT DIFFICULT AT ALL
5. BEING SO RESTLESS THAT IT IS HARD TO SIT STILL: 2
GAD7 TOTAL SCORE: 10
3. WORRYING TOO MUCH ABOUT DIFFERENT THINGS: 1

## 2023-01-16 ASSESSMENT — SOCIAL DETERMINANTS OF HEALTH (SDOH): HOW HARD IS IT FOR YOU TO PAY FOR THE VERY BASICS LIKE FOOD, HOUSING, MEDICAL CARE, AND HEATING?: NOT VERY HARD

## 2023-01-16 NOTE — TELEPHONE ENCOUNTER
Patient: Myles Grady Patient : 1950      Patient call back number- [phone]     Spoke with: PT    Symptom(s) patient is experiencing- Mouth is swollen, trouble swallowing. Soar throat sometimes and when she lying down. Symptom onset has been chronic for a time period of a month maybe oonger month(s). Severity is described as none. Course of her symptoms over time is minimal.      Does anything make the symptom(s) better or worse?- better when she drinks water    Have you experienced this before?-no    Any pertinent medical history? no      Have you taken anything (prescriptions or OTC) to help with symptom(s)?- no   - If YES, did it help?- No      Is patient having pain? No   Location of the pain- mouth back of her throat  Describe the pain (sharp, stabbing, aching, burning, dull, etc)-- dull  Constant or intermittent- none  Rate pain on a scale of 1 to 10: 0  Does is it radiate to other areas or just in one spot?- nonradiating      Call Outcome/Determination of next steps for patient- Appointment Scheduled with 2:30 2023  as advised by provider    Advised to call back directly if there are further questions, or if these symptoms fail to improve as anticipated or worsen.       Plan of Care reviewed and discussed with PCP: Yes       Electronically signed by Phil Gupta LPN on  at 87:97 AM palomo

## 2023-01-16 NOTE — PROGRESS NOTES
2023  Gahda Miranda (: 1950)  67 y.o.    ASSESSMENT and PLAN:  Dillan So was seen today for dysphagia and cough. Diagnoses and all orders for this visit:    Dysphagia, unspecified type  -     AFL - Wesly Dick MD, Gastroenterology, [facility]  -     omeprazole (PRILOSEC) 20 MG delayed release capsule; TAKE 2 CAPSULE BY MOUTH Daily   -no swelling noted in oral cavity or angioedema.   -had esophageal stretching with Dr. Jules Castellanos in . -increase omeprazole to 40 mg daily, take 30 minutes prior to first meal of the day. If no improvement may increase to bid. -recommend dental soft diet. -reviewed gerd symptoms, and lifestyle recommendations. -reviewed alarm symptoms, if any oral swelling, difficultly breathing or worsening symptoms call office or go to ER    Return if symptoms worsen or fail to improve. Cough  Pertinent negatives include no chest pain, fever, headaches, shortness of breath or wheezing. Presenting with difficultly swallowing. Symptom onset one month. Associated symptoms include feels tight inside mouth. Mild cough, non-productive. Most bothersome at night. Denies hives/rash. No SOB. No new products. Still taking omeprazole 20 mg daily. Has sensation of food getting stuck intermittently, had recent stretching of esophagus in . Symptoms seem to be returning. No issues when drinking fluids. Treatments tried rolaid. No medication changes. Denies n/v. Intermittent diarrhea (baseline). No pain with swallowing. Appetite is normal.   Denies sob different than usually. No recent dental work. Denies blood in stool or black stools. Review of Systems   Constitutional:  Negative for activity change, appetite change, fatigue, fever and unexpected weight change. Respiratory:  Positive for cough. Negative for chest tightness, shortness of breath and wheezing. Cardiovascular:  Negative for chest pain, palpitations and leg swelling. Gastrointestinal:  Negative for constipation, diarrhea, nausea and vomiting. Genitourinary: Negative. Negative for difficulty urinating and dysuria. Musculoskeletal: Negative. Negative for gait problem. Neurological: Negative. Negative for dizziness, syncope, weakness, light-headedness, numbness and headaches. Psychiatric/Behavioral: Negative. Allergies, past medical history, family history, and social history reviewed and unchanged from previous encounter. Current Outpatient Medications   Medication Sig Dispense Refill    lamoTRIgine (LAMICTAL) 25 MG tablet TAKE 3 TABLETS BY MOUTH DAILY (WITH BREAKFAST) FOR MOOD SWINGS (Patient taking differently: TAKE 2 TABLETS BY MOUTH DAILY (WITH BREAKFAST)  and two tablets in the evening FOR MOOD SWINGS) 270 tablet 1    atorvastatin (LIPITOR) 20 MG tablet TAKE 1 TABLET BY MOUTH EVERY DAY AT NIGHT 90 tablet 1    omeprazole (PRILOSEC) 20 MG delayed release capsule TAKE 1 CAPSULE BY MOUTH TWICE A  capsule 0    albuterol sulfate HFA (VENTOLIN HFA) 108 (90 Base) MCG/ACT inhaler Inhale 2 puffs into the lungs 4 times daily as needed for Wheezing 54 g 5    sertraline (ZOLOFT) 50 MG tablet TAKE 1 TABLET BY MOUTH THREE TIMES A  tablet 1    vitamin B-12 (CYANOCOBALAMIN) 100 MCG tablet Take 50 mcg by mouth daily      Cholecalciferol (VITAMIN D) 50 MCG (2000 UT) CAPS capsule Take by mouth       No current facility-administered medications for this visit. Vitals:    01/16/23 1443   BP: 126/80   Site: Left Upper Arm   Position: Sitting   Cuff Size: Medium Adult   Pulse: 72   SpO2: 96%     Estimated body mass index is 27.62 kg/m² as calculated from the following:    Height as of 7/25/22: 5' 5\" (1.651 m). Weight as of 7/25/22: 166 lb (75.3 kg). Physical Exam  Vitals reviewed. Constitutional:       General: She is not in acute distress. Appearance: Normal appearance. She is not ill-appearing, toxic-appearing or diaphoretic.    HENT: Head: Normocephalic and atraumatic. Right Ear: Tympanic membrane normal.      Left Ear: Tympanic membrane normal.      Nose: Nose normal. No congestion or rhinorrhea. Mouth/Throat:      Mouth: Mucous membranes are moist.      Pharynx: Oropharynx is clear. No oropharyngeal exudate or posterior oropharyngeal erythema. Eyes:      Conjunctiva/sclera: Conjunctivae normal.   Neck:      Vascular: No carotid bruit. Cardiovascular:      Rate and Rhythm: Normal rate and regular rhythm. Pulses: Normal pulses. Heart sounds: Normal heart sounds. Pulmonary:      Effort: Pulmonary effort is normal. No respiratory distress. Breath sounds: No wheezing or rhonchi. Chest:      Chest wall: No tenderness. Abdominal:      General: Abdomen is flat. Bowel sounds are normal.      Palpations: Abdomen is soft. Tenderness: There is no abdominal tenderness. There is no guarding. Musculoskeletal:         General: Normal range of motion. Cervical back: Normal range of motion and neck supple. No rigidity or tenderness. Lymphadenopathy:      Cervical: No cervical adenopathy. Skin:     General: Skin is warm and dry. Capillary Refill: Capillary refill takes less than 2 seconds. Neurological:      General: No focal deficit present. Mental Status: She is alert and oriented to person, place, and time. Mental status is at baseline. Psychiatric:         Mood and Affect: Mood normal.         Behavior: Behavior normal.         Thought Content:  Thought content normal.         Judgment: Judgment normal.

## 2023-01-27 ENCOUNTER — TELEPHONE (OUTPATIENT)
Dept: FAMILY MEDICINE CLINIC | Age: 73
End: 2023-01-27

## 2023-02-07 DIAGNOSIS — R13.19 ESOPHAGEAL DYSPHAGIA: Primary | ICD-10-CM

## 2023-02-10 ENCOUNTER — HOSPITAL ENCOUNTER (OUTPATIENT)
Dept: GENERAL RADIOLOGY | Age: 73
Discharge: HOME | End: 2023-02-10
Payer: MEDICARE

## 2023-02-10 DIAGNOSIS — R13.19 ESOPHAGEAL DYSPHAGIA: ICD-10-CM

## 2023-02-10 PROCEDURE — 74220 X-RAY XM ESOPHAGUS 1CNTRST: CPT

## 2023-02-20 ENCOUNTER — TELEMEDICINE (OUTPATIENT)
Dept: FAMILY MEDICINE CLINIC | Age: 73
End: 2023-02-20
Payer: MEDICARE

## 2023-02-20 DIAGNOSIS — Z00.00 MEDICARE ANNUAL WELLNESS VISIT, SUBSEQUENT: Primary | ICD-10-CM

## 2023-02-20 PROCEDURE — 3017F COLORECTAL CA SCREEN DOC REV: CPT | Performed by: FAMILY MEDICINE

## 2023-02-20 PROCEDURE — 1123F ACP DISCUSS/DSCN MKR DOCD: CPT | Performed by: FAMILY MEDICINE

## 2023-02-20 PROCEDURE — G0439 PPPS, SUBSEQ VISIT: HCPCS | Performed by: FAMILY MEDICINE

## 2023-02-20 SDOH — ECONOMIC STABILITY: FOOD INSECURITY: WITHIN THE PAST 12 MONTHS, YOU WORRIED THAT YOUR FOOD WOULD RUN OUT BEFORE YOU GOT MONEY TO BUY MORE.: NEVER TRUE

## 2023-02-20 SDOH — ECONOMIC STABILITY: FOOD INSECURITY: WITHIN THE PAST 12 MONTHS, THE FOOD YOU BOUGHT JUST DIDN'T LAST AND YOU DIDN'T HAVE MONEY TO GET MORE.: NEVER TRUE

## 2023-02-20 SDOH — ECONOMIC STABILITY: HOUSING INSECURITY
IN THE LAST 12 MONTHS, WAS THERE A TIME WHEN YOU DID NOT HAVE A STEADY PLACE TO SLEEP OR SLEPT IN A SHELTER (INCLUDING NOW)?: NO

## 2023-02-20 SDOH — ECONOMIC STABILITY: INCOME INSECURITY: HOW HARD IS IT FOR YOU TO PAY FOR THE VERY BASICS LIKE FOOD, HOUSING, MEDICAL CARE, AND HEATING?: NOT HARD AT ALL

## 2023-02-20 ASSESSMENT — PATIENT HEALTH QUESTIONNAIRE - PHQ9
SUM OF ALL RESPONSES TO PHQ9 QUESTIONS 1 & 2: 1
7. TROUBLE CONCENTRATING ON THINGS, SUCH AS READING THE NEWSPAPER OR WATCHING TELEVISION: 0
SUM OF ALL RESPONSES TO PHQ QUESTIONS 1-9: 2
10. IF YOU CHECKED OFF ANY PROBLEMS, HOW DIFFICULT HAVE THESE PROBLEMS MADE IT FOR YOU TO DO YOUR WORK, TAKE CARE OF THINGS AT HOME, OR GET ALONG WITH OTHER PEOPLE: 0
SUM OF ALL RESPONSES TO PHQ QUESTIONS 1-9: 2
1. LITTLE INTEREST OR PLEASURE IN DOING THINGS: 0
SUM OF ALL RESPONSES TO PHQ QUESTIONS 1-9: 2
5. POOR APPETITE OR OVEREATING: 0
6. FEELING BAD ABOUT YOURSELF - OR THAT YOU ARE A FAILURE OR HAVE LET YOURSELF OR YOUR FAMILY DOWN: 0
4. FEELING TIRED OR HAVING LITTLE ENERGY: 0
8. MOVING OR SPEAKING SO SLOWLY THAT OTHER PEOPLE COULD HAVE NOTICED. OR THE OPPOSITE, BEING SO FIGETY OR RESTLESS THAT YOU HAVE BEEN MOVING AROUND A LOT MORE THAN USUAL: 0
9. THOUGHTS THAT YOU WOULD BE BETTER OFF DEAD, OR OF HURTING YOURSELF: 0
2. FEELING DOWN, DEPRESSED OR HOPELESS: 1
3. TROUBLE FALLING OR STAYING ASLEEP: 1
SUM OF ALL RESPONSES TO PHQ QUESTIONS 1-9: 2

## 2023-02-20 ASSESSMENT — LIFESTYLE VARIABLES
HOW MANY STANDARD DRINKS CONTAINING ALCOHOL DO YOU HAVE ON A TYPICAL DAY: 1 OR 2
HOW OFTEN DO YOU HAVE A DRINK CONTAINING ALCOHOL: MONTHLY OR LESS

## 2023-02-20 NOTE — PATIENT INSTRUCTIONS
Personalized Preventive Plan for Allison Brewer - 2/20/2023  Medicare offers a range of preventive health benefits. Some of the tests and screenings are paid in full while other may be subject to a deductible, co-insurance, and/or copay. Some of these benefits include a comprehensive review of your medical history including lifestyle, illnesses that may run in your family, and various assessments and screenings as appropriate. After reviewing your medical record and screening and assessments performed today your provider may have ordered immunizations, labs, imaging, and/or referrals for you. A list of these orders (if applicable) as well as your Preventive Care list are included within your After Visit Summary for your review. Other Preventive Recommendations:    A preventive eye exam performed by an eye specialist is recommended every 1-2 years to screen for glaucoma; cataracts, macular degeneration, and other eye disorders. A preventive dental visit is recommended every 6 months. Try to get at least 150 minutes of exercise per week or 10,000 steps per day on a pedometer . Order or download the FREE \"Exercise & Physical Activity: Your Everyday Guide\" from [employer]. Call 4-856.152.5131 or search [employer] online. You need 6045-7351 mg of calcium and 1882-8943 IU of vitamin D per day. It is possible to meet your calcium requirement with diet alone, but a vitamin D supplement is usually necessary to meet this goal.  When exposed to the sun, use a sunscreen that protects against both UVA and UVB radiation with an SPF of 30 or greater. Reapply every 2 to 3 hours or after sweating, drying off with a towel, or swimming. Always wear a seat belt when traveling in a car. Always wear a helmet when riding a bicycle or motorcycle.

## 2023-02-20 NOTE — PROGRESS NOTES
Medicare Annual Wellness Visit    Pedro Walters is here for Medicare AWV    Assessment & Plan   Medicare annual wellness visit, subsequent      Recommendations for Preventive Services Due: see orders and patient instructions/AVS.  Recommended screening schedule for the next 5-10 years is provided to the patient in written form: see Patient Instructions/AVS.     No follow-ups on file. Subjective       Patient's complete Health Risk Assessment and screening values have been reviewed and are found in Flowsheets. The following problems were reviewed today and where indicated follow up appointments were made and/or referrals ordered. Positive Risk Factor Screenings with Interventions:                    Vision Screen:  Do you have difficulty driving, watching TV, or doing any of your daily activities because of your eyesight?: No  Have you had an eye exam within the past year?: (!) No  No results found. Interventions:   Patient encouraged to make appointment with their eye specialist                      Objective      Patient-Reported Vitals  Patient-Reported Systolic (Top): 013 mmHg  Patient-Reported Diastolic (Bottom): 78 mmHg  Patient-Reported Pulse: 76  Patient-Reported Weight: 164lb  Patient-Reported Height: 5' 5\"  Patient-Reported Pulse Oximetry: 97              Allergies   Allergen Reactions    Sulfa Antibiotics     Codeine Nausea Only and Other (See Comments)     Significant headaches     Prior to Visit Medications    Medication Sig Taking?  Authorizing Provider   omeprazole (PRILOSEC) 20 MG delayed release capsule TAKE 2 CAPSULES BY MOUTH TWICE A DAY  Nelson Harrison DO   lamoTRIgine (LAMICTAL) 25 MG tablet TAKE 3 TABLETS BY MOUTH DAILY (WITH BREAKFAST) FOR MOOD SWINGS  Patient taking differently: TAKE 2 TABLETS BY MOUTH DAILY (WITH BREAKFAST)  and two tablets in the evening FOR MOOD SWINGS  Nelson Harrison DO   atorvastatin (LIPITOR) 20 MG tablet TAKE 1 TABLET BY MOUTH EVERY DAY AT NIGHT  Nelson ADKINS DO Christy   albuterol sulfate HFA (VENTOLIN HFA) 108 (90 Base) MCG/ACT inhaler Inhale 2 puffs into the lungs 4 times daily as needed for Wheezing  Gera Rios MD   sertraline (ZOLOFT) 50 MG tablet TAKE 1 TABLET BY MOUTH THREE TIMES A DAY  Vashti Stewart Chuck, APRN - CNP   vitamin B-12 (CYANOCOBALAMIN) 100 MCG tablet Take 50 mcg by mouth daily  Historical Provider, MD   Cholecalciferol (VITAMIN D) 50 MCG (2000 UT) CAPS capsule Take by mouth  Historical Provider, MD Cabrera (Including outside providers/suppliers regularly involved in providing care):   Patient Care Team:  Melinda Winston DO as PCP - General (Family Medicine)  Melinda Winston DO as PCP - Empaneled Provider  Marcelo Ely MD as Consulting Physician (Pain Management)  NESHA Escalona as Physician Assistant (Physician Assistant)  Johnathan Mackay MD as Consulting Physician (Gastroenterology)     Reviewed and updated this visit:  Tobacco  Allergies  Meds  Med Hx  Surg Hx  Soc Hx  Fam Hx        I, Archana Mora LPN, 9[birth year], performed the documented evaluation under the direct supervision of the attending physician. Parisa Ramirez, was evaluated through a synchronous (real-time) telephone encounter. The patient (or guardian if applicable) is aware that this is a billable service, which includes applicable co-pays. This Virtual Visit was conducted with patient's (and/or legal guardian's) consent. The visit was conducted pursuant to the emergency declaration under the [facility], [address] [place] [address] authority and the [employer] and [email or web address] General Act. Patient identification was verified, and a caregiver was present when appropriate.    The patient was located at Home: [address] [place] [id number]  Provider was located at [facility] (Appt Dept): [address]  [place],  [address] [facility] [address]    This encounter was performed under myMelinda DOs, direct supervision, 2/20/2023.           Cain Rosas LPN

## 2023-03-10 DIAGNOSIS — R45.86 MOOD SWINGS: ICD-10-CM

## 2023-03-10 DIAGNOSIS — F32.89 OTHER DEPRESSION: ICD-10-CM

## 2023-03-10 RX ORDER — LAMOTRIGINE 25 MG/1
TABLET ORAL
Qty: 270 TABLET | Refills: 1 | Status: SHIPPED | OUTPATIENT
Start: 2023-03-10

## 2023-03-10 NOTE — TELEPHONE ENCOUNTER
Refill Request     CONFIRM preferred pharmacy with the patient. If Mail Order Rx - Pend for 90 day refill. Last Seen: Last Seen Department: 2/20/2023  Last Seen by PCP: 2/20/2023    Last Written: 270 with 1 1/9/2023     If no future appointment scheduled, route STAFF MESSAGE with patient name to the [facility] for scheduling. Next Appointment:   Future Appointments   Date Time Provider Christina Burton   7/19/2023  1:00 PM [facility] CT T [facility] CT Tiffanie Mohr   7/19/2023  2:00 PM Pernell Hardin MD AND ARUN HECK       Message sent to [address] [place] [address] to schedule appt with patient?   YES      Requested Prescriptions     Pending Prescriptions Disp Refills    lamoTRIgine (LAMICTAL) 25 MG tablet 270 tablet 1     Sig: TAKE 2 TABLETS BY MOUTH DAILY (WITH BREAKFAST)  and two tablets in the evening FOR MOOD SWINGS

## 2023-04-10 DIAGNOSIS — E04.2 MULTIPLE THYROID NODULES: ICD-10-CM

## 2023-04-10 DIAGNOSIS — D50.9 IRON DEFICIENCY ANEMIA, UNSPECIFIED IRON DEFICIENCY ANEMIA TYPE: ICD-10-CM

## 2023-04-10 DIAGNOSIS — E55.9 VITAMIN D INSUFFICIENCY: ICD-10-CM

## 2023-04-10 DIAGNOSIS — Z00.00 HEALTHCARE MAINTENANCE: ICD-10-CM

## 2023-04-10 DIAGNOSIS — R13.10 DYSPHAGIA, UNSPECIFIED TYPE: ICD-10-CM

## 2023-04-10 PROBLEM — Z85.118 HISTORY OF LUNG CANCER: Status: ACTIVE | Noted: 2023-04-10

## 2023-04-10 PROBLEM — G40.89 OTHER SEIZURES (HCC): Status: ACTIVE | Noted: 2023-04-10

## 2023-04-10 PROBLEM — R91.1 NODULE OF RIGHT LUNG: Status: RESOLVED | Noted: 2021-05-20 | Resolved: 2023-04-10

## 2023-04-10 PROBLEM — K90.9 INTESTINAL MALABSORPTION: Status: RESOLVED | Noted: 2020-10-26 | Resolved: 2023-04-10

## 2023-04-10 PROBLEM — Z90.2 HISTORY OF LOBECTOMY OF LUNG: Status: ACTIVE | Noted: 2023-04-10

## 2023-04-10 PROBLEM — D50.0 ANEMIA DUE TO CHRONIC BLOOD LOSS: Status: RESOLVED | Noted: 2020-08-14 | Resolved: 2023-04-10

## 2023-04-10 PROBLEM — R56.9 UNSPECIFIED CONVULSIONS (HCC): Status: RESOLVED | Noted: 2023-04-10 | Resolved: 2023-04-10

## 2023-04-10 PROBLEM — R56.9 UNSPECIFIED CONVULSIONS (HCC): Status: ACTIVE | Noted: 2023-04-10

## 2023-04-10 PROBLEM — G40.89 OTHER SEIZURES (HCC): Status: RESOLVED | Noted: 2023-04-10 | Resolved: 2023-04-10

## 2023-04-10 PROBLEM — R45.86 MOOD SWINGS: Status: RESOLVED | Noted: 2019-10-30 | Resolved: 2023-04-10

## 2023-04-10 LAB
25(OH)D3 SERPL-MCNC: 35.2 NG/ML
ALBUMIN SERPL-MCNC: 4.5 G/DL (ref 3.4–5)
ALBUMIN/GLOB SERPL: 1.7 {RATIO} (ref 1.1–2.2)
ALP SERPL-CCNC: 114 U/L (ref 40–129)
ALT SERPL-CCNC: 18 U/L (ref 10–40)
ANION GAP SERPL CALCULATED.3IONS-SCNC: 14 MMOL/L (ref 3–16)
AST SERPL-CCNC: 18 U/L (ref 15–37)
BASOPHILS # BLD: 0 K/UL (ref 0–0.2)
BASOPHILS NFR BLD: 0.4 %
BILIRUB SERPL-MCNC: 0.5 MG/DL (ref 0–1)
BUN SERPL-MCNC: 15 MG/DL (ref 7–20)
CALCIUM SERPL-MCNC: 9.9 MG/DL (ref 8.3–10.6)
CHLORIDE SERPL-SCNC: 105 MMOL/L (ref 99–110)
CHOLEST SERPL-MCNC: 160 MG/DL (ref 0–199)
CO2 SERPL-SCNC: 26 MMOL/L (ref 21–32)
CREAT SERPL-MCNC: 0.7 MG/DL (ref 0.6–1.2)
DEPRECATED RDW RBC AUTO: 13.6 % (ref 12.4–15.4)
EOSINOPHIL # BLD: 0.1 K/UL (ref 0–0.6)
EOSINOPHIL NFR BLD: 1.9 %
FERRITIN SERPL IA-MCNC: 144.2 NG/ML (ref 15–150)
GFR SERPLBLD CREATININE-BSD FMLA CKD-EPI: >60 ML/MIN/{1.73_M2}
GLUCOSE SERPL-MCNC: 95 MG/DL (ref 70–99)
HCT VFR BLD AUTO: 43.3 % (ref 36–48)
HDLC SERPL-MCNC: 50 MG/DL (ref 40–60)
HGB BLD-MCNC: 14.6 G/DL (ref 12–16)
IRON SATN MFR SERPL: 26 % (ref 15–50)
IRON SERPL-MCNC: 74 UG/DL (ref 37–145)
LDLC SERPL CALC-MCNC: 81 MG/DL
LYMPHOCYTES # BLD: 1.5 K/UL (ref 1–5.1)
LYMPHOCYTES NFR BLD: 28.1 %
MCH RBC QN AUTO: 28.2 PG (ref 26–34)
MCHC RBC AUTO-ENTMCNC: 33.6 G/DL (ref 31–36)
MCV RBC AUTO: 83.7 FL (ref 80–100)
MONOCYTES # BLD: 0.4 K/UL (ref 0–1.3)
MONOCYTES NFR BLD: 7.5 %
NEUTROPHILS # BLD: 3.3 K/UL (ref 1.7–7.7)
NEUTROPHILS NFR BLD: 62.1 %
PLATELET # BLD AUTO: 181 K/UL (ref 135–450)
PMV BLD AUTO: 9.3 FL (ref 5–10.5)
POTASSIUM SERPL-SCNC: 4.7 MMOL/L (ref 3.5–5.1)
PROT SERPL-MCNC: 7.1 G/DL (ref 6.4–8.2)
RBC # BLD AUTO: 5.18 M/UL (ref 4–5.2)
SODIUM SERPL-SCNC: 145 MMOL/L (ref 136–145)
TIBC SERPL-MCNC: 281 UG/DL (ref 260–445)
TRIGL SERPL-MCNC: 143 MG/DL (ref 0–150)
TSH SERPL DL<=0.005 MIU/L-ACNC: 0.81 UIU/ML (ref 0.27–4.2)
VIT B12 SERPL-MCNC: 966 PG/ML (ref 211–911)
VLDLC SERPL CALC-MCNC: 29 MG/DL
WBC # BLD AUTO: 5.2 K/UL (ref 4–11)

## 2023-04-17 ENCOUNTER — TELEPHONE (OUTPATIENT)
Dept: FAMILY MEDICINE CLINIC | Age: 73
End: 2023-04-17

## 2023-04-17 NOTE — TELEPHONE ENCOUNTER
----- Message from Deja Wilson sent at 4/17/2023  9:48 AM EDT -----  Subject: Medication Problem    Medication: sertraline (ZOLOFT) 100 MG tablet  Dosage: 100 mg   Ordering Provider: Sky Arisa     Question/Problem: Pt would like a call back to discuss this medication. Pt   is having an anxious feeling regarding the medication. Please advise.  Pt   denied needing an appt       Pharmacy: [address] [phone]    ---------------------------------------------------------------------------  --------------  Sekou STOREY  [phone]; OK to leave message on voicemail  ---------------------------------------------------------------------------  --------------    SCRIPT ANSWERS  Relationship to Patient: Self

## 2023-04-17 NOTE — TELEPHONE ENCOUNTER
Pt recently established care with me as a new pt. See plan below. Ok to schedule VV or telephone visit to discuss further. Moderate episode of recurrent major depressive disorder ([facility] [address]  See HPI. Pt has been having some adverse reactions since starting Lamictal.  Will wean the pt off of Lamictal and change dose of sertraline to 100 mg daily. Advised pt to f/u with me in 8 weeks or sooner if needed. -     sertraline (ZOLOFT) 100 MG tablet; Take 1 tablet by mouth daily, Disp-90 tablet, R-0Normal  -     lamoTRIgine (LAMICTAL) 25 MG tablet; Take 1 tablet by mouth 2 times daily for 14 days, THEN 1 tablet daily for 14 days. , Disp-42 tablet, R-0Normal

## 2023-04-19 ENCOUNTER — TELEMEDICINE (OUTPATIENT)
Dept: FAMILY MEDICINE CLINIC | Age: 73
End: 2023-04-19

## 2023-04-19 DIAGNOSIS — F33.1 MODERATE EPISODE OF RECURRENT MAJOR DEPRESSIVE DISORDER (HCC): ICD-10-CM

## 2023-04-19 DIAGNOSIS — F41.9 ANXIETY: ICD-10-CM

## 2023-04-19 RX ORDER — LAMOTRIGINE 25 MG/1
50 TABLET ORAL 2 TIMES DAILY
Qty: 360 TABLET | Refills: 0 | Status: SHIPPED | OUTPATIENT
Start: 2023-04-19 | End: 2023-07-18

## 2023-04-19 RX ORDER — SERTRALINE HYDROCHLORIDE 100 MG/1
100 TABLET, FILM COATED ORAL DAILY
Qty: 90 TABLET | Refills: 0 | Status: SHIPPED | OUTPATIENT
Start: 2023-04-19

## 2023-04-19 ASSESSMENT — ENCOUNTER SYMPTOMS: RESPIRATORY NEGATIVE: 1

## 2023-04-19 NOTE — PROGRESS NOTES
2023    TELEHEALTH EVALUATION -- Audio/Visual (During AVGXD-31 public health emergency)    HPI:    Artem Flores (:  1950) has requested an audio/video evaluation for the following concern(s):    Pt established care with me as a new pt on 4/10/2023. Notes from OV 4/10/2023:  H/o anxiety and depression. She states that she has been taking sertraline for several years and that she was started on Lamictal about a year ago for increased anxiety. Prescribed Lamictal 25 mg tabs, take 2 tabs in the morning and 2 tabs in the evening and Sertraline 50 mg tabs, take 1 tab in the morning and 1 tab in the evening. States that her symptoms of depression are not as bad as they used to be. C/o increased anxiety. Pt c/o a decrease in her mental alertness since around the time she started the Lamictal.  Has trouble \"keeping a train of thought\". Moderate episode of recurrent major depressive disorder ([address]  See HPI. Pt has been having some adverse reactions since starting Lamictal.  Will wean the pt off of Lamictal and change dose of sertraline to 100 mg daily. Advised pt to f/u with me in 8 weeks or sooner if needed. -     sertraline (ZOLOFT) 100 MG tablet; Take 1 tablet by mouth daily, Disp-90 tablet, R-0Normal  -     lamoTRIgine (LAMICTAL) 25 MG tablet; Take 1 tablet by mouth 2 times daily for 14 days, THEN 1 tablet daily for 14 days. , Disp-42 tablet, R-0Normal     _____________________________________________________________    Pt states that she has been weaning off of Lamictal as directed, but her symptoms of anxiety and depression have worsened. Denies HI or SI. She states that she would like to go back on the Lamictal at her previous dose and keep the sertraline at 100 mg daily. Review of Systems   Constitutional: Negative. Respiratory: Negative. Cardiovascular: Negative. Psychiatric/Behavioral:  Positive for dysphoric mood.  Negative for decreased concentration,

## 2023-06-07 ENCOUNTER — OFFICE VISIT (OUTPATIENT)
Dept: FAMILY MEDICINE CLINIC | Age: 73
End: 2023-06-07

## 2023-06-07 VITALS
BODY MASS INDEX: 27.32 KG/M2 | HEART RATE: 66 BPM | OXYGEN SATURATION: 98 % | HEIGHT: 65 IN | SYSTOLIC BLOOD PRESSURE: 136 MMHG | WEIGHT: 164 LBS | DIASTOLIC BLOOD PRESSURE: 76 MMHG

## 2023-06-07 DIAGNOSIS — F33.1 MODERATE EPISODE OF RECURRENT MAJOR DEPRESSIVE DISORDER (HCC): ICD-10-CM

## 2023-06-07 DIAGNOSIS — F41.9 ANXIETY: ICD-10-CM

## 2023-06-07 DIAGNOSIS — J30.2 SEASONAL ALLERGIES: Primary | ICD-10-CM

## 2023-06-07 RX ORDER — CITALOPRAM 10 MG/1
10 TABLET ORAL DAILY
Qty: 90 TABLET | Refills: 0 | Status: SHIPPED | OUTPATIENT
Start: 2023-06-07 | End: 2023-09-05

## 2023-06-07 RX ORDER — CITALOPRAM 10 MG/1
10 TABLET ORAL DAILY
Qty: 30 TABLET | Refills: 0 | Status: SHIPPED | OUTPATIENT
Start: 2023-06-07 | End: 2023-06-07

## 2023-06-07 RX ORDER — FEXOFENADINE HCL 180 MG/1
180 TABLET ORAL DAILY
Qty: 90 TABLET | Refills: 1 | Status: SHIPPED | OUTPATIENT
Start: 2023-06-07

## 2023-06-07 ASSESSMENT — ENCOUNTER SYMPTOMS
EYES NEGATIVE: 1
RHINORRHEA: 1
COUGH: 1

## 2023-06-07 NOTE — PROGRESS NOTES
Patient is going to look for doctor that she had gotten shingles vaccine from.
HFA) 108 (90 Base) MCG/ACT inhaler Inhale 2 puffs into the lungs 4 times daily as needed for Wheezing Yes Michelle Godinez MD   Cholecalciferol (VITAMIN D) 50 MCG ( UT) CAPS capsule Take by mouth Yes Historical Provider, MD        Social History     Tobacco Use    Smoking status: Former     Packs/day: 1.00     Years: 15.00     Pack years: 15.00     Types: Cigarettes     Start date: 1970     Quit date: 6/15/1983     Years since quittin.0    Smokeless tobacco: Never   Substance Use Topics    Alcohol use: No        Vitals:    23 1045   BP: 136/76   Site: Right Upper Arm   Position: Sitting   Cuff Size: Medium Adult   Pulse: 66   SpO2: 98%   Weight: 164 lb (74.4 kg)   Height: 5' 5\" (1.651 m)     Estimated body mass index is 27.29 kg/m² as calculated from the following:    Height as of this encounter: 5' 5\" (1.651 m). Weight as of this encounter: 164 lb (74.4 kg). Physical Exam  Vitals reviewed. Constitutional:       Appearance: Normal appearance. HENT:      Head: Normocephalic and atraumatic. Nose: Nose normal.   Eyes:      Pupils: Pupils are equal, round, and reactive to light. Cardiovascular:      Rate and Rhythm: Normal rate and regular rhythm. Heart sounds: Normal heart sounds. Pulmonary:      Effort: Pulmonary effort is normal.      Breath sounds: Normal breath sounds. Skin:     General: Skin is warm and dry. Neurological:      General: No focal deficit present. Mental Status: She is alert and oriented to person, place, and time. Psychiatric:         Attention and Perception: Attention and perception normal.         Mood and Affect: Mood is anxious and depressed. Affect is tearful. Behavior: Behavior normal. Behavior is cooperative. Thought Content: Thought content normal.         Cognition and Memory: Cognition normal.         Judgment: Judgment normal.       ASSESSMENT/PLAN:  1.  Moderate episode of recurrent major depressive disorder

## 2023-06-27 DIAGNOSIS — F41.9 ANXIETY: ICD-10-CM

## 2023-06-27 DIAGNOSIS — F33.1 MODERATE EPISODE OF RECURRENT MAJOR DEPRESSIVE DISORDER (HCC): ICD-10-CM

## 2023-06-27 RX ORDER — CITALOPRAM 20 MG/1
20 TABLET ORAL DAILY
Qty: 90 TABLET | Refills: 0 | Status: SHIPPED | OUTPATIENT
Start: 2023-06-27 | End: 2023-09-25

## 2023-06-30 RX ORDER — ATORVASTATIN CALCIUM 20 MG/1
TABLET, FILM COATED ORAL
Qty: 90 TABLET | Refills: 1 | Status: SHIPPED | OUTPATIENT
Start: 2023-06-30

## 2023-07-13 ENCOUNTER — TELEPHONE (OUTPATIENT)
Dept: ORTHOPEDIC SURGERY | Age: 73
End: 2023-07-13

## 2023-07-13 ENCOUNTER — OFFICE VISIT (OUTPATIENT)
Dept: ORTHOPEDIC SURGERY | Age: 73
End: 2023-07-13

## 2023-07-13 DIAGNOSIS — M25.561 RIGHT KNEE PAIN, UNSPECIFIED CHRONICITY: Primary | ICD-10-CM

## 2023-07-13 NOTE — TELEPHONE ENCOUNTER
General Question     Subject: Pt TOOK A FALL AND HIT KNEE -   Patient and /or Facility Request: Mitra Pizarro  Contact Number: [phone]     Pt TOOK A FALL AND HIT THE KNEE DR COLLINS SAW HER FOR, BUT, THERE ARE  NO AVAIL APPT @ [address] [place] [address]. WILL DR. COLLINS PUT AN ORDER FOR XR IN AND SEE IF THERE IS SOMETHING VISIBLY WRONG? Pt WILL SEE JLYTLE ON TUES PM, TO F/U.     PLEASE CALL TO LET Pt KNOW IF ORDER IS IN.

## 2023-07-13 NOTE — TELEPHONE ENCOUNTER
Patient last seen 2021 -     Need appointment     Geetha Monaco can see her Monday / or After hours     jm

## 2023-07-19 ENCOUNTER — HOSPITAL ENCOUNTER (OUTPATIENT)
Dept: CT IMAGING | Age: 73
Discharge: HOME | End: 2023-07-19
Payer: MEDICARE

## 2023-07-19 DIAGNOSIS — C34.12 MALIGNANT NEOPLASM OF UPPER LOBE OF LEFT LUNG (HCC): ICD-10-CM

## 2023-07-19 PROCEDURE — 71250 CT THORAX DX C-: CPT

## 2023-07-20 ENCOUNTER — OFFICE VISIT (OUTPATIENT)
Dept: ORTHOPEDIC SURGERY | Age: 73
End: 2023-07-20

## 2023-07-20 VITALS — BODY MASS INDEX: 27.32 KG/M2 | HEIGHT: 65 IN | WEIGHT: 164 LBS

## 2023-07-20 DIAGNOSIS — M17.11 PRIMARY OSTEOARTHRITIS OF RIGHT KNEE: Primary | ICD-10-CM

## 2023-07-20 RX ORDER — BUPIVACAINE HYDROCHLORIDE 2.5 MG/ML
4 INJECTION, SOLUTION INFILTRATION; PERINEURAL ONCE
Status: COMPLETED | OUTPATIENT
Start: 2023-07-20 | End: 2023-07-20

## 2023-07-20 RX ORDER — TRIAMCINOLONE ACETONIDE 40 MG/ML
80 INJECTION, SUSPENSION INTRA-ARTICULAR; INTRAMUSCULAR ONCE
Status: COMPLETED | OUTPATIENT
Start: 2023-07-20 | End: 2023-07-20

## 2023-07-20 RX ORDER — LIDOCAINE HYDROCHLORIDE 10 MG/ML
4 INJECTION, SOLUTION INFILTRATION; PERINEURAL ONCE
Status: COMPLETED | OUTPATIENT
Start: 2023-07-20 | End: 2023-07-20

## 2023-07-20 RX ADMIN — BUPIVACAINE HYDROCHLORIDE 10 MG: 2.5 INJECTION, SOLUTION INFILTRATION; PERINEURAL at 10:53

## 2023-07-20 RX ADMIN — LIDOCAINE HYDROCHLORIDE 4 ML: 10 INJECTION, SOLUTION INFILTRATION; PERINEURAL at 10:53

## 2023-07-20 RX ADMIN — TRIAMCINOLONE ACETONIDE 80 MG: 40 INJECTION, SUSPENSION INTRA-ARTICULAR; INTRAMUSCULAR at 10:53

## 2023-07-20 NOTE — PROGRESS NOTES
for knee injection. Final images were taken and saved for permanent record. The patient tolerated the injection well. The patient was instructed to call the office immediately if there is any pain, redness, warmth, fever, or chills. Assessment and Plan  Luna Souza was seen today for knee pain. Diagnoses and all orders for this visit:    Primary osteoarthritis of right knee  -     lidocaine 1 % injection 4 mL  -     bupivacaine (MARCAINE) 0.25 % injection 10 mg  -     triamcinolone acetonide (KENALOG-40) injection 80 mg  -     US ARTHR/ASP/INJ MAJOR JNT/BURSA RIGHT        Patient does have advanced right knee osteoarthritis. I have offered and recommended a total knee arthroplasty but patient has refused and she would like to continue with conservative care. She is given a right knee intra-articular cortisone injection with ultrasound guidance. She will follow-up in the office in 6 weeks or sooner if problems arise. I discussed with Ritesh Schmitt that her history, symptoms, signs, and imaging are most consistent with knee arthritis. We reviewed the natural history of these conditions and treatment options ranging from conservative measures (rest, icing, activity modification, physical therapy, pain meds) to surgical options. In terms of treatment, I recommended continuing with rest, icing, avoidance of painful activities, NSAIDs or pain meds as tolerated, and physical therapy. We discussed surgical options as well, should conservative measures fail. Electronically signed by Good Maciel MD on 7/20/2023 at 12:24 PM  This dictation was generated by voice recognition computer software. Although all attempts are made to edit the dictation for accuracy, there may be errors in the transcription that are not intended.

## 2023-07-25 ENCOUNTER — TELEPHONE (OUTPATIENT)
Dept: FAMILY MEDICINE CLINIC | Age: 73
End: 2023-07-25

## 2023-07-25 NOTE — TELEPHONE ENCOUNTER
She was given a referral to Dr. Chaparrita Vences at her last appt with me. Is that what she is referring to, or is she wanting to establish care with a provider in this office after I leave on 8/10/23? If so, we do not have any providers accepting new pt's at this time. Please give her my information at [facility] in [place] if she would like to follow me, or the list of other [facility] providers in the area that are currently taking new pt's. Thank you!

## 2023-07-25 NOTE — TELEPHONE ENCOUNTER
Called patient back and she was referring to seeing someone in the practice that she can establish with. Patient has been infomred that we are currently not taking in any new patients at this time. I have sent her the new location that you would be at along with the other [facility] locations that are taking in NP. Patient was requesting that if you can call her, because she state that the medication that she was started on is not working for her.

## 2023-07-25 NOTE — TELEPHONE ENCOUNTER
Below is the plan from her visit with me on 6/7/23. She did not schedule f/u with me. Did she see Dr. Tye Burt? Please let her know that I am currently in the office seeing pt's, but to please schedule an OV or VV to discuss further. Thank you! ASSESSMENT/PLAN:  1. Moderate episode of recurrent major depressive disorder (HCC)  Continue Lamictal as directed. Start citalopram 10 mg daily. Decrease sertraline to 50 mg daily for 2 weeks, then decrease to 25 mg daily for 2 weeks, then stop. Continue Lamictal and citalopram.  F/u with Dr. Tye Burt for further evaluation. F/u with me in 6 weeks or sooner if needed. - External Referral To Psychology  - citalopram (CELEXA) 10 MG tablet; Take 1 tablet by mouth daily  Dispense: 90 tablet; Refill: 0     2. Anxiety  See #1.  - External Referral To Psychology  - citalopram (CELEXA) 10 MG tablet; Take 1 tablet by mouth daily  Dispense: 90 tablet; Refill: 0     3. Seasonal allergies     - fexofenadine (ALLEGRA) 180 MG tablet; Take 1 tablet by mouth daily  Dispense: 90 tablet; Refill: 1     Return in about 6 weeks (around 7/19/2023) for Anxiety, Depression.

## 2023-07-25 NOTE — TELEPHONE ENCOUNTER
----- Message from APGR Green EDGARDO Wilde sent at 7/25/2023 12:38 PM EDT -----  Subject: Message to Provider    QUESTIONS  Information for Provider? Pt is asking if there are any doctors at this   office that specialize in depression/anxiety?  ---------------------------------------------------------------------------  --------------  [address]  [phone]; OK to leave message on Daily Pic,OK to respond with electronic   message via [employer] portal (only for patients who have registered [employer]   account)  ---------------------------------------------------------------------------  --------------  SCRIPT ANSWERS  Relationship to Patient?  Self

## 2023-07-27 ENCOUNTER — OFFICE VISIT (OUTPATIENT)
Dept: FAMILY MEDICINE CLINIC | Age: 73
End: 2023-07-27

## 2023-07-27 VITALS
BODY MASS INDEX: 26.79 KG/M2 | SYSTOLIC BLOOD PRESSURE: 146 MMHG | WEIGHT: 161 LBS | TEMPERATURE: 98.2 F | OXYGEN SATURATION: 97 % | HEART RATE: 80 BPM | DIASTOLIC BLOOD PRESSURE: 68 MMHG

## 2023-07-27 DIAGNOSIS — F33.1 MODERATE EPISODE OF RECURRENT MAJOR DEPRESSIVE DISORDER (HCC): ICD-10-CM

## 2023-07-27 DIAGNOSIS — F41.9 ANXIETY: Primary | ICD-10-CM

## 2023-07-27 RX ORDER — LAMOTRIGINE 25 MG/1
50 TABLET ORAL 2 TIMES DAILY
Qty: 360 TABLET | Refills: 0 | Status: SHIPPED | OUTPATIENT
Start: 2023-07-27 | End: 2023-10-25

## 2023-07-27 RX ORDER — SERTRALINE HYDROCHLORIDE 100 MG/1
100 TABLET, FILM COATED ORAL DAILY
Qty: 90 TABLET | Refills: 0 | Status: SHIPPED | OUTPATIENT
Start: 2023-07-27

## 2023-07-27 ASSESSMENT — ENCOUNTER SYMPTOMS
GASTROINTESTINAL NEGATIVE: 1
RESPIRATORY NEGATIVE: 1

## 2023-07-27 NOTE — PROGRESS NOTES
2023     Lila Freedman (:  1950) is a 67 y.o. female, here for evaluation of the following medical concerns:    HPI  Pt is here today for anxiety/depression f/u. We had recently switched her from sertraline to citalopram.  She states that she has been taking the citalopram as directed, but she feels like her anxiety is worse and would like to go back to sertraline. Denies HI or SI. Review of Systems   Constitutional: Negative. Respiratory: Negative. Cardiovascular: Negative. Negative for chest pain. Gastrointestinal: Negative. Psychiatric/Behavioral:  Negative for agitation, behavioral problems, confusion, decreased concentration, dysphoric mood, hallucinations, self-injury, sleep disturbance and suicidal ideas. The patient is nervous/anxious. The patient is not hyperactive. Prior to Visit Medications    Medication Sig Taking?  Authorizing Provider   sertraline (ZOLOFT) 100 MG tablet Take 1 tablet by mouth daily Yes BELINDA De Leon Ace, CNP   lamoTRIgine (LAMICTAL) 25 MG tablet Take 2 tablets by mouth 2 times daily Yes BELINDA De Leon Ace, CNP   diclofenac sodium (VOLTAREN) 1 % GEL Apply 4 g topically 4 times daily Yes NESHA Garland   atorvastatin (LIPITOR) 20 MG tablet TAKE 1 TABLET BY MOUTH EVERY DAY AT NIGHT Yes BELINDA Dash CNP   fexofenadine (ALLEGRA) 180 MG tablet Take 1 tablet by mouth daily Yes BELINDA De Leon Ace, CNP   omeprazole (PRILOSEC) 40 MG delayed release capsule Take 1 capsule by mouth every morning (before breakfast) Yes BELINDA De Leon Ace, CNP   albuterol sulfate HFA (VENTOLIN HFA) 108 (90 Base) MCG/ACT inhaler Inhale 2 puffs into the lungs 4 times daily as needed for Wheezing Yes Trenton Xavier MD   Cholecalciferol (VITAMIN D) 50 MCG (2000 UT) CAPS capsule Take by mouth Yes Historical Provider, MD        Social History     Tobacco Use    Smoking status: Former     Packs/day: 1.00     Years: 15.00     Pack years: 15.00     Types:

## 2023-08-10 ENCOUNTER — TELEPHONE (OUTPATIENT)
Dept: FAMILY MEDICINE CLINIC | Age: 73
End: 2023-08-10

## 2023-08-10 NOTE — TELEPHONE ENCOUNTER
Patient called the office stating that she has a problem falling asleep and staying asleep, this started about 10 days ago. Melatonin is not working, not sure of the dose. Patient would like to see if something can be prescribed. Explained that today is Merlyn Mancuso NP last day and she might have to schedule an appt her the new office to discuss as this is a new issue. *Patient plans on following Lucila Max NP to new location.

## 2023-08-14 NOTE — TELEPHONE ENCOUNTER
Remigio Forde is no longer at this office. Unfortunately, our office is no longer accepting her patients unless they have already re-established with other providers in this office. Has she ever tried trazodone before? That is the most common prescription medicine that is tried. It may have a small interaction with Lamictal though. Remigio Forde is just starting this week, Wednesday or Thursday I believe. It might be wise to make a new patient appointment with her this week.

## 2023-08-14 NOTE — TELEPHONE ENCOUNTER
Spoke with patient and relayed message. She will call and make an appt with Ignacia at her new office.

## 2023-08-15 ENCOUNTER — OFFICE VISIT (OUTPATIENT)
Dept: PULMONOLOGY | Age: 73
End: 2023-08-15
Payer: MEDICARE

## 2023-08-15 VITALS
TEMPERATURE: 97.8 F | SYSTOLIC BLOOD PRESSURE: 127 MMHG | DIASTOLIC BLOOD PRESSURE: 79 MMHG | WEIGHT: 162 LBS | HEART RATE: 61 BPM | OXYGEN SATURATION: 98 % | BODY MASS INDEX: 26.99 KG/M2 | HEIGHT: 65 IN | RESPIRATION RATE: 16 BRPM

## 2023-08-15 DIAGNOSIS — R06.02 SHORTNESS OF BREATH: ICD-10-CM

## 2023-08-15 DIAGNOSIS — Z90.2 HISTORY OF LOBECTOMY OF LUNG: ICD-10-CM

## 2023-08-15 DIAGNOSIS — Z87.891 FORMER SMOKER: ICD-10-CM

## 2023-08-15 DIAGNOSIS — R09.89 CHEST CONGESTION: ICD-10-CM

## 2023-08-15 DIAGNOSIS — C34.12 MALIGNANT NEOPLASM OF UPPER LOBE OF LEFT LUNG (HCC): Primary | ICD-10-CM

## 2023-08-15 PROCEDURE — G8417 CALC BMI ABV UP PARAM F/U: HCPCS | Performed by: STUDENT IN AN ORGANIZED HEALTH CARE EDUCATION/TRAINING PROGRAM

## 2023-08-15 PROCEDURE — 1090F PRES/ABSN URINE INCON ASSESS: CPT | Performed by: STUDENT IN AN ORGANIZED HEALTH CARE EDUCATION/TRAINING PROGRAM

## 2023-08-15 PROCEDURE — 3017F COLORECTAL CA SCREEN DOC REV: CPT | Performed by: STUDENT IN AN ORGANIZED HEALTH CARE EDUCATION/TRAINING PROGRAM

## 2023-08-15 PROCEDURE — 1036F TOBACCO NON-USER: CPT | Performed by: STUDENT IN AN ORGANIZED HEALTH CARE EDUCATION/TRAINING PROGRAM

## 2023-08-15 PROCEDURE — G8400 PT W/DXA NO RESULTS DOC: HCPCS | Performed by: STUDENT IN AN ORGANIZED HEALTH CARE EDUCATION/TRAINING PROGRAM

## 2023-08-15 PROCEDURE — 99213 OFFICE O/P EST LOW 20 MIN: CPT | Performed by: STUDENT IN AN ORGANIZED HEALTH CARE EDUCATION/TRAINING PROGRAM

## 2023-08-15 PROCEDURE — G8427 DOCREV CUR MEDS BY ELIG CLIN: HCPCS | Performed by: STUDENT IN AN ORGANIZED HEALTH CARE EDUCATION/TRAINING PROGRAM

## 2023-08-15 PROCEDURE — 1123F ACP DISCUSS/DSCN MKR DOCD: CPT | Performed by: STUDENT IN AN ORGANIZED HEALTH CARE EDUCATION/TRAINING PROGRAM

## 2023-08-15 ASSESSMENT — ENCOUNTER SYMPTOMS
NAUSEA: 0
DIARRHEA: 0
TROUBLE SWALLOWING: 0
WHEEZING: 0
EYE ITCHING: 0
STRIDOR: 0
ABDOMINAL DISTENTION: 0
EYE DISCHARGE: 0
BACK PAIN: 0
CONSTIPATION: 0
EYE PAIN: 0
SHORTNESS OF BREATH: 1
EYE REDNESS: 0
COLOR CHANGE: 0
SORE THROAT: 0
COUGH: 0
VOMITING: 0
ABDOMINAL PAIN: 0

## 2023-08-15 NOTE — PATIENT INSTRUCTIONS
Remember to bring a list of pulmonary medications and any CPAP or BiPAP machines to your next appointment with the office. Please keep all of your future appointments scheduled by [facility] office. Out of respect for other patients and providers, you may be asked to reschedule your appointment if you arrive later than your scheduled appointment time. Appointments cancelled less than 24hrs in advance will be considered a no show. Patients with three missed appointments within 1 year or four missed appointments within 2 years can be dismissed from the practice. Please be aware that our physicians are required to work in the Intensive Care Unit at [facility].  Your appointment may need to be rescheduled if they are designated to work during your appointment time. You may receive a survey regarding the care you received during your visit. Your input is valuable to us. We encourage you to complete and return your survey. We hope you will choose us in the future for your healthcare needs. Pt instructed of all future appointment dates & times, including radiology, labs, procedures & referrals. If procedures were scheduled preparation instructions provided. Instructions on future appointments with [facility] were given.

## 2023-08-15 NOTE — PROGRESS NOTES
[facility] Pulmonary Follow-up  [address] [facility], [facility][note 3][address] [facility],[address]  [phone]        Chris Postal (: 1950 ) is a 75 y.o. female here for an evaluation of   Chief Complaint   Patient presents with    Follow-up     1 yr hx lung ca     Results     ct         SUBJECTIVE/OBJECTIVE:  Patient is a 59-year-old female with significant past medical history of left upper lobe lobectomy for adenocarcinoma and wedge resection of right upper lobe nodule the presents to [facility] for follow-up visit. She complains of chest congestion and shortness of breath. She used to walk up to 5 miles per day prior to her surgeries, now she walks up to 1 mile per day. She was asking how far she should exert herself. She has no other complaints today. Patient is here for follow-up of CT scan. Per Dr. Elda Joseph:   . Jakub Garduno is a 66y.o. year old female here for follow-up. Her PCP is Dr. Mervat Frank. Myra Cota presents for annual follow-up, was last seen on 2021. She is a former smoker with left upper lobe lobectomy for adenocarcinoma left upper lobe in , then wedge resection for a right upper lobe nodule in  that was benign. She did not meet criteria for COPD. She returns with annual CT chest.  The patient's other medical problems include GERD, anxiety, gastritis, colon polyps, hyperlipidemia, insomnia, depression, and deficiency anemia, IBS, thyroid nodules. The patient continues to abstain from smoking. She has possible shortness of breath with activity, denies significant cough or wheezing. She does have reflux, has a slightly hoarse voice, is going to see a gastroenterologist, will be undergoing upper and lower endoscopy. She has a fair appetite, has had no further weight loss after her surgery. She is eating reasonably well. She denies GI or  complaints. She is using vitamin B12 and vitamin D for health benefits, was not prescribed these. The rest of her ROS was negative.

## 2023-08-15 NOTE — PROGRESS NOTES
MA Communication:   The following orders are received by verbal communication from   Frances Alvarez MD    Orders include:  CTWO 1 yr       FU 1 yr       Nebulizer

## 2023-08-16 ENCOUNTER — TELEPHONE (OUTPATIENT)
Dept: PULMONOLOGY | Age: 73
End: 2023-08-16

## 2023-08-16 DIAGNOSIS — J44.9 COPD WITHOUT EXACERBATION (HCC): Primary | ICD-10-CM

## 2023-08-16 DIAGNOSIS — J41.1 MUCOPURULENT CHRONIC BRONCHITIS (HCC): ICD-10-CM

## 2023-08-16 RX ORDER — ALBUTEROL SULFATE 2.5 MG/3ML
2.5 SOLUTION RESPIRATORY (INHALATION) EVERY 6 HOURS PRN
Qty: 120 EACH | Refills: 3 | Status: SHIPPED | OUTPATIENT
Start: 2023-08-16

## 2023-08-16 NOTE — TELEPHONE ENCOUNTER
Pharm request alternative nebulizer med they need it to be  Albuterol sul 2.5 mg /3 ml soln as the 5 ml is not covered by insurance. I could not pee it.

## 2023-11-29 ENCOUNTER — OFFICE VISIT (OUTPATIENT)
Dept: FAMILY MEDICINE CLINIC | Age: 73
End: 2023-11-29

## 2023-11-29 VITALS
SYSTOLIC BLOOD PRESSURE: 132 MMHG | HEIGHT: 65 IN | BODY MASS INDEX: 26.69 KG/M2 | WEIGHT: 160.2 LBS | HEART RATE: 77 BPM | OXYGEN SATURATION: 98 % | DIASTOLIC BLOOD PRESSURE: 80 MMHG

## 2023-11-29 DIAGNOSIS — R13.19 ESOPHAGEAL DYSPHAGIA: ICD-10-CM

## 2023-11-29 DIAGNOSIS — F33.1 MODERATE EPISODE OF RECURRENT MAJOR DEPRESSIVE DISORDER (HCC): ICD-10-CM

## 2023-11-29 DIAGNOSIS — F41.9 ANXIETY: Primary | ICD-10-CM

## 2023-11-29 DIAGNOSIS — K21.00 GASTROESOPHAGEAL REFLUX DISEASE WITH ESOPHAGITIS, UNSPECIFIED WHETHER HEMORRHAGE: ICD-10-CM

## 2023-11-29 RX ORDER — QUETIAPINE FUMARATE 25 MG/1
25 TABLET, FILM COATED ORAL NIGHTLY
Qty: 30 TABLET | Refills: 1 | Status: SHIPPED | OUTPATIENT
Start: 2023-11-29

## 2023-11-29 NOTE — PROGRESS NOTES
sounds: Normal breath sounds. Neurological:      General: No focal deficit present. Mental Status: She is alert and oriented to person, place, and time. Psychiatric:         Behavior: Behavior normal.         Thought Content: Thought content normal.       Vitals:    11/29/23 1358   BP: 132/80   Pulse: 77   SpO2: 98%       Please note that portions of this note may have been completed with a voice recognition program. Efforts were made to edit the dictations but occasionally words are mis-transcribed.

## 2023-12-01 RX ORDER — SERTRALINE HYDROCHLORIDE 100 MG/1
150 TABLET, FILM COATED ORAL DAILY
Qty: 135 TABLET | Refills: 3 | Status: SHIPPED | OUTPATIENT
Start: 2023-12-01

## 2023-12-01 RX ORDER — OMEPRAZOLE 40 MG/1
40 CAPSULE, DELAYED RELEASE ORAL
Qty: 90 CAPSULE | Refills: 1 | Status: SHIPPED | OUTPATIENT
Start: 2023-12-01

## 2023-12-07 DIAGNOSIS — F41.9 ANXIETY: ICD-10-CM

## 2023-12-07 DIAGNOSIS — F33.1 MODERATE EPISODE OF RECURRENT MAJOR DEPRESSIVE DISORDER (HCC): ICD-10-CM

## 2023-12-07 RX ORDER — QUETIAPINE FUMARATE 25 MG/1
25 TABLET, FILM COATED ORAL NIGHTLY
Qty: 30 TABLET | Refills: 1 | Status: SHIPPED | OUTPATIENT
Start: 2023-12-07

## 2023-12-07 NOTE — TELEPHONE ENCOUNTER
Refill Request     CONFIRM preferred pharmacy with the patient. If Mail Order Rx - Pend for 90 day refill. Last Seen: Last Seen Department: 11/29/2023  Last Seen by PCP: 11/29/2023    Last Written: 11/29/2023     If no future appointment scheduled:  Review the last OV with PCP and review information for follow-up visit,  Route STAFF MESSAGE with patient name to the [facility] for scheduling with the following information:            -  Timing of next visit           -  Visit type ie Physical, OV, etc           -  Diagnoses/Reason ie. COPD, HTN - Do not use MEDICATION, Follow-up or CHECK UP - Give reason for visit      Next Appointment:   Future Appointments   Date Time Provider [address]   1/10/2024  3:30 PM DO YVROSE Linton  [place] - [place]   8/15/2024 10:30 AM [facility] CT [facility]CT [facility]   8/15/2024 11:20 AM Jenni Garcia MD AND PULM [facility]       Message sent to [address] [place] [address] to schedule appt with patient?   NO      Requested Prescriptions     Pending Prescriptions Disp Refills    QUEtiapine (SEROQUEL) 25 MG tablet 30 tablet 1     Sig: Take 1 tablet by mouth at bedtime

## 2023-12-11 RX ORDER — BUPIVACAINE HYDROCHLORIDE 2.5 MG/ML
30 INJECTION, SOLUTION INFILTRATION; PERINEURAL ONCE
Status: CANCELLED | OUTPATIENT
Start: 2023-12-11 | End: 2023-12-11

## 2023-12-11 RX ORDER — TRIAMCINOLONE ACETONIDE 40 MG/ML
40 INJECTION, SUSPENSION INTRA-ARTICULAR; INTRAMUSCULAR ONCE
Status: CANCELLED | OUTPATIENT
Start: 2023-12-11 | End: 2023-12-11

## 2023-12-11 RX ORDER — LIDOCAINE HYDROCHLORIDE 10 MG/ML
5 INJECTION, SOLUTION INFILTRATION; PERINEURAL ONCE
Status: CANCELLED | OUTPATIENT
Start: 2023-12-11 | End: 2023-12-11

## 2023-12-12 ENCOUNTER — OFFICE VISIT (OUTPATIENT)
Dept: ORTHOPEDIC SURGERY | Age: 73
End: 2023-12-12
Payer: MEDICARE

## 2023-12-12 VITALS — HEIGHT: 65 IN | WEIGHT: 160.27 LBS | BODY MASS INDEX: 26.7 KG/M2

## 2023-12-12 DIAGNOSIS — M17.11 PRIMARY OSTEOARTHRITIS OF RIGHT KNEE: Primary | ICD-10-CM

## 2023-12-12 DIAGNOSIS — F33.1 MODERATE EPISODE OF RECURRENT MAJOR DEPRESSIVE DISORDER (HCC): ICD-10-CM

## 2023-12-12 DIAGNOSIS — F41.9 ANXIETY: ICD-10-CM

## 2023-12-12 PROCEDURE — 1123F ACP DISCUSS/DSCN MKR DOCD: CPT | Performed by: ORTHOPAEDIC SURGERY

## 2023-12-12 PROCEDURE — G8428 CUR MEDS NOT DOCUMENT: HCPCS | Performed by: ORTHOPAEDIC SURGERY

## 2023-12-12 PROCEDURE — 1036F TOBACCO NON-USER: CPT | Performed by: ORTHOPAEDIC SURGERY

## 2023-12-12 PROCEDURE — 1090F PRES/ABSN URINE INCON ASSESS: CPT | Performed by: ORTHOPAEDIC SURGERY

## 2023-12-12 PROCEDURE — G8484 FLU IMMUNIZE NO ADMIN: HCPCS | Performed by: ORTHOPAEDIC SURGERY

## 2023-12-12 PROCEDURE — G8417 CALC BMI ABV UP PARAM F/U: HCPCS | Performed by: ORTHOPAEDIC SURGERY

## 2023-12-12 PROCEDURE — G8400 PT W/DXA NO RESULTS DOC: HCPCS | Performed by: ORTHOPAEDIC SURGERY

## 2023-12-12 PROCEDURE — 99213 OFFICE O/P EST LOW 20 MIN: CPT | Performed by: ORTHOPAEDIC SURGERY

## 2023-12-12 PROCEDURE — 3017F COLORECTAL CA SCREEN DOC REV: CPT | Performed by: ORTHOPAEDIC SURGERY

## 2023-12-12 PROCEDURE — 20611 DRAIN/INJ JOINT/BURSA W/US: CPT | Performed by: ORTHOPAEDIC SURGERY

## 2023-12-12 RX ORDER — HYALURONATE SODIUM 10 MG/ML
20 SYRINGE (ML) INTRAARTICULAR ONCE
Status: COMPLETED | OUTPATIENT
Start: 2023-12-12 | End: 2023-12-12

## 2023-12-12 RX ORDER — QUETIAPINE FUMARATE 25 MG/1
25 TABLET, FILM COATED ORAL NIGHTLY
Qty: 90 TABLET | Refills: 1 | Status: SHIPPED | OUTPATIENT
Start: 2023-12-12

## 2023-12-12 RX ADMIN — Medication 20 MG: at 08:24

## 2023-12-12 NOTE — TELEPHONE ENCOUNTER
Refill Request     CONFIRM preferred pharmacy with the patient. If Mail Order Rx - Pend for 90 day refill. Last Seen: Last Seen Department: 11/29/2023  Last Seen by PCP: 11/29/2023    Last Written: 12/07/2023 30 tab 1 refills     If no future appointment scheduled:  Review the last OV with PCP and review information for follow-up visit,  Route STAFF MESSAGE with patient name to the [facility] for scheduling with the following information:            -  Timing of next visit           -  Visit type ie Physical, OV, etc           -  Diagnoses/Reason ie. COPD, HTN - Do not use MEDICATION, Follow-up or CHECK UP - Give reason for visit      Next Appointment:   Future Appointments   Date Time Provider [address]   1/10/2024  3:30 PM DO YVROSE Reeder  [place] - [place]   8/15/2024 10:30 AM [facility] CT VCT [facility] CT Carlyn Mathur   8/15/2024 11:20 AM Ananya Wilkes MD AND PULM [facility]       Message sent to [address] [place] [address] to schedule appt with patient?   NO      Requested Prescriptions     Pending Prescriptions Disp Refills    QUEtiapine (SEROQUEL) 25 MG tablet 90 tablet 1     Sig: Take 1 tablet by mouth at bedtime

## 2023-12-12 NOTE — PROGRESS NOTES
and regular rhythm. Pulses: Normal pulses. Pulmonary:      Effort: Pulmonary effort is normal. No respiratory distress. Neurological:      Mental Status: He is alert and oriented to person, place, and time. Mental status is at baseline. Skin: Mean, dry, intact. No open sores  Lymphatics: No palpable lymph nodes    Musculoskeletal:  Gait:  altered  Lumbar spine: There is no swelling, warmth, or erythema. Range of motion is within normal limits. There is no paraspinal or spinous process tenderness. . The distal neurovascular exam is grossly intact and symmetric. Louis Hip: Examination of the right and left hip reveals intact skin. The patient demonstrates full painless range of motion with regards to flexion, abduction, internal and external rotation. There is no tenderness about the greater trochanter. R Knee: Physical exam of the knee demonstrates painful range of motion 5-110. Tenderness over the medial joint line. No gross instability to either varus or valgus stress test.  L Knee: Physical exam of the knee demonstrates painful range of motion 5-110. Tenderness over the medial joint line. No gross instability to either varus or valgus stress test.      Imaging  Right Knee: [place]  Previous Radiographs: X-rays were ordered today and reviewed of the right knee. 4 views. Standing AP, standing AP flexed, lateral, and skyline views. They demonstrate advanced medial joint space thinning and osteophyte formation. No evidence of fractures or dislocations. Procedure:  No orders of the defined types were placed in this encounter. The patient is symptomatic from osteoarthritis of the right knee joint with documented radiological signs of arthritis. The patient has also failed 3 months of conservative treatment including home exercise, education, Tylenol and/or NSAIDs use. The patient was offered a Visco supplementation today.  Risks, benefits, and alternatives to the injections

## 2023-12-26 ENCOUNTER — NURSE ONLY (OUTPATIENT)
Dept: ORTHOPEDIC SURGERY | Age: 73
End: 2023-12-26
Payer: MEDICARE

## 2023-12-26 VITALS — BODY MASS INDEX: 26.66 KG/M2 | WEIGHT: 160 LBS | HEIGHT: 65 IN

## 2023-12-26 DIAGNOSIS — M17.11 PRIMARY OSTEOARTHRITIS OF RIGHT KNEE: Primary | ICD-10-CM

## 2023-12-26 PROCEDURE — 20611 DRAIN/INJ JOINT/BURSA W/US: CPT | Performed by: PHYSICIAN ASSISTANT

## 2023-12-26 PROCEDURE — 99999 PR OFFICE/OUTPT VISIT,PROCEDURE ONLY: CPT | Performed by: PHYSICIAN ASSISTANT

## 2023-12-26 RX ORDER — HYALURONATE SODIUM 10 MG/ML
20 SYRINGE (ML) INTRAARTICULAR ONCE
Status: COMPLETED | OUTPATIENT
Start: 2023-12-26 | End: 2023-12-26

## 2023-12-26 RX ADMIN — Medication 20 MG: at 10:26

## 2023-12-26 NOTE — PROGRESS NOTES
Diagnosis: Right knee osteoarthritis    Procedure: Right knee viscosupplementation #3    The patient returns today for their third and final Euflexxa injection in the right knee. The risks, benefits, and complications of the injections were again discussed in detail with the patient. The risks discussed include but are not limited to infection, skin reactions, hot swollen joints, and anaphylaxis. The patient gave verbal informed consent for the injection. The patient's skin was prepped with 3 sterile gauze pads soaked with alcohol solution and the knee joint was injected with 2 mL of Euflexxa intra-articularly under sterile conditions. Technique: Under sterile conditions a Sondurchblicker.at ultrasound unit with a variable frequency (6.0-15.0 MHz) linear transducer was used to localize the placement of a 22-gauge needle into the knee joint. Findings: Successful needle placement for intra-articular Visco supplementation injection. Final images were taken and saved for permanent record. The patient tolerated the injection reasonably well. The patient was given instructions to ice of the knee and avoid strenuous activity for 24-48 hours. The patient was instructed to call the office immediately if there is increased pain, redness, warmth, fever, or chills. We will see the patient back on an as-needed basis  from this point.

## 2023-12-31 RX ORDER — ATORVASTATIN CALCIUM 20 MG/1
TABLET, FILM COATED ORAL
Qty: 90 TABLET | Refills: 1 | Status: SHIPPED | OUTPATIENT
Start: 2023-12-31

## 2024-01-10 ENCOUNTER — OFFICE VISIT (OUTPATIENT)
Dept: FAMILY MEDICINE CLINIC | Age: 74
End: 2024-01-10

## 2024-01-10 VITALS
OXYGEN SATURATION: 98 % | HEIGHT: 65 IN | HEART RATE: 79 BPM | DIASTOLIC BLOOD PRESSURE: 78 MMHG | WEIGHT: 163 LBS | SYSTOLIC BLOOD PRESSURE: 132 MMHG | BODY MASS INDEX: 27.16 KG/M2

## 2024-01-10 DIAGNOSIS — F33.1 MODERATE EPISODE OF RECURRENT MAJOR DEPRESSIVE DISORDER (HCC): ICD-10-CM

## 2024-01-10 DIAGNOSIS — F41.9 ANXIETY: ICD-10-CM

## 2024-01-10 DIAGNOSIS — C34.12 MALIGNANT NEOPLASM OF UPPER LOBE OF LEFT LUNG (HCC): Primary | ICD-10-CM

## 2024-01-10 RX ORDER — QUETIAPINE FUMARATE 25 MG/1
25 TABLET, FILM COATED ORAL NIGHTLY
Qty: 90 TABLET | Refills: 1 | Status: SHIPPED | OUTPATIENT
Start: 2024-01-10

## 2024-01-10 NOTE — PROGRESS NOTES
Assessment:  Encounter Diagnoses   Name Primary?    Anxiety     Moderate episode of recurrent major depressive disorder (HCC)     Malignant neoplasm of upper lobe of left lung (HCC) Yes       Plan:  1. Malignant neoplasm of upper lobe of left lung (HCC)  2. Anxiety  -     QUEtiapine (SEROQUEL) 25 MG tablet; Take 1 tablet by mouth at bedtime, Disp-90 tablet, R-1Normal  3. Moderate episode of recurrent major depressive disorder (HCC)  -     QUEtiapine (SEROQUEL) 25 MG tablet; Take 1 tablet by mouth at bedtime, Disp-90 tablet, R-1Normal  Following with pulmonology for history of malignant neoplasm in left lung.  Anxiety partially controlled on Zoloft 100 mg but had adverse reaction to higher doses.  Will restart Seroquel and reviewed GoodRx cost with patient who feels these are affordable.  If continued increased anxiety with addition of Seroquel then may consider alternative medications      No follow-ups on file.      Patient: Sandie Perez is a 73 y.o. female who presents today with the following Chief Complaint(s):  Chief Complaint   Patient presents with    Anxiety         HPI    Insomnia  Improved with seroquel however has stopped due to cost of medication    Anxiety  Felt jttery with zoloft 150mg and now taking 100mg with improvement of jitteriness.   Feels anxiety is still well controlled    Loose stool  2-3 times a day at most    Current Outpatient Medications   Medication Sig Dispense Refill    QUEtiapine (SEROQUEL) 25 MG tablet Take 1 tablet by mouth at bedtime 90 tablet 1    atorvastatin (LIPITOR) 20 MG tablet TAKE 1 TABLET BY MOUTH EVERY DAY AT NIGHT 90 tablet 1    sertraline (ZOLOFT) 100 MG tablet Take 1.5 tablets by mouth daily (Patient taking differently: Take 1 tablet by mouth daily) 135 tablet 3    omeprazole (PRILOSEC) 40 MG delayed release capsule Take 1 capsule by mouth every morning (before breakfast) 90 capsule 1    albuterol (PROVENTIL) (2.5 MG/3ML) 0.083% nebulizer solution Take 3 mLs by

## 2024-01-24 ENCOUNTER — TELEPHONE (OUTPATIENT)
Dept: FAMILY MEDICINE CLINIC | Age: 74
End: 2024-01-24

## 2024-01-24 RX ORDER — ALBUTEROL SULFATE 90 UG/1
2 AEROSOL, METERED RESPIRATORY (INHALATION) 4 TIMES DAILY PRN
Qty: 54 G | Refills: 5 | Status: SHIPPED | OUTPATIENT
Start: 2024-01-24

## 2024-01-24 NOTE — TELEPHONE ENCOUNTER
----- Message from Kaley Dang sent at 1/24/2024 12:40 PM EST -----  Subject: Refill Request    QUESTIONS  Name of Medication? albuterol sulfate HFA (VENTOLIN HFA) 108 (90 Base)   MCG/ACT inhaler  Patient-reported dosage and instructions? once or twice a day, 90 mg   How many days do you have left? 0  Preferred Pharmacy? [id number]  Pharmacy phone number (if available)? [phone]  ---------------------------------------------------------------------------  --------------  CALL BACK INFO  What is the best way for the office to contact you? OK to leave message on   voicemail  Preferred Call Back Phone Number? [phone]  ---------------------------------------------------------------------------  --------------  SCRIPT ANSWERS  Relationship to Patient? Self

## 2024-01-24 NOTE — TELEPHONE ENCOUNTER
Refill Request     CONFIRM preferred pharmacy with the patient.    If Mail Order Rx - Pend for 90 day refill.      Last Seen: Last Seen Department: 1/10/2024  Last Seen by PCP: 1/10/2024    Last Written: 7/19/22 54g 5 refills     If no future appointment scheduled:  Review the last OV with PCP and review information for follow-up visit,  Route STAFF MESSAGE with patient name to the  Pool for scheduling with the following information:            -  Timing of next visit           -  Visit type ie Physical, OV, etc           -  Diagnoses/Reason ie. COPD, HTN - Do not use MEDICATION, Follow-up or CHECK UP - Give reason for visit      Next Appointment:   Future Appointments   Date Time Provider Department Center   7/10/2024  1:15 PM Saurabh Lauren, DO [facility]  [place] - [facility]   8/15/2024 10:30 AM [facility] CT VCT [facility] CT [facility]   8/15/2024 11:20 AM Simone Grove MD [facility]       Message sent to  to schedule appt with patient?  NO      Requested Prescriptions     Pending Prescriptions Disp Refills    albuterol sulfate HFA (VENTOLIN HFA) 108 (90 Base) MCG/ACT inhaler 54 g 5     Sig: Inhale 2 puffs into the lungs 4 times daily as needed for Wheezing

## 2024-01-30 ENCOUNTER — TELEPHONE (OUTPATIENT)
Dept: FAMILY MEDICINE CLINIC | Age: 74
End: 2024-01-30

## 2024-01-30 NOTE — TELEPHONE ENCOUNTER
----- Message from Raysa Dumont sent at 1/30/2024 12:54 PM EST -----  Subject: Message to Provider    QUESTIONS  Information for Provider? pt called in stating she is having anxiety and   wants to speak to dr costello. Please contact the pt  ---------------------------------------------------------------------------  --------------  CALL BACK INFO  [phone]; OK to leave message on voicemail  ---------------------------------------------------------------------------  --------------  SCRIPT ANSWERS  Relationship to Patient? Self

## 2024-01-30 NOTE — TELEPHONE ENCOUNTER
Called Patient and she states that for last 4-5 days she has been mean to everyone around her, snapping at them, and that she has been crying a lot and that it feels like that the insides of her are shaking and feeling of jittery. She also states that she can not sleep. I see patient just seen you on 1/10/2024 for her Anxiety.    Patient states that she did take one and a half tab of her sertraline and didn't seem to help. Patient states that weather she takes the 100MG sertraline, or 150mg sertraline she does not feel any better.     Please advise on what patient needs to do.

## 2024-01-31 DIAGNOSIS — F33.1 MODERATE EPISODE OF RECURRENT MAJOR DEPRESSIVE DISORDER (HCC): Primary | ICD-10-CM

## 2024-01-31 RX ORDER — FLUOXETINE HYDROCHLORIDE 20 MG/1
20 CAPSULE ORAL DAILY
Qty: 30 CAPSULE | Refills: 3 | Status: SHIPPED | OUTPATIENT
Start: 2024-01-31

## 2024-01-31 NOTE — TELEPHONE ENCOUNTER
Patient states that she would like to switch back to Prozac, states that she was on this for a while. Ayleen Darby CNP thought that the patient was on this Rx for to long, that was the only reason she stopped taking it. Patient would also like a referral to a psychologist or psychiatrist to discuss her depression and anxiety.     Patient ware that pcp is currently our of the office.

## 2024-01-31 NOTE — TELEPHONE ENCOUNTER
Pt stated that she picked the medication up and took it however she stated that it made her have terrible dreams did not seem to help and pt could not sleep. She has stopped taking this medication she is asking if you think prozac would help/work she recalls taking this in the past please advise

## 2024-02-13 ENCOUNTER — TELEPHONE (OUTPATIENT)
Dept: ORTHOPEDIC SURGERY | Age: 74
End: 2024-02-13

## 2024-02-13 NOTE — TELEPHONE ENCOUNTER
Surgery and/or Procedure Scheduling     Contact Name: Sandie Perez  Surgical/Procedure Request: R KNEE  Patient Contact Number: [phone]

## 2024-02-19 DIAGNOSIS — F33.1 MODERATE EPISODE OF RECURRENT MAJOR DEPRESSIVE DISORDER (HCC): ICD-10-CM

## 2024-02-19 RX ORDER — FLUOXETINE HYDROCHLORIDE 20 MG/1
20 CAPSULE ORAL DAILY
Qty: 30 CAPSULE | Refills: 3 | Status: SHIPPED | OUTPATIENT
Start: 2024-02-19

## 2024-02-19 NOTE — TELEPHONE ENCOUNTER
Refill Request     CONFIRM preferred pharmacy with the patient.    If Mail Order Rx - Pend for 90 day refill.      Last Seen: Last Seen Department: 1/10/2024  Last Seen by PCP: Visit date not found    Last Written: 1/31/24    If no future appointment scheduled:  Review the last OV with PCP and review information for follow-up visit,  Route STAFF MESSAGE with patient name to the  Pool for scheduling with the following information:            -  Timing of next visit           -  Visit type ie Physical, OV, etc           -  Diagnoses/Reason ie. COPD, HTN - Do not use MEDICATION, Follow-up or CHECK UP - Give reason for visit      Next Appointment:   Future Appointments   Date Time Provider Department Center   2/22/2024  1:45 PM Baltazar Bowling MD AND ORTHO [facility]   7/10/2024  1:15 PM Saurabh Lauren, DO [facility]  [place] - DYD   8/15/2024 10:30 AM [facility] CT VCT [facility] CT [facility]   8/15/2024 11:20 AM Simone Grove MD AND PUL [facility]       Message sent to  to schedule appt with patient?  NO      Requested Prescriptions     Pending Prescriptions Disp Refills    FLUoxetine (PROZAC) 20 MG capsule 30 capsule 3     Sig: Take 1 capsule by mouth daily        Patient asking if Dr. Lauren can send in a new rx for patient to be taking 1 tablet 2 times a day.

## 2024-02-22 ENCOUNTER — OFFICE VISIT (OUTPATIENT)
Dept: ORTHOPEDIC SURGERY | Age: 74
End: 2024-02-22
Payer: MEDICARE

## 2024-02-22 DIAGNOSIS — Z01.818 PREOP TESTING: ICD-10-CM

## 2024-02-22 DIAGNOSIS — M17.11 PRIMARY OSTEOARTHRITIS OF RIGHT KNEE: Primary | ICD-10-CM

## 2024-02-22 PROCEDURE — 1090F PRES/ABSN URINE INCON ASSESS: CPT | Performed by: ORTHOPAEDIC SURGERY

## 2024-02-22 PROCEDURE — 3017F COLORECTAL CA SCREEN DOC REV: CPT | Performed by: ORTHOPAEDIC SURGERY

## 2024-02-22 PROCEDURE — G8417 CALC BMI ABV UP PARAM F/U: HCPCS | Performed by: ORTHOPAEDIC SURGERY

## 2024-02-22 PROCEDURE — 99214 OFFICE O/P EST MOD 30 MIN: CPT | Performed by: ORTHOPAEDIC SURGERY

## 2024-02-22 PROCEDURE — G8484 FLU IMMUNIZE NO ADMIN: HCPCS | Performed by: ORTHOPAEDIC SURGERY

## 2024-02-22 PROCEDURE — 1036F TOBACCO NON-USER: CPT | Performed by: ORTHOPAEDIC SURGERY

## 2024-02-22 PROCEDURE — G8427 DOCREV CUR MEDS BY ELIG CLIN: HCPCS | Performed by: ORTHOPAEDIC SURGERY

## 2024-02-22 PROCEDURE — 1123F ACP DISCUSS/DSCN MKR DOCD: CPT | Performed by: ORTHOPAEDIC SURGERY

## 2024-02-22 PROCEDURE — G8400 PT W/DXA NO RESULTS DOC: HCPCS | Performed by: ORTHOPAEDIC SURGERY

## 2024-02-22 NOTE — PROGRESS NOTES
Dr Baltazar Bowling      Date /Time 2/22/2024       12:24 PM EDT  Name Sandie Perez             1950   [facility]  MRN [id number]                Chief Complaint   Patient presents with    Knee Pain     CK R KNEE        History of Present Illness  Sandie Perez is a 73 y.o. female who presents with  right knee pain, .      Occupation: Retired  Athletic/exercise activity: no sports.  Injury Mechanism:  twisting injury.  Worker's Comp. & legal issues:   none.  Previous Treatments: Ice, Heat, NSAIDs, and pain medication    Patient presents the office today for a follow-up visit.  Patient being treated for advanced right knee osteoarthritis.  Patient has received not only intra-articular cortisone injections but viscosupplementation injections with very minimal improvement.  Diclofenac gel and Tylenol have not helped her.  She continues to complain of significant pain symptoms.  Her pain is started affecting her ability to perform ADLs and her quality of life    Previous history: Patient presents to the office today for follow-up visit.  Patient being treated for right knee osteoarthritis.  Patient did receive an intra-articular cortisone injection 4-5 months ago.  The injection did help for period of time but her pain has returned.  No new injury or trauma.  Symptoms concentrated medially    Previous history: Patient presents to the office today for a new problem.  Patient here with a chief complaint of right knee pain.  Patient's right knee became painful approximately a week ago.  She tweaked her knee.  She does have known knee osteoarthritis but symptoms got worse a week ago.  She was seen in the after-hours clinic and referred to me.  Pain is concentrated medial.  Increased pain with activities and improvement with rest.          Past History  Past Medical History:   Diagnosis Date    Allergic rhinitis     Anxiety 10/10/2011    Arthritis     Depression 3/22/2017    GERD (gastroesophageal

## 2024-02-25 ENCOUNTER — PREP FOR PROCEDURE (OUTPATIENT)
Dept: ORTHOPEDIC SURGERY | Age: 74
End: 2024-02-25

## 2024-02-25 DIAGNOSIS — M17.11 PRIMARY OSTEOARTHRITIS OF RIGHT KNEE: Primary | ICD-10-CM

## 2024-02-26 ENCOUNTER — TELEPHONE (OUTPATIENT)
Dept: ORTHOPEDIC SURGERY | Age: 74
End: 2024-02-26

## 2024-03-05 DIAGNOSIS — Z01.818 PREOP TESTING: ICD-10-CM

## 2024-03-05 DIAGNOSIS — M17.11 PRIMARY OSTEOARTHRITIS OF RIGHT KNEE: ICD-10-CM

## 2024-03-05 LAB
APTT BLD: 46.8 SEC (ref 22.7–35.9)
INR PPP: 1.06 (ref 0.84–1.16)
PROTHROMBIN TIME: 13.8 SEC (ref 11.5–14.8)

## 2024-03-06 LAB
ABO + RH BLD: NORMAL
ALBUMIN SERPL-MCNC: 4.4 G/DL (ref 3.4–5)
ANION GAP SERPL CALCULATED.3IONS-SCNC: 7 MMOL/L (ref 3–16)
BACTERIA UR CULT: NORMAL
BASOPHILS # BLD: 0 K/UL (ref 0–0.2)
BASOPHILS NFR BLD: 0.5 %
BILIRUB UR QL STRIP.AUTO: NEGATIVE
BLD GP AB SCN SERPL QL: NORMAL
BUN SERPL-MCNC: 11 MG/DL (ref 7–20)
CALCIUM SERPL-MCNC: 9.7 MG/DL (ref 8.3–10.6)
CHLORIDE SERPL-SCNC: 107 MMOL/L (ref 99–110)
CLARITY UR: CLEAR
CO2 SERPL-SCNC: 29 MMOL/L (ref 21–32)
COLOR UR: YELLOW
CREAT SERPL-MCNC: 0.7 MG/DL (ref 0.6–1.2)
DEPRECATED RDW RBC AUTO: 13.9 % (ref 12.4–15.4)
EOSINOPHIL # BLD: 0.1 K/UL (ref 0–0.6)
EOSINOPHIL NFR BLD: 1.1 %
EST. AVERAGE GLUCOSE BLD GHB EST-MCNC: 105.4 MG/DL
GFR SERPLBLD CREATININE-BSD FMLA CKD-EPI: >60 ML/MIN/{1.73_M2}
GLUCOSE SERPL-MCNC: 98 MG/DL (ref 70–99)
GLUCOSE UR STRIP.AUTO-MCNC: NEGATIVE MG/DL
HBA1C MFR BLD: 5.3 %
HCT VFR BLD AUTO: 42.3 % (ref 36–48)
HGB BLD-MCNC: 14.5 G/DL (ref 12–16)
HGB UR QL STRIP.AUTO: NEGATIVE
KETONES UR STRIP.AUTO-MCNC: NEGATIVE MG/DL
LEUKOCYTE ESTERASE UR QL STRIP.AUTO: NEGATIVE
LYMPHOCYTES # BLD: 1.4 K/UL (ref 1–5.1)
LYMPHOCYTES NFR BLD: 29.3 %
MCH RBC QN AUTO: 28.1 PG (ref 26–34)
MCHC RBC AUTO-ENTMCNC: 34.2 G/DL (ref 31–36)
MCV RBC AUTO: 82.2 FL (ref 80–100)
MONOCYTES # BLD: 0.3 K/UL (ref 0–1.3)
MONOCYTES NFR BLD: 5.9 %
NEUTROPHILS # BLD: 3.1 K/UL (ref 1.7–7.7)
NEUTROPHILS NFR BLD: 63.2 %
NITRITE UR QL STRIP.AUTO: NEGATIVE
PH UR STRIP.AUTO: 6.5 [PH] (ref 5–8)
PLATELET # BLD AUTO: 161 K/UL (ref 135–450)
PLATELET BLD QL SMEAR: ADEQUATE
PMV BLD AUTO: 11.4 FL (ref 5–10.5)
POTASSIUM SERPL-SCNC: 4.4 MMOL/L (ref 3.5–5.1)
PROT UR STRIP.AUTO-MCNC: NEGATIVE MG/DL
RBC # BLD AUTO: 5.15 M/UL (ref 4–5.2)
RBC MORPH BLD: NORMAL
SLIDE REVIEW: ABNORMAL
SODIUM SERPL-SCNC: 143 MMOL/L (ref 136–145)
SP GR UR STRIP.AUTO: 1.01 (ref 1–1.03)
TRANSFERRIN SERPL-MCNC: 241 MG/DL (ref 200–360)
UA DIPSTICK W REFLEX MICRO PNL UR: NORMAL
URN SPEC COLLECT METH UR: NORMAL
UROBILINOGEN UR STRIP-ACNC: 0.2 E.U./DL
WBC # BLD AUTO: 4.9 K/UL (ref 4–11)

## 2024-03-07 LAB — MRSA SPEC QL CULT: NORMAL

## 2024-03-08 ENCOUNTER — APPOINTMENT (OUTPATIENT)
Dept: PHYSICAL THERAPY | Age: 74
End: 2024-03-08
Attending: ORTHOPAEDIC SURGERY
Payer: MEDICARE

## 2024-03-12 ENCOUNTER — HOSPITAL ENCOUNTER (OUTPATIENT)
Dept: PHYSICAL THERAPY | Age: 74
Setting detail: THERAPIES SERIES
Discharge: HOME | End: 2024-03-12
Attending: ORTHOPAEDIC SURGERY
Payer: MEDICARE

## 2024-03-12 PROCEDURE — 97161 PT EVAL LOW COMPLEX 20 MIN: CPT | Performed by: PHYSICAL THERAPIST

## 2024-03-12 PROCEDURE — 97110 THERAPEUTIC EXERCISES: CPT | Performed by: PHYSICAL THERAPIST

## 2024-03-12 NOTE — PLAN OF CARE
[facility]  [address], [place] [address] office: [phone] fax: [phone]    Physical Therapy Initial Evaluation Certification      Dear Baltazar Bowling MD,    We had the pleasure of evaluating the following patient for physical therapy services at [facility].  A summary of our findings can be found in the initial assessment below.  This includes our plan of care.  If you have any questions or concerns regarding these findings, please do not hesitate to contact me at the office phone number listed above.  Thank you for the referral.     Physician Signature:_______________________________Date:__________________  By signing above (or electronic signature), therapist’s plan is approved by physician       Physical Therapy: TREATMENT/PROGRESS NOTE   Patient: Sandie Perez (73 y.o. female)   Examination Date: 2024   :  1950 MRN: [id number]   Visit #: 1   Insurance Allowable Auth Needed   bmn []Yes    []No    Insurance: Payor: MEDICARE / Plan: MEDICARE PART A AND B / Product Type: *No Product type* /   Insurance ID: [id number] - (Medicare)  Secondary Insurance (if applicable): [facility]   Treatment Diagnosis:   Right knee OA M17.11. right knee pain M25.561   Medical Diagnosis:  Primary osteoarthritis of right knee [M17.11]   Referring Physician: Baltazar Bowling MD  PCP: Saurabh Lauren DO       Plan of care signed (Y/N):     Date of Patient follow up with Physician:      Progress Report/POC: EVAL today  POC update due: (10 visits /OR AUTH LIMITS, whichever is less)  2024                                             Precautions/ Contra-indications:           Latex allergy:  NO  Pacemaker:    NO  Contraindications for Manipulation: None  Date of Surgery: 24  Other:    Red Flags:  None    C-SSRS Triggered by Intake questionnaire:   [x] No, Questionnaire did not trigger screening.   []

## 2024-03-14 ENCOUNTER — TELEPHONE (OUTPATIENT)
Dept: ORTHOPEDIC SURGERY | Age: 74
End: 2024-03-14

## 2024-03-14 ENCOUNTER — OFFICE VISIT (OUTPATIENT)
Dept: FAMILY MEDICINE CLINIC | Age: 74
End: 2024-03-14

## 2024-03-14 VITALS
HEART RATE: 74 BPM | WEIGHT: 160 LBS | OXYGEN SATURATION: 98 % | DIASTOLIC BLOOD PRESSURE: 80 MMHG | BODY MASS INDEX: 26.63 KG/M2 | SYSTOLIC BLOOD PRESSURE: 120 MMHG

## 2024-03-14 DIAGNOSIS — C34.12 MALIGNANT NEOPLASM OF UPPER LOBE OF LEFT LUNG (HCC): ICD-10-CM

## 2024-03-14 DIAGNOSIS — M17.11 PRIMARY OSTEOARTHRITIS OF RIGHT KNEE: ICD-10-CM

## 2024-03-14 DIAGNOSIS — F33.1 MODERATE EPISODE OF RECURRENT MAJOR DEPRESSIVE DISORDER (HCC): ICD-10-CM

## 2024-03-14 DIAGNOSIS — Z01.818 PRE-OP EXAM: Primary | ICD-10-CM

## 2024-03-14 DIAGNOSIS — F41.9 ANXIETY: ICD-10-CM

## 2024-03-14 RX ORDER — QUETIAPINE FUMARATE 25 MG/1
25 TABLET, FILM COATED ORAL NIGHTLY
Qty: 90 TABLET | Refills: 1 | Status: SHIPPED | OUTPATIENT
Start: 2024-03-14

## 2024-03-14 SDOH — ECONOMIC STABILITY: FOOD INSECURITY: WITHIN THE PAST 12 MONTHS, YOU WORRIED THAT YOUR FOOD WOULD RUN OUT BEFORE YOU GOT MONEY TO BUY MORE.: NEVER TRUE

## 2024-03-14 SDOH — ECONOMIC STABILITY: FOOD INSECURITY: WITHIN THE PAST 12 MONTHS, THE FOOD YOU BOUGHT JUST DIDN'T LAST AND YOU DIDN'T HAVE MONEY TO GET MORE.: NEVER TRUE

## 2024-03-14 SDOH — ECONOMIC STABILITY: INCOME INSECURITY: HOW HARD IS IT FOR YOU TO PAY FOR THE VERY BASICS LIKE FOOD, HOUSING, MEDICAL CARE, AND HEATING?: NOT HARD AT ALL

## 2024-03-14 NOTE — TELEPHONE ENCOUNTER
ORTHOPAEDIC NURSE NAVIGATOR SUMMARY NOTE      Anticipated Date of Surgery: 4/1/24    Recieved Pre-Op Education: yes   In person class:NA  Pt used educational link:NA   Pt completed pre and post test to measure learning:NA    If pt did not complete either, why not?  N/A  ERAS protocol explained to pt. Pt does not have the following medical conditions:  -Diabetes  -Gastroparesis  -CHF  -Fluid restricted diet  -Known difficult airway  Pt instructed to drink up to 40 oz of Gatorade type drink the evening prior to surgery.   Pt informed they can have up to 40 oz of water and that it must be completed 2 hours prior to scheduled surgery.  Pt verbalized understanding.       PCP: Saurabh Lauren DO   Phone #: [phone]    Date of PCP Visit for H&P: 3/14/24    Any Noted Concerns from PCP prior to surgery:  No   If Yes, what concerns?:    IS THE PATIENT IN A PAIN MANAGEMENT PROGRAM?:   Not Applicable     Review of Past Medical History Reveals History of:      Critical Lab Values:   Hgb/Hct:   Hemoglobin (g/dL)   Date Value   03/05/2024 14.5   /  Hematocrit (%)   Date Value   03/05/2024 42.3      HgbA1C:    Lab Results   Component Value Date    LABA1C 5.3 03/05/2024    LABA1C 5.5 04/08/2019      Albumin:    Lab Results   Component Value Date    LABALBU 4.4 03/05/2024      BUN/Cr:   BUN (mg/dL)   Date Value   03/05/2024 11   /  Creatinine (mg/dL)   Date Value   03/05/2024 0.7      BMI:    BMI Readings from Last 1 Encounters:   03/14/24 26.63 kg/m²        Coronary Artery Disease/HTN/CHF History: No       -On any anticoagulation-none     Diabetes History: No     Pulmonary: COPD/Emphysema/ Use of home oxygen: Lung CA- Lung resection     Alcohol use:        DVT Risk Stratification:  Low      -Smoking history or use of estrogen-         BMI Greater than 40 at time of scheduling?: No     Additional Medical Concerns:         Discharge Disposition Information:     Attended Pre-Hab Program: yes     Anticipated Discharge Disposition:

## 2024-03-14 NOTE — PROGRESS NOTES
Urine 03/05/2024 Negative     Microscopic Examination 03/05/2024 Not Indicated     Urine Type 03/05/2024 Cleancatch         Assessment:   73 y.o. female with planned surgery as above.     - Known risk factors for perioperative complications: None  History o wedge resection    - Difficulty with intubation is not anticipated.   - Current medications which may produce withdrawal symptoms if withheld perioperatively: none     Plan:   1. Preoperative workup as follows ECG   2. Change in medication regimen before surgery: none, continue med regimen including morning of surgery, w/sip of water   Pt instructed to avoid NSAIDs, ASA, MV, Vitamin E, Fish oil 1 week prior to surgery to decrease bleeding risk.   3. Prophylaxis for cardiac events with perioperative beta-blockers: not indicated   4. Invasive hemodynamic monitoring perioperatively: not indicated   5. Deep vein thrombosis prophylaxis postoperatively:regimen to be chosen by surgical team   6. Other measures: none      1. Pre-op exam  - EKG 12 Lead - Clinic Performed    2. Primary osteoarthritis of right knee  4. Malignant neoplasm of upper lobe of left lung (HCC)  History of left upper lobe wedge resection    6. Anxiety  7. Moderate episode of recurrent major depressive disorder (HCC)  Doing well on current medications          While assessing care for this patient, I have reviewed all pertinent lab work/imaging/ specialist notes and care in reference to those problems addressed above in detail. Appropriate medical decision making was based on this.     Please note that portions of this note may have been completed with a voice recognition program. Efforts were made to edit the dictations but occasionally words are mis-transcribed.      Pt is at an acceptable risk for planned procedure    Please call with any questions  Saurabh Lauren,

## 2024-03-22 DIAGNOSIS — F33.1 MODERATE EPISODE OF RECURRENT MAJOR DEPRESSIVE DISORDER (HCC): ICD-10-CM

## 2024-03-22 NOTE — TELEPHONE ENCOUNTER
.Refill Request     CONFIRM preferred pharmacy with the patient.    If Mail Order Rx - Pend for 90 day refill.      Last Seen: Last Seen Department: 3/14/2024  Last Seen by PCP: Visit date not found    Last Written: 2-19-24 30 with 3     If no future appointment scheduled:  Review the last OV with PCP and review information for follow-up visit,  Route STAFF MESSAGE with patient name to the  Pool for scheduling with the following information:            -  Timing of next visit           -  Visit type ie Physical, OV, etc           -  Diagnoses/Reason ie. COPD, HTN - Do not use MEDICATION, Follow-up or CHECK UP - Give reason for visit      Next Appointment:   Future Appointments   Date Time Provider Department Center   4/3/2024 12:20 PM Yvette Hawkins, PT [facility] AND PT [facility]""   4/18/2024 10:15 AM Baltazar Bowling MD AND ORTHO [facility]   7/10/2024  [facility]  [place] [facility]   8/15/2024 10:30 AM [facility] CT VCT [place] [facility]   8/15/2024 11:20 AM Simone Grove MD AND PULM [facility]       Message sent to  to schedule appt with patient?  NO      Requested Prescriptions     Pending Prescriptions Disp Refills    FLUoxetine (PROZAC) 20 MG capsule [Pharmacy Med Name: FLUOXETINE HCL 20 MG CAPSULE] 90 capsule 1     Sig: TAKE 1 CAPSULE BY MOUTH EVERY DAY

## 2024-03-24 RX ORDER — FLUOXETINE HYDROCHLORIDE 20 MG/1
CAPSULE ORAL DAILY
Qty: 90 CAPSULE | Refills: 3 | Status: SHIPPED | OUTPATIENT
Start: 2024-03-24

## 2024-03-28 ENCOUNTER — TELEPHONE (OUTPATIENT)
Dept: ORTHOPEDIC SURGERY | Age: 74
End: 2024-03-28

## 2024-03-29 ENCOUNTER — ANESTHESIA EVENT (OUTPATIENT)
Dept: OPERATING ROOM | Age: 74
End: 2024-03-29
Payer: MEDICARE

## 2024-04-01 ENCOUNTER — ANESTHESIA (OUTPATIENT)
Dept: OPERATING ROOM | Age: 74
End: 2024-04-01
Payer: MEDICARE

## 2024-04-01 ENCOUNTER — HOSPITAL ENCOUNTER (OUTPATIENT)
Age: 74
Discharge: HOME | End: 2024-04-02
Attending: ORTHOPAEDIC SURGERY | Admitting: ORTHOPAEDIC SURGERY
Payer: MEDICARE

## 2024-04-01 ENCOUNTER — APPOINTMENT (OUTPATIENT)
Dept: GENERAL RADIOLOGY | Age: 74
End: 2024-04-01
Attending: ORTHOPAEDIC SURGERY
Payer: MEDICARE

## 2024-04-01 DIAGNOSIS — Z96.651 S/P TOTAL KNEE ARTHROPLASTY, RIGHT: ICD-10-CM

## 2024-04-01 DIAGNOSIS — M17.11 PRIMARY OSTEOARTHRITIS OF RIGHT KNEE: Primary | ICD-10-CM

## 2024-04-01 LAB
ABO + RH BLD: NORMAL
APTT BLD: 43.4 SEC (ref 22.7–35.9)
BLD GP AB SCN SERPL QL: NORMAL
GLUCOSE BLD-MCNC: 116 MG/DL (ref 70–99)
GLUCOSE BLD-MCNC: 134 MG/DL (ref 70–99)
GLUCOSE BLD-MCNC: 140 MG/DL (ref 70–99)
GLUCOSE BLD-MCNC: 96 MG/DL (ref 70–99)
PERFORMED ON: ABNORMAL
PERFORMED ON: NORMAL

## 2024-04-01 PROCEDURE — 6370000000 HC RX 637 (ALT 250 FOR IP): Performed by: PHYSICIAN ASSISTANT

## 2024-04-01 PROCEDURE — 3600000015 HC SURGERY LEVEL 5 ADDTL 15MIN: Performed by: ORTHOPAEDIC SURGERY

## 2024-04-01 PROCEDURE — 2580000003 HC RX 258: Performed by: ORTHOPAEDIC SURGERY

## 2024-04-01 PROCEDURE — C1713 ANCHOR/SCREW BN/BN,TIS/BN: HCPCS | Performed by: ORTHOPAEDIC SURGERY

## 2024-04-01 PROCEDURE — 6360000002 HC RX W HCPCS: Performed by: ANESTHESIOLOGY

## 2024-04-01 PROCEDURE — 6360000002 HC RX W HCPCS: Performed by: NURSE ANESTHETIST, CERTIFIED REGISTERED

## 2024-04-01 PROCEDURE — 86850 RBC ANTIBODY SCREEN: CPT

## 2024-04-01 PROCEDURE — 3600000005 HC SURGERY LEVEL 5 BASE: Performed by: ORTHOPAEDIC SURGERY

## 2024-04-01 PROCEDURE — 94761 N-INVAS EAR/PLS OXIMETRY MLT: CPT

## 2024-04-01 PROCEDURE — 85730 THROMBOPLASTIN TIME PARTIAL: CPT

## 2024-04-01 PROCEDURE — 97162 PT EVAL MOD COMPLEX 30 MIN: CPT

## 2024-04-01 PROCEDURE — 2580000003 HC RX 258: Performed by: PHYSICIAN ASSISTANT

## 2024-04-01 PROCEDURE — 2709999900 HC NON-CHARGEABLE SUPPLY: Performed by: ORTHOPAEDIC SURGERY

## 2024-04-01 PROCEDURE — 64447 NJX AA&/STRD FEMORAL NRV IMG: CPT | Performed by: ANESTHESIOLOGY

## 2024-04-01 PROCEDURE — C1776 JOINT DEVICE (IMPLANTABLE): HCPCS | Performed by: ORTHOPAEDIC SURGERY

## 2024-04-01 PROCEDURE — 2580000003 HC RX 258: Performed by: ANESTHESIOLOGY

## 2024-04-01 PROCEDURE — 6360000002 HC RX W HCPCS: Performed by: PHYSICIAN ASSISTANT

## 2024-04-01 PROCEDURE — 7100000001 HC PACU RECOVERY - ADDTL 15 MIN: Performed by: ORTHOPAEDIC SURGERY

## 2024-04-01 PROCEDURE — 73560 X-RAY EXAM OF KNEE 1 OR 2: CPT

## 2024-04-01 PROCEDURE — 2500000003 HC RX 250 WO HCPCS: Performed by: ORTHOPAEDIC SURGERY

## 2024-04-01 PROCEDURE — 97110 THERAPEUTIC EXERCISES: CPT

## 2024-04-01 PROCEDURE — 7100000000 HC PACU RECOVERY - FIRST 15 MIN: Performed by: ORTHOPAEDIC SURGERY

## 2024-04-01 PROCEDURE — 3700000001 HC ADD 15 MINUTES (ANESTHESIA): Performed by: ORTHOPAEDIC SURGERY

## 2024-04-01 PROCEDURE — 6360000002 HC RX W HCPCS: Performed by: STUDENT IN AN ORGANIZED HEALTH CARE EDUCATION/TRAINING PROGRAM

## 2024-04-01 PROCEDURE — 2580000003 HC RX 258: Performed by: NURSE ANESTHETIST, CERTIFIED REGISTERED

## 2024-04-01 PROCEDURE — 27447 TOTAL KNEE ARTHROPLASTY: CPT | Performed by: ORTHOPAEDIC SURGERY

## 2024-04-01 PROCEDURE — 6370000000 HC RX 637 (ALT 250 FOR IP): Performed by: ANESTHESIOLOGY

## 2024-04-01 PROCEDURE — 6360000002 HC RX W HCPCS: Performed by: ORTHOPAEDIC SURGERY

## 2024-04-01 PROCEDURE — 6370000000 HC RX 637 (ALT 250 FOR IP): Performed by: STUDENT IN AN ORGANIZED HEALTH CARE EDUCATION/TRAINING PROGRAM

## 2024-04-01 PROCEDURE — 2500000003 HC RX 250 WO HCPCS: Performed by: PHYSICIAN ASSISTANT

## 2024-04-01 PROCEDURE — 3700000000 HC ANESTHESIA ATTENDED CARE: Performed by: ORTHOPAEDIC SURGERY

## 2024-04-01 PROCEDURE — 97530 THERAPEUTIC ACTIVITIES: CPT

## 2024-04-01 PROCEDURE — 2500000003 HC RX 250 WO HCPCS: Performed by: NURSE ANESTHETIST, CERTIFIED REGISTERED

## 2024-04-01 PROCEDURE — 86901 BLOOD TYPING SEROLOGIC RH(D): CPT

## 2024-04-01 PROCEDURE — 2700000000 HC OXYGEN THERAPY PER DAY

## 2024-04-01 PROCEDURE — 86900 BLOOD TYPING SEROLOGIC ABO: CPT

## 2024-04-01 PROCEDURE — 2720000010 HC SURG SUPPLY STERILE: Performed by: ORTHOPAEDIC SURGERY

## 2024-04-01 DEVICE — IMPLANTABLE DEVICE
Type: IMPLANTABLE DEVICE | Site: KNEE | Status: FUNCTIONAL
Brand: PERSONA® THE PERSONALIZED KNEE® OSSEOTI®

## 2024-04-01 DEVICE — IMPLANTABLE DEVICE
Type: IMPLANTABLE DEVICE | Site: KNEE | Status: FUNCTIONAL
Brand: PERSONA® PPS®

## 2024-04-01 DEVICE — PSN MC VE ASF R 11MM 8-11 EF: Type: IMPLANTABLE DEVICE | Site: KNEE | Status: FUNCTIONAL

## 2024-04-01 RX ORDER — SODIUM CHLORIDE 9 MG/ML
INJECTION, SOLUTION INTRAVENOUS PRN
Status: DISCONTINUED | OUTPATIENT
Start: 2024-04-01 | End: 2024-04-01 | Stop reason: HOSPADM

## 2024-04-01 RX ORDER — DIPHENHYDRAMINE HYDROCHLORIDE 50 MG/ML
12.5 INJECTION INTRAMUSCULAR; INTRAVENOUS
Status: DISCONTINUED | OUTPATIENT
Start: 2024-04-01 | End: 2024-04-01 | Stop reason: HOSPADM

## 2024-04-01 RX ORDER — LIDOCAINE HYDROCHLORIDE 10 MG/ML
1 INJECTION, SOLUTION EPIDURAL; INFILTRATION; INTRACAUDAL; PERINEURAL
Status: DISCONTINUED | OUTPATIENT
Start: 2024-04-01 | End: 2024-04-01 | Stop reason: HOSPADM

## 2024-04-01 RX ORDER — NALOXONE HYDROCHLORIDE 0.4 MG/ML
INJECTION, SOLUTION INTRAMUSCULAR; INTRAVENOUS; SUBCUTANEOUS PRN
Status: DISCONTINUED | OUTPATIENT
Start: 2024-04-01 | End: 2024-04-01 | Stop reason: HOSPADM

## 2024-04-01 RX ORDER — MELOXICAM 15 MG/1
15 TABLET ORAL DAILY
Qty: 30 TABLET | Refills: 0 | Status: SHIPPED | OUTPATIENT
Start: 2024-04-01

## 2024-04-01 RX ORDER — MORPHINE SULFATE 2 MG/ML
2 INJECTION, SOLUTION INTRAMUSCULAR; INTRAVENOUS
Status: DISCONTINUED | OUTPATIENT
Start: 2024-04-01 | End: 2024-04-02

## 2024-04-01 RX ORDER — OXYCODONE HYDROCHLORIDE 5 MG/1
10 TABLET ORAL PRN
Status: COMPLETED | OUTPATIENT
Start: 2024-04-01 | End: 2024-04-01

## 2024-04-01 RX ORDER — FENTANYL CITRATE 50 UG/ML
INJECTION, SOLUTION INTRAMUSCULAR; INTRAVENOUS PRN
Status: DISCONTINUED | OUTPATIENT
Start: 2024-04-01 | End: 2024-04-01 | Stop reason: SDUPTHER

## 2024-04-01 RX ORDER — GLYCOPYRROLATE 0.2 MG/ML
INJECTION INTRAMUSCULAR; INTRAVENOUS PRN
Status: DISCONTINUED | OUTPATIENT
Start: 2024-04-01 | End: 2024-04-01 | Stop reason: SDUPTHER

## 2024-04-01 RX ORDER — BUPIVACAINE HYDROCHLORIDE 5 MG/ML
INJECTION, SOLUTION EPIDURAL; INTRACAUDAL PRN
Status: DISCONTINUED | OUTPATIENT
Start: 2024-04-01 | End: 2024-04-01 | Stop reason: SDUPTHER

## 2024-04-01 RX ORDER — MORPHINE SULFATE 4 MG/ML
4 INJECTION, SOLUTION INTRAMUSCULAR; INTRAVENOUS
Status: DISCONTINUED | OUTPATIENT
Start: 2024-04-01 | End: 2024-04-02

## 2024-04-01 RX ORDER — ACETAMINOPHEN 325 MG/1
650 TABLET ORAL EVERY 6 HOURS
Status: DISCONTINUED | OUTPATIENT
Start: 2024-04-01 | End: 2024-04-02 | Stop reason: HOSPADM

## 2024-04-01 RX ORDER — PROPOFOL 10 MG/ML
INJECTION, EMULSION INTRAVENOUS PRN
Status: DISCONTINUED | OUTPATIENT
Start: 2024-04-01 | End: 2024-04-01 | Stop reason: SDUPTHER

## 2024-04-01 RX ORDER — ONDANSETRON 2 MG/ML
4 INJECTION INTRAMUSCULAR; INTRAVENOUS
Status: DISCONTINUED | OUTPATIENT
Start: 2024-04-01 | End: 2024-04-01 | Stop reason: HOSPADM

## 2024-04-01 RX ORDER — SODIUM CHLORIDE 9 MG/ML
INJECTION, SOLUTION INTRAVENOUS PRN
Status: DISCONTINUED | OUTPATIENT
Start: 2024-04-01 | End: 2024-04-02 | Stop reason: HOSPADM

## 2024-04-01 RX ORDER — INSULIN LISPRO 100 [IU]/ML
0.08 INJECTION, SOLUTION INTRAVENOUS; SUBCUTANEOUS
Status: DISCONTINUED | OUTPATIENT
Start: 2024-04-01 | End: 2024-04-02 | Stop reason: HOSPADM

## 2024-04-01 RX ORDER — MIDAZOLAM HYDROCHLORIDE 1 MG/ML
INJECTION INTRAMUSCULAR; INTRAVENOUS PRN
Status: DISCONTINUED | OUTPATIENT
Start: 2024-04-01 | End: 2024-04-01 | Stop reason: SDUPTHER

## 2024-04-01 RX ORDER — SODIUM CHLORIDE 0.9 % (FLUSH) 0.9 %
5-40 SYRINGE (ML) INJECTION PRN
Status: DISCONTINUED | OUTPATIENT
Start: 2024-04-01 | End: 2024-04-01 | Stop reason: HOSPADM

## 2024-04-01 RX ORDER — FLUOXETINE HYDROCHLORIDE 20 MG/1
20 CAPSULE ORAL DAILY
Status: DISCONTINUED | OUTPATIENT
Start: 2024-04-01 | End: 2024-04-02 | Stop reason: HOSPADM

## 2024-04-01 RX ORDER — MEPERIDINE HYDROCHLORIDE 50 MG/ML
12.5 INJECTION INTRAMUSCULAR; INTRAVENOUS; SUBCUTANEOUS EVERY 5 MIN PRN
Status: DISCONTINUED | OUTPATIENT
Start: 2024-04-01 | End: 2024-04-01 | Stop reason: HOSPADM

## 2024-04-01 RX ORDER — CYCLOBENZAPRINE HCL 10 MG
10 TABLET ORAL 3 TIMES DAILY PRN
Qty: 30 TABLET | Refills: 0 | Status: SHIPPED | OUTPATIENT
Start: 2024-04-01 | End: 2024-04-11

## 2024-04-01 RX ORDER — CEFAZOLIN SODIUM IN 0.9 % NACL 2 G/100 ML
2000 PLASTIC BAG, INJECTION (ML) INTRAVENOUS EVERY 8 HOURS
Status: COMPLETED | OUTPATIENT
Start: 2024-04-01 | End: 2024-04-02

## 2024-04-01 RX ORDER — MAGNESIUM SULFATE 1 G/100ML
1000 INJECTION INTRAVENOUS ONCE
Status: COMPLETED | OUTPATIENT
Start: 2024-04-01 | End: 2024-04-01

## 2024-04-01 RX ORDER — OXYCODONE HYDROCHLORIDE 5 MG/1
5 TABLET ORAL PRN
Status: COMPLETED | OUTPATIENT
Start: 2024-04-01 | End: 2024-04-01

## 2024-04-01 RX ORDER — DEXAMETHASONE SODIUM PHOSPHATE 4 MG/ML
INJECTION, SOLUTION INTRA-ARTICULAR; INTRALESIONAL; INTRAMUSCULAR; INTRAVENOUS; SOFT TISSUE PRN
Status: DISCONTINUED | OUTPATIENT
Start: 2024-04-01 | End: 2024-04-01 | Stop reason: SDUPTHER

## 2024-04-01 RX ORDER — METHOCARBAMOL 750 MG/1
750 TABLET, FILM COATED ORAL EVERY 8 HOURS PRN
Status: DISPENSED | OUTPATIENT
Start: 2024-04-01 | End: 2024-04-01

## 2024-04-01 RX ORDER — POLYETHYLENE GLYCOL 3350 17 G/17G
17 POWDER, FOR SOLUTION ORAL DAILY
Status: DISCONTINUED | OUTPATIENT
Start: 2024-04-01 | End: 2024-04-02 | Stop reason: HOSPADM

## 2024-04-01 RX ORDER — CEPHALEXIN 500 MG/1
500 CAPSULE ORAL 4 TIMES DAILY
Qty: 4 CAPSULE | Refills: 0 | Status: SHIPPED | OUTPATIENT
Start: 2024-04-01 | End: 2024-04-02 | Stop reason: HOSPADM

## 2024-04-01 RX ORDER — ALBUTEROL SULFATE 2.5 MG/3ML
2.5 SOLUTION RESPIRATORY (INHALATION) EVERY 6 HOURS PRN
Status: DISCONTINUED | OUTPATIENT
Start: 2024-04-01 | End: 2024-04-02 | Stop reason: HOSPADM

## 2024-04-01 RX ORDER — OXYCODONE HYDROCHLORIDE 5 MG/1
5 TABLET ORAL EVERY 6 HOURS PRN
Qty: 28 TABLET | Refills: 0 | Status: SHIPPED | OUTPATIENT
Start: 2024-04-01 | End: 2024-04-08

## 2024-04-01 RX ORDER — ONDANSETRON 4 MG/1
4 TABLET, FILM COATED ORAL 3 TIMES DAILY PRN
Qty: 15 TABLET | Refills: 0 | Status: SHIPPED | OUTPATIENT
Start: 2024-04-01

## 2024-04-01 RX ORDER — LABETALOL HYDROCHLORIDE 5 MG/ML
10 INJECTION, SOLUTION INTRAVENOUS
Status: DISCONTINUED | OUTPATIENT
Start: 2024-04-01 | End: 2024-04-01 | Stop reason: HOSPADM

## 2024-04-01 RX ORDER — SODIUM CHLORIDE 0.9 % (FLUSH) 0.9 %
5-40 SYRINGE (ML) INJECTION EVERY 12 HOURS SCHEDULED
Status: DISCONTINUED | OUTPATIENT
Start: 2024-04-01 | End: 2024-04-01 | Stop reason: HOSPADM

## 2024-04-01 RX ORDER — DEXTROSE MONOHYDRATE 100 MG/ML
INJECTION, SOLUTION INTRAVENOUS CONTINUOUS PRN
Status: DISCONTINUED | OUTPATIENT
Start: 2024-04-01 | End: 2024-04-02 | Stop reason: HOSPADM

## 2024-04-01 RX ORDER — SENNOSIDES A AND B 8.6 MG/1
1 TABLET, FILM COATED ORAL DAILY PRN
Status: DISCONTINUED | OUTPATIENT
Start: 2024-04-01 | End: 2024-04-02 | Stop reason: HOSPADM

## 2024-04-01 RX ORDER — SODIUM CHLORIDE, SODIUM LACTATE, POTASSIUM CHLORIDE, CALCIUM CHLORIDE 600; 310; 30; 20 MG/100ML; MG/100ML; MG/100ML; MG/100ML
INJECTION, SOLUTION INTRAVENOUS CONTINUOUS
Status: DISCONTINUED | OUTPATIENT
Start: 2024-04-01 | End: 2024-04-01 | Stop reason: HOSPADM

## 2024-04-01 RX ORDER — GLUCAGON 1 MG/ML
1 KIT INJECTION PRN
Status: DISCONTINUED | OUTPATIENT
Start: 2024-04-01 | End: 2024-04-02 | Stop reason: HOSPADM

## 2024-04-01 RX ORDER — OXYCODONE HYDROCHLORIDE 5 MG/1
5 TABLET ORAL EVERY 4 HOURS PRN
Status: DISCONTINUED | OUTPATIENT
Start: 2024-04-01 | End: 2024-04-02 | Stop reason: HOSPADM

## 2024-04-01 RX ORDER — INSULIN LISPRO 100 [IU]/ML
0-8 INJECTION, SOLUTION INTRAVENOUS; SUBCUTANEOUS
Status: DISCONTINUED | OUTPATIENT
Start: 2024-04-01 | End: 2024-04-02 | Stop reason: HOSPADM

## 2024-04-01 RX ORDER — VANCOMYCIN HYDROCHLORIDE 1 G/20ML
INJECTION, POWDER, LYOPHILIZED, FOR SOLUTION INTRAVENOUS PRN
Status: DISCONTINUED | OUTPATIENT
Start: 2024-04-01 | End: 2024-04-01 | Stop reason: ALTCHOICE

## 2024-04-01 RX ORDER — INSULIN LISPRO 100 [IU]/ML
0-4 INJECTION, SOLUTION INTRAVENOUS; SUBCUTANEOUS NIGHTLY
Status: DISCONTINUED | OUTPATIENT
Start: 2024-04-01 | End: 2024-04-02 | Stop reason: HOSPADM

## 2024-04-01 RX ORDER — ONDANSETRON 2 MG/ML
INJECTION INTRAMUSCULAR; INTRAVENOUS PRN
Status: DISCONTINUED | OUTPATIENT
Start: 2024-04-01 | End: 2024-04-01 | Stop reason: SDUPTHER

## 2024-04-01 RX ORDER — LIDOCAINE HYDROCHLORIDE 20 MG/ML
INJECTION, SOLUTION EPIDURAL; INFILTRATION; INTRACAUDAL; PERINEURAL PRN
Status: DISCONTINUED | OUTPATIENT
Start: 2024-04-01 | End: 2024-04-01 | Stop reason: SDUPTHER

## 2024-04-01 RX ORDER — SODIUM CHLORIDE, SODIUM LACTATE, POTASSIUM CHLORIDE, CALCIUM CHLORIDE 600; 310; 30; 20 MG/100ML; MG/100ML; MG/100ML; MG/100ML
INJECTION, SOLUTION INTRAVENOUS CONTINUOUS
Status: DISCONTINUED | OUTPATIENT
Start: 2024-04-01 | End: 2024-04-02 | Stop reason: HOSPADM

## 2024-04-01 RX ORDER — CEFAZOLIN SODIUM IN 0.9 % NACL 2 G/100 ML
2000 PLASTIC BAG, INJECTION (ML) INTRAVENOUS
Status: COMPLETED | OUTPATIENT
Start: 2024-04-01 | End: 2024-04-01

## 2024-04-01 RX ORDER — MELOXICAM 7.5 MG/1
3.75 TABLET ORAL DAILY
Status: DISCONTINUED | OUTPATIENT
Start: 2024-04-01 | End: 2024-04-02 | Stop reason: HOSPADM

## 2024-04-01 RX ORDER — ROCURONIUM BROMIDE 10 MG/ML
INJECTION, SOLUTION INTRAVENOUS PRN
Status: DISCONTINUED | OUTPATIENT
Start: 2024-04-01 | End: 2024-04-01 | Stop reason: SDUPTHER

## 2024-04-01 RX ORDER — ASPIRIN 81 MG/1
81 TABLET, CHEWABLE ORAL 2 TIMES DAILY
Qty: 60 TABLET | Refills: 0 | Status: SHIPPED | OUTPATIENT
Start: 2024-04-02

## 2024-04-01 RX ORDER — SODIUM CHLORIDE 0.9 % (FLUSH) 0.9 %
5-40 SYRINGE (ML) INJECTION PRN
Status: DISCONTINUED | OUTPATIENT
Start: 2024-04-01 | End: 2024-04-02 | Stop reason: HOSPADM

## 2024-04-01 RX ORDER — METHYLPREDNISOLONE 4 MG/1
TABLET ORAL
Qty: 1 KIT | Refills: 0 | Status: SHIPPED | OUTPATIENT
Start: 2024-04-01

## 2024-04-01 RX ORDER — QUETIAPINE FUMARATE 25 MG/1
25 TABLET, FILM COATED ORAL NIGHTLY
Status: DISCONTINUED | OUTPATIENT
Start: 2024-04-01 | End: 2024-04-02 | Stop reason: HOSPADM

## 2024-04-01 RX ORDER — PANTOPRAZOLE SODIUM 40 MG/1
40 TABLET, DELAYED RELEASE ORAL
Status: DISCONTINUED | OUTPATIENT
Start: 2024-04-02 | End: 2024-04-02 | Stop reason: HOSPADM

## 2024-04-01 RX ORDER — PROCHLORPERAZINE EDISYLATE 5 MG/ML
10 INJECTION INTRAMUSCULAR; INTRAVENOUS EVERY 6 HOURS PRN
Status: DISCONTINUED | OUTPATIENT
Start: 2024-04-01 | End: 2024-04-02 | Stop reason: HOSPADM

## 2024-04-01 RX ORDER — SODIUM CHLORIDE 0.9 % (FLUSH) 0.9 %
5-40 SYRINGE (ML) INJECTION EVERY 12 HOURS SCHEDULED
Status: DISCONTINUED | OUTPATIENT
Start: 2024-04-01 | End: 2024-04-02 | Stop reason: HOSPADM

## 2024-04-01 RX ORDER — OXYCODONE HYDROCHLORIDE 5 MG/1
10 TABLET ORAL EVERY 4 HOURS PRN
Status: DISCONTINUED | OUTPATIENT
Start: 2024-04-01 | End: 2024-04-02 | Stop reason: HOSPADM

## 2024-04-01 RX ORDER — ACETAMINOPHEN 500 MG
1000 TABLET ORAL ONCE
Status: COMPLETED | OUTPATIENT
Start: 2024-04-01 | End: 2024-04-01

## 2024-04-01 RX ADMIN — MEPERIDINE HYDROCHLORIDE 12.5 MG: 50 INJECTION, SOLUTION INTRAMUSCULAR; INTRAVENOUS; SUBCUTANEOUS at 09:22

## 2024-04-01 RX ADMIN — FLUOXETINE 20 MG: 20 CAPSULE ORAL at 19:24

## 2024-04-01 RX ADMIN — MIDAZOLAM 2 MG: 1 INJECTION INTRAMUSCULAR; INTRAVENOUS at 06:55

## 2024-04-01 RX ADMIN — METHOCARBAMOL 200 MG: 100 INJECTION INTRAMUSCULAR; INTRAVENOUS at 08:26

## 2024-04-01 RX ADMIN — MORPHINE SULFATE 2 MG: 2 INJECTION, SOLUTION INTRAMUSCULAR; INTRAVENOUS at 13:41

## 2024-04-01 RX ADMIN — METHOCARBAMOL 600 MG: 100 INJECTION INTRAMUSCULAR; INTRAVENOUS at 08:11

## 2024-04-01 RX ADMIN — MELOXICAM 3.75 MG: 7.5 TABLET ORAL at 16:24

## 2024-04-01 RX ADMIN — PROPOFOL 140 MG: 10 INJECTION, EMULSION INTRAVENOUS at 07:33

## 2024-04-01 RX ADMIN — SODIUM CHLORIDE 20 ML: 9 INJECTION, SOLUTION INTRAVENOUS at 16:09

## 2024-04-01 RX ADMIN — DEXAMETHASONE SODIUM PHOSPHATE 4 MG: 4 INJECTION, SOLUTION INTRAMUSCULAR; INTRAVENOUS at 07:47

## 2024-04-01 RX ADMIN — POLYETHYLENE GLYCOL 3350 17 G: 17 POWDER, FOR SOLUTION ORAL at 16:24

## 2024-04-01 RX ADMIN — BUPIVACAINE HYDROCHLORIDE 20 ML: 5 INJECTION, SOLUTION EPIDURAL; INTRACAUDAL; PERINEURAL at 06:55

## 2024-04-01 RX ADMIN — HYDROMORPHONE HYDROCHLORIDE 0.5 MG: 1 INJECTION, SOLUTION INTRAMUSCULAR; INTRAVENOUS; SUBCUTANEOUS at 10:06

## 2024-04-01 RX ADMIN — ACETAMINOPHEN 650 MG: 325 TABLET ORAL at 20:03

## 2024-04-01 RX ADMIN — ROCURONIUM BROMIDE 50 MG: 50 INJECTION, SOLUTION INTRAVENOUS at 07:33

## 2024-04-01 RX ADMIN — METHOCARBAMOL 750 MG: 750 TABLET ORAL at 20:02

## 2024-04-01 RX ADMIN — SODIUM CHLORIDE, SODIUM LACTATE, POTASSIUM CHLORIDE, AND CALCIUM CHLORIDE: .6; .31; .03; .02 INJECTION, SOLUTION INTRAVENOUS at 07:27

## 2024-04-01 RX ADMIN — PROCHLORPERAZINE EDISYLATE 10 MG: 5 INJECTION INTRAMUSCULAR; INTRAVENOUS at 15:57

## 2024-04-01 RX ADMIN — MAGNESIUM SULFATE IN DEXTROSE 1000 MG: 10 INJECTION, SOLUTION INTRAVENOUS at 07:39

## 2024-04-01 RX ADMIN — METHOCARBAMOL 200 MG: 100 INJECTION INTRAMUSCULAR; INTRAVENOUS at 08:02

## 2024-04-01 RX ADMIN — OXYCODONE 10 MG: 5 TABLET ORAL at 20:03

## 2024-04-01 RX ADMIN — GLYCOPYRROLATE 0.2 MG: 0.2 INJECTION, SOLUTION INTRAMUSCULAR; INTRAVENOUS at 08:05

## 2024-04-01 RX ADMIN — ACETAMINOPHEN 650 MG: 325 TABLET ORAL at 16:24

## 2024-04-01 RX ADMIN — ACETAMINOPHEN 1000 MG: 500 TABLET ORAL at 06:13

## 2024-04-01 RX ADMIN — Medication 10 ML: at 20:07

## 2024-04-01 RX ADMIN — HYDROMORPHONE HYDROCHLORIDE 0.5 MG: 1 INJECTION, SOLUTION INTRAMUSCULAR; INTRAVENOUS; SUBCUTANEOUS at 09:26

## 2024-04-01 RX ADMIN — Medication 2000 MG: at 16:10

## 2024-04-01 RX ADMIN — OXYCODONE 5 MG: 5 TABLET ORAL at 11:56

## 2024-04-01 RX ADMIN — HYDROMORPHONE HYDROCHLORIDE 0.5 MG: 1 INJECTION, SOLUTION INTRAMUSCULAR; INTRAVENOUS; SUBCUTANEOUS at 09:48

## 2024-04-01 RX ADMIN — FENTANYL CITRATE 50 MCG: 50 INJECTION, SOLUTION INTRAMUSCULAR; INTRAVENOUS at 07:57

## 2024-04-01 RX ADMIN — Medication 2000 MG: at 07:27

## 2024-04-01 RX ADMIN — QUETIAPINE FUMARATE 25 MG: 25 TABLET ORAL at 20:03

## 2024-04-01 RX ADMIN — LIDOCAINE HYDROCHLORIDE 100 MG: 20 INJECTION, SOLUTION EPIDURAL; INFILTRATION; INTRACAUDAL at 07:33

## 2024-04-01 RX ADMIN — FENTANYL CITRATE 50 MCG: 50 INJECTION, SOLUTION INTRAMUSCULAR; INTRAVENOUS at 07:31

## 2024-04-01 RX ADMIN — SUGAMMADEX 200 MG: 100 INJECTION, SOLUTION INTRAVENOUS at 09:02

## 2024-04-01 RX ADMIN — TRANEXAMIC ACID 1000 MG: 100 INJECTION, SOLUTION INTRAVENOUS at 07:39

## 2024-04-01 RX ADMIN — ONDANSETRON 4 MG: 2 INJECTION INTRAMUSCULAR; INTRAVENOUS at 07:47

## 2024-04-01 RX ADMIN — SODIUM CHLORIDE, POTASSIUM CHLORIDE, SODIUM LACTATE AND CALCIUM CHLORIDE: 600; 310; 30; 20 INJECTION, SOLUTION INTRAVENOUS at 16:05

## 2024-04-01 ASSESSMENT — PAIN SCALES - GENERAL
PAINLEVEL_OUTOF10: 6
PAINLEVEL_OUTOF10: 2
PAINLEVEL_OUTOF10: 6
PAINLEVEL_OUTOF10: 7
PAINLEVEL_OUTOF10: 0
PAINLEVEL_OUTOF10: 7

## 2024-04-01 ASSESSMENT — PAIN DESCRIPTION - LOCATION
LOCATION: KNEE
LOCATION: GENERALIZED

## 2024-04-01 ASSESSMENT — PAIN DESCRIPTION - ORIENTATION
ORIENTATION: RIGHT

## 2024-04-01 ASSESSMENT — PAIN DESCRIPTION - DESCRIPTORS
DESCRIPTORS: ACHING
DESCRIPTORS: ACHING;DISCOMFORT
DESCRIPTORS: ACHING

## 2024-04-01 NOTE — OP NOTE
Orthopaedic Surgery  Operative Report      Patient Name:  Sandie Perez  Patient :  1950  MRN: [id number]    Date: 24     Pre-operative Diagnosis:   M17.11 Primary Osteoarthritis    Post-operative Diagnosis:    Same    Procedure: RIGHT  15119 Total Knee Arthroplasty    Surgeon:  Surgeon(s) and Role:     * Baltazar Bowling MD - Primary    Assistant: Leighaulator: Beth Maravilla RN  Surgical Assistant: Chaka Hope; Hardware, Willi  Scrub Person First: Danette Goldsmith    Anesthesia: General endotracheal anesthesia and Intraoperative local infiltration - Exparel/marcaine solution    Estimated blood loss: 150    Specimens: * No specimens in log *    Complications: None    Drains: None    Condition: Stable    Implants:   Implant Name Type Inv. Item Serial No.  Lot No. LRB No. Used Action   PSN MC VE ASF R 11MM 8-11 EF - [id number]  PSN MC VE ASF R 11MM 8-11 EF  [employer]- [id number] Right 1 Implanted   BASEPLATE TIB KEELED 0 DEG E RT KNEE SPIKE OSSEOTI PERSONA - [id number]  BASEPLATE TIB KEELED 0 DEG E RT KNEE SPIKE OSSEOTI PERSONA  [employer]- [id number] Right 1 Implanted   COMPONENT FEM CR RICHELLE 8 RT KNEE IVONNE COCR STRL PERSONA LTX - [id number]  COMPONENT FEM CR RICHELLE 8 RT KNEE IVONNE COCR STRL PERSONA LTX  [employer]-WD [id number] Right 1 Implanted       Findings:   1. End stage OA  2. Varus gonarthrosis, intact patella cartilage    Indications:   The patient has been battling right knee pain for months to years.  Pain has gotten worse recently.  Patient has failed all preoperative conservative treatment options.  The activities of daily living have been affected quite a bit.  Patient wanted to regain mobility and be active as possible.  Patient understood the risk benefits and alternatives in detail and wanted to proceed with the above operation.    Procedure Details:   I marked the surgical site of the right knee for surgery.  He was taken back to the

## 2024-04-01 NOTE — ANESTHESIA POSTPROCEDURE EVALUATION
Department of Anesthesiology  Postprocedure Note    Patient: Sandie Perez  MRN: [id number]  YOB: 1950  Date of evaluation: 4/1/2024    Procedure Summary       Date: 04/01/24 Room / Location: [address]    Anesthesia Start: 0727 Anesthesia Stop: 0915    Procedure: RIGHT TOTAL KNEE ARTHROPLASTY (Right: Knee) Diagnosis:       Osteoarthritis of right knee      (Osteoarthritis of right knee [M17.11])    Surgeons: Baltazar Bowling MD Responsible Provider: Rolly Swanson MD    Anesthesia Type: general ASA Status: 3            Anesthesia Type: No value filed.    Brenda Phase I: Brenda Score: 8    Brenda Phase II: Brenda Score: 8    Anesthesia Post Evaluation    Comments: Anes Post-op Note    Name:    Sandie Perez  MRN:      [id number]    Patient Vitals in the past 12 hrs:  04/01/24 1400, BP:113/61, Pulse:93, SpO2:93 %  04/01/24 1345, Pulse:96, SpO2:(!) 89 %  04/01/24 1330, Pulse:(!) 102, SpO2:(!) 88 %  04/01/24 1315, Pulse:95, SpO2:92 %  04/01/24 1300, Pulse:92, SpO2:94 %  04/01/24 1245, BP:132/63, Pulse:100, SpO2:94 %  04/01/24 1156, BP:121/89, Pulse:93, SpO2:92 %  04/01/24 1110, BP:(!) 115/55, Pulse:95, SpO2:96 %  04/01/24 1050, BP:126/61, Pulse:89, SpO2:97 %  04/01/24 1045, Pulse:93, SpO2:94 %  04/01/24 1030, BP:116/68, Pulse:90, SpO2:95 %  04/01/24 1015, Pulse:90, SpO2:94 %  04/01/24 1000, Pulse:93, Resp:(!) 9, SpO2:93 %  04/01/24 0950, BP:131/77, Pulse:94, Resp:14, SpO2:97 %  04/01/24 0940, BP:(!) 146/81, Pulse:89, Resp:22, SpO2:97 %  04/01/24 0932, Pulse:93, Resp:27, SpO2:94 %  04/01/24 0909, BP:(!) 155/76, Temp:97.5 °F (36.4 °C), Temp src:Temporal, Pulse:100, Resp:16, SpO2:95 %  04/01/24 0540, BP:(!) 141/91, Temp:97.2 °F (36.2 °C), Temp src:Temporal, Pulse:73, Resp:16, SpO2:93 %     LABS:    CBC  Lab Results       Component                Value               Date/Time                  WBC                      4.9                 03/05/2024 01:57 PM        HGB

## 2024-04-01 NOTE — ANESTHESIA PROCEDURE NOTES
Peripheral Block    Patient location during procedure: pre-op  Reason for block: post-op pain management and at surgeon's request  Start time: 4/1/2024 6:55 AM  End time: 4/1/2024 7:05 AM  Staffing  Performed: anesthesiologist   Anesthesiologist: Rolly Swanson MD  Performed by: Rolly Swanson MD  Authorized by: Rolly Swanson MD    Preanesthetic Checklist  Completed: patient identified, IV checked, site marked, risks and benefits discussed, surgical/procedural consents, equipment checked, pre-op evaluation, timeout performed, anesthesia consent given, oxygen available and monitors applied/VS acknowledged  Peripheral Block   Patient position: supine  Prep: ChloraPrep  Provider prep: mask and sterile gloves  Patient monitoring: cardiac monitor, continuous pulse ox, frequent blood pressure checks and IV access  Block type: Femoral  Adductor canal (Low Femoral)  Laterality: right  Injection technique: single-shot  Guidance: ultrasound guided  Local infiltration: lidocaine  Infiltration strength: 1 %  Local infiltration: lidocaine  Dose: 3 mL    Needle   Needle type: insulated echogenic nerve stimulator needle   Needle gauge: 21 G  Needle localization: ultrasound guidance  Needle length: 10 cm  Assessment   Injection assessment: negative aspiration for heme, no paresthesia on injection and local visualized surrounding nerve on ultrasound  Paresthesia pain: none  Slow fractionated injection: yes  Hemodynamics: stable  Outcomes: uncomplicated and patient tolerated procedure well    Additional Notes  Immediately prior to procedure a \"time out\" was called to verify the correct patient, allergies, laterality, procedure and equipment. Time out performed with  RN    Local Anesthetic: 0.5 %  Bupivacaine  plus epi 1 to 200K Amount: 20 ml  in 5 ml increments after negative aspiration each time.

## 2024-04-01 NOTE — DISCHARGE INSTRUCTIONS
Total Knee Replacement  Discharge Instructions    To prevent Clot formation, you have been placed on the following medication:  Take aspirin 81 mg twice a day starting day after surgery   Surgical Site Care:  Keep incision clean and dry.  May shower on post-op day #3 with waterproof dressing on.  Change to new waterproof dressing between 5-7 days.   Physical Therapy:  Weight Bearing Status:  Weight bearing as tolerated  Outpatient therapy-should start within 2-3 days  Precautions  Per Physical Therapy Handout  Pain Medications  You were given oxycodone (Oxycontin, Oxyir)  Wean off pain medications as you deem appropriate as long as pain is under control  Be sure to drink plenty of fluids (recommend water) while taking narcotic pain medications to prevent constipation  You may take an over the counter laxative or stool softener as needed to prevent/treat constipation as well, we recommend Senokot S OTC.  We recommend that you consider taking these medications the entire time you are taking pain medication.  Cold packs/Ice packs/Machine  May be used as much as necessary to control swelling/inflammation/soreness  Be sure to have a barrier (cloth, clothing, towel) between the site and the ice pack to prevent frostbite  [facility] office if  Increased redness, swelling, drainage of any kind, and/or pain to surgery site.  As well as new onset fevers and or chills.  These could signify an infection.  Calf or thigh tenderness to touch as well as increased swelling or redness.  This could signify a clot formation.  Numbness or tingling to an area around the incision site or below the incision site (toes).  Any rash appears, increased  or new onset nausea/vomiting occur.  This may indicate a reaction to a medication.  Phone # [phone]  Follow up with Dr. Bowling or Ankush Cisneros PA-C at scheduled appointment time.   Please continue to use your Incentive Spirometer every hour while awake.     Discharge

## 2024-04-01 NOTE — FLOWSHEET NOTE
04/01/24 0909   Vital Signs   Temp 97.5 °F (36.4 °C)   Temp Source Temporal   Pulse 100   Respirations 16   BP (!) 155/76   MAP (Calculated) 102   MAP (mmHg) 98   BP Location Right upper arm   BP Method Automatic   Patient Position Semi fowlers   Pain Assessment   Pain Assessment Conrad-Ricardo FACES   Pain Level 0   Oxygen Therapy   SpO2 95 %   Pulse Oximetry Type Continuous   O2 Device None (Room air)     Patient arrived to PACU bay 5, phase one initiated. Placed on bedside monitor, VSS. Report obtained from OR RN and anesthesia.. Assessment WNL. Warm blankets applied. Side rails in place, will monitor patient closely.

## 2024-04-01 NOTE — PLAN OF CARE
Consult received.  No active medical problem on discharge.  Medications reviewed.  Will not be writing a complete consult note for today.  If there is any active changes needed from the hospital medicine team we will be happy to assist    Full note for consult to do tomorrow if need be

## 2024-04-01 NOTE — CONSULTS
Consult Placed- Hospitalist consult   Who: Dolores served Dr.Munis Tijerina   Date:4/1/24  Time:15:24  Electronically signed by Barb White on 4/1/2024 at 3:22 PM

## 2024-04-01 NOTE — ANESTHESIA PRE PROCEDURE
Department of Anesthesiology  Preprocedure Note       Name:  Sandie Perez   Age:  73 y.o.  :  1950                                          MRN:  [id number]         Date:  2024      Surgeon: Surgeon(s):  Baltazar Bowling MD    Procedure: Procedure(s):  RIGHT TOTAL KNEE ARTHROPLASTY                   [employer] NOTE: ADDUCTOR CANAL BLOCK    Medications prior to admission:   Prior to Admission medications    Medication Sig Start Date End Date Taking? Authorizing Provider   FLUoxetine (PROZAC) 20 MG capsule TAKE 1 CAPSULE BY MOUTH EVERY DAY 3/24/24   Saurabh Lauren DO   QUEtiapine (SEROQUEL) 25 MG tablet Take 1 tablet by mouth at bedtime 3/14/24   Saurabh Lauren DO   mupirocin (BACTROBAN) 2 % ointment Apply twice daily to each nare for 5 days prior to surgical procedure 24   Baltazar Bowling MD   albuterol sulfate HFA (VENTOLIN HFA) 108 (90 Base) MCG/ACT inhaler Inhale 2 puffs into the lungs 4 times daily as needed for Wheezing 24   Saurabh Lauren DO   atorvastatin (LIPITOR) 20 MG tablet TAKE 1 TABLET BY MOUTH EVERY DAY AT NIGHT  Patient taking differently: Take 1 tablet by mouth at bedtime TAKE 1 TABLET BY MOUTH EVERY DAY AT NIGHT 23   Saurabh Lauren DO   omeprazole (PRILOSEC) 40 MG delayed release capsule Take 1 capsule by mouth every morning (before breakfast) 23   Saurabh Lauren DO   albuterol (PROVENTIL) (2.5 MG/3ML) 0.083% nebulizer solution Take 3 mLs by nebulization every 6 hours as needed for Wheezing 23   Vicky Agustin, APRN - CNP   acetaminophen (TYLENOL) 500 MG CAPS capsule     ProviderDanilo MD   Cholecalciferol (VITAMIN D) 50 MCG (2000) CAPS capsule Take by mouth    ProviderDanilo MD       Current medications:    Current Facility-Administered Medications   Medication Dose Route Frequency Provider Last Rate Last Admin   • ortho mix injection   Injection On Call Ankush Cisneros PA-C       • tranexamic acid (CYKLOKAPRON) 1,000 mg in sodium

## 2024-04-01 NOTE — H&P
Update History & Physical     The patient's History and Physical of 3/14/2024 was reviewed with the patient and I examined the patient. There was no change. The surgical site was confirmed by the patient and me.      Plan: The risks, benefits, expected outcome, and alternative to the recommended procedure have been discussed with the patient / family. Patient understands and wants to proceed with the procedure.      Electronically signed by Baltazar Bowling MD on 4/1/2024 at 7:19 AM

## 2024-04-01 NOTE — RT PROTOCOL NOTE
RT Inhaler-Nebulizer Bronchodilator Protocol Note    There is a bronchodilator order in the chart from a provider indicating to follow the RT Bronchodilator Protocol and there is an “Initiate RT Inhaler-Nebulizer Bronchodilator Protocol” order as well (see protocol at bottom of note).    CXR Findings:  No results found.    The findings from the last RT Protocol Assessment were as follows:   History Pulmonary Disease: Smoker 15 pack years or more  Respiratory Pattern: Regular pattern and RR 12-20 bpm  Breath Sounds: Slightly diminished and/or crackles  Cough: Strong, spontaneous, non-productive  Indication for Bronchodilator Therapy: On home bronchodilators  Bronchodilator Assessment Score: 3    Aerosolized bronchodilator medication orders have been revised according to the RT Inhaler-Nebulizer Bronchodilator Protocol below.    Respiratory Therapist to perform RT Therapy Protocol Assessment initially then follow the protocol.  Repeat RT Therapy Protocol Assessment PRN for score 0-3 or on second treatment, BID, and PRN for scores above 3.    No Indications - adjust the frequency to every 6 hours PRN wheezing or bronchospasm, if no treatments needed after 48 hours then discontinue using Per Protocol order mode.     If indication present, adjust the RT bronchodilator orders based on the Bronchodilator Assessment Score as indicated below.  Use Inhaler orders unless patient has one or more of the following: on home nebulizer, not able to hold breath for 10 seconds, is not alert and oriented, cannot activate and use MDI correctly, or respiratory rate 25 breaths per minute or more, then use the equivalent nebulizer order(s) with same Frequency and PRN reasons based on the score.  If a patient is on this medication at home then do not decrease Frequency below that used at home.    0-3 - enter or revise RT bronchodilator order(s) to equivalent RT Bronchodilator order with Frequency of every 4 hours PRN for wheezing or

## 2024-04-02 VITALS
HEIGHT: 65 IN | OXYGEN SATURATION: 92 % | WEIGHT: 165 LBS | BODY MASS INDEX: 27.49 KG/M2 | TEMPERATURE: 98.8 F | RESPIRATION RATE: 16 BRPM | SYSTOLIC BLOOD PRESSURE: 113 MMHG | DIASTOLIC BLOOD PRESSURE: 68 MMHG | HEART RATE: 69 BPM

## 2024-04-02 LAB
ANION GAP SERPL CALCULATED.3IONS-SCNC: 8 MMOL/L (ref 3–16)
BASOPHILS # BLD: 0 K/UL (ref 0–0.2)
BASOPHILS NFR BLD: 0.1 %
BUN SERPL-MCNC: 19 MG/DL (ref 7–20)
CALCIUM SERPL-MCNC: 9 MG/DL (ref 8.3–10.6)
CHLORIDE SERPL-SCNC: 102 MMOL/L (ref 99–110)
CO2 SERPL-SCNC: 26 MMOL/L (ref 21–32)
CREAT SERPL-MCNC: 0.8 MG/DL (ref 0.6–1.2)
DEPRECATED RDW RBC AUTO: 14.5 % (ref 12.4–15.4)
EOSINOPHIL # BLD: 0 K/UL (ref 0–0.6)
EOSINOPHIL NFR BLD: 0 %
GFR SERPLBLD CREATININE-BSD FMLA CKD-EPI: 77 ML/MIN/{1.73_M2}
GLUCOSE BLD-MCNC: 92 MG/DL (ref 70–99)
GLUCOSE BLD-MCNC: 95 MG/DL (ref 70–99)
GLUCOSE SERPL-MCNC: 101 MG/DL (ref 70–99)
HCT VFR BLD AUTO: 36.5 % (ref 36–48)
HGB BLD-MCNC: 12.4 G/DL (ref 12–16)
LYMPHOCYTES # BLD: 1 K/UL (ref 1–5.1)
LYMPHOCYTES NFR BLD: 9.6 %
MCH RBC QN AUTO: 28.5 PG (ref 26–34)
MCHC RBC AUTO-ENTMCNC: 33.8 G/DL (ref 31–36)
MCV RBC AUTO: 84.3 FL (ref 80–100)
MONOCYTES # BLD: 0.9 K/UL (ref 0–1.3)
MONOCYTES NFR BLD: 8.4 %
NEUTROPHILS # BLD: 8.9 K/UL (ref 1.7–7.7)
NEUTROPHILS NFR BLD: 81.9 %
PERFORMED ON: NORMAL
PERFORMED ON: NORMAL
PLATELET # BLD AUTO: 147 K/UL (ref 135–450)
PMV BLD AUTO: 9.8 FL (ref 5–10.5)
POTASSIUM SERPL-SCNC: 4.5 MMOL/L (ref 3.5–5.1)
RBC # BLD AUTO: 4.33 M/UL (ref 4–5.2)
SODIUM SERPL-SCNC: 136 MMOL/L (ref 136–145)
WBC # BLD AUTO: 10.8 K/UL (ref 4–11)

## 2024-04-02 PROCEDURE — 97116 GAIT TRAINING THERAPY: CPT

## 2024-04-02 PROCEDURE — 2700000000 HC OXYGEN THERAPY PER DAY

## 2024-04-02 PROCEDURE — 80048 BASIC METABOLIC PNL TOTAL CA: CPT

## 2024-04-02 PROCEDURE — 99024 POSTOP FOLLOW-UP VISIT: CPT | Performed by: SPECIALIST/TECHNOLOGIST

## 2024-04-02 PROCEDURE — 6370000000 HC RX 637 (ALT 250 FOR IP): Performed by: STUDENT IN AN ORGANIZED HEALTH CARE EDUCATION/TRAINING PROGRAM

## 2024-04-02 PROCEDURE — 94761 N-INVAS EAR/PLS OXIMETRY MLT: CPT

## 2024-04-02 PROCEDURE — 97530 THERAPEUTIC ACTIVITIES: CPT

## 2024-04-02 PROCEDURE — 6360000002 HC RX W HCPCS: Performed by: PHYSICIAN ASSISTANT

## 2024-04-02 PROCEDURE — 85025 COMPLETE CBC W/AUTO DIFF WBC: CPT

## 2024-04-02 PROCEDURE — 2580000003 HC RX 258: Performed by: PHYSICIAN ASSISTANT

## 2024-04-02 PROCEDURE — 6370000000 HC RX 637 (ALT 250 FOR IP): Performed by: PHYSICIAN ASSISTANT

## 2024-04-02 PROCEDURE — 36415 COLL VENOUS BLD VENIPUNCTURE: CPT

## 2024-04-02 RX ADMIN — ACETAMINOPHEN 650 MG: 325 TABLET ORAL at 08:37

## 2024-04-02 RX ADMIN — MORPHINE SULFATE 4 MG: 4 INJECTION, SOLUTION INTRAMUSCULAR; INTRAVENOUS at 08:35

## 2024-04-02 RX ADMIN — ACETAMINOPHEN 650 MG: 325 TABLET ORAL at 15:03

## 2024-04-02 RX ADMIN — PANTOPRAZOLE SODIUM 40 MG: 40 TABLET, DELAYED RELEASE ORAL at 08:37

## 2024-04-02 RX ADMIN — OXYCODONE 10 MG: 5 TABLET ORAL at 00:38

## 2024-04-02 RX ADMIN — Medication 10 ML: at 08:35

## 2024-04-02 RX ADMIN — POLYETHYLENE GLYCOL 3350 17 G: 17 POWDER, FOR SOLUTION ORAL at 08:37

## 2024-04-02 RX ADMIN — FLUOXETINE 20 MG: 20 CAPSULE ORAL at 08:37

## 2024-04-02 RX ADMIN — MELOXICAM 3.75 MG: 7.5 TABLET ORAL at 08:37

## 2024-04-02 RX ADMIN — Medication 2000 MG: at 00:43

## 2024-04-02 ASSESSMENT — PAIN DESCRIPTION - LOCATION
LOCATION: KNEE;LEG
LOCATION: KNEE

## 2024-04-02 ASSESSMENT — PAIN DESCRIPTION - DESCRIPTORS
DESCRIPTORS: ACHING
DESCRIPTORS: ACHING;THROBBING

## 2024-04-02 ASSESSMENT — PAIN SCALES - GENERAL
PAINLEVEL_OUTOF10: 7
PAINLEVEL_OUTOF10: 9

## 2024-04-02 ASSESSMENT — PAIN DESCRIPTION - ORIENTATION
ORIENTATION: RIGHT
ORIENTATION: RIGHT

## 2024-04-02 NOTE — PLAN OF CARE
Problem: Discharge Planning  Goal: Discharge to home or other facility with appropriate resources  4/2/2024 1151 by BRUCE DAVIS  Outcome: Progressing  4/2/2024 0234 by Papa Youngblood RN  Outcome: Progressing     Problem: Safety - Adult  Goal: Free from fall injury  4/2/2024 1151 by BRUCE DAVIS  Outcome: Progressing  4/2/2024 0234 by Papa Youngblood RN  Outcome: Progressing     Problem: Pain  Goal: Verbalizes/displays adequate comfort level or baseline comfort level  4/2/2024 1151 by BRUCE DAVIS  Outcome: Progressing  4/2/2024 0234 by Papa Youngblood RN  Outcome: Progressing     Problem: ABCDS Injury Assessment  Goal: Absence of physical injury  4/2/2024 1151 by BRUCE DAVIS  Outcome: Progressing  4/2/2024 0234 by Papa Youngblood RN  Outcome: Progressing     Problem: Skin/Tissue Integrity  Goal: Absence of new skin breakdown  Description: 1.  Monitor for areas of redness and/or skin breakdown  2.  Assess vascular access sites hourly  3.  Every 4-6 hours minimum:  Change oxygen saturation probe site  4.  Every 4-6 hours:  If on nasal continuous positive airway pressure, respiratory therapy assess nares and determine need for appliance change or resting period.  4/2/2024 1151 by BRUCE DAVIS  Outcome: Progressing  4/2/2024 0234 by Papa Youngblood, RN  Outcome: Progressing

## 2024-04-02 NOTE — PROGRESS NOTES
Department of Orthopedic Surgery  Physician Assistant   Progress Note    Subjective:       Systemic or Specific Complaints:No Complaints and pain is well controlled, worked with therapy, ambulating about the room. Some nausea/dizziness with pain medication, tolerable. Tolerating PO, voiding. No family at bedside    Objective:     Patient Vitals for the past 24 hrs:   BP Temp Temp src Pulse Resp SpO2   04/02/24 0830 113/68 98.8 °F (37.1 °C) Oral 69 16 92 %   04/02/24 0319 100/63 99.4 °F (37.4 °C) Oral 65 16 94 %   04/02/24 0108 -- -- -- -- 16 --   04/02/24 0038 -- -- -- -- 16 --   04/01/24 2336 (!) 101/57 99 °F (37.2 °C) Oral 70 16 92 %   04/01/24 2033 -- -- -- -- 16 --   04/01/24 1958 (!) 146/78 98.1 °F (36.7 °C) Oral 64 16 96 %   04/01/24 1825 118/68 -- -- 64 -- 93 %   04/01/24 1543 (!) 150/81 -- -- -- -- --   04/01/24 1534 136/79 -- -- -- -- --   04/01/24 1445 131/70 -- -- 85 24 94 %   04/01/24 1400 113/61 -- -- 93 -- 93 %   04/01/24 1345 -- -- -- 96 -- (!) 89 %   04/01/24 1330 -- -- -- (!) 102 -- (!) 88 %   04/01/24 1315 -- -- -- 95 -- 92 %   04/01/24 1300 -- -- -- 92 -- 94 %   04/01/24 1245 132/63 -- -- 100 -- 94 %   04/01/24 1156 121/89 -- -- 93 -- 92 %       General: alert, appears stated age, cooperative, and no distress   Wound: Wound clean and dry no evidence of infection., No Erythema, No Drainage, and Positive for Edema   Motion: Painful range of Motion in affected extremity   DVT Exam: No evidence of DVT seen on physical exam.  No cords or calf tenderness.  No significant calf/ankle edema.     Additional exam: Patient seen sitting up in bed at time of interview  Leg lengths equal, rotational alignment neutral  knee swelling as expected, compartments compressible  EHL, FHL, gastroc, and anterior tibialis motor intact  Sensation intact to light touch  DP and PT pulses 2+      Data Review  CBC:   Lab Results   Component Value Date/Time    WBC 10.8 04/02/2024 06:28 AM    RBC 4.33 04/02/2024 06:28 AM    HGB 
  Surgery Date and Time: 4/1/24 @ 07:30 am   Arrival Time:  05:30 am    The instructions given when and if a patient needs to stop oral intake prior to surgery varies. Follow the instructions you were given by your    Surgeon or RN during the Pre-op call.       __X__Nothing to eat or to drink after Midnight the night before the surgery. NO gum, mints, candy or ice chips day of surgery.                   ___X__ Carbo-loading or ENHANCED RECOVERY instructions will be given to select patients.  Please do the following:     The evening before your surgery after dinner before midnight drink 40 ounces (2 - 20 ounce bottles) of Gatorade. If you are diabetic use sugar free.      The morning of surgery drink two (2) - 20 ounce bottle water. This needs to be finished 2 hours prior to your surgery start time.                   Only take the following medications with a small sip of water the morning of surgery:   none      Aspirin, Ibuprofen, Advil, Naproxen, Vitamin E and other Anti-inflammatory products and supplements should be stopped for 5 -7days before surgery      or as directed by your physician.         - Do not smoke or vape, and do not drink any alcoholic beverages 24 hours prior to surgery, this includes NA Beer. Refrain from using any recreational drugs,     including non-prescribed prescription drugs.       -You may brush your teeth and gargle the morning of surgery.  DO NOT SWALLOW WATER.    -You MUST plan for a responsible adult to stay on site while you are here and take you home after your surgery. You will not be allowed to leave alone or drive               yourself home. It is requested someone stay with you the first 24 hrs. Your surgery will be cancelled if you do not have a ride home with a responsible adult.    -A parent/legal guardian must accompany a child scheduled for surgery and plan to stay at the hospital until the child is discharged.  Please do not bring other                children with 
 Ice water and teena crackers given to pt.   
4 Eyes Skin Assessment     NAME:  Sandie Perez  YOB: 1950  MEDICAL RECORD NUMBER:  [id number]    The patient is being assessed for  Post-Op Surgical    I agree that at least one RN has performed a thorough Head to Toe Skin Assessment on the patient. ALL assessment sites listed below have been assessed.      Areas assessed by both nurses:    Head, Face, Ears, Shoulders, Back, Chest, Arms, Elbows, Hands, Sacrum. Buttock, Coccyx, Ischium, Legs. Feet and Heels, and Under Medical Devices         Does the Patient have a Wound? No noted wound(s)       Carlos Prevention initiated by RN: No  Wound Care Orders initiated by RN: No    Pressure Injury (Stage 3,4, Unstageable, DTI, NWPT, and Complex wounds) if present, place Wound referral order by RN under : No    New Ostomies, if present place, Ostomy referral order under : No     Nurse 1 eSignature: Electronically signed by Papa Youngblood RN on 4/2/24 at 3:42 AM EDT    **SHARE this note so that the co-signing nurse can place an eSignature**    Nurse 2 eSignature: Electronically signed by Madalyn Wright on 4/2/24 at 5:18 AM EDT   
Anne-Marie placed on pt.  
Nerve blocked preformed by Dr. Swanson. Time out preformed. Patient on monitor throughout procedure, tolerated well.   
Occupational Therapy    Per PT, pt with no acute OT needs at this time. Will d/c eval and tx order. Please re-order if necessary. No charge.      Dinorah Cunningham OTR/L  
Patient IV removed, no complications. Discharge instructions given and all questions answered. Patient wheeled to main entrance with family for discharge   
Pt states stil hungry, offered more teena crackers-pt states they are no good, will wait until she gets to her room to order some food. RN offered other snacks, pt declines.  
Pt tolerating ice chips.  
Sandie GAYTAN Perez    Age 73 y.o.    female    1950    MRN [id number]    4/1/2024  Arrival Time_____________  OR Time____________135 Min     Procedure(s):  RIGHT TOTAL KNEE ARTHROPLASTY                   JESUSITA NOTE: ADDUCTOR CANAL BLOCK                      General   Surgeon(s):  Baltazar Bowling, MD      DAY ADMIT ___  SDS/OP ___  OUTPT IN BED ___        Phone [phone] (home)                  PCP _____________________ Phone_________________ Epic ( ) Epic CE ( ) Appt ________    NOTES: _________________________________________ Consult/Cardio _______________    ____________________________________________________________________________    ____________________________________________________________________________  PAT APPT DATE:________ TIME: ________  FAXED QAD: _______  (__) H&P w/ Hospitalist    (__) PAT orders in EPIC    (__) Meet with PAT nurse  __________________________________________________________________________  Preop Nurse phone screen complete: _____________  (__) CBC     (__) W/ DIFF ___________  (__)  CT CHEST  __________   (__) Hgb A1C    ___________  (__) CHEST X RAY   __________  (__) LIPID PROFILE  ___________  (__) EKG   __________  (__) PT-INR / APTT  ___________  (__) PFT's   __________  (__) BMP   ___________  (__) CAROTIDS  __________  (__) CMP   ___________  (__) VEIN MAPPING  __________  (__) U/A   ___________  (__) HISTORY & PHYSICAL __________  (__) URINE C & S  ___________  (__) CARDIAC CLEARANCE __________  (__) U/A W/ FLEX  ___________  (__) PULM. CLEARANCE __________  (__) SERUM PREGNANCY ___________  (__) Preop Orders in EPIC __________  (__) TYPE & SCREEN __________repeat ( ) (__)  __________________ __________  (__) Albumin   ___________  (__)  __________________ __________  (__) TRANSFERRIN  ___________  (__)  __________________ __________  (__) LIVER PROFILE  ___________  (__) URINE PREG DOS __________  (__) MRSA NASAL SWAB ___________  (__) BLOOD SUGAR DOS __________  (__) 
T-Scale Score : 40.78  Mobility Inpatient CMS 0-100% Score: 54.16  Mobility Inpatient CMS G-Code Modifier : CK      Referring Provider:    Surgery: Right, Total Knee Arthroplasty    Restrictions: Full Weight Bearing as Tolerated, No ROM Restrictions, IV    Assessment:   Pt seen for PT evaluation POD#0 s/p Right, Total Knee Arthroplasty. At baseline, pt lives with her brother and performs functional mobility independently. Her brother is in respite right now but will have her grandson stay with her for the first week. Pt currently requires SBA for bed mobility, CGA for transfers, and min A for ambulating 2 steps forward and back with a RW. Pt limited by nausea and R knee buckling during stance phase. Pt will continue to benefit from skilled PT services to address current deficits. It is anticipated pt will be safe to DC home with 24/7 assist pending progress and when deemed medically appropriate   Impairments noted: Impaired functional mobility, Pain limiting function, Impaired strength, and Impaired ROM    Discharge Recommendations: Home with 24-hr supervision/assist and Further PT per surgeon  DME Recommendations: Rolling Walker  Barriers to discharge: 5 steps to enter     PT Diagnosis: Impaired mobility post-op R TKA   Complexity: MEDIUM     Subjective:   Pt in bed upon arrival, RN cleared for therapy. Pt pleasant and agreeable     Pain     Pain: Yes  Location: R knee  Ratin/10  Non-pharmaceutical interventions: Pt received pain meds prior to session    Discharge Environment:    Pt. Lives with family (Brother and Grandson  will be there for 1 week to assist) and 24/7 Assist Available   Home environment:  two story home, pt able to stay main level, and 1/2 bath and recliner or hospital bed on 1st floor   Steps to enter first floor:  1+1+1+1+1 steps to enter and no handrail  Steps to second floor: Full flight of 12-13  Bathroom: tub/shower unit  Equipment owned: shower chair/bench and hospital bed    Prior Level 
ambulate 100 ft with SBA and use of Rolling Walker -- MET 4/02; progress to 150ft with supervision  Pt will perform 5 (1+1+1+1+1) steps with SBA and use of Rolling Walker  Pt will perform 10 reps of BLE exercises      Pt specific goal: \"I want to get up and be able to walk to the bathroom\"    Education  Patient Education  Education Given To: Patient  Education Provided: Role of Therapy;Plan of Care;Home Exercise Program;Transfer Training;Equipment  Education Method: Verbal  Barriers to Learning: None  Education Outcome: Verbalized understanding    AM-PAC - Mobility    AM-PAC Basic Mobility - Inpatient   How much help is needed turning from your back to your side while in a flat bed without using bedrails?: None  How much help is needed moving from lying on your back to sitting on the side of a flat bed without using bedrails?: A Little  How much help is needed moving to and from a bed to a chair?: A Little  How much help is needed standing up from a chair using your arms?: A Little  How much help is needed walking in hospital room?: A Little  How much help is needed climbing 3-5 steps with a railing?: A Little  AM-[facility] Inpatient Mobility Raw Score : 19  AM-PAC Inpatient T-Scale Score : 45.44  Mobility Inpatient CMS 0-100% Score: 41.77  Mobility Inpatient CMS G-Code Modifier : CK         Therapy Time   Individual Concurrent Group Co-treatment   Time In 0845         Time Out 0927         Minutes 42         Timed Code Treatment Minutes: 42 Minutes       Viola Velásquez, PT, DPT    If pt is unable to be seen after this session, please let this note serve as discharge summary.  Please see case management note for discharge disposition.  Thank you.

## 2024-04-02 NOTE — PLAN OF CARE
Problem: Discharge Planning  Goal: Discharge to home or other facility with appropriate resources  4/2/2024 0234 by Papa Youngblood RN  Outcome: Progressing  4/1/2024 1936 by BRUCE DAVIS  Outcome: Progressing     Problem: Safety - Adult  Goal: Free from fall injury  4/2/2024 0234 by Papa Youngblood RN  Outcome: Progressing  4/1/2024 1936 by BRUCE DAVIS  Outcome: Progressing     Problem: Pain  Goal: Verbalizes/displays adequate comfort level or baseline comfort level  4/2/2024 0234 by Papa Youngblood RN  Outcome: Progressing  4/1/2024 1936 by BRUCE DAVIS  Outcome: Progressing     Problem: ABCDS Injury Assessment  Goal: Absence of physical injury  4/2/2024 0234 by Papa Youngblood RN  Outcome: Progressing  4/1/2024 1936 by BRUCE DAVIS  Outcome: Progressing     Problem: Skin/Tissue Integrity  Goal: Absence of new skin breakdown  Description: 1.  Monitor for areas of redness and/or skin breakdown  2.  Assess vascular access sites hourly  3.  Every 4-6 hours minimum:  Change oxygen saturation probe site  4.  Every 4-6 hours:  If on nasal continuous positive airway pressure, respiratory therapy assess nares and determine need for appliance change or resting period.  4/2/2024 0234 by Papa Youngblood RN  Outcome: Progressing  4/1/2024 1936 by BRUCE DAVIS  Outcome: Progressing

## 2024-04-02 NOTE — PLAN OF CARE
Problem: Discharge Planning  Goal: Discharge to home or other facility with appropriate resources  4/2/2024 1438 by BRUCE DAVIS  Outcome: Adequate for Discharge  4/2/2024 1151 by BRUCE DAVIS  Outcome: Progressing  4/2/2024 0234 by Papa Youngblood RN  Outcome: Progressing     Problem: Safety - Adult  Goal: Free from fall injury  4/2/2024 1438 by BRUCE DAVIS  Outcome: Adequate for Discharge  4/2/2024 1151 by BRUCE DAVIS  Outcome: Progressing  4/2/2024 0234 by Papa Youngblood RN  Outcome: Progressing     Problem: Pain  Goal: Verbalizes/displays adequate comfort level or baseline comfort level  4/2/2024 1438 by BRUCE DAVIS  Outcome: Adequate for Discharge  4/2/2024 1151 by BRUCE DAVIS  Outcome: Progressing  4/2/2024 0234 by Papa Youngblood RN  Outcome: Progressing     Problem: ABCDS Injury Assessment  Goal: Absence of physical injury  4/2/2024 1438 by BRUCE DAVIS  Outcome: Adequate for Discharge  4/2/2024 1151 by BRUCE DAVIS  Outcome: Progressing  4/2/2024 0234 by Papa Youngblood RN  Outcome: Progressing     Problem: Skin/Tissue Integrity  Goal: Absence of new skin breakdown  Description: 1.  Monitor for areas of redness and/or skin breakdown  2.  Assess vascular access sites hourly  3.  Every 4-6 hours minimum:  Change oxygen saturation probe site  4.  Every 4-6 hours:  If on nasal continuous positive airway pressure, respiratory therapy assess nares and determine need for appliance change or resting period.  4/2/2024 1438 by BRUCE DAVIS  Outcome: Adequate for Discharge  4/2/2024 1151 by BRUCE DAVIS  Outcome: Progressing  4/2/2024 0234 by Papa Youngblood RN  Outcome: Progressing

## 2024-04-02 NOTE — CARE COORDINATION
Miah w/[employer] given needed documents to deliver RW to pts bedside. Per Miah to be delivered asap. Electronically signed by PATRICIA SANCHEZ RN on 4/2/2024 at 1:09 PM

## 2024-04-02 NOTE — DISCHARGE SUMMARY
Department of Orthopedic Surgery  Physician Assistant   Discharge Summary    The Sandie Perez is a 73 y.o. female underwent total knee replacement procedure without complication.  Sandie Perez was admitted to the floor following Her recovery in the PACU.     Discharge Diagnosis  right Knee Arthroplasty    Current Inpatient Medications    Current Facility-Administered Medications: insulin NPH (HumuLIN N;NovoLIN N) injection vial 9 Units, 0.125 Units/kg, SubCUTAneous, BID AC  insulin lispro (HUMALOG) injection vial 6 Units, 0.08 Units/kg, SubCUTAneous, TID WC  insulin lispro (HUMALOG) injection vial 0-8 Units, 0-8 Units, SubCUTAneous, TID WC  insulin lispro (HUMALOG) injection vial 0-4 Units, 0-4 Units, SubCUTAneous, Nightly  glucose chewable tablet 16 g, 4 tablet, Oral, PRN  dextrose bolus 10% 125 mL, 125 mL, IntraVENous, PRN **OR** dextrose bolus 10% 250 mL, 250 mL, IntraVENous, PRN  glucagon injection 1 mg, 1 mg, SubCUTAneous, PRN  dextrose 10 % infusion, , IntraVENous, Continuous PRN  lactated ringers IV soln infusion, , IntraVENous, Continuous  sodium chloride flush 0.9 % injection 5-40 mL, 5-40 mL, IntraVENous, 2 times per day  sodium chloride flush 0.9 % injection 5-40 mL, 5-40 mL, IntraVENous, PRN  0.9 % sodium chloride infusion, , IntraVENous, PRN  acetaminophen (TYLENOL) tablet 650 mg, 650 mg, Oral, Q6H  meloxicam (MOBIC) tablet 3.75 mg, 3.75 mg, Oral, Daily  oxyCODONE (ROXICODONE) immediate release tablet 5 mg, 5 mg, Oral, Q4H PRN **OR** oxyCODONE (ROXICODONE) immediate release tablet 10 mg, 10 mg, Oral, Q4H PRN  polyethylene glycol (GLYCOLAX) packet 17 g, 17 g, Oral, Daily  senna (SENOKOT) tablet 8.6 mg, 1 tablet, Oral, Daily PRN  prochlorperazine (COMPAZINE) injection 10 mg, 10 mg, IntraVENous, Q6H PRN  albuterol (PROVENTIL) (2.5 MG/3ML) 0.083% nebulizer solution 2.5 mg, 2.5 mg, Nebulization, Q6H PRN  FLUoxetine (PROZAC) capsule 20 mg, 20 mg, Oral, Daily  pantoprazole (PROTONIX) tablet 40 mg, 40

## 2024-04-02 NOTE — CARE COORDINATION
Case Management Assessment  Initial Evaluation    Date/Time of Evaluation: 4/2/2024 1:05 PM  Assessment Completed by: PATRICIA SANCHEZ RN    If patient is discharged prior to next notation, then this note serves as note for discharge by case management.    Patient Name: Sandie Perez                   YOB: 1950  Diagnosis: Osteoarthritis of right knee [M17.11]  Localized osteoarthritis of right knee [M17.11]                   Date / Time: 4/1/2024  5:24 AM    Patient Admission Status: Outpatient in a bed   Readmission Risk (Low < 19, Mod (19-27), High > 27): No data recorded  Current PCP: Saurabh Lauren, DO  PCP verified by CM? Yes    Chart Reviewed: Yes      History Provided by: Patient  Patient Orientation: Alert and Oriented    Patient Cognition: Alert    Hospitalization in the last 30 days (Readmission):  No    If yes, Readmission Assessment in CM Navigator will be completed.    Advance Directives:      Code Status: Full Code   Patient's Primary Decision Maker is: Named in Scanned ACP Document    Primary Decision Maker: Valerie Quigley - Child - [phone]    Secondary Decision Maker: RufinaFareed rios - Child - [phone]    Discharge Planning:    Patient lives with: Family Members (brother) Type of Home: Other (Comment) (CONDO 2 story)  Primary Care Giver: Self  Patient Support Systems include: Children, Friends/Neighbors   Current Financial resources: Medicare  Current community resources: None  Current services prior to admission: Durable Medical Equipment            Current DME: Wheelchair            Type of Home Care services:  None    ADLS  Prior functional level: Independent in ADLs/IADLs  Current functional level: Assistance with the following:, Cooking, Housework, Shopping, Bathing    PT AM-PAC: 19 /24  OT AM-PAC:   /24    Family can provide assistance at DC: Yes  Would you like Case Management to discuss the discharge plan with any other family members/significant others, and if

## 2024-04-02 NOTE — DISCHARGE INSTR - COC
Continuity of Care Form    Patient Name: Sandie Perez   :  1950  MRN:  [id number]    Admit date:  2024  Discharge date:  ***    Code Status Order: Full Code   Advance Directives:   Advance Care Flowsheet Documentation       Date/Time Healthcare Directive Type of Healthcare Directive Copy in Chart Healthcare Agent Appointed Healthcare Agent's Name Healthcare Agent's Phone Number    24 0601 Yes, patient has an advance directive for healthcare treatment Durable power of  for health care;Health care treatment directive;Living will Yes, copy in chart Adult Children -- --            Admitting Physician:  Baltazar Bowling MD  PCP: Saurabh Lauren DO    Discharging Nurse: ***  Discharging Hospital Unit/Room#: 0535/0535-01  Discharging Unit Phone Number: ***    Emergency Contact:   Extended Emergency Contact Information  Primary Emergency Contact: Valerie Quigley   [facility]  Home Phone: [phone]  Relation: Child  Secondary Emergency Contact: RufinaKaseyy   [facility]  Home Phone: [phone]  Relation: Child    Past Surgical History:  Past Surgical History:   Procedure Laterality Date    COLONOSCOPY N/A 2022    COLONOSCOPY WITH BIOPSY performed by Daniel Ace MD at [facility] ENDOSCOPY    COLONOSCOPY N/A 2022    COLONOSCOPY POLYPECTOMY SNARE/COLD BIOPSY performed by Daniel Ace MD at [facility] ENDOSCOPY    CT NEEDLE BIOPSY LUNG PERCUTANEOUS  9/3/2020    CT NEEDLE BIOPSY LUNG PERCUTANEOUS 9/3/2020 [facility] CT SCAN    CT NEEDLE BIOPSY LUNG PERCUTANEOUS  3/25/2021    CT NEEDLE BIOPSY LUNG PERCUTANEOUS 3/25/2021 Guero Hassan MD [facility] CT SCAN    LITHOTRIPSY      SHOULDER SURGERY      THORACOSCOPY Right 2021    RIGHT VIDEO ASSISTED THORACOSCOPIC SURGERY WITH WEDGE EXCISION OF RIGHT UPPER LOBE NODULE performed by Ernesto Moore MD at [facility] CVOR    THORACOTOMY N/A 10/16/2020    LEFT UPPER LOBECTOMY THORACOTOMY WITH MEDIASTINAL LYMPH NODE DISSECTION, CRYO

## 2024-04-03 ENCOUNTER — TELEPHONE (OUTPATIENT)
Dept: FAMILY MEDICINE CLINIC | Age: 74
End: 2024-04-03

## 2024-04-03 ENCOUNTER — HOSPITAL ENCOUNTER (OUTPATIENT)
Dept: PHYSICAL THERAPY | Age: 74
Setting detail: THERAPIES SERIES
End: 2024-04-03
Attending: ORTHOPAEDIC SURGERY
Payer: MEDICARE

## 2024-04-03 NOTE — TELEPHONE ENCOUNTER
Care Transitions Initial Follow Up Call    Outreach made within 2 business days of discharge: Yes    Patient: Sandie Perez Patient : 1950   MRN: [id number]  Reason for Admission: There are no discharge diagnoses documented for the most recent discharge.  Discharge Date: 24       Spoke with: Sandie Patient had a planned procedure and will be following up with specialist.    Discharge department/facility: [facility] Interactive Patient Contact:  Was patient able to fill all prescriptions: Yes  Was patient instructed to bring all medications to the follow-up visit: Yes  Is patient taking all medications as directed in the discharge summary? Yes  Does patient understand their discharge instructions: Yes  Does patient have questions or concerns that need addressed prior to 7-14 day follow up office visit: no    Scheduled appointment with PCP within 7-14 days    Follow Up  Future Appointments   Date Time Provider Department Center   2024  2:00 PM Guanaco Coleman, PT [facility] PT Al[facility]   2024 10:15 AM Baltazar Bowling MD AND ORTHO [facility]   7/10/2024  1:15 PM Saurabh Lauren, DO [facility] [place] [facility]   8/15/2024 10:30 AM [facility] Al[facility] Rad   8/15/2024 11:20 AM Simone Grove MD AND PULM [facility] Lombardo LPN

## 2024-04-04 ENCOUNTER — TELEPHONE (OUTPATIENT)
Dept: ORTHOPEDIC SURGERY | Age: 74
End: 2024-04-04

## 2024-04-04 ENCOUNTER — HOSPITAL ENCOUNTER (OUTPATIENT)
Dept: PHYSICAL THERAPY | Age: 74
Setting detail: THERAPIES SERIES
Discharge: HOME | End: 2024-04-04
Attending: ORTHOPAEDIC SURGERY
Payer: MEDICARE

## 2024-04-04 DIAGNOSIS — Z74.09 STIFFNESS DUE TO IMMOBILITY: ICD-10-CM

## 2024-04-04 DIAGNOSIS — M25.561 ACUTE PAIN OF RIGHT KNEE: Primary | ICD-10-CM

## 2024-04-04 DIAGNOSIS — R22.41 LOCALIZED SWELLING, MASS, OR LUMP OF RIGHT LOWER EXTREMITY: ICD-10-CM

## 2024-04-04 DIAGNOSIS — R53.1 WEAKNESS: ICD-10-CM

## 2024-04-04 DIAGNOSIS — Z96.651 PRESENCE OF TOTAL RIGHT KNEE JOINT PROSTHESIS: ICD-10-CM

## 2024-04-04 DIAGNOSIS — M25.60 STIFFNESS DUE TO IMMOBILITY: ICD-10-CM

## 2024-04-04 PROCEDURE — 97016 VASOPNEUMATIC DEVICE THERAPY: CPT

## 2024-04-04 PROCEDURE — 97161 PT EVAL LOW COMPLEX 20 MIN: CPT

## 2024-04-04 PROCEDURE — 97110 THERAPEUTIC EXERCISES: CPT

## 2024-04-04 NOTE — PLAN OF CARE
Reps CUES NEEDED                                      Therapeutic Activity (90498)  Sets/time                                          Modalities:    Vasopneumatic Compression (Game Ready): Applied to Knee Right for significant edema, swelling, pain control for 15 minutes.  Relevant ICD-10 R22.4 Localized swelling of lower limb.   Girth Left Right Post Vaso   Knee: Midpatella 39cm 41.5cm 41.0cm   Knee: 5 cm above      Knee: 15 cm above      Ankle: transmalleolar      Ankle: figure 8              Education/Home Exercise Program: Patient HEP program created electronically.  Refer to [employer] access code: Access Code [email or web address]    Access Code: [email or web address]  URL: [email or web address]  Date: 04/04/2024  Prepared by: Guanaco Coleman    Program Notes  Complete reps to tolerance dependent on varying signs/symptoms throughout the day. However, below are the recommended reps to alleviate your condition.    Exercises  - Supine Quad Set  - 5 x daily - 7 x weekly - 1 sets - 10 reps - 10sec hold  - Long Sitting Calf Stretch with Strap  - 5 x daily - 7 x weekly - 1 sets - 10 reps - 10sec hold  - Seated Hamstring Stretch  - 5 x daily - 7 x weekly - 1 sets - 10 reps - 10sec hold  - Supine Heel Slide with Strap  - 5 x daily - 7 x weekly - 10 reps - 10sec hold  - Ice  - 1-3 x daily - 7 x weekly - 6-20mins hold          ASSESSMENT   Assessment:   Sandie Perez is a 73 y.o. female presenting today to Outpatient PT with signs and symptoms consistent with R TKR 4/1/24.    Pt. presents with the functional impairments and activity limitations listed below and would benefit from Outpatient PT to address the below impairments as well as improve pain, and restore function.     Functional Impairments:   Noted lumbar/proximal hip/LE joint hypomobility  Decreased LE functional ROM  Decreased core/proximal hip strength and neuromuscular control  Decreased LE functional strength     Functional Activity Limitations (from functional questionnaire

## 2024-04-04 NOTE — TELEPHONE ENCOUNTER
Attempted to contact pt, left voicemail with purpose and call back number.    Mima Sierra  Orthopedic Nurse Navigator  Phone number: [phone]    Follow up appointments:    Future Appointments   Date Time Provider Department Center   4/4/2024  2:00 PM Guanaco Coleman, PT [facility] AND [facility]   4/18/2024 10:15 AM Baltazar Bowling MD AND ORTHO [facility]   7/10/2024  1:15 PM Saurabh Lauren, DO [facility]  [place] [facility]   8/15/2024 10:30 AM [facility] CT [facility]   8/15/2024 11:20 AM [facility]

## 2024-04-09 ENCOUNTER — HOSPITAL ENCOUNTER (OUTPATIENT)
Dept: PHYSICAL THERAPY | Age: 74
Setting detail: THERAPIES SERIES
Discharge: HOME | End: 2024-04-09
Attending: ORTHOPAEDIC SURGERY
Payer: MEDICARE

## 2024-04-09 PROCEDURE — 97140 MANUAL THERAPY 1/> REGIONS: CPT | Performed by: PHYSICAL THERAPIST

## 2024-04-09 PROCEDURE — 97016 VASOPNEUMATIC DEVICE THERAPY: CPT | Performed by: PHYSICAL THERAPIST

## 2024-04-09 PROCEDURE — 97110 THERAPEUTIC EXERCISES: CPT | Performed by: PHYSICAL THERAPIST

## 2024-04-09 NOTE — FLOWSHEET NOTE
Physical Therapy: TREATMENT/PROGRESS NOTE   Patient: Sandie Perez (73 y.o. female)   Examination Date: 2024   :  1950 MRN: [id number]   Visit #: 2   Insurance Allowable Auth Needed   BMN []Yes    []No    Insurance: Payor: MEDICARE / Plan: MEDICARE PART A AND B / Product Type: *No Product type* /   Insurance ID: [id number] - (Medicare)  Secondary Insurance (if applicable): [facility]   Treatment Diagnosis:     ICD-10-CM    1. Acute pain of right knee  M25.561       2. 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 Weakness  R53.1       4. Stiffness due to immobility  M25.60     Z74.09          Medical Diagnosis:  S/P total knee arthroplasty, right [Z96.651]   Referring Physician: Baltazar Bowling MD  PCP: Saurabh Lauren DO       Plan of care signed (Y/N): NO    Date of Patient follow up with Physician: 24 10:15am     Progress Report/POC: NO  POC update due: (10 visits /OR AUTH LIMITS, whichever is less)  5/3/2024                                             Precautions/ Contra-indications:           Latex allergy:  NO  Pacemaker:    NO  Contraindications for Manipulation: recent surgical history (relative)  Date of Surgery: 24 R TKA  Other:    Red Flags:  None    C-SSRS Triggered by Intake questionnaire:   [x] No, Questionnaire did not trigger screening.   [] Yes, Patient intake triggered further evaluation      [] C-SSRS Screening completed  [] PCP notified via Plan of Care  [] Emergency services notified     Preferred Language for Healthcare:   [x] English       [] other:    SUBJECTIVE EXAMINATION     Patient stated complaint: Pt reports she has been with her daughter and she is making sure she does her exercises properly as well as her bike. She began taking the Meloxicam yesterday and feels better now. Pt was bale to get herself dressed today as well.       Test used Initial score  2024   Pain Summary VAS 6-7/10    Functional questionnaire

## 2024-04-10 ENCOUNTER — TELEPHONE (OUTPATIENT)
Dept: ORTHOPEDIC SURGERY | Age: 74
End: 2024-04-10

## 2024-04-10 NOTE — TELEPHONE ENCOUNTER
Attempted to contact pt, left voicemail with purpose and call back number.    Mima Sierra  Orthopedic Nurse Navigator  Phone number: [phone]    Follow up appointments:    Future Appointments   Date Time Provider Department Center   4/11/2024  2:00 PM Guanaco Coleman, PT [facility] [place]   4/15/2024  2:00 PM Guanaco Coleman, PT [facility]   4/18/2024  9:20 AM Guanaco Coleman, PT [facility] [place]   4/18/2024 10:15 AM Baltazar Bowling MD AND [facility]   7/10/2024  1:15 PM Saurabh Lauren, DO [facility] [place] [facility]   8/15/2024 10:30 AM [facility] CT [facility]CT [facility]   8/15/2024 11:20 AM [facility]

## 2024-04-11 ENCOUNTER — HOSPITAL ENCOUNTER (OUTPATIENT)
Dept: PHYSICAL THERAPY | Age: 74
Setting detail: THERAPIES SERIES
Discharge: HOME | End: 2024-04-11
Attending: ORTHOPAEDIC SURGERY
Payer: MEDICARE

## 2024-04-11 PROCEDURE — 97110 THERAPEUTIC EXERCISES: CPT

## 2024-04-11 PROCEDURE — 97016 VASOPNEUMATIC DEVICE THERAPY: CPT

## 2024-04-11 PROCEDURE — 97140 MANUAL THERAPY 1/> REGIONS: CPT

## 2024-04-11 NOTE — FLOWSHEET NOTE
Adjusted  Patient will increase LE function to allow independence in all self-care activities.             Status: [] Progressing: [] Met: [] Not Met: [] Adjusted      TREATMENT PLAN     Frequency/Duration: 1-2x/week for 8-10 weeks for the following treatment interventions:    Interventions:  [x] Therapeutic exercise including: strength training, ROM, including postural re-education.   [x] NMR activation and proprioception, including postural re-education.    [x] Manual therapy as indicated to include: PROM, Gr I-IV mobilizations, STM, and Dry Needling/IASTM  [x] Modalities as needed that may include: Cryotherapy, Electrical Stimulation, and Biofeedback  [x] Patient education on joint protection, postural re-education, activity modification, progression of HEP.        [] Aquatic Therapy    Plan: Cont POC- Continue emphasis/focus on increasing ROM. Next visit plan to add new exercises and adjust HEP , focus on quad contraction and TKE    Electronically Signed by Guanaco Coleman, PT  Date: 04/11/2024     Note: Portions of this note have been templated and/or copied from initial evaluation, reassessments and prior notes for documentation efficiency.

## 2024-04-15 ENCOUNTER — APPOINTMENT (OUTPATIENT)
Dept: PHYSICAL THERAPY | Age: 74
End: 2024-04-15
Attending: ORTHOPAEDIC SURGERY
Payer: MEDICARE

## 2024-04-18 ENCOUNTER — OFFICE VISIT (OUTPATIENT)
Dept: ORTHOPEDIC SURGERY | Age: 74
End: 2024-04-18

## 2024-04-18 ENCOUNTER — HOSPITAL ENCOUNTER (OUTPATIENT)
Dept: PHYSICAL THERAPY | Age: 74
Setting detail: THERAPIES SERIES
Discharge: HOME | End: 2024-04-18
Attending: ORTHOPAEDIC SURGERY
Payer: MEDICARE

## 2024-04-18 VITALS — WEIGHT: 165 LBS | HEIGHT: 65 IN | BODY MASS INDEX: 27.49 KG/M2

## 2024-04-18 DIAGNOSIS — Z96.652 S/P TOTAL KNEE ARTHROPLASTY, LEFT: Primary | ICD-10-CM

## 2024-04-18 PROCEDURE — 97140 MANUAL THERAPY 1/> REGIONS: CPT

## 2024-04-18 PROCEDURE — 97112 NEUROMUSCULAR REEDUCATION: CPT

## 2024-04-18 PROCEDURE — 97110 THERAPEUTIC EXERCISES: CPT

## 2024-04-18 NOTE — FLOWSHEET NOTE
Physical Therapy: TREATMENT/PROGRESS NOTE   Patient: Sandie Perez (73 y.o. female)   Examination Date: 2024   :  1950 MRN: [id number]   Visit #: 4   Insurance Allowable Auth Needed   BMN []Yes    []No    Insurance: Payor: MEDICARE / Plan: MEDICARE PART A AND B / Product Type: *No Product type* /   Insurance ID: [id number] - (Medicare)  Secondary Insurance (if applicable): [facility]   Treatment Diagnosis:     ICD-10-CM    1. Acute pain of right knee  M25.561       2. 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 Weakness  R53.1       4. Stiffness due to immobility  M25.60     Z74.09          Medical Diagnosis:  S/P total knee arthroplasty, right [Z96.651]   Referring Physician: Baltazar Bowling MD  PCP: Saurabh Lauren DO       Plan of care signed (Y/N): NO    Date of Patient follow up with Physician: 24 10:15am     Progress Report/POC: NO  POC update due: (10 visits /OR AUTH LIMITS, whichever is less)  5/3/2024                                             Precautions/ Contra-indications:           Latex allergy:  NO  Pacemaker:    NO  Contraindications for Manipulation: recent surgical history (relative)  Date of Surgery: 24 R TKA  Other:    Red Flags:  None    C-SSRS Triggered by Intake questionnaire:   [x] No, Questionnaire did not trigger screening.   [] Yes, Patient intake triggered further evaluation      [] C-SSRS Screening completed  [] PCP notified via Plan of Care  [] Emergency services notified     Preferred Language for Healthcare:   [x] English       [] other:    SUBJECTIVE EXAMINATION     Patient stated complaint: Sandie is able to sleep throughout the night now and notes that she is completing the program 4-5x/day with daughter setting a timer for every 2-3 hours.        Test used Initial score  2024   Pain Summary VAS 6-7/10    Functional questionnaire WOMAC 54/96    Other:                  OBJECTIVE EXAMINATION   Observation:

## 2024-04-18 NOTE — PROGRESS NOTES
Dr Baltazar Bowling      Date /Time 4/18/2024       10:11 AM EDT  Name Sandie Perez             1950   [facility]  MRN [id number]                Chief Complaint   Patient presents with    Follow-up     1st PO Left TKA SX 04/0/12024        History of Present Illness      Sandie Perez is a 73 y.o. female is here for post-op visit after LEFT  33333 Total Knee Arthroplasty    Patient presents to the office today for follow-up visit.  Patient's status post total knee arthroplasty.  Patient doing well.  Patient denies fever, chills, or drainage.  Pain controlled.    Physical Exam    Based off 1997 Exam Criteria    LMP  (LMP Unknown)      Constitutional:       General: He is not in acute distress.     Appearance: Normal appearance.     LEFT Knee: incision clean, intact, healing appropriately. No surrounding  erythema or fluctuance. Neuro intact distal. No evidence of DVT.    Range of motion:0-125    Imaging         Assessment and Plan    Sandie was seen today for follow-up.    Diagnoses and all orders for this visit:    S/P total knee arthroplasty, left        Patient doing well.  Patient will continue with physical therapy and maximizing range of motion.  Follow-up in 3 months for range of motion check and x-rays or sooner if problems arise.    Electronically signed by Baltazar Bowling MD on 4/18/2024 at 10:11 AM  This dictation was generated by voice recognition computer software.  Although all attempts are made to edit the dictation for accuracy, there may be errors in the transcription that are not intended.

## 2024-04-23 ENCOUNTER — HOSPITAL ENCOUNTER (OUTPATIENT)
Dept: PHYSICAL THERAPY | Age: 74
Setting detail: THERAPIES SERIES
Discharge: HOME | End: 2024-04-23
Attending: ORTHOPAEDIC SURGERY
Payer: MEDICARE

## 2024-04-23 PROCEDURE — 97110 THERAPEUTIC EXERCISES: CPT

## 2024-04-23 PROCEDURE — 97016 VASOPNEUMATIC DEVICE THERAPY: CPT

## 2024-04-23 PROCEDURE — 97112 NEUROMUSCULAR REEDUCATION: CPT

## 2024-04-23 NOTE — FLOWSHEET NOTE
Physical Therapy: TREATMENT/PROGRESS NOTE   Patient: Sandie Perez (73 y.o. female)   Examination Date: 2024   :  1950 MRN: [id number]   Visit #: 4   Insurance Allowable Auth Needed   BMN []Yes    []No    Insurance: Payor: MEDICARE / Plan: MEDICARE PART A AND B / Product Type: *No Product type* /   Insurance ID: [id number] - (Medicare)  Secondary Insurance (if applicable): [facility]   Treatment Diagnosis:       ICD-10-CM     R knee pain                                            Medical Diagnosis:  S/P total knee arthroplasty, right [Z96.651]   Referring Physician: Baltazar Bowling MD  PCP: Saurabh Lauren DO         Plan of care signed (Y/N): NO     Date of Patient follow up with Physician: 24 10:15am     Progress Report/POC: NO  POC update due: (10 visits /OR AUTH LIMITS, whichever is less)  5/3/2024                                             Precautions/ Contra-indications:                                                                                          Latex allergy:  NO  Pacemaker:    NO  Contraindications for Manipulation: recent surgical history (relative)  Date of Surgery: 24 R TKA  Other:     Red Flags:  None     C-SSRS Triggered by Intake questionnaire:   [x] No, Questionnaire did not trigger screening.   [] Yes, Patient intake triggered further evaluation      [] C-SSRS Screening completed  [] PCP notified via Plan of Care  [] Emergency services notified      Preferred Language for Healthcare:   [x] English       [] other:     SUBJECTIVE EXAMINATION      Patient stated complaint: Pt reports her knee is doing well           Test used Initial score  2024   Pain Summary VAS 6-7/10  3   Functional questionnaire WOMAC 54/96     Other:                            OBJECTIVE EXAMINATION   Observation:    Tino's (-), bandage intact with no drainage     ROM/Strength: (Blank cells denote NT)      24 KE/KF supine SB rolls -2 to 106 R

## 2024-04-25 ENCOUNTER — APPOINTMENT (OUTPATIENT)
Dept: PHYSICAL THERAPY | Age: 74
End: 2024-04-25
Attending: ORTHOPAEDIC SURGERY
Payer: MEDICARE

## 2024-04-30 ENCOUNTER — TELEPHONE (OUTPATIENT)
Dept: FAMILY MEDICINE CLINIC | Age: 74
End: 2024-04-30

## 2024-04-30 DIAGNOSIS — R13.19 ESOPHAGEAL DYSPHAGIA: ICD-10-CM

## 2024-04-30 DIAGNOSIS — K21.00 GASTROESOPHAGEAL REFLUX DISEASE WITH ESOPHAGITIS, UNSPECIFIED WHETHER HEMORRHAGE: ICD-10-CM

## 2024-04-30 DIAGNOSIS — F33.1 MODERATE EPISODE OF RECURRENT MAJOR DEPRESSIVE DISORDER (HCC): ICD-10-CM

## 2024-04-30 RX ORDER — OMEPRAZOLE 40 MG/1
40 CAPSULE, DELAYED RELEASE ORAL
Qty: 90 CAPSULE | Refills: 1 | Status: SHIPPED | OUTPATIENT
Start: 2024-04-30

## 2024-04-30 RX ORDER — FLUOXETINE HYDROCHLORIDE 20 MG/1
60 CAPSULE ORAL DAILY
Qty: 90 CAPSULE | Refills: 1 | Status: SHIPPED | OUTPATIENT
Start: 2024-04-30

## 2024-04-30 NOTE — TELEPHONE ENCOUNTER
Refill Request     CONFIRM preferred pharmacy with the patient.    If Mail Order Rx - Pend for 90 day refill.      Last Seen: Last Seen Department: 3/14/2024  Last Seen by PCP: 3/14/2024    Last Written: 12/1/2023    If no future appointment scheduled:  Review the last OV with PCP and review information for follow-up visit,  Route STAFF MESSAGE with patient name to the  Pool for scheduling with the following information:            -  Timing of next visit           -  Visit type ie Physical, OV, etc           -  Diagnoses/Reason ie. COPD, HTN - Do not use MEDICATION, Follow-up or CHECK UP - Give reason for visit      Next Appointment:   Future Appointments   Date Time Provider Department Center   5/2/2024 10:15 AM Baltazar Bowling MD AND [facility]   7/10/2024  1:15 PM Saurabh Lauren, DO [facility]  [place] - [facility]   7/18/2024  1:15 PM Baltazar Bowling MD AND [facility]TH[facility]   8/15/2024 10:30 AM [facility] CT VCT [facility] CT [facility]   8/15/2024 11:20 AM Simone Grove MD AND [facility]       Message sent to  to schedule appt with patient?  NO      Requested Prescriptions     Pending Prescriptions Disp Refills    omeprazole (PRILOSEC) 40 MG delayed release capsule [Pharmacy Med Name: OMEPRAZOLE DR 40 MG CAPSULE] 90 capsule 1     Sig: TAKE 1 CAPSULE BY MOUTH EVERY DAY IN THE MORNING BEFORE BREAKFAST

## 2024-04-30 NOTE — TELEPHONE ENCOUNTER
Patient calling in stating she has been taking 2 20mg Prozac. She is asking for an increased dose to be sent to the pharmacy due to her running out. States the the 40mg helps a little but she is still very anxious and emotional. Please advise

## 2024-05-02 ENCOUNTER — OFFICE VISIT (OUTPATIENT)
Dept: ORTHOPEDIC SURGERY | Age: 74
End: 2024-05-02

## 2024-05-02 VITALS — WEIGHT: 165 LBS | BODY MASS INDEX: 27.49 KG/M2 | HEIGHT: 65 IN

## 2024-05-02 DIAGNOSIS — Z96.651 S/P REVISION OF TOTAL KNEE, RIGHT: Primary | ICD-10-CM

## 2024-05-02 DIAGNOSIS — Z96.652 S/P TOTAL KNEE ARTHROPLASTY, LEFT: ICD-10-CM

## 2024-05-02 PROCEDURE — 99024 POSTOP FOLLOW-UP VISIT: CPT | Performed by: ORTHOPAEDIC SURGERY

## 2024-05-02 NOTE — PROGRESS NOTES
Dr Baltazar Bowling      Date /Time 4/18/2024       10:11 AM EDT  Name Sandie Perez             1950   [place]  [facility] [place] [facility]  MRN [id number]                Chief Complaint   Patient presents with    Follow-up     1st PO Left TKA SX 04/0/12024        History of Present Illness      Sandie Perez is a 73 y.o. female is here for post-op visit after LEFT  74671 Total Knee Arthroplasty    Patient presents to the office today for follow-up visit.  Patient's status post total knee arthroplasty.  Patient doing well.  Patient denies fever, chills, or drainage.  Pain controlled.  Patient feels she may have tweaked her knee    Physical Exam    Based off 1997 Exam Criteria    LMP  (LMP Unknown)      Constitutional:       General: He is not in acute distress.     Appearance: Normal appearance.     LEFT Knee: incision clean, intact, healing appropriately. No surrounding  erythema or fluctuance. Neuro intact distal. No evidence of DVT.    Range of motion:0-130    Imaging         Assessment and Plan    Sandie was seen today for follow-up.    Diagnoses and all orders for this visit:    S/P total knee arthroplasty, left        Patient doing well.  Patient will continue with physical therapy and maximizing range of motion.  Follow-up in 2 months for range of motion check and x-rays or sooner if problems arise.    Electronically signed by Baltazar Bowling MD on 4/18/2024 at 10:11 AM  This dictation was generated by voice recognition computer software.  Although all attempts are made to edit the dictation for accuracy, there may be errors in the transcription that are not intended.

## 2024-05-07 ENCOUNTER — TELEPHONE (OUTPATIENT)
Dept: FAMILY MEDICINE CLINIC | Age: 74
End: 2024-05-07

## 2024-05-23 DIAGNOSIS — F33.1 MODERATE EPISODE OF RECURRENT MAJOR DEPRESSIVE DISORDER (HCC): ICD-10-CM

## 2024-05-23 RX ORDER — FLUOXETINE HYDROCHLORIDE 20 MG/1
60 CAPSULE ORAL DAILY
Qty: 270 CAPSULE | Refills: 1 | Status: SHIPPED | OUTPATIENT
Start: 2024-05-23

## 2024-05-23 NOTE — TELEPHONE ENCOUNTER
Refill Request     CONFIRM preferred pharmacy with the patient.    If Mail Order Rx - Pend for 90 day refill.      Last Seen: Last Seen Department: 3/14/2024  Last Seen by PCP: 3/14/2024    Last Written: 04/30/2024 90 cap 1 refills     If no future appointment scheduled:  Review the last OV with PCP and review information for follow-up visit,  Route STAFF MESSAGE with patient name to the  Pool for scheduling with the following information:            -  Timing of next visit           -  Visit type ie Physical, OV, etc           -  Diagnoses/Reason ie. COPD, HTN - Do not use MEDICATION, Follow-up or CHECK UP - Give reason for visit      Next Appointment:   Future Appointments   Date Time Provider Department Center   7/10/2024  1:15 PM Saurabh Lauren, DO [facility]  [place] [facility]   7/18/2024  1:15 PM Baltazar Bowling MD AND [facility]   8/15/2024 10:30 AM [facility]CT [place] [facility]   8/15/2024 11:20 AM Simone Grove MD AND [facility]       Message sent to  to schedule appt with patient?  NO      Requested Prescriptions     Pending Prescriptions Disp Refills    FLUoxetine (PROZAC) 20 MG capsule [Pharmacy Med Name: FLUOXETINE HCL 20 MG CAPSULE] 270 capsule 1     Sig: TAKE 3 CAPSULES BY MOUTH EVERY DAY

## 2024-05-24 ENCOUNTER — TELEPHONE (OUTPATIENT)
Dept: ORTHOPEDIC SURGERY | Age: 74
End: 2024-05-24

## 2024-05-24 NOTE — TELEPHONE ENCOUNTER
General Question     Subject: SOONER APPT  Patient and /or Facility Request: Sandie Perez   Contact Number: [phone]     PT CLLED TO REQ AN APPT FOR TODAY AT [place] OFFICE ADV DR. COLLINS WAS AT [place] TODAY OFFERED TUES PT DECLINED SAID HER KNEE WAS POPPING WHEN SHE BENDS IT

## 2024-05-28 ENCOUNTER — OFFICE VISIT (OUTPATIENT)
Dept: ORTHOPEDIC SURGERY | Age: 74
End: 2024-05-28

## 2024-05-28 VITALS — HEIGHT: 65 IN | WEIGHT: 152 LBS | BODY MASS INDEX: 25.33 KG/M2

## 2024-05-28 DIAGNOSIS — Z96.652 S/P TOTAL KNEE ARTHROPLASTY, LEFT: Primary | ICD-10-CM

## 2024-05-28 DIAGNOSIS — Z96.651 S/P REVISION OF TOTAL KNEE, RIGHT: ICD-10-CM

## 2024-05-28 PROCEDURE — 99024 POSTOP FOLLOW-UP VISIT: CPT | Performed by: ORTHOPAEDIC SURGERY

## 2024-05-28 NOTE — PROGRESS NOTES
Dr Baltazar Bowling      Date /Time 5/28/2024       10:11 AM EDT  Name Sandie Perez             1950   [place]  [facility] [place] [facility]  MRN [id number]                Chief Complaint   Patient presents with    Follow-up     Right knee         History of Present Illness      Sandie Perez is a 73 y.o. female is here for post-op visit after RIGHT  69282 Total Knee Arthroplasty    Patient presents to the office today for follow-up visit.  Patient's status post total knee arthroplast on April 1, 2024.  Patient called late last week and stated that she has a recurrent popping sensation.  Patient denies fever, chills, or drainage.  Pain controlled.  Patient feels she may have tweaked her knee    Physical Exam    Based off 1997 Exam Criteria    LMP  (LMP Unknown)      Constitutional:       General: He is not in acute distress.     Appearance: Normal appearance.     LEFT Knee: incision clean, intact, healing appropriately. No surrounding  erythema or fluctuance. Neuro intact distal. No evidence of DVT.  Slight instability to varus and valgus stress    Range of motion:0-130    Imaging         Assessment and Plan    Sandie was seen today for follow-up.    Diagnoses and all orders for this visit:    S/P total knee arthroplasty, left    S/P revision of total knee, right  -     Kneed-It $35          Patient doing well.  I do believe most of her symptoms are coming from her patellofemoral joint.  We will put her into a sleeve.  We will also have her revisit physical therapy for quad strengthening.  Follow-up in 2 months or sooner if problems arise      Electronically signed by Baltazar Bowling MD on 5/28/2024 at 10:14 AM  This dictation was generated by voice recognition computer software.  Although all attempts are made to edit the dictation for accuracy, there may be errors in the transcription that are not intended.

## 2024-06-07 ENCOUNTER — TELEPHONE (OUTPATIENT)
Dept: FAMILY MEDICINE CLINIC | Age: 74
End: 2024-06-07

## 2024-06-07 DIAGNOSIS — F41.9 ANXIETY: Primary | ICD-10-CM

## 2024-06-07 RX ORDER — HYDROXYZINE HYDROCHLORIDE 25 MG/1
25 TABLET, FILM COATED ORAL EVERY 8 HOURS PRN
Qty: 30 TABLET | Refills: 0 | Status: SHIPPED | OUTPATIENT
Start: 2024-06-07 | End: 2024-06-17

## 2024-06-07 NOTE — TELEPHONE ENCOUNTER
Patient called the office asking if you could call her in some Xanax to help her get through taking care of Jorge, . She is trying to keep up with all of his medications, ADL's, and doctors appt. Patient is feeling overwhelmed at times. Thanks.

## 2024-06-11 NOTE — TELEPHONE ENCOUNTER
Received phone call from the patient in regards to, the atarax caused her to have shakiness, nervous and she is asking If an alternative can be sent in for her to take PRN. Alprazolam is what she mentioned for you to send in. Please advise thank you

## 2024-06-12 NOTE — TELEPHONE ENCOUNTER
Received phone call back from the patient, advised of information pt stated never mind dont worry about this request. She stated that she will deal with it. Offered pt an appt VV or telephone call. Pt stated that she has so much going on and a lot of other appointment for celina at this time. Pt did not mention following up with any referrals for psychiatry given. YAKELIN Lauren

## 2024-06-12 NOTE — TELEPHONE ENCOUNTER
Please help schedule virtual or telephone visit to discuss.  Would not recommend starting any acute benzo medication like Xanax at this time.  Please see if she has been able to reach out to psychiatry offices which we have previously given.

## 2024-06-22 ENCOUNTER — HOSPITAL ENCOUNTER (EMERGENCY)
Age: 74
Discharge: HOME | End: 2024-06-22
Attending: EMERGENCY MEDICINE
Payer: MEDICARE

## 2024-06-22 ENCOUNTER — APPOINTMENT (OUTPATIENT)
Dept: CT IMAGING | Age: 74
End: 2024-06-22
Payer: MEDICARE

## 2024-06-22 VITALS
HEIGHT: 65 IN | OXYGEN SATURATION: 97 % | BODY MASS INDEX: 24.99 KG/M2 | TEMPERATURE: 97.6 F | HEART RATE: 87 BPM | WEIGHT: 150 LBS | SYSTOLIC BLOOD PRESSURE: 169 MMHG | RESPIRATION RATE: 17 BRPM | DIASTOLIC BLOOD PRESSURE: 99 MMHG

## 2024-06-22 DIAGNOSIS — R42 VERTIGO: Primary | ICD-10-CM

## 2024-06-22 DIAGNOSIS — R11.0 NAUSEA: ICD-10-CM

## 2024-06-22 LAB
ANION GAP SERPL CALCULATED.3IONS-SCNC: 11 MMOL/L (ref 3–16)
BASOPHILS # BLD: 0 K/UL (ref 0–0.2)
BASOPHILS NFR BLD: 0.4 %
BILIRUB UR QL STRIP.AUTO: NEGATIVE
BUN SERPL-MCNC: 14 MG/DL (ref 7–20)
CALCIUM SERPL-MCNC: 9.5 MG/DL (ref 8.3–10.6)
CHLORIDE SERPL-SCNC: 103 MMOL/L (ref 99–110)
CLARITY UR: CLEAR
CO2 SERPL-SCNC: 25 MMOL/L (ref 21–32)
COLOR UR: YELLOW
CREAT SERPL-MCNC: 0.6 MG/DL (ref 0.6–1.2)
DEPRECATED RDW RBC AUTO: 13.6 % (ref 12.4–15.4)
EKG ATRIAL RATE: 73 BPM
EKG DIAGNOSIS: NORMAL
EKG P AXIS: 79 DEGREES
EKG P-R INTERVAL: 160 MS
EKG Q-T INTERVAL: 420 MS
EKG QRS DURATION: 82 MS
EKG QTC CALCULATION (BAZETT): 462 MS
EKG R AXIS: 15 DEGREES
EKG T AXIS: 22 DEGREES
EKG VENTRICULAR RATE: 73 BPM
EOSINOPHIL # BLD: 0.1 K/UL (ref 0–0.6)
EOSINOPHIL NFR BLD: 1.9 %
GFR SERPLBLD CREATININE-BSD FMLA CKD-EPI: >90 ML/MIN/{1.73_M2}
GLUCOSE SERPL-MCNC: 128 MG/DL (ref 70–99)
GLUCOSE UR STRIP.AUTO-MCNC: NEGATIVE MG/DL
HCT VFR BLD AUTO: 43.7 % (ref 36–48)
HGB BLD-MCNC: 14.8 G/DL (ref 12–16)
HGB UR QL STRIP.AUTO: NEGATIVE
KETONES UR STRIP.AUTO-MCNC: NEGATIVE MG/DL
LEUKOCYTE ESTERASE UR QL STRIP.AUTO: NEGATIVE
LYMPHOCYTES # BLD: 2.6 K/UL (ref 1–5.1)
LYMPHOCYTES NFR BLD: 41.4 %
MAGNESIUM SERPL-MCNC: 2.1 MG/DL (ref 1.8–2.4)
MCH RBC QN AUTO: 28.2 PG (ref 26–34)
MCHC RBC AUTO-ENTMCNC: 33.8 G/DL (ref 31–36)
MCV RBC AUTO: 83.3 FL (ref 80–100)
MONOCYTES # BLD: 0.5 K/UL (ref 0–1.3)
MONOCYTES NFR BLD: 7.2 %
NEUTROPHILS # BLD: 3.1 K/UL (ref 1.7–7.7)
NEUTROPHILS NFR BLD: 49.1 %
NITRITE UR QL STRIP.AUTO: NEGATIVE
NT-PROBNP SERPL-MCNC: 102 PG/ML (ref 0–449)
PH UR STRIP.AUTO: 7 [PH] (ref 5–8)
PLATELET # BLD AUTO: 168 K/UL (ref 135–450)
PMV BLD AUTO: 9.3 FL (ref 5–10.5)
POTASSIUM SERPL-SCNC: 3.2 MMOL/L (ref 3.5–5.1)
PROT UR STRIP.AUTO-MCNC: NEGATIVE MG/DL
RBC # BLD AUTO: 5.25 M/UL (ref 4–5.2)
SODIUM SERPL-SCNC: 139 MMOL/L (ref 136–145)
SP GR UR STRIP.AUTO: 1.01 (ref 1–1.03)
TROPONIN, HIGH SENSITIVITY: 10 NG/L (ref 0–14)
TROPONIN, HIGH SENSITIVITY: 10 NG/L (ref 0–14)
UA COMPLETE W REFLEX CULTURE PNL UR: NORMAL
UA DIPSTICK W REFLEX MICRO PNL UR: NORMAL
URN SPEC COLLECT METH UR: NORMAL
UROBILINOGEN UR STRIP-ACNC: 0.2 E.U./DL
WBC # BLD AUTO: 6.4 K/UL (ref 4–11)

## 2024-06-22 PROCEDURE — 81003 URINALYSIS AUTO W/O SCOPE: CPT

## 2024-06-22 PROCEDURE — 96374 THER/PROPH/DIAG INJ IV PUSH: CPT

## 2024-06-22 PROCEDURE — 85025 COMPLETE CBC W/AUTO DIFF WBC: CPT

## 2024-06-22 PROCEDURE — 84484 ASSAY OF TROPONIN QUANT: CPT

## 2024-06-22 PROCEDURE — 36415 COLL VENOUS BLD VENIPUNCTURE: CPT

## 2024-06-22 PROCEDURE — 80048 BASIC METABOLIC PNL TOTAL CA: CPT

## 2024-06-22 PROCEDURE — 93005 ELECTROCARDIOGRAM TRACING: CPT | Performed by: EMERGENCY MEDICINE

## 2024-06-22 PROCEDURE — 6360000002 HC RX W HCPCS: Performed by: EMERGENCY MEDICINE

## 2024-06-22 PROCEDURE — 70450 CT HEAD/BRAIN W/O DYE: CPT

## 2024-06-22 PROCEDURE — 6370000000 HC RX 637 (ALT 250 FOR IP): Performed by: EMERGENCY MEDICINE

## 2024-06-22 PROCEDURE — 83735 ASSAY OF MAGNESIUM: CPT

## 2024-06-22 PROCEDURE — 93010 ELECTROCARDIOGRAM REPORT: CPT | Performed by: INTERNAL MEDICINE

## 2024-06-22 PROCEDURE — 83880 ASSAY OF NATRIURETIC PEPTIDE: CPT

## 2024-06-22 PROCEDURE — 99284 EMERGENCY DEPT VISIT MOD MDM: CPT

## 2024-06-22 PROCEDURE — 2580000003 HC RX 258: Performed by: EMERGENCY MEDICINE

## 2024-06-22 RX ORDER — 0.9 % SODIUM CHLORIDE 0.9 %
1000 INTRAVENOUS SOLUTION INTRAVENOUS ONCE
Status: COMPLETED | OUTPATIENT
Start: 2024-06-22 | End: 2024-06-22

## 2024-06-22 RX ORDER — ONDANSETRON 2 MG/ML
4 INJECTION INTRAMUSCULAR; INTRAVENOUS ONCE
Status: COMPLETED | OUTPATIENT
Start: 2024-06-22 | End: 2024-06-22

## 2024-06-22 RX ORDER — MECLIZINE HYDROCHLORIDE 25 MG/1
25 TABLET ORAL 3 TIMES DAILY PRN
Qty: 15 TABLET | Refills: 0 | Status: SHIPPED | OUTPATIENT
Start: 2024-06-22 | End: 2024-07-02

## 2024-06-22 RX ORDER — METOCLOPRAMIDE HYDROCHLORIDE 5 MG/ML
10 INJECTION INTRAMUSCULAR; INTRAVENOUS ONCE
Status: DISCONTINUED | OUTPATIENT
Start: 2024-06-22 | End: 2024-06-22

## 2024-06-22 RX ORDER — MECLIZINE HCL 25MG 25 MG/1
25 TABLET, CHEWABLE ORAL ONCE
Status: COMPLETED | OUTPATIENT
Start: 2024-06-22 | End: 2024-06-22

## 2024-06-22 RX ORDER — POTASSIUM CHLORIDE 20 MEQ/1
40 TABLET, EXTENDED RELEASE ORAL ONCE
Status: COMPLETED | OUTPATIENT
Start: 2024-06-22 | End: 2024-06-22

## 2024-06-22 RX ADMIN — POTASSIUM CHLORIDE 40 MEQ: 1500 TABLET, EXTENDED RELEASE ORAL at 11:27

## 2024-06-22 RX ADMIN — SODIUM CHLORIDE 1000 ML: 9 INJECTION, SOLUTION INTRAVENOUS at 09:19

## 2024-06-22 RX ADMIN — ONDANSETRON 4 MG: 2 INJECTION INTRAMUSCULAR; INTRAVENOUS at 09:53

## 2024-06-22 RX ADMIN — MECLIZINE HYDROCHLORIDE 25 MG: 25 TABLET, CHEWABLE ORAL at 09:18

## 2024-06-22 ASSESSMENT — ENCOUNTER SYMPTOMS
RHINORRHEA: 0
NAUSEA: 0
DIARRHEA: 0
EYE PAIN: 0
VOMITING: 0
COUGH: 0
SHORTNESS OF BREATH: 0
BACK PAIN: 0
CONSTIPATION: 0
EYE REDNESS: 0
ABDOMINAL PAIN: 0

## 2024-06-22 ASSESSMENT — LIFESTYLE VARIABLES
HOW MANY STANDARD DRINKS CONTAINING ALCOHOL DO YOU HAVE ON A TYPICAL DAY: PATIENT DOES NOT DRINK
HOW OFTEN DO YOU HAVE A DRINK CONTAINING ALCOHOL: NEVER

## 2024-06-22 ASSESSMENT — PAIN - FUNCTIONAL ASSESSMENT: PAIN_FUNCTIONAL_ASSESSMENT: NONE - DENIES PAIN

## 2024-06-22 NOTE — ED NOTES
Patient had bowel movement when throwing up, full bed change completed by writer and another RN. Patient provided with warm blankets and a warm washcloth for head.

## 2024-06-22 NOTE — ED NOTES
Writer ambulated patient to bathroom without difficulty, states she feels better. Patient able to tolerate oral meds and water. Provider notified

## 2024-06-22 NOTE — ED PROVIDER NOTES
tablet by mouth 3 times daily as needed for Dizziness          (Please note that portions of this note were completed with a voice recognition program.  Efforts were made to edit the dictations but occasionally words are mis-transcribed.)    Humphrey Leonard DO (electronically signed)  Attending Emergency Physician        Humphrey Leonard DO  06/22/24 [id number]

## 2024-06-24 ENCOUNTER — TELEPHONE (OUTPATIENT)
Dept: FAMILY MEDICINE CLINIC | Age: 74
End: 2024-06-24

## 2024-06-24 DIAGNOSIS — R42 VERTIGO: Primary | ICD-10-CM

## 2024-06-24 NOTE — TELEPHONE ENCOUNTER
ED Follow Up Call/ Schedule appt   ED: [facility]   Reason: Vertigo  Date: 6/22/24    Appt scheduled: n/a      Comments: Left message for the patient to call the office back regarding ED follow up      Future Appointments   Date Time Provider Department Center   7/10/2024 12:00 PM Saurabh Lauren DO [facility]  [place] [facility]   7/18/2024  1:15 PM Baltazar Bowling MD AND [facility]   8/15/2024 10:30 AM [facility]   8/15/2024 11:20 AM Simone Grove MD AND [facility]

## 2024-06-24 NOTE — TELEPHONE ENCOUNTER
Patient returned phone call, she stated that the ED DX this as vertigo.     Pt stated that the ED suggested an ENT referral     Please advise if ok to place an ENT referral for pt

## 2024-07-03 NOTE — PROGRESS NOTES
RIGHT VIDEO ASSISTED THORACOSCOPIC SURGERY WITH WEDGE EXCISION OF RIGHT UPPER LOBE NODULE performed by Ernesto Moore MD at [facility]    THORACOTOMY N/A 10/16/2020    LEFT UPPER LOBECTOMY THORACOTOMY WITH MEDIASTINAL LYMPH NODE DISSECTION, CRYO ABLATION OF INTERCOSTAL NERVES performed by Anshul Irizarry MD at [facility]    TOTAL KNEE ARTHROPLASTY Right 2024    RIGHT TOTAL KNEE ARTHROPLASTY performed by Baltazar Bowling MD at [facility] OR    TUBAL LIGATION      UPPER GASTROINTESTINAL ENDOSCOPY      gastritis    UPPER GASTROINTESTINAL ENDOSCOPY N/A 2019    EGD BIOPSY performed by Anderson Sylvester MD at [facility] ENDOSCOPY    UPPER GASTROINTESTINAL ENDOSCOPY N/A 2019    COLON & EGD W/ ANESTHESIA performed by Anderson Sylvester MD at [facility] ENDOSCOPY    UPPER GASTROINTESTINAL ENDOSCOPY  2019    EGD DILATION Mallony performed by Anderson Sylvester MD at [facility] ENDOSCOPY    UPPER GASTROINTESTINAL ENDOSCOPY N/A 2022    EGD BIOPSY performed by Daniel Ace MD at [facility] ENDOSCOPY    UPPER GASTROINTESTINAL ENDOSCOPY  2022    EGD ESOPHAGOGASTRODUODENOSCOPY DILATATION performed by Daniel Ace MD at [facility] ENDOSCOPY     Family History   Problem Relation Age of Onset    Asthma Mother     Heart Disease Father      Social History     Socioeconomic History    Marital status:      Spouse name: Not on file    Number of children: Not on file    Years of education: Not on file    Highest education level: Not on file   Occupational History    Not on file   Tobacco Use    Smoking status: Former     Current packs/day: 0.00     Average packs/day: 1 pack/day for 15.0 years (15.0 ttl pk-yrs)     Types: Cigarettes     Start date: 1970     Quit date: 6/15/1983     Years since quittin.0     Passive exposure: Past    Smokeless tobacco: Never   Vaping Use    Vaping Use: Never used   Substance and Sexual Activity    Alcohol use: No    Drug use: No    Sexual activity: Yes     Partners: Male   Other

## 2024-07-08 ENCOUNTER — OFFICE VISIT (OUTPATIENT)
Dept: ENT CLINIC | Age: 74
End: 2024-07-08
Payer: MEDICARE

## 2024-07-08 ENCOUNTER — PROCEDURE VISIT (OUTPATIENT)
Dept: AUDIOLOGY | Age: 74
End: 2024-07-08
Payer: MEDICARE

## 2024-07-08 VITALS
BODY MASS INDEX: 24.99 KG/M2 | DIASTOLIC BLOOD PRESSURE: 81 MMHG | HEIGHT: 65 IN | HEART RATE: 73 BPM | SYSTOLIC BLOOD PRESSURE: 149 MMHG | WEIGHT: 150 LBS

## 2024-07-08 DIAGNOSIS — H93.13 TINNITUS OF BOTH EARS: ICD-10-CM

## 2024-07-08 DIAGNOSIS — H90.3 ASYMMETRICAL SENSORINEURAL HEARING LOSS: Primary | ICD-10-CM

## 2024-07-08 DIAGNOSIS — K21.00 GASTROESOPHAGEAL REFLUX DISEASE WITH ESOPHAGITIS, UNSPECIFIED WHETHER HEMORRHAGE: ICD-10-CM

## 2024-07-08 DIAGNOSIS — H90.3 ASYMMETRIC SNHL (SENSORINEURAL HEARING LOSS): Primary | ICD-10-CM

## 2024-07-08 DIAGNOSIS — M26.621 ARTHRALGIA OF RIGHT TEMPOROMANDIBULAR JOINT: ICD-10-CM

## 2024-07-08 DIAGNOSIS — H90.3 SENSORINEURAL HEARING LOSS, BILATERAL: ICD-10-CM

## 2024-07-08 DIAGNOSIS — H93.12 TINNITUS OF LEFT EAR: ICD-10-CM

## 2024-07-08 PROCEDURE — G8427 DOCREV CUR MEDS BY ELIG CLIN: HCPCS | Performed by: STUDENT IN AN ORGANIZED HEALTH CARE EDUCATION/TRAINING PROGRAM

## 2024-07-08 PROCEDURE — 92557 COMPREHENSIVE HEARING TEST: CPT | Performed by: AUDIOLOGIST

## 2024-07-08 PROCEDURE — 92567 TYMPANOMETRY: CPT | Performed by: AUDIOLOGIST

## 2024-07-08 PROCEDURE — 99204 OFFICE O/P NEW MOD 45 MIN: CPT | Performed by: STUDENT IN AN ORGANIZED HEALTH CARE EDUCATION/TRAINING PROGRAM

## 2024-07-08 PROCEDURE — G8420 CALC BMI NORM PARAMETERS: HCPCS | Performed by: STUDENT IN AN ORGANIZED HEALTH CARE EDUCATION/TRAINING PROGRAM

## 2024-07-08 PROCEDURE — G8400 PT W/DXA NO RESULTS DOC: HCPCS | Performed by: STUDENT IN AN ORGANIZED HEALTH CARE EDUCATION/TRAINING PROGRAM

## 2024-07-08 PROCEDURE — 1090F PRES/ABSN URINE INCON ASSESS: CPT | Performed by: STUDENT IN AN ORGANIZED HEALTH CARE EDUCATION/TRAINING PROGRAM

## 2024-07-08 PROCEDURE — 1123F ACP DISCUSS/DSCN MKR DOCD: CPT | Performed by: STUDENT IN AN ORGANIZED HEALTH CARE EDUCATION/TRAINING PROGRAM

## 2024-07-08 PROCEDURE — 3017F COLORECTAL CA SCREEN DOC REV: CPT | Performed by: STUDENT IN AN ORGANIZED HEALTH CARE EDUCATION/TRAINING PROGRAM

## 2024-07-08 PROCEDURE — 1036F TOBACCO NON-USER: CPT | Performed by: STUDENT IN AN ORGANIZED HEALTH CARE EDUCATION/TRAINING PROGRAM

## 2024-07-08 ASSESSMENT — ENCOUNTER SYMPTOMS
VOMITING: 0
COUGH: 0
SHORTNESS OF BREATH: 0
RHINORRHEA: 0
NAUSEA: 0
EYE PAIN: 0

## 2024-07-08 NOTE — PROGRESS NOTES
loss.  Speech Recognition Threshold: 20 dB HL  Word Recognition: Excellent 100%, based on NU-6 25-word list at 60 dBHL using recorded speech stimuli.    Tympanometry: Normal peak pressure and compliance, Type A tympanogram, consistent with normal middle ear function. Volume 1.6 cm3, Peak -5 daPa, 1.12 mmho.      Reliability: Good   Transducer: Inserts    See scanned audiogram dated 7/8/2024 for results.        PATIENT EDUCATION:       The following items were discussed with the patient:   - Good Communication Strategies  - Hearing Loss and Hearing Aids  - Tinnitus Management Strategies      Educational information was shared in the After Visit Summary.                                                RECOMMENDATIONS:                                                                                                                                                                                                                                                            The following items are recommended based on patient report and results from today's appointment:   - Continue medical follow-up with Agusto Junior DO.   - Retest hearing as medically indicated and/or sooner if a change in hearing is noted.  - If desired, schedule a Hearing Aid Evaluation (HAE) appointment to discuss hearing aid options.  - Utilize \"Good Communication Strategies\" as discussed to assist in speech understanding with communication partners.  - Maintain a sound enriched environment to assist in the management of tinnitus symptoms.       Cassy Trevino, Hannah  Audiologist        Degree of   Hearing Sensitivity dB Range   Within Normal Limits (WNL) 0 - 20   Mild 20 - 40   Moderate 40 - 55   Moderately-Severe 55 - 70   Severe 70 - 90   Profound 90 +

## 2024-07-18 ENCOUNTER — OFFICE VISIT (OUTPATIENT)
Dept: ORTHOPEDIC SURGERY | Age: 74
End: 2024-07-18
Payer: MEDICARE

## 2024-07-18 VITALS — WEIGHT: 150 LBS | BODY MASS INDEX: 24.99 KG/M2 | HEIGHT: 65 IN

## 2024-07-18 DIAGNOSIS — Z96.651 S/P TOTAL KNEE ARTHROPLASTY, RIGHT: Primary | ICD-10-CM

## 2024-07-18 PROCEDURE — 1090F PRES/ABSN URINE INCON ASSESS: CPT | Performed by: ORTHOPAEDIC SURGERY

## 2024-07-18 PROCEDURE — 1036F TOBACCO NON-USER: CPT | Performed by: ORTHOPAEDIC SURGERY

## 2024-07-18 PROCEDURE — G8400 PT W/DXA NO RESULTS DOC: HCPCS | Performed by: ORTHOPAEDIC SURGERY

## 2024-07-18 PROCEDURE — G8420 CALC BMI NORM PARAMETERS: HCPCS | Performed by: ORTHOPAEDIC SURGERY

## 2024-07-18 PROCEDURE — 1123F ACP DISCUSS/DSCN MKR DOCD: CPT | Performed by: ORTHOPAEDIC SURGERY

## 2024-07-18 PROCEDURE — G8428 CUR MEDS NOT DOCUMENT: HCPCS | Performed by: ORTHOPAEDIC SURGERY

## 2024-07-18 PROCEDURE — 3017F COLORECTAL CA SCREEN DOC REV: CPT | Performed by: ORTHOPAEDIC SURGERY

## 2024-07-18 PROCEDURE — 99213 OFFICE O/P EST LOW 20 MIN: CPT | Performed by: ORTHOPAEDIC SURGERY

## 2024-07-18 NOTE — PROGRESS NOTES
Dr Baltazar Bowling      Date /Time 5/28/2024       10:11 AM EDT  Name Sandie Perez             1950   [facility]  MRN [id number]                Chief Complaint   Patient presents with    Follow-up     Right knee         History of Present Illness      Sandie Perez is a 73 y.o. female is here for post-op visit after RIGHT  83600 Total Knee Arthroplasty    Patient presents to the office today for follow-up visit.  Patient's status post total knee arthroplast on April 1, 2024.  Patient called late last week and stated that she has a recurrent popping sensation.  Patient denies fever, chills, or drainage.  Pain controlled.  She has been feeling a popping sensation over the lateral quad.  Only has pain after prolonged activities.    Physical Exam    Based off 1997 Exam Criteria    LMP  (LMP Unknown)      Constitutional:       General: He is not in acute distress.     Appearance: Normal appearance.     RIGHT Knee: incision clean, intact, healing appropriately. No surrounding  erythema or fluctuance. Neuro intact distal. No evidence of DVT.  Slight instability to varus and valgus stress.  Patient does have a popping sensation felt over the lateral quadrant going from extension to flexion    Range of motion:0-130    Imaging       X-rays were ordered today and reviewed of the right knee.  3 views of the right knee.  Standing AP, lateral, and skyline views.  They demonstrate a right total knee arthroplasty in good position.  No evidence of loosening or periprosthetic fracture.    Assessment and Plan    Sandie was seen today for follow-up.    Diagnoses and all orders for this visit:    S/P total knee arthroplasty, right  -     XR KNEE RIGHT (3 VIEWS); Future    Other orders  -     Cancel: XR KNEE LEFT (3 VIEWS); Future          Patient doing well.  Continue with home exercises patient is already learned in physical therapy.  Follow-up 1 year from surgery sooner problems arise.  We have discussed

## 2024-08-01 ENCOUNTER — TELEPHONE (OUTPATIENT)
Dept: PULMONOLOGY | Age: 74
End: 2024-08-01

## 2024-08-01 ENCOUNTER — HOSPITAL ENCOUNTER (OUTPATIENT)
Dept: CT IMAGING | Age: 74
Discharge: HOME | End: 2024-08-01

## 2024-08-01 DIAGNOSIS — J44.9 COPD WITHOUT EXACERBATION (HCC): Primary | ICD-10-CM

## 2024-08-01 DIAGNOSIS — J44.9 COPD WITHOUT EXACERBATION (HCC): ICD-10-CM

## 2024-08-13 ENCOUNTER — HOSPITAL ENCOUNTER (OUTPATIENT)
Dept: CT IMAGING | Age: 74
Discharge: HOME | End: 2024-08-13
Attending: STUDENT IN AN ORGANIZED HEALTH CARE EDUCATION/TRAINING PROGRAM
Payer: MEDICARE

## 2024-08-13 DIAGNOSIS — J44.9 COPD WITHOUT EXACERBATION (HCC): ICD-10-CM

## 2024-08-13 PROCEDURE — 71250 CT THORAX DX C-: CPT

## 2024-08-16 DIAGNOSIS — F41.9 ANXIETY: ICD-10-CM

## 2024-08-16 RX ORDER — HYDROXYZINE HYDROCHLORIDE 25 MG/1
25 TABLET, FILM COATED ORAL EVERY 8 HOURS PRN
Qty: 30 TABLET | Refills: 0 | Status: SHIPPED | OUTPATIENT
Start: 2024-08-16 | End: 2024-11-14

## 2024-08-16 NOTE — TELEPHONE ENCOUNTER
Refill Request     CONFIRM preferred pharmacy with the patient.    If Mail Order Rx - Pend for 90 day refill.      Last Seen: Last Seen Department: 3/14/2024  Last Seen by PCP: 3/14/2024    Last Written: 6/7/2024 25mg # 30 0 refills     If no future appointment scheduled:  Review the last OV with PCP and review information for follow-up visit,  Route STAFF MESSAGE with patient name to the  Pool for scheduling with the following information:            -  Timing of next visit           -  Visit type ie Physical, OV, etc           -  Diagnoses/Reason ie. COPD, HTN - Do not use MEDICATION, Follow-up or CHECK UP - Give reason for visit      Next Appointment:   Future Appointments   Date Time Provider Department Center   8/27/2024  2:00 PM Simone Grove MD AND [facility]       Message sent to  to schedule appt with patient?  NO      Requested Prescriptions     Pending Prescriptions Disp Refills    hydrOXYzine HCl (ATARAX) 25 MG tablet 30 tablet 0     Sig: Take 1 tablet by mouth every 8 hours as needed for Anxiety

## 2024-08-20 ENCOUNTER — TELEPHONE (OUTPATIENT)
Dept: PULMONOLOGY | Age: 74
End: 2024-08-20

## 2024-08-20 NOTE — TELEPHONE ENCOUNTER
Patient called and is wanting the interpretation of her CT that was done on the 13th and sent to [place] on the 17th. I don't see any result notes to relay to the patient.   Please advise.

## 2024-08-27 ENCOUNTER — OFFICE VISIT (OUTPATIENT)
Dept: PULMONOLOGY | Age: 74
End: 2024-08-27
Payer: MEDICARE

## 2024-08-27 VITALS
DIASTOLIC BLOOD PRESSURE: 85 MMHG | WEIGHT: 150.13 LBS | SYSTOLIC BLOOD PRESSURE: 161 MMHG | HEART RATE: 79 BPM | HEIGHT: 65 IN | TEMPERATURE: 97.9 F | BODY MASS INDEX: 25.01 KG/M2 | OXYGEN SATURATION: 96 % | RESPIRATION RATE: 16 BRPM

## 2024-08-27 DIAGNOSIS — R09.89 CHEST CONGESTION: ICD-10-CM

## 2024-08-27 DIAGNOSIS — R06.02 SHORTNESS OF BREATH: ICD-10-CM

## 2024-08-27 DIAGNOSIS — C34.12 MALIGNANT NEOPLASM OF UPPER LOBE OF LEFT LUNG (HCC): Primary | ICD-10-CM

## 2024-08-27 DIAGNOSIS — Z87.891 FORMER SMOKER: ICD-10-CM

## 2024-08-27 DIAGNOSIS — Z90.2 HISTORY OF LOBECTOMY OF LUNG: ICD-10-CM

## 2024-08-27 PROCEDURE — 3017F COLORECTAL CA SCREEN DOC REV: CPT | Performed by: STUDENT IN AN ORGANIZED HEALTH CARE EDUCATION/TRAINING PROGRAM

## 2024-08-27 PROCEDURE — G8420 CALC BMI NORM PARAMETERS: HCPCS | Performed by: STUDENT IN AN ORGANIZED HEALTH CARE EDUCATION/TRAINING PROGRAM

## 2024-08-27 PROCEDURE — 1036F TOBACCO NON-USER: CPT | Performed by: STUDENT IN AN ORGANIZED HEALTH CARE EDUCATION/TRAINING PROGRAM

## 2024-08-27 PROCEDURE — 99213 OFFICE O/P EST LOW 20 MIN: CPT | Performed by: STUDENT IN AN ORGANIZED HEALTH CARE EDUCATION/TRAINING PROGRAM

## 2024-08-27 PROCEDURE — 1090F PRES/ABSN URINE INCON ASSESS: CPT | Performed by: STUDENT IN AN ORGANIZED HEALTH CARE EDUCATION/TRAINING PROGRAM

## 2024-08-27 PROCEDURE — G8427 DOCREV CUR MEDS BY ELIG CLIN: HCPCS | Performed by: STUDENT IN AN ORGANIZED HEALTH CARE EDUCATION/TRAINING PROGRAM

## 2024-08-27 PROCEDURE — 1123F ACP DISCUSS/DSCN MKR DOCD: CPT | Performed by: STUDENT IN AN ORGANIZED HEALTH CARE EDUCATION/TRAINING PROGRAM

## 2024-08-27 PROCEDURE — G8400 PT W/DXA NO RESULTS DOC: HCPCS | Performed by: STUDENT IN AN ORGANIZED HEALTH CARE EDUCATION/TRAINING PROGRAM

## 2024-08-27 RX ORDER — ALBUTEROL SULFATE 90 UG/1
2 AEROSOL, METERED RESPIRATORY (INHALATION) 4 TIMES DAILY PRN
Qty: 54 G | Refills: 1 | Status: SHIPPED | OUTPATIENT
Start: 2024-08-27

## 2024-08-27 RX ORDER — DILTIAZEM HYDROCHLORIDE 60 MG/1
2 TABLET, FILM COATED ORAL 2 TIMES DAILY
Qty: 1 EACH | Refills: 3 | Status: SHIPPED | OUTPATIENT
Start: 2024-08-27 | End: 2024-08-28 | Stop reason: ALTCHOICE

## 2024-08-27 RX ORDER — FLUTICASONE PROPIONATE AND SALMETEROL 250; 50 UG/1; UG/1
1 POWDER RESPIRATORY (INHALATION) EVERY 12 HOURS
Qty: 60 EACH | Refills: 3 | Status: SHIPPED | OUTPATIENT
Start: 2024-08-27 | End: 2024-08-27 | Stop reason: ALTCHOICE

## 2024-08-27 ASSESSMENT — ENCOUNTER SYMPTOMS
ABDOMINAL PAIN: 0
CONSTIPATION: 0
ABDOMINAL DISTENTION: 0
EYE REDNESS: 0
SHORTNESS OF BREATH: 1
EYE ITCHING: 0
TROUBLE SWALLOWING: 0
SORE THROAT: 0
WHEEZING: 0
COLOR CHANGE: 0
VOMITING: 0
NAUSEA: 0
BACK PAIN: 0
COUGH: 0
DIARRHEA: 0
STRIDOR: 0
EYE DISCHARGE: 0
EYE PAIN: 0

## 2024-08-27 NOTE — PROGRESS NOTES
[facility] Follow-up  [address] [place] [address]  [phone]        Sandie Perez (: 1950 ) is a 74 y.o. female here for an evaluation of   Chief Complaint   Patient presents with    Follow-up     Lung ca     Results     Ct          SUBJECTIVE/OBJECTIVE:    Patient is a 74-year-old female with significant past medical history of left upper lobe lobectomy for adenocarcinoma and wedge resection of right upper lobe nodule the presents to [facility] for follow-up visit. She complains of chest congestion and shortness of breath. She used to walk up to 5 miles per day prior to her surgeries, now she walks up to 1 mile per day. She was asking how far she should exert herself. She has no other complaints today. Patient is here for follow-up of CT scan.       Review of Systems   Constitutional:  Negative for activity change, appetite change, chills, diaphoresis and fatigue.   HENT:  Negative for congestion, sore throat and trouble swallowing.    Eyes:  Negative for pain, discharge, redness and itching.   Respiratory:  Positive for shortness of breath. Negative for cough, wheezing and stridor.    Cardiovascular:  Negative for chest pain, palpitations and leg swelling.   Gastrointestinal:  Negative for abdominal distention, abdominal pain, constipation, diarrhea, nausea and vomiting.   Endocrine: Negative for polydipsia, polyphagia and polyuria.   Genitourinary:  Negative for difficulty urinating.   Musculoskeletal:  Negative for back pain, myalgias and neck pain.   Skin:  Negative for color change.   Neurological:  Negative for dizziness, weakness and light-headedness.   Psychiatric/Behavioral:  Negative for agitation and behavioral problems.          Vitals:    24 1349   BP: (!) 161/85   Pulse: 79   Resp: 16   Temp: 97.9 °F (36.6 °C)   TempSrc: Oral   SpO2: 96%   Weight: 68.1 kg (150 lb 2 oz)   Height: 1.651 m (5' 5\")        Physical Exam  Constitutional:       General: She is  influenza, COVID, RSV vaccines  - cont smoking cessation  - f/u 1 year    Simone Grove MD, [facility]

## 2024-08-27 NOTE — TELEPHONE ENCOUNTER
Pharmacy requesting alternative for Symbicort sent in today, 8/27/24. Advair or Betito suggested. Please advise.

## 2024-08-27 NOTE — PROGRESS NOTES
MA Communication:  The following orders are received by verbal communication from   Simone Grove MD    Orders include:  FU 1 yr       CT 1 yr

## 2024-08-27 NOTE — PATIENT INSTRUCTIONS
Remember to bring a list of pulmonary medications and any CPAP or BiPAP machines to your next appointment with the office.     Please keep all of your future appointments scheduled by [facility] office. Out of respect for other patients and providers, you may be asked to reschedule your appointment if you arrive later than your scheduled appointment time. Appointments cancelled less than 24hrs in advance will be considered a no show. Patients with three missed appointments within 1 year or four missed appointments within 2 years can be dismissed from the practice.     Please be aware that our physicians are required to work in the Intensive Care Unit at [facility].  Your appointment may need to be rescheduled if they are designated to work during your appointment time.      You may receive a survey regarding the care you received during your visit.  Your input is valuable to us.  We encourage you to complete and return your survey.  We hope you will choose us in the future for your healthcare needs.     Pt instructed of all future appointment dates & times, including radiology, labs, procedures & referrals. If procedures were scheduled preparation instructions provided. Instructions on future appointments with [facility] were given.      In the next few weeks, you will be receiving a survey from [facility] regarding your visit today.  We would greatly appreciate it if you would take just a few minutes to fill that out.  It is very important to us that our patients receive top notch care and our surveys help keep us accountable. However, if your experience was not a good one, we want to hear about that as well. This is a key way we can keep track of problems and strive to correct any for future visits.    Again, we appreciate your time and thank you for choosing [facility]!    Katelyn ROSE

## 2024-08-28 DIAGNOSIS — R06.02 SHORTNESS OF BREATH: Primary | ICD-10-CM

## 2024-08-28 RX ORDER — FLUTICASONE PROPIONATE AND SALMETEROL XINAFOATE 45; 21 UG/1; UG/1
AEROSOL, METERED RESPIRATORY (INHALATION)
Refills: 0 | OUTPATIENT
Start: 2024-08-28

## 2024-08-28 NOTE — TELEPHONE ENCOUNTER
Refill Request     CONFIRM preferred pharmacy with the patient.    If Mail Order Rx - Pend for 90 day refill.      Last Seen: Last Seen Department: 3/14/2024  Last Seen by PCP: 3/14/2024    Last Written: 12/31/23 #90 - 1 refill    If no future appointment scheduled:  Review the last OV with PCP and review information for follow-up visit,  Route STAFF MESSAGE with patient name to the  Pool for scheduling with the following information:            -  Timing of next visit           -  Visit type ie Physical, OV, etc           -  Diagnoses/Reason ie. COPD, HTN - Do not use MEDICATION, Follow-up or CHECK UP - Give reason for visit      Next Appointment:   Future Appointments   Date Time Provider Department Center   8/28/2025  1:30 PM [facility]   8/28/2025  2:20 PM Simone Grove MD AND [facility]M [facility]       Message sent to  to schedule appt with patient?  YES 6 month follow up was due 7/10/24      Requested Prescriptions     Pending Prescriptions Disp Refills    atorvastatin (LIPITOR) 20 MG tablet [Pharmacy Med Name: ATORVASTATIN 20 MG TABLET] 90 tablet 1     Sig: TAKE 1 TABLET BY MOUTH EVERY DAY AT NIGHT

## 2024-08-29 RX ORDER — ATORVASTATIN CALCIUM 20 MG/1
TABLET, FILM COATED ORAL
Qty: 90 TABLET | Refills: 3 | Status: SHIPPED | OUTPATIENT
Start: 2024-08-29

## 2024-11-15 ENCOUNTER — TELEPHONE (OUTPATIENT)
Dept: FAMILY MEDICINE CLINIC | Age: 74
End: 2024-11-15

## 2024-11-15 NOTE — TELEPHONE ENCOUNTER
Received call from patient.  Patient states she is having \"sparkly shiny diamonds\" in vision x 1 month that usually only last for a few minutes then goes away.  Patient reports it happened today and it didn't go away immediately and when it did her vision was in \"3D\".  Patient reports she goes to [facility] or [facility], she has called both and no appointments available, thinks she most recently saw [facility].  Patient reports [facility] offered an appt in [place] but patient doesn't want to drive with the 3D vision, they then told patient to be evaluated in the ER.  Advised patient that the office has not way of thoroughly checking her eyes, she needs to call [facility] back to see if they offer an Urgent Care like [facility] or go to ED to be evaluated.  Patient will call them back and if unable to get in will go to ED.    YAKELIN.

## 2024-11-21 ENCOUNTER — TELEPHONE (OUTPATIENT)
Dept: PULMONOLOGY | Age: 74
End: 2024-11-21

## 2024-11-21 ENCOUNTER — HOSPITAL ENCOUNTER (OUTPATIENT)
Age: 74
Discharge: HOME | End: 2024-11-21
Payer: MEDICARE

## 2024-11-21 ENCOUNTER — HOSPITAL ENCOUNTER (OUTPATIENT)
Dept: GENERAL RADIOLOGY | Age: 74
Discharge: HOME | End: 2024-11-21
Payer: MEDICARE

## 2024-11-21 DIAGNOSIS — Z85.118 HISTORY OF LUNG CANCER: ICD-10-CM

## 2024-11-21 DIAGNOSIS — Z85.118 HISTORY OF LUNG CANCER: Primary | ICD-10-CM

## 2024-11-21 PROCEDURE — 71046 X-RAY EXAM CHEST 2 VIEWS: CPT

## 2024-11-21 NOTE — TELEPHONE ENCOUNTER
Chart reviewed. Please have her get CXR today, order placed.  Schedule for 2 pm tomorrow.  Worsening symptoms, go to ED for further evaluation.

## 2024-11-21 NOTE — TELEPHONE ENCOUNTER
Spoke with the patient and informed of the response from Vicky Agustin. Patient will go to [facility] and get the CXR today. Follow up tomorrow at 2 pm

## 2024-11-21 NOTE — TELEPHONE ENCOUNTER
Patient called requesting an appointment to be seen.  She states that for the last 1-2 weeks she has been having trouble feeling like she can't get a deep breath in.  For the last 2 days she has been having a cough that is productive in the morning and dry in the afternoon. She is requesting to be seen.  Can patient be added to Friday afternoon? Please advise.

## 2024-11-22 ENCOUNTER — OFFICE VISIT (OUTPATIENT)
Dept: PULMONOLOGY | Age: 74
End: 2024-11-22
Payer: MEDICARE

## 2024-11-22 VITALS
WEIGHT: 149 LBS | SYSTOLIC BLOOD PRESSURE: 150 MMHG | RESPIRATION RATE: 16 BRPM | HEART RATE: 82 BPM | BODY MASS INDEX: 24.83 KG/M2 | TEMPERATURE: 97.2 F | HEIGHT: 65 IN | OXYGEN SATURATION: 98 % | DIASTOLIC BLOOD PRESSURE: 89 MMHG

## 2024-11-22 DIAGNOSIS — Z90.2 HISTORY OF LOBECTOMY OF LUNG: ICD-10-CM

## 2024-11-22 DIAGNOSIS — Z87.891 FORMER SMOKER, STOPPED SMOKING IN DISTANT PAST: ICD-10-CM

## 2024-11-22 DIAGNOSIS — R06.02 SHORTNESS OF BREATH: ICD-10-CM

## 2024-11-22 DIAGNOSIS — Z85.118 HISTORY OF LUNG CANCER: ICD-10-CM

## 2024-11-22 DIAGNOSIS — R03.0 ELEVATED BLOOD PRESSURE READING: ICD-10-CM

## 2024-11-22 DIAGNOSIS — J44.9 COPD, SEVERITY TO BE DETERMINED (HCC): Primary | ICD-10-CM

## 2024-11-22 DIAGNOSIS — R06.02 SOB (SHORTNESS OF BREATH): ICD-10-CM

## 2024-11-22 PROCEDURE — 3017F COLORECTAL CA SCREEN DOC REV: CPT | Performed by: NURSE PRACTITIONER

## 2024-11-22 PROCEDURE — 1090F PRES/ABSN URINE INCON ASSESS: CPT | Performed by: NURSE PRACTITIONER

## 2024-11-22 PROCEDURE — 3023F SPIROM DOC REV: CPT | Performed by: NURSE PRACTITIONER

## 2024-11-22 PROCEDURE — 1036F TOBACCO NON-USER: CPT | Performed by: NURSE PRACTITIONER

## 2024-11-22 PROCEDURE — G8420 CALC BMI NORM PARAMETERS: HCPCS | Performed by: NURSE PRACTITIONER

## 2024-11-22 PROCEDURE — G8484 FLU IMMUNIZE NO ADMIN: HCPCS | Performed by: NURSE PRACTITIONER

## 2024-11-22 PROCEDURE — 1123F ACP DISCUSS/DSCN MKR DOCD: CPT | Performed by: NURSE PRACTITIONER

## 2024-11-22 PROCEDURE — 99214 OFFICE O/P EST MOD 30 MIN: CPT | Performed by: NURSE PRACTITIONER

## 2024-11-22 PROCEDURE — 1159F MED LIST DOCD IN RCRD: CPT | Performed by: NURSE PRACTITIONER

## 2024-11-22 PROCEDURE — G8400 PT W/DXA NO RESULTS DOC: HCPCS | Performed by: NURSE PRACTITIONER

## 2024-11-22 PROCEDURE — G8427 DOCREV CUR MEDS BY ELIG CLIN: HCPCS | Performed by: NURSE PRACTITIONER

## 2024-11-22 RX ORDER — HYDROXYZINE HYDROCHLORIDE 25 MG/1
25 TABLET, FILM COATED ORAL EVERY 8 HOURS PRN
Qty: 30 TABLET | Refills: 0 | Status: SHIPPED | OUTPATIENT
Start: 2024-11-22

## 2024-11-22 ASSESSMENT — ENCOUNTER SYMPTOMS
WHEEZING: 1
SPUTUM PRODUCTION: 1
RHINORRHEA: 0
SHORTNESS OF BREATH: 1
HEMOPTYSIS: 0
VOMITING: 0
SORE THROAT: 0

## 2024-11-22 NOTE — TELEPHONE ENCOUNTER
Refill Request     CONFIRM preferred pharmacy with the patient.    If Mail Order Rx - Pend for 90 day refill.      Last Seen: Last Seen Department: 3/14/2024  Last Seen by PCP: 3/14/2024    Last Written: 8/16/24 30 with no refills     If no future appointment scheduled:  Review the last OV with PCP and review information for follow-up visit,  Route STAFF MESSAGE with patient name to the  Pool for scheduling with the following information:            -  Timing of next visit           -  Visit type ie Physical, OV, etc           -  Diagnoses/Reason ie. COPD, HTN - Do not use MEDICATION, Follow-up or CHECK UP - Give reason for visit      Next Appointment:   Future Appointments   Date Time Provider Department Center   11/22/2024  1:00 PM Vicky Agustin APRN - CNP AND [facility]ULM [facility]   11/26/2024  8:45 AM Ankush Cisneros PA-C AND [facility]TH[facility]   8/28/2025  1:30 PM [facility] CT [facility]CT [facility]   8/28/2025  2:20 PM Simone Grove MD AND [facility]       Message sent to  to schedule appt with patient?  YES 1/2025 Depression/anxiety      Requested Prescriptions     Pending Prescriptions Disp Refills    hydrOXYzine HCl (ATARAX) 25 MG tablet [Pharmacy Med Name: hydrOXYzine HCl Oral Tablet 25 MG] 30 tablet 0     Sig: TAKE 1 TABLET BY MOUTH EVERY 8 HOURS AS NEEDED FOR ANXIETY

## 2024-11-22 NOTE — PROGRESS NOTES
MA Communication:  The following orders are received by verbal communication from   Vicky Agustin CNP    Orders include:  FU 3 mo      PFT Same day    
cardiomediastinal silhouette is stable. The osseous  structures are stable.     IMPRESSION:  No acute process.    EXAMINATION:  CT OF THE CHEST WITHOUT CONTRAST 8/13/2024 2:14 pm     TECHNIQUE:  CT of the chest was performed without the administration of intravenous  contrast. Multiplanar reformatted images are provided for review. Automated  exposure control, iterative reconstruction, and/or weight based adjustment of  the mA/kV was utilized to reduce the radiation dose to as low as reasonably  achievable.     COMPARISON:  CT chest 07/19/2023, 07/18/2022.     HISTORY:  ORDERING SYSTEM PROVIDED HISTORY: COPD without exacerbation (HCC)  TECHNOLOGIST PROVIDED HISTORY:  Reason for Exam: COPD without exacerbation (HCC)  Relevant Medical/Surgical History: Lung cancer per pt     FINDINGS:  Lung Findings and Airways: Stable post treatment changes of the right upper  lobe compatible with prior wedge resection, compared to July 2022.  Stable  postsurgical changes of prior left upper lobectomy.  No evidence of recurrent  disease at the surgical margins.  No focal consolidation or evidence of  pulmonary edema.  Mild atelectasis versus scarring in the right middle lobe  and lingula.  New 2 mm solid nodule in the right lower lobe (series 3, image  54).  Additional unchanged sub 4 mm solid nodules in the right upper lobe,  dating back to 2022.  Small airways mucous plugging in the left lower lobe  (series 3, image 63).     Pleura: No effusion or pneumothorax.     Heart and pericardium: Heart size normal.  Small volume pericardial fluid,  similar to prior exams.     Mediastinum and chidi: Stable posttreatment changes in the bilateral chidi.  No  lymphadenopathy.     Vessels: Mild atherosclerosis of the aorta, its branch vessels and coronary  arteries.  Aorta is normal in caliber.     Lower neck: Stable heterogeneous appearance of the thyroid without discrete  nodule measuring larger than 1.5 cm.     Chest Wall: Normal.     Bones:

## 2024-11-22 NOTE — PATIENT INSTRUCTIONS
Remember to bring a list of pulmonary medications and any CPAP or BiPAP machines to your next appointment with the office.     Please keep all of your future appointments scheduled by [facility] [place] [facility] office. Out of respect for other patients and providers, you may be asked to reschedule your appointment if you arrive later than your scheduled appointment time. Appointments cancelled less than 24hrs in advance will be considered a no show. Patients with three missed appointments within 1 year or four missed appointments within 2 years can be dismissed from the practice.     Please be aware that our physicians are required to work in the Intensive Care Unit at [facility].  Your appointment may need to be rescheduled if they are designated to work during your appointment time.      You may receive a survey regarding the care you received during your visit.  Your input is valuable to us.  We encourage you to complete and return your survey.  We hope you will choose us in the future for your healthcare needs.     Pt instructed of all future appointment dates & times, including radiology, labs, procedures & referrals. If procedures were scheduled preparation instructions provided. Instructions on future appointments with [facility] were given.     In the next few weeks, you will be receiving a survey from [facility] regarding your visit today.  We would greatly appreciate it if you would take just a few minutes to fill that out.  It is very important to us that our patients receive top notch care and our surveys help keep us accountable. However, if your experience was not a good one, we want to hear about that as well. This is a key way we can keep track of problems and strive to correct any for future visits.    Again, we appreciate your time and thank you for choosing [facility]!    Katelyn ROSE Call with worsening symptoms such as increased shortness of breath,

## 2024-11-26 ENCOUNTER — TELEPHONE (OUTPATIENT)
Dept: PULMONOLOGY | Age: 74
End: 2024-11-26

## 2024-11-26 ENCOUNTER — OFFICE VISIT (OUTPATIENT)
Dept: ORTHOPEDIC SURGERY | Age: 74
End: 2024-11-26
Payer: MEDICARE

## 2024-11-26 VITALS — HEIGHT: 65 IN | BODY MASS INDEX: 24.83 KG/M2 | WEIGHT: 149 LBS

## 2024-11-26 DIAGNOSIS — Z96.651 S/P TOTAL KNEE ARTHROPLASTY, RIGHT: Primary | ICD-10-CM

## 2024-11-26 PROCEDURE — G8420 CALC BMI NORM PARAMETERS: HCPCS | Performed by: PHYSICIAN ASSISTANT

## 2024-11-26 PROCEDURE — 1159F MED LIST DOCD IN RCRD: CPT | Performed by: PHYSICIAN ASSISTANT

## 2024-11-26 PROCEDURE — 99213 OFFICE O/P EST LOW 20 MIN: CPT | Performed by: PHYSICIAN ASSISTANT

## 2024-11-26 PROCEDURE — G8400 PT W/DXA NO RESULTS DOC: HCPCS | Performed by: PHYSICIAN ASSISTANT

## 2024-11-26 PROCEDURE — 1123F ACP DISCUSS/DSCN MKR DOCD: CPT | Performed by: PHYSICIAN ASSISTANT

## 2024-11-26 PROCEDURE — 3017F COLORECTAL CA SCREEN DOC REV: CPT | Performed by: PHYSICIAN ASSISTANT

## 2024-11-26 PROCEDURE — L1812 KO ELASTIC W/JOINTS PRE OTS: HCPCS | Performed by: PHYSICIAN ASSISTANT

## 2024-11-26 PROCEDURE — 1036F TOBACCO NON-USER: CPT | Performed by: PHYSICIAN ASSISTANT

## 2024-11-26 PROCEDURE — G8427 DOCREV CUR MEDS BY ELIG CLIN: HCPCS | Performed by: PHYSICIAN ASSISTANT

## 2024-11-26 PROCEDURE — G8484 FLU IMMUNIZE NO ADMIN: HCPCS | Performed by: PHYSICIAN ASSISTANT

## 2024-11-26 PROCEDURE — 1090F PRES/ABSN URINE INCON ASSESS: CPT | Performed by: PHYSICIAN ASSISTANT

## 2024-11-26 NOTE — TELEPHONE ENCOUNTER
Called patient to see what alternatives are covered. She will call her insurance and find out and let us know.

## 2024-11-26 NOTE — TELEPHONE ENCOUNTER
Patient states that her insurance will not cover her dulera is there something else that is covered please advise. Thanks

## 2024-11-26 NOTE — PROGRESS NOTES
Dr Baltazar Bowling      Date /Time 11/26/2024       8:33 AM EST  Name Sandie Perez             1950   [facility]  MRN [id number]                Chief Complaint   Patient presents with    Follow-up     Ck Right TKA 04/01/2024        History of Present Illness  Sandie Perez is a 74 y.o. female who presents with  right knee pain, .      Injury Mechanism:  none.  Worker's Comp. & legal issues:   none.  Previous Treatments: Ice, Heat, and NSAIDs    Patient presents to the office today for a follow-up visit.  Patient had a right total knee arthroplasty done by Dr. Bowling on April 1, 2024.  Patient has had no new injury or trauma.  She is complaining of a popping sensation and 3/10 in pain.  No injury or trauma.    Past History  Past Medical History:   Diagnosis Date    Allergic rhinitis     Anxiety 10/10/2011    Arthritis     Depression 03/22/2017    GERD (gastroesophageal reflux disease)     Hallux valgus     Headache(784.0)     Herpes simplex     Irritable bowel syndrome     Kidney stones     Lung cancer, main bronchus, left (HCC) 10/16/2020    Osteopenia     Other and unspecified hyperlipidemia 04/02/2013    Pain medication agreement signed 10/10/2011    Thyroid disease     nodules    Unspecified convulsions 04/10/2023    Wears dentures     Wears dentures     full set     Past Surgical History:   Procedure Laterality Date    COLONOSCOPY N/A 7/25/2022    COLONOSCOPY WITH BIOPSY performed by Daniel Ace MD at [facility] ENDOSCOPY    COLONOSCOPY N/A 7/25/2022    COLONOSCOPY POLYPECTOMY SNARE/COLD BIOPSY performed by Daniel Ace MD at [facility] ENDOSCOPY    CT NEEDLE BIOPSY LUNG PERCUTANEOUS  9/3/2020    CT NEEDLE BIOPSY LUNG PERCUTANEOUS 9/3/2020 [facility] [place] CT SCAN    CT NEEDLE BIOPSY LUNG PERCUTANEOUS  3/25/2021    CT NEEDLE BIOPSY LUNG PERCUTANEOUS 3/25/2021 Guero Hassan MD [facility] CT SCAN    LITHOTRIPSY      SHOULDER SURGERY      THORACOSCOPY Right 5/20/2021    RIGHT VIDEO ASSISTED

## 2024-11-27 ENCOUNTER — HOSPITAL ENCOUNTER (EMERGENCY)
Age: 74
Discharge: HOME | End: 2024-11-27
Attending: EMERGENCY MEDICINE
Payer: MEDICARE

## 2024-11-27 ENCOUNTER — APPOINTMENT (OUTPATIENT)
Dept: CT IMAGING | Age: 74
End: 2024-11-27
Payer: MEDICARE

## 2024-11-27 VITALS
OXYGEN SATURATION: 96 % | BODY MASS INDEX: 25.68 KG/M2 | SYSTOLIC BLOOD PRESSURE: 154 MMHG | RESPIRATION RATE: 20 BRPM | WEIGHT: 150.4 LBS | DIASTOLIC BLOOD PRESSURE: 79 MMHG | HEIGHT: 64 IN | TEMPERATURE: 98.1 F | HEART RATE: 61 BPM

## 2024-11-27 DIAGNOSIS — R31.0 GROSS HEMATURIA: Primary | ICD-10-CM

## 2024-11-27 LAB
ANION GAP SERPL CALCULATED.3IONS-SCNC: 13 MMOL/L (ref 3–16)
BACTERIA URNS QL MICRO: ABNORMAL /HPF
BASOPHILS # BLD: 0 K/UL (ref 0–0.2)
BASOPHILS NFR BLD: 0.7 %
BILIRUB UR QL STRIP.AUTO: NEGATIVE
BUN SERPL-MCNC: 14 MG/DL (ref 7–20)
CALCIUM SERPL-MCNC: 9.8 MG/DL (ref 8.3–10.6)
CHLORIDE SERPL-SCNC: 104 MMOL/L (ref 99–110)
CLARITY UR: ABNORMAL
CO2 SERPL-SCNC: 25 MMOL/L (ref 21–32)
COLOR UR: ABNORMAL
CREAT SERPL-MCNC: 0.7 MG/DL (ref 0.6–1.2)
DEPRECATED RDW RBC AUTO: 13.3 % (ref 12.4–15.4)
EOSINOPHIL # BLD: 0.1 K/UL (ref 0–0.6)
EOSINOPHIL NFR BLD: 1.3 %
EPI CELLS #/AREA URNS HPF: ABNORMAL /HPF (ref 0–5)
GFR SERPLBLD CREATININE-BSD FMLA CKD-EPI: 90 ML/MIN/{1.73_M2}
GLUCOSE SERPL-MCNC: 98 MG/DL (ref 70–99)
GLUCOSE UR STRIP.AUTO-MCNC: NEGATIVE MG/DL
HCT VFR BLD AUTO: 45 % (ref 36–48)
HGB BLD-MCNC: 14.8 G/DL (ref 12–16)
HGB UR QL STRIP.AUTO: ABNORMAL
KETONES UR STRIP.AUTO-MCNC: NEGATIVE MG/DL
LEUKOCYTE ESTERASE UR QL STRIP.AUTO: NEGATIVE
LYMPHOCYTES # BLD: 2 K/UL (ref 1–5.1)
LYMPHOCYTES NFR BLD: 39 %
MCH RBC QN AUTO: 28.4 PG (ref 26–34)
MCHC RBC AUTO-ENTMCNC: 32.8 G/DL (ref 31–36)
MCV RBC AUTO: 86.8 FL (ref 80–100)
MONOCYTES # BLD: 0.3 K/UL (ref 0–1.3)
MONOCYTES NFR BLD: 6 %
NEUTROPHILS # BLD: 2.8 K/UL (ref 1.7–7.7)
NEUTROPHILS NFR BLD: 53 %
NITRITE UR QL STRIP.AUTO: NEGATIVE
PH UR STRIP.AUTO: 5.5 [PH] (ref 5–8)
PLATELET # BLD AUTO: 199 K/UL (ref 135–450)
PMV BLD AUTO: 8.7 FL (ref 5–10.5)
POTASSIUM SERPL-SCNC: 4.2 MMOL/L (ref 3.5–5.1)
PROT UR STRIP.AUTO-MCNC: ABNORMAL MG/DL
RBC # BLD AUTO: 5.19 M/UL (ref 4–5.2)
RBC #/AREA URNS HPF: >100 /HPF (ref 0–4)
SODIUM SERPL-SCNC: 142 MMOL/L (ref 136–145)
SP GR UR STRIP.AUTO: 1.02 (ref 1–1.03)
UA COMPLETE W REFLEX CULTURE PNL UR: ABNORMAL
UA DIPSTICK W REFLEX MICRO PNL UR: YES
URN SPEC COLLECT METH UR: ABNORMAL
UROBILINOGEN UR STRIP-ACNC: 0.2 E.U./DL
WBC # BLD AUTO: 5.2 K/UL (ref 4–11)
WBC #/AREA URNS HPF: ABNORMAL /HPF (ref 0–5)

## 2024-11-27 PROCEDURE — 99285 EMERGENCY DEPT VISIT HI MDM: CPT

## 2024-11-27 PROCEDURE — 81001 URINALYSIS AUTO W/SCOPE: CPT

## 2024-11-27 PROCEDURE — 96374 THER/PROPH/DIAG INJ IV PUSH: CPT

## 2024-11-27 PROCEDURE — 74177 CT ABD & PELVIS W/CONTRAST: CPT

## 2024-11-27 PROCEDURE — 85025 COMPLETE CBC W/AUTO DIFF WBC: CPT

## 2024-11-27 PROCEDURE — 80048 BASIC METABOLIC PNL TOTAL CA: CPT

## 2024-11-27 PROCEDURE — 6370000000 HC RX 637 (ALT 250 FOR IP): Performed by: EMERGENCY MEDICINE

## 2024-11-27 PROCEDURE — 6360000004 HC RX CONTRAST MEDICATION

## 2024-11-27 PROCEDURE — 6360000002 HC RX W HCPCS

## 2024-11-27 PROCEDURE — 96375 TX/PRO/DX INJ NEW DRUG ADDON: CPT

## 2024-11-27 RX ORDER — IOPAMIDOL 755 MG/ML
75 INJECTION, SOLUTION INTRAVASCULAR
Status: COMPLETED | OUTPATIENT
Start: 2024-11-27 | End: 2024-11-27

## 2024-11-27 RX ORDER — ONDANSETRON 4 MG/1
4 TABLET, ORALLY DISINTEGRATING ORAL 3 TIMES DAILY PRN
Qty: 6 TABLET | Refills: 0 | Status: SHIPPED | OUTPATIENT
Start: 2024-11-27

## 2024-11-27 RX ORDER — KETOROLAC TROMETHAMINE 30 MG/ML
15 INJECTION, SOLUTION INTRAMUSCULAR; INTRAVENOUS ONCE
Status: COMPLETED | OUTPATIENT
Start: 2024-11-27 | End: 2024-11-27

## 2024-11-27 RX ORDER — CEFDINIR 300 MG/1
300 CAPSULE ORAL 2 TIMES DAILY
Qty: 10 CAPSULE | Refills: 0 | Status: SHIPPED | OUTPATIENT
Start: 2024-11-27 | End: 2024-12-02

## 2024-11-27 RX ORDER — ONDANSETRON 2 MG/ML
4 INJECTION INTRAMUSCULAR; INTRAVENOUS ONCE
Status: COMPLETED | OUTPATIENT
Start: 2024-11-27 | End: 2024-11-27

## 2024-11-27 RX ORDER — CEFDINIR 300 MG/1
300 CAPSULE ORAL ONCE
Status: COMPLETED | OUTPATIENT
Start: 2024-11-27 | End: 2024-11-27

## 2024-11-27 RX ADMIN — CEFDINIR 300 MG: 300 CAPSULE ORAL at 20:57

## 2024-11-27 RX ADMIN — ONDANSETRON 4 MG: 2 INJECTION INTRAMUSCULAR; INTRAVENOUS at 18:04

## 2024-11-27 RX ADMIN — IOPAMIDOL 75 ML: 755 INJECTION, SOLUTION INTRAVENOUS at 18:24

## 2024-11-27 RX ADMIN — KETOROLAC TROMETHAMINE 15 MG: 30 INJECTION INTRAMUSCULAR; INTRAVENOUS at 18:04

## 2024-11-27 ASSESSMENT — PAIN DESCRIPTION - ORIENTATION
ORIENTATION: LOWER;MID
ORIENTATION: LOWER;MID
ORIENTATION: MID;LOWER
ORIENTATION: MID;LOWER

## 2024-11-27 ASSESSMENT — PAIN SCALES - GENERAL
PAINLEVEL_OUTOF10: 4
PAINLEVEL_OUTOF10: 3
PAINLEVEL_OUTOF10: 5
PAINLEVEL_OUTOF10: 4

## 2024-11-27 ASSESSMENT — PAIN DESCRIPTION - DESCRIPTORS
DESCRIPTORS: ACHING

## 2024-11-27 ASSESSMENT — PAIN DESCRIPTION - LOCATION
LOCATION: ABDOMEN

## 2024-11-27 ASSESSMENT — PAIN - FUNCTIONAL ASSESSMENT
PAIN_FUNCTIONAL_ASSESSMENT: 0-10

## 2024-11-27 NOTE — ED PROVIDER NOTES
[facility]  ED  EMERGENCY DEPARTMENT ENCOUNTER        Pt Name: Sandie Perez  MRN: [id number]  Birthdate 1950  Date of evaluation: 11/27/2024  Provider: NESHA Mendoza Jr  PCP: Saurabh Luaren DO  Note Started: 6:33 PM EST 11/27/24       I have seen and evaluated this patient with my supervising physician Lilian Gan MD.      CHIEF COMPLAINT       Chief Complaint   Patient presents with    Hematuria     Pt reports some pelvic discomfort and pressure with urinating x2-3 days and noticed blood in her urine today.        HISTORY OF PRESENT ILLNESS: 1 or more Elements     History from : Patient    Limitations to history : None    Sandie Perez is a 74 y.o. female who presents with reports that she has been having some lower abdominal discomfort and noticed that she had blood in her urine today.  She has had kidney stones in the past and states that this has not really feels similar.  Being any nausea, fevers, chills, body aches.  States that she does have some pain that radiates into her left flank.  She is not having any burning with urination.  She denies any other complaints this time.    Nursing Notes were all reviewed and agreed with or any disagreements were addressed in the HPI.      SURGICAL HISTORY     Past Surgical History:   Procedure Laterality Date    COLONOSCOPY N/A 7/25/2022    COLONOSCOPY WITH BIOPSY performed by Daniel Ace MD at [facility] ENDOSCOPY    COLONOSCOPY N/A 7/25/2022    COLONOSCOPY POLYPECTOMY SNARE/COLD BIOPSY performed by Daniel Ace MD at [facility] ENDOSCOPY    CT NEEDLE BIOPSY LUNG PERCUTANEOUS  9/3/2020    CT NEEDLE BIOPSY LUNG PERCUTANEOUS 9/3/2020 [facility] CT SCAN    CT NEEDLE BIOPSY LUNG PERCUTANEOUS  3/25/2021    CT NEEDLE BIOPSY LUNG PERCUTANEOUS 3/25/2021 Guero Hassan MD [facility] CT SCAN    LITHOTRIPSY      SHOULDER SURGERY      THORACOSCOPY Right 5/20/2021    RIGHT VIDEO ASSISTED THORACOSCOPIC SURGERY WITH WEDGE EXCISION OF RIGHT UPPER

## 2024-11-28 NOTE — ED PROVIDER NOTES
I independently examined and evaluated Sandie Perez.    In brief, patient is a 74-year-old female presents to the emergency department for evaluation of abdominal pain and blood in the urine. Focused exam revealed clinically nontoxic-appearing female, resting comfortably.  Reports her symptoms have improved since receiving medications in the ER.  Workup remarkable for no leukocytosis, hemoglobin of 14.8, creatinine 0.7.  Urinalysis negative for nitrite leukocyte not indicative of UTI.  There was rare bacteria in the urine with greater than 100 RBC.  CT shows no acute abnormality.  There was bilateral punctate nonobstructing renal calculi.  Discussed the results of the workup with patient.  I answered all of her questions.  As patient reports having some suprapubic discomfort along with burning with urination, will treat as UTI with antibiotics.  She was given one-time dose of Omnicef.  Given prescription of Omnicef for 5 days.  Gave prescription of Zofran in case she starts feeling nauseous.  Discharged home with strict and precautions.    All diagnostic, treatment, and disposition decisions were made by myself in conjunction with the advanced practice provider/resident physician.     I personally saw the patient and performed a substantive portion of the visit including aspects of the medical decision making. I approved management plan and take responsibility for the patient management.     I personally saw the patient and independently provided 0 minutes of non-concurrent critical care out of the total shared critical care time provided.    Comment: Please note this report has been produced using speech recognition software and may contain errors related to that system including errors in grammar, punctuation, and spelling, as well as words and phrases that may be inappropriate. If there are any questions or concerns please feel free to contact the dictating provider for clarification.    For all further

## 2024-12-02 ENCOUNTER — HOSPITAL ENCOUNTER (OUTPATIENT)
Dept: PHYSICAL THERAPY | Age: 74
Setting detail: THERAPIES SERIES
Discharge: HOME | End: 2024-12-02
Payer: MEDICARE

## 2024-12-02 ENCOUNTER — TELEPHONE (OUTPATIENT)
Dept: FAMILY MEDICINE CLINIC | Age: 74
End: 2024-12-02

## 2024-12-02 DIAGNOSIS — R06.02 SHORTNESS OF BREATH: ICD-10-CM

## 2024-12-02 DIAGNOSIS — R09.89 CHEST CONGESTION: ICD-10-CM

## 2024-12-02 PROCEDURE — 97161 PT EVAL LOW COMPLEX 20 MIN: CPT

## 2024-12-02 PROCEDURE — 97112 NEUROMUSCULAR REEDUCATION: CPT

## 2024-12-02 PROCEDURE — 97110 THERAPEUTIC EXERCISES: CPT

## 2024-12-02 NOTE — PLAN OF CARE
balance, coordination, kinesthetic sense, posture, and/or proprioception for sitting and/or standing activities    (34123) THERAPEUTIC ACTIVITY - use of dynamic activities to improve functional performance. (Ex include squatting, ascending/descending stairs, walking, bending, lifting, catching, throwing, pushing, pulling, jumping.)  Direct, one on one contact, billed in 15-minute increments.  (28459) MANUAL THERAPY -  Manual therapy techniques, 1 or more regions, each 15 minutes (Mobilization/manipulation, manual lymphatic drainage, manual traction) for the purpose of modulating pain, promoting relaxation,  increasing ROM, reducing/eliminating soft tissue swelling/inflammation/restriction, improving soft tissue extensibility and allowing for proper ROM for normal function with self care, mobility, lifting and ambulation  (52833) GAIT RE-EDUCATION - Provided training and instruction to the patient for proper joint and muscle recruitment and positioning and eccentric body weight control with ambulation re-education including up and down stairs. Therapeutic procedure, one or more areas, each 15 minutes;   (13960) VASOPNEUMATIC  (92098) ADL - Provided self-care/home management training related to activities of daily living and compensatory training, and/or use of adaptive equipment for improvement with: ADLs and compensatory training, meal preparation, safety procedures and instruction in use of adaptive equipment, including bathing, grooming, dressing, personal hygiene, basic household cleaning and chores.   (47037) Needle insertion(s) without injection; 1 or 2 muscle(s).  (86123) UNATTENDED ESTIM. Electrical stimulation (unattended), to 1 or more areas for indication(s) other than wound care, as part of a therapy plan of care. ([facility] )    GOALS     Patient stated goal: Pt would like to return to pain free recreational activities.   [] Progressing: [] Met: [] Not Met: [] Adjusted    Therapist goals for Patient:

## 2024-12-02 NOTE — TELEPHONE ENCOUNTER
Last office visit 11/22/2024     Last written 8/27/24    Next office visit scheduled 2/10/2025    Requested Prescriptions     Pending Prescriptions Disp Refills    albuterol sulfate HFA (VENTOLIN HFA) 108 (90 Base) MCG/ACT inhaler 54 g 1     Sig: Inhale 2 puffs into the lungs 4 times daily as needed for Wheezing           Pt called in asking for refill

## 2024-12-02 NOTE — TELEPHONE ENCOUNTER
ED Follow Up Call/ Schedule appt   ED: [facility]  Reason: Hematuria  Date:11/27/2024    Appt scheduled:       Comments:   Spoke with patient, she stated that her SXS are better.   She stated that she will follow up with urology.    Future Appointments   Date Time Provider Department Center   12/10/2024 11:00 AM Jair Best, PT [facility]   12/17/2024 11:00 AM Jair Best, PT [facility] [place] [facility]   12/24/2024 11:00 AM Jair Best, PT [facility] [place] [facility]   12/31/2024 11:00 AM Jair Best, PT [facility] [place] [facility]   1/7/2025 10:30 AM Saurabh Lauren DO [facility]   2/10/2025 12:00 PM SCHEDULE, [facility] PFT [facility] PFT [place]   2/10/2025  2:20 PM Simone Grove MD AND [facility]   8/28/2025  1:30 PM [facility] CT VCT [facility] CT [facility]   8/28/2025  2:20 PM [facility]

## 2024-12-05 NOTE — PROGRESS NOTES
Ankush Oconnell received a viral test for COVID-19. They were educated on isolation and quarantine as appropriate. For any symptoms, they were directed to seek care from their PCP, given contact information to establish with a doctor, directed to an urgent care or the emergency room.
1.83

## 2024-12-10 ENCOUNTER — HOSPITAL ENCOUNTER (OUTPATIENT)
Dept: PHYSICAL THERAPY | Age: 74
Setting detail: THERAPIES SERIES
Discharge: HOME | End: 2024-12-10
Payer: MEDICARE

## 2024-12-10 PROCEDURE — 97110 THERAPEUTIC EXERCISES: CPT

## 2024-12-10 PROCEDURE — 97112 NEUROMUSCULAR REEDUCATION: CPT

## 2024-12-10 NOTE — FLOWSHEET NOTE
[facility]- Outpatient Rehabilitation and Therapy [address], [place] [address] office: [phone] fax: [phone]           Physical Therapy: TREATMENT/PROGRESS NOTE   Patient: Sandie Perez (74 y.o. female)   Examination Date: 12/10/2024   :  1950 MRN: [id number]   Visit #: 2   Insurance Allowable Auth Needed   ? []Yes    []No    Insurance: Payor: MEDICARE / Plan: MEDICARE PART A AND B / Product Type: *No Product type* /   Insurance ID: [id number] - (Medicare)  Secondary Insurance (if applicable): [facility]   Treatment Diagnosis: R knee pain  No diagnosis found.   Medical Diagnosis:  S/P total knee arthroplasty, right [Z96.651]   Referring Physician: Ankush Cisneros PA-C  PCP: Saurabh Lauren DO     Plan of care signed (Y/N):     Date of Patient follow up with Physician:      Plan of Care Report: EVAL today and YES, Date Range for this report: 2024 to 3/2/2025  POC update due: (10 visits /OR AUTH LIMITS, whichever is less)  2025                                             Medical History:  Comorbidities:  Prior Surgeries: R TKA 2024  Relevant Medical History: na                                         Precautions/ Contra-indications:           Latex allergy:  NO  Pacemaker:    NO  Contraindications for Manipulation: None  Date of Surgery: 2024  Other:    Red Flags:  None    Suicide Screening:   The patient did not verbalize a primary behavioral concern, suicidal ideation, suicidal intent, or demonstrate suicidal behaviors.    Preferred Language for Healthcare:   [x] English       [] other:    SUBJECTIVE EXAMINATION     Patient stated complaint: Pt reports her knee is doing well.         Test used Initial score  12/2/24 12/10/2024   Pain Summary VAS 2 1   Functional questionnaire LEFS na    Other:                Exercises/Interventions     Therapeutic Ex (22409)  resistance Sets/time Reps Notes/Cues/Progressions   Supported jett

## 2024-12-17 ENCOUNTER — APPOINTMENT (OUTPATIENT)
Dept: PHYSICAL THERAPY | Age: 74
End: 2024-12-17
Payer: MEDICARE

## 2024-12-24 ENCOUNTER — HOSPITAL ENCOUNTER (OUTPATIENT)
Dept: PHYSICAL THERAPY | Age: 74
Setting detail: THERAPIES SERIES
End: 2024-12-24
Payer: MEDICARE

## 2024-12-31 ENCOUNTER — HOSPITAL ENCOUNTER (OUTPATIENT)
Dept: PHYSICAL THERAPY | Age: 74
Setting detail: THERAPIES SERIES
End: 2024-12-31
Payer: MEDICARE

## 2025-01-08 DIAGNOSIS — R13.19 ESOPHAGEAL DYSPHAGIA: ICD-10-CM

## 2025-01-08 DIAGNOSIS — K21.00 GASTROESOPHAGEAL REFLUX DISEASE WITH ESOPHAGITIS, UNSPECIFIED WHETHER HEMORRHAGE: ICD-10-CM

## 2025-01-08 DIAGNOSIS — F33.1 MODERATE EPISODE OF RECURRENT MAJOR DEPRESSIVE DISORDER (HCC): ICD-10-CM

## 2025-01-08 RX ORDER — OMEPRAZOLE 40 MG/1
40 CAPSULE, DELAYED RELEASE ORAL
Qty: 90 CAPSULE | Refills: 1 | Status: SHIPPED | OUTPATIENT
Start: 2025-01-08

## 2025-01-08 NOTE — TELEPHONE ENCOUNTER
Refill Request     CONFIRM preferred pharmacy with the patient.    If Mail Order Rx - Pend for 90 day refill.      Last Seen: Last Seen Department: 3/14/2024  Last Seen by PCP: 3/14/2024    Last Written:   Omeprazole-04/30/2024 90 cap 1 refills   Prozac-05/23/2024 270 cap 1 refills     If no future appointment scheduled:  Review the last OV with PCP and review information for follow-up visit,  Route STAFF MESSAGE with patient name to the  Pool for scheduling with the following information:            -  Timing of next visit           -  Visit type ie Physical, OV, etc           -  Diagnoses/Reason ie. COPD, HTN - Do not use MEDICATION, Follow-up or CHECK UP - Give reason for visit      Next Appointment:   Future Appointments   Date Time Provider Department Center   1/10/2025 11:30 AM Saurabh Lauren DO [facility]   2/10/2025 12:00 PM SCHEDULE, [facility] PFT [place] [facility]   2/10/2025  2:20 PM Simone Grove MD AND [facility]   8/28/2025  1:30 PM A CT VCT [facility] CT [facility]   8/28/2025  2:20 PM Simone Grove MD AND [facility]       Message sent to  to schedule appt with patient?  NO      Requested Prescriptions     Pending Prescriptions Disp Refills    omeprazole (PRILOSEC) 40 MG delayed release capsule [Pharmacy Med Name: OMEPRAZOLE DR 40 MG CAPSULE] 90 capsule 1     Sig: TAKE 1 CAPSULE BY MOUTH EVERY DAY IN THE MORNING BEFORE BREAKFAST    FLUoxetine (PROZAC) 20 MG capsule [Pharmacy Med Name: FLUOXETINE HCL 20 MG CAPSULE] 270 capsule 1     Sig: TAKE 3 CAPSULES BY MOUTH EVERY DAY

## 2025-01-13 ENCOUNTER — OFFICE VISIT (OUTPATIENT)
Dept: FAMILY MEDICINE CLINIC | Age: 75
End: 2025-01-13

## 2025-01-13 VITALS
SYSTOLIC BLOOD PRESSURE: 124 MMHG | RESPIRATION RATE: 16 BRPM | WEIGHT: 152.6 LBS | HEART RATE: 72 BPM | OXYGEN SATURATION: 96 % | BODY MASS INDEX: 26.05 KG/M2 | HEIGHT: 64 IN | DIASTOLIC BLOOD PRESSURE: 78 MMHG

## 2025-01-13 VITALS
HEART RATE: 72 BPM | DIASTOLIC BLOOD PRESSURE: 78 MMHG | BODY MASS INDEX: 25.95 KG/M2 | SYSTOLIC BLOOD PRESSURE: 124 MMHG | HEIGHT: 64 IN | WEIGHT: 152 LBS | RESPIRATION RATE: 18 BRPM

## 2025-01-13 DIAGNOSIS — Z00.00 MEDICARE ANNUAL WELLNESS VISIT, SUBSEQUENT: Primary | ICD-10-CM

## 2025-01-13 DIAGNOSIS — Z85.118 HISTORY OF LUNG CANCER: ICD-10-CM

## 2025-01-13 DIAGNOSIS — F33.1 MODERATE EPISODE OF RECURRENT MAJOR DEPRESSIVE DISORDER (HCC): ICD-10-CM

## 2025-01-13 DIAGNOSIS — J44.9 COPD, SEVERITY TO BE DETERMINED (HCC): Primary | ICD-10-CM

## 2025-01-13 DIAGNOSIS — E78.2 MIXED HYPERLIPIDEMIA: ICD-10-CM

## 2025-01-13 PROBLEM — C34.12 MALIGNANT NEOPLASM OF UPPER LOBE OF LEFT LUNG (HCC): Status: RESOLVED | Noted: 2024-01-10 | Resolved: 2025-01-13

## 2025-01-13 LAB
CHOLEST SERPL-MCNC: 163 MG/DL (ref 0–199)
HDLC SERPL-MCNC: 60 MG/DL (ref 40–60)
LDL CHOLESTEROL: 82 MG/DL
TRIGL SERPL-MCNC: 106 MG/DL (ref 0–150)
VLDLC SERPL CALC-MCNC: 21 MG/DL

## 2025-01-13 RX ORDER — HYDROXYZINE HYDROCHLORIDE 25 MG/1
25 TABLET, FILM COATED ORAL NIGHTLY PRN
Qty: 90 TABLET | Refills: 1 | Status: SHIPPED | OUTPATIENT
Start: 2025-01-13

## 2025-01-13 ASSESSMENT — PATIENT HEALTH QUESTIONNAIRE - PHQ9
SUM OF ALL RESPONSES TO PHQ QUESTIONS 1-9: 3
1. LITTLE INTEREST OR PLEASURE IN DOING THINGS: SEVERAL DAYS
5. POOR APPETITE OR OVEREATING: NOT AT ALL
3. TROUBLE FALLING OR STAYING ASLEEP: SEVERAL DAYS
4. FEELING TIRED OR HAVING LITTLE ENERGY: NOT AT ALL
9. THOUGHTS THAT YOU WOULD BE BETTER OFF DEAD, OR OF HURTING YOURSELF: NOT AT ALL
2. FEELING DOWN, DEPRESSED OR HOPELESS: SEVERAL DAYS
6. FEELING BAD ABOUT YOURSELF - OR THAT YOU ARE A FAILURE OR HAVE LET YOURSELF OR YOUR FAMILY DOWN: NOT AT ALL
10. IF YOU CHECKED OFF ANY PROBLEMS, HOW DIFFICULT HAVE THESE PROBLEMS MADE IT FOR YOU TO DO YOUR WORK, TAKE CARE OF THINGS AT HOME, OR GET ALONG WITH OTHER PEOPLE: SOMEWHAT DIFFICULT
SUM OF ALL RESPONSES TO PHQ9 QUESTIONS 1 & 2: 2
SUM OF ALL RESPONSES TO PHQ QUESTIONS 1-9: 3
SUM OF ALL RESPONSES TO PHQ QUESTIONS 1-9: 3
7. TROUBLE CONCENTRATING ON THINGS, SUCH AS READING THE NEWSPAPER OR WATCHING TELEVISION: NOT AT ALL
8. MOVING OR SPEAKING SO SLOWLY THAT OTHER PEOPLE COULD HAVE NOTICED. OR THE OPPOSITE, BEING SO FIGETY OR RESTLESS THAT YOU HAVE BEEN MOVING AROUND A LOT MORE THAN USUAL: NOT AT ALL
SUM OF ALL RESPONSES TO PHQ QUESTIONS 1-9: 3

## 2025-01-13 ASSESSMENT — LIFESTYLE VARIABLES
HOW OFTEN DO YOU HAVE A DRINK CONTAINING ALCOHOL: MONTHLY OR LESS
HOW MANY STANDARD DRINKS CONTAINING ALCOHOL DO YOU HAVE ON A TYPICAL DAY: 1 OR 2

## 2025-01-13 NOTE — PROGRESS NOTES
HFA) 108 (90 Base) MCG/ACT inhaler Inhale 2 puffs into the lungs 4 times daily as needed for Wheezing Yes Saurabh Lauren DO   albuterol (PROVENTIL) (2.5 MG/3ML) 0.083% nebulizer solution Take 3 mLs by nebulization every 6 hours as needed for Wheezing Yes Vicky Agustin APRN - CNP   acetaminophen (TYLENOL) 500 MG CAPS capsule  Yes Provider, MD Danilo   Cholecalciferol (VITAMIN D) 50 MCG (2000 UT) CAPS capsule Take by mouth Yes Provider, MD Danilo   mometasone-formoterol (DULERA) 100-5 MCG/ACT inhaler Inhale 2 puffs into the lungs 2 times daily  Patient not taking: Reported on 1/13/2025  Vicky Agustin APRN - CNP   albuterol sulfate HFA (VENTOLIN HFA) 108 (90 Base) MCG/ACT inhaler Inhale 2 puffs into the lungs 4 times daily as needed for Wheezing  Simone Grove MD   QUEtiapine (SEROQUEL) 25 MG tablet Take 1 tablet by mouth at bedtime  Patient not taking: Reported on 1/13/2025  Saurabh Lauren DO       CareTeam (Including outside providers/suppliers regularly involved in providing care):   Patient Care Team:  Saurabh Lauren DO as PCP - General (Family Medicine)  Saurabh Lauren DO as PCP - Empaneled Provider  Katerin Daugherty MD as Consulting Physician (Pain Management)  Nicole Marquez PA as Physician Assistant (Physician Assistant)  Anderson Sylvester MD as Consulting Physician (Gastroenterology)     Recommendations for Preventive Services Due: see orders and patient instructions/AVS.  Recommended screening schedule for the next 5-10 years is provided to the patient in written form: see Patient Instructions/AVS.     Reviewed and updated this visit:  Tobacco  Allergies  Meds  Problems  Med Hx  Surg Hx  Soc Hx  Fam Hx        I, Jessica Zepeda LPN, 1/13/2025, performed the documented evaluation under the direct supervision of the attending physician.     This encounter was performed under mySaurabh DO’s, direct supervision, 1/13/2025.  Saurabh Lauren DO

## 2025-01-13 NOTE — PROGRESS NOTES
Assessment:  Encounter Diagnoses   Name Primary?    COPD, severity to be determined (HCC) Yes    History of lung cancer     Moderate episode of recurrent major depressive disorder (HCC)     Mixed hyperlipidemia        Plan:  1. COPD, severity to be determined (HCC)  2. History of lung cancer  3. Moderate episode of recurrent major depressive disorder (HCC)  -     hydrOXYzine HCl (ATARAX) 25 MG tablet; Take 1 tablet by mouth nightly as needed for Anxiety, Disp-90 tablet, R-1Normal  4. Mixed hyperlipidemia  -     Lipid, Fasting; Future  Patient reports that she was unable to start Dulera due to cost.  Recommend follow-up with pulmonology to discuss alternative inhalers.  Status post lobectomy.  Can continue hydroxyzine nightly for the short-term given exacerbation of depression and insomnia with brother in hospice care currently.  Recheck lipid panel.      No follow-ups on file.      Patient: Sandie Perez is a 74 y.o. female who presents today with the following Chief Complaint(s):  Chief Complaint   Patient presents with    Other     Depression/ anxiety follow up         HPI    Insomnia  Improved with seroquel however has stopped due to cost of medication     Anxiety  Prozac 20mg  Feels anxiety is still well controlled    COPD  Has established with pulmonology  S/p lobectomy  Has been using incentive spirometer daily  Was not able to  dulera, has albuterol which she is using most days  PFT ordered but has not been completed    Current Outpatient Medications   Medication Sig Dispense Refill    hydrOXYzine HCl (ATARAX) 25 MG tablet Take 1 tablet by mouth nightly as needed for Anxiety 90 tablet 1    omeprazole (PRILOSEC) 40 MG delayed release capsule TAKE 1 CAPSULE BY MOUTH EVERY DAY IN THE MORNING BEFORE BREAKFAST 90 capsule 1    FLUoxetine (PROZAC) 20 MG capsule TAKE 3 CAPSULES BY MOUTH EVERY  capsule 1    ondansetron (ZOFRAN-ODT) 4 MG disintegrating tablet Take 1 tablet by mouth 3 times daily as

## 2025-02-10 ENCOUNTER — OFFICE VISIT (OUTPATIENT)
Dept: PULMONOLOGY | Age: 75
End: 2025-02-10
Payer: MEDICARE

## 2025-02-10 ENCOUNTER — HOSPITAL ENCOUNTER (OUTPATIENT)
Dept: PULMONOLOGY | Age: 75
Discharge: HOME | End: 2025-02-10
Payer: MEDICARE

## 2025-02-10 VITALS
DIASTOLIC BLOOD PRESSURE: 77 MMHG | HEART RATE: 93 BPM | SYSTOLIC BLOOD PRESSURE: 153 MMHG | HEIGHT: 64 IN | TEMPERATURE: 98.2 F | WEIGHT: 153.13 LBS | BODY MASS INDEX: 26.14 KG/M2 | OXYGEN SATURATION: 97 % | RESPIRATION RATE: 16 BRPM

## 2025-02-10 VITALS — OXYGEN SATURATION: 97 %

## 2025-02-10 DIAGNOSIS — R06.02 SOB (SHORTNESS OF BREATH): ICD-10-CM

## 2025-02-10 DIAGNOSIS — Z90.2 HISTORY OF LOBECTOMY OF LUNG: ICD-10-CM

## 2025-02-10 DIAGNOSIS — J44.9 COPD, SEVERITY TO BE DETERMINED (HCC): ICD-10-CM

## 2025-02-10 DIAGNOSIS — C34.12 MALIGNANT NEOPLASM OF UPPER LOBE OF LEFT LUNG (HCC): Primary | ICD-10-CM

## 2025-02-10 DIAGNOSIS — Z87.891 FORMER SMOKER: ICD-10-CM

## 2025-02-10 DIAGNOSIS — R09.89 CHEST CONGESTION: ICD-10-CM

## 2025-02-10 LAB
DLCO %PRED: 89 %
DLCO PRED: NORMAL
DLCO/VA %PRED: NORMAL
DLCO/VA PRED: NORMAL
DLCO/VA: NORMAL
DLCO: NORMAL
EXPIRATORY TIME-POST: NORMAL
EXPIRATORY TIME: NORMAL
FEF 25-75 %CHNG: NORMAL
FEF 25-75 POST %PRED: NORMAL
FEF 25-75% %PRED-PRE: NORMAL
FEF 25-75% PRED: NORMAL
FEF 25-75-POST: NORMAL
FEF 25-75-PRE: NORMAL
FEV1 %PRED-POST: 105 %
FEV1 %PRED-PRE: 101 %
FEV1 PRED: NORMAL
FEV1-POST: NORMAL
FEV1-PRE: NORMAL
FEV1/FVC %PRED-POST: 88 %
FEV1/FVC %PRED-PRE: 89 %
FEV1/FVC PRED: NORMAL
FEV1/FVC-POST: NORMAL
FEV1/FVC-PRE: NORMAL
FVC %PRED-POST: 118 L
FVC %PRED-PRE: 111 %
FVC PRED: NORMAL
FVC-POST: NORMAL
FVC-PRE: NORMAL
GAW %PRED: NORMAL
GAW PRED: NORMAL
GAW: NORMAL
IC PRE %PRED: NORMAL
IC PRED: NORMAL
IC: NORMAL
MEP: NORMAL
MIP: NORMAL
MVV %PRED-PRE: NORMAL
MVV PRED: NORMAL
MVV-PRE: NORMAL
PEF %PRED-POST: NORMAL
PEF %PRED-PRE: NORMAL
PEF PRED: NORMAL
PEF%CHNG: NORMAL
PEF-POST: NORMAL
PEF-PRE: NORMAL
RAW %PRED: NORMAL
RAW PRED: NORMAL
RAW: NORMAL
RV PRE %PRED: NORMAL
RV PRED: NORMAL
RV: NORMAL
SVC %PRED: NORMAL
SVC PRED: NORMAL
SVC: NORMAL
TLC PRE %PRED: 88 %
TLC PRED: NORMAL
TLC: NORMAL
VA %PRED: NORMAL
VA PRED: NORMAL
VA: NORMAL
VTG %PRED: NORMAL
VTG PRED: NORMAL
VTG: NORMAL

## 2025-02-10 PROCEDURE — 1090F PRES/ABSN URINE INCON ASSESS: CPT | Performed by: STUDENT IN AN ORGANIZED HEALTH CARE EDUCATION/TRAINING PROGRAM

## 2025-02-10 PROCEDURE — 3017F COLORECTAL CA SCREEN DOC REV: CPT | Performed by: STUDENT IN AN ORGANIZED HEALTH CARE EDUCATION/TRAINING PROGRAM

## 2025-02-10 PROCEDURE — 94060 EVALUATION OF WHEEZING: CPT

## 2025-02-10 PROCEDURE — G8427 DOCREV CUR MEDS BY ELIG CLIN: HCPCS | Performed by: STUDENT IN AN ORGANIZED HEALTH CARE EDUCATION/TRAINING PROGRAM

## 2025-02-10 PROCEDURE — G8400 PT W/DXA NO RESULTS DOC: HCPCS | Performed by: STUDENT IN AN ORGANIZED HEALTH CARE EDUCATION/TRAINING PROGRAM

## 2025-02-10 PROCEDURE — 99213 OFFICE O/P EST LOW 20 MIN: CPT | Performed by: STUDENT IN AN ORGANIZED HEALTH CARE EDUCATION/TRAINING PROGRAM

## 2025-02-10 PROCEDURE — 94729 DIFFUSING CAPACITY: CPT

## 2025-02-10 PROCEDURE — 1123F ACP DISCUSS/DSCN MKR DOCD: CPT | Performed by: STUDENT IN AN ORGANIZED HEALTH CARE EDUCATION/TRAINING PROGRAM

## 2025-02-10 PROCEDURE — 1159F MED LIST DOCD IN RCRD: CPT | Performed by: STUDENT IN AN ORGANIZED HEALTH CARE EDUCATION/TRAINING PROGRAM

## 2025-02-10 PROCEDURE — 6370000000 HC RX 637 (ALT 250 FOR IP): Performed by: NURSE PRACTITIONER

## 2025-02-10 PROCEDURE — G8417 CALC BMI ABV UP PARAM F/U: HCPCS | Performed by: STUDENT IN AN ORGANIZED HEALTH CARE EDUCATION/TRAINING PROGRAM

## 2025-02-10 PROCEDURE — G2211 COMPLEX E/M VISIT ADD ON: HCPCS | Performed by: STUDENT IN AN ORGANIZED HEALTH CARE EDUCATION/TRAINING PROGRAM

## 2025-02-10 PROCEDURE — 94726 PLETHYSMOGRAPHY LUNG VOLUMES: CPT

## 2025-02-10 PROCEDURE — 94760 N-INVAS EAR/PLS OXIMETRY 1: CPT

## 2025-02-10 PROCEDURE — 1036F TOBACCO NON-USER: CPT | Performed by: STUDENT IN AN ORGANIZED HEALTH CARE EDUCATION/TRAINING PROGRAM

## 2025-02-10 RX ORDER — ALBUTEROL SULFATE 90 UG/1
4 INHALANT RESPIRATORY (INHALATION) ONCE
Status: COMPLETED | OUTPATIENT
Start: 2025-02-10 | End: 2025-02-10

## 2025-02-10 RX ADMIN — Medication 4 PUFF: at 12:08

## 2025-02-10 ASSESSMENT — ENCOUNTER SYMPTOMS
TROUBLE SWALLOWING: 0
ABDOMINAL DISTENTION: 0
EYE REDNESS: 0
SHORTNESS OF BREATH: 0
SORE THROAT: 0
VOMITING: 0
COLOR CHANGE: 0
STRIDOR: 0
EYE DISCHARGE: 0
DIARRHEA: 0
CONSTIPATION: 0
BACK PAIN: 0
COUGH: 0
NAUSEA: 0
ABDOMINAL PAIN: 0
EYE PAIN: 0
EYE ITCHING: 0
WHEEZING: 0

## 2025-02-10 ASSESSMENT — PULMONARY FUNCTION TESTS
FVC_PERCENT_PREDICTED_POST: 118
FEV1_PERCENT_PREDICTED_POST: 105
FEV1/FVC_PERCENT_PREDICTED_PRE: 89
FVC_PERCENT_PREDICTED_PRE: 111
FEV1_PERCENT_PREDICTED_PRE: 101
FEV1/FVC_PERCENT_PREDICTED_POST: 88

## 2025-02-10 NOTE — PATIENT INSTRUCTIONS
Remember to bring a list of pulmonary medications and any CPAP or BiPAP machines to your next appointment with the office.     Please keep all of your future appointments scheduled by [facility] [place] [facility] office. Out of respect for other patients and providers, you may be asked to reschedule your appointment if you arrive later than your scheduled appointment time. Appointments cancelled less than 24hrs in advance will be considered a no show. Patients with three missed appointments within 1 year or four missed appointments within 2 years can be dismissed from the practice.     Please be aware that our physicians are required to work in the Intensive Care Unit at [facility].  Your appointment may need to be rescheduled if they are designated to work during your appointment time.      You may receive a survey regarding the care you received during your visit.  Your input is valuable to us.  We encourage you to complete and return your survey.  We hope you will choose us in the future for your healthcare needs.     Pt instructed of all future appointment dates & times, including radiology, labs, procedures & referrals. If procedures were scheduled preparation instructions provided. Instructions on future appointments with [facility] were given.     In the next few weeks, you will be receiving a survey from [facility] regarding your visit today.  We would greatly appreciate it if you would take just a few minutes to fill that out.  It is very important to us that our patients receive top notch care and our surveys help keep us accountable. However, if your experience was not a good one, we want to hear about that as well. This is a key way we can keep track of problems and strive to correct any for future visits.    Again, we appreciate your time and thank you for choosing [facility]!    Katelyn ROSE

## 2025-02-10 NOTE — PROGRESS NOTES
MA Communication:  The following orders are received by verbal communication from   Simone Grove MD    Orders include:  FU Aug      CT Aug

## 2025-02-10 NOTE — PROGRESS NOTES
[facility] Pulmonary Follow-up  [address] [place] [address]  [phone]        Sandie Perez (: 1950 ) is a 74 y.o. female here for an evaluation of   Chief Complaint   Patient presents with    Follow-up    COPD     Lung ca    Results     PFT         SUBJECTIVE/OBJECTIVE:  Patient is a 74-year-old female with significant past medical history of left upper lobe lobectomy for adenocarcinoma and wedge resection of right upper lobe nodule the presents to [facility] for follow-up visit.  Patient has no complaints today.  She uses albuterol as needed for shortness of breath.  She reports using around 5 times per month.  She was ordered maintenance inhaler at last visit, but did not  due to cost.  She has no other complaints today      Review of Systems   Constitutional:  Negative for activity change, appetite change, chills, diaphoresis and fatigue.   HENT:  Negative for congestion, sore throat and trouble swallowing.    Eyes:  Negative for pain, discharge, redness and itching.   Respiratory:  Negative for cough, shortness of breath, wheezing and stridor.    Cardiovascular:  Negative for chest pain, palpitations and leg swelling.   Gastrointestinal:  Negative for abdominal distention, abdominal pain, constipation, diarrhea, nausea and vomiting.   Endocrine: Negative for polydipsia, polyphagia and polyuria.   Genitourinary:  Negative for difficulty urinating.   Musculoskeletal:  Negative for back pain, myalgias and neck pain.   Skin:  Negative for color change.   Neurological:  Negative for dizziness, weakness and light-headedness.   Psychiatric/Behavioral:  Negative for agitation and behavioral problems.          Vitals:    02/10/25 1248   BP: (!) 153/77   Pulse: 93   Resp: 16   Temp: 98.2 °F (36.8 °C)   TempSrc: Temporal   SpO2: 97%   Weight: 69.5 kg (153 lb 2 oz)   Height: 1.626 m (5' 4\")        Physical Exam  Constitutional:       General: She is not in acute distress.

## 2025-02-11 NOTE — PROCEDURES
Pulmonary Function Testing      Patient name:  Sandie Perez      Unit #:   [id number]   Date of test:  2/10/2025   Date of interpretation:   2/11/2025    Ms. Sandie Perez is a 74 y.o. year-old former smoker. The spirometry data were acceptable and reproducible.     Spirometry:  Flow volume loops were normal. The FEV-1/FVC ratio was normal. The FEV-1 was normal. The FVC was normal. Response to inhaled bronchodilators (albuterol) was not significant.    Lung volumes:  Lung volumes were tested by plethysmography. The total lung capacity was normal. The residual volume was decreased. The ratio of residual volume to total lung capacity (RV/TLC) was 83%, which was normal.     Diffusion capacity was found to be 89% which is Normal.      Interpretation:  Normal spirometry, lung volumes, and diffusion with no significant bronchodilator response    Comments: None      Simone Grove MD, [facility]  [facility] Critical Care

## 2025-02-13 NOTE — PROGRESS NOTES
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 Recommend a colp.    tablet by mouth daily 90 tablet 0    fexofenadine (ALLEGRA) 180 MG tablet Take 1 tablet by mouth daily 90 tablet 1    lamoTRIgine (LAMICTAL) 25 MG tablet Take 2 tablets by mouth 2 times daily 360 tablet 0    sertraline (ZOLOFT) 100 MG tablet Take 1 tablet by mouth daily 90 tablet 0    omeprazole (PRILOSEC) 40 MG delayed release capsule Take 1 capsule by mouth every morning (before breakfast) 30 capsule 1    albuterol sulfate HFA (VENTOLIN HFA) 108 (90 Base) MCG/ACT inhaler Inhale 2 puffs into the lungs 4 times daily as needed for Wheezing 54 g 5    Cholecalciferol (VITAMIN D) 50 MCG (2000 UT) CAPS capsule Take by mouth       No current facility-administered medications on file prior to visit. Objective:   not currently breastfeeding. On examination is a very pleasant 76-year-old female who is alert and orient x3 she can go from sitting to standing and has has full extension through her right leg she has 1-2+ edema up and through the joint and into her shin. No significant varus or valgus laxity but joint line tenderness and hypertrophic synovitis around the parapatellar region consistent with the advanced osteoarthritis and anterior knee contusion. Neuro exam grossly intact both lower extremities. Intact sensation to light touch. Motor exam 4+ to 5/5 in all major motor groups. Negative Tomas's sign. Skin is warm, dry and intact with out erythema or significant increased temperature around the knee joint(s). There are no cutaneous lesions or lymphadenopathy present. X-RAYS:  The x-rays taken the office today do show some progression of the joint space loss subchondral sclerosis and osteophyte formation seen in both knees with the right being worse than the left.   But no obvious fractures were seen overall height and alignment of the patella was appropriate      Assessment:  Right knee anterior contusion and synovitis with chronic advancing osteoarthritis of both knees    Plan:  During

## 2025-02-20 ENCOUNTER — TELEPHONE (OUTPATIENT)
Dept: FAMILY MEDICINE CLINIC | Age: 75
End: 2025-02-20

## 2025-02-20 NOTE — TELEPHONE ENCOUNTER
11/27/2024 had a CT scan    IMPRESSION:  No acute abnormality identified.     Bilateral punctate nonobstructing renal calculi.     Diverticulosis.    She is asking the meaning of the impression of this and is asking if she should have a colonoscopy and see GI for this.     She would need a referral for GI

## 2025-02-20 NOTE — TELEPHONE ENCOUNTER
She does not require a GI evaluation. Diverticulosis is an outpouching of part of the bowel that is commonly seen. It can be a reason to develop diverticulitis which is an acute infection of this but does not mean it will occur. The best way to prevent this is to increase fiber and water intake to ensure easy smooth bowel movements.

## 2025-02-24 ASSESSMENT — ENCOUNTER SYMPTOMS
RHINORRHEA: 0
COUGH: 0
SHORTNESS OF BREATH: 0
NAUSEA: 0
VOMITING: 0
EYE PAIN: 0

## 2025-02-24 NOTE — PROGRESS NOTES
[facility]  DIVISION OF OTOLARYNGOLOGY- HEAD & NECK SURGERY  CONSULT    Patient Name: Sandie Perez  Medical Record Number:  [id number]  Primary Care Physician:  Saurabh Lauren DO  Date of Consultation: 2/27/2025    Chief Complaint:   Chief Complaint   Patient presents with    Tinnitus     Tinnitus worse on the left side but can hear in both ears.       HISTORY OF PRESENT ILLNESS  Sandie is a(n) 74 y.o. female who presents for evaluation of hearing loss, tinnitus, and dizziness.  She states that she was in the ER several weeks ago with an extreme episode of vertigo.  She was treated with medication and has not had any further vertigo since then.  She does continue to have a left-sided tinnitus.  She also has right-sided ear pain.    Interval History 02/27/25  Sandie states that the hearing has not significantly changed but the ringing on the left side has significantly worsened.  Patient Active Problem List   Diagnosis    Anxiety    History of colon polyps    History of gastritis    Hyperlipidemia    Moderate episode of recurrent major depressive disorder (HCC)    Gastroesophageal reflux disease with esophagitis    Multiple thyroid nodules    Iron deficiency anemia    Colon, diverticulosis    Esophageal dysphagia    History of lobectomy of lung    History of lung cancer    Osteoarthritis of right knee    Localized osteoarthritis of right knee    Former smoker, stopped smoking in distant past    COPD, severity to be determined (HCC)     Past Surgical History:   Procedure Laterality Date    COLONOSCOPY N/A 7/25/2022    COLONOSCOPY WITH BIOPSY performed by Daniel Ace MD at [facility] ENDOSCOPY    COLONOSCOPY N/A 7/25/2022    COLONOSCOPY POLYPECTOMY SNARE/COLD BIOPSY performed by Daniel Ace MD at [facility] ENDOSCOPY    CT NEEDLE BIOPSY LUNG PERCUTANEOUS W IMAGING GUIDANCE  9/3/2020    CT NEEDLE BIOPSY LUNG PERCUTANEOUS 9/3/2020 [facility] CT SCAN    CT NEEDLE BIOPSY LUNG PERCUTANEOUS W IMAGING GUIDANCE  3/25/2021

## 2025-02-26 ENCOUNTER — NURSE ONLY (OUTPATIENT)
Dept: FAMILY MEDICINE CLINIC | Age: 75
End: 2025-02-26
Payer: MEDICARE

## 2025-02-26 ENCOUNTER — TELEMEDICINE (OUTPATIENT)
Age: 75
End: 2025-02-26
Payer: MEDICARE

## 2025-02-26 DIAGNOSIS — N76.0 VULVOVAGINITIS: ICD-10-CM

## 2025-02-26 DIAGNOSIS — R30.0 DYSURIA: Primary | ICD-10-CM

## 2025-02-26 LAB
BILIRUBIN, POC: ABNORMAL
BLOOD URINE, POC: ABNORMAL
CLARITY, POC: ABNORMAL
COLOR, POC: YELLOW
GLUCOSE URINE, POC: ABNORMAL MG/DL
KETONES, POC: ABNORMAL MG/DL
LEUKOCYTE EST, POC: ABNORMAL
NITRITE, POC: ABNORMAL
PH, POC: 5.5
PROTEIN, POC: ABNORMAL MG/DL
SPECIFIC GRAVITY, POC: <=1.005
UROBILINOGEN, POC: 0.2 MG/DL

## 2025-02-26 PROCEDURE — 1036F TOBACCO NON-USER: CPT | Performed by: NURSE PRACTITIONER

## 2025-02-26 PROCEDURE — 1090F PRES/ABSN URINE INCON ASSESS: CPT | Performed by: NURSE PRACTITIONER

## 2025-02-26 PROCEDURE — G8400 PT W/DXA NO RESULTS DOC: HCPCS | Performed by: NURSE PRACTITIONER

## 2025-02-26 PROCEDURE — 81002 URINALYSIS NONAUTO W/O SCOPE: CPT | Performed by: NURSE PRACTITIONER

## 2025-02-26 PROCEDURE — 1123F ACP DISCUSS/DSCN MKR DOCD: CPT | Performed by: NURSE PRACTITIONER

## 2025-02-26 PROCEDURE — G8427 DOCREV CUR MEDS BY ELIG CLIN: HCPCS | Performed by: NURSE PRACTITIONER

## 2025-02-26 PROCEDURE — 3017F COLORECTAL CA SCREEN DOC REV: CPT | Performed by: NURSE PRACTITIONER

## 2025-02-26 PROCEDURE — 99213 OFFICE O/P EST LOW 20 MIN: CPT | Performed by: NURSE PRACTITIONER

## 2025-02-26 PROCEDURE — G8417 CALC BMI ABV UP PARAM F/U: HCPCS | Performed by: NURSE PRACTITIONER

## 2025-02-26 PROCEDURE — 1159F MED LIST DOCD IN RCRD: CPT | Performed by: NURSE PRACTITIONER

## 2025-02-26 ASSESSMENT — ENCOUNTER SYMPTOMS
VOMITING: 0
NAUSEA: 0

## 2025-02-26 NOTE — PROGRESS NOTES
Sandie Perez (:  1950) is a Established patient, here for evaluation of the following:    Dysuria (X1 week, dysuria, abdominal pain, diarrhea, also thinks she passed a kidney stone recently )       Assessment & Plan:  Below is the assessment and plan developed based on review of pertinent history, physical exam, labs, studies, and medications.  1. Dysuria  POCT UA and Urine Culture ordered. Avoid vigorous scrubbing of skin and cleansers to prevent further irritation.   2. Vulvovaginitis  -     Vaginitis Panel PCR; Future  POCT UA positive for blood exclusively. Will wait for culture and results of vaginal swab before treating. Patient was advised to stop using the gentle soap/cleanser and to not be vigorously cleaning her skin as that can cause irritation. I recommended A&D ointment while waiting for results to see if that helps with the discomfort.  The patient would benefit from future follow up with their usual PCP. As of the end of their Virtualist Visit today, follow up visit status is as follows: Patient to follow up as previously instructed by PCP    Return if symptoms worsen or fail to improve.    Subjective:   Patient reports having burning with urination and burning around her urethra. She passed a kidney stone last month and also had a bout of diarrhea with a fever a few weeks ago. Since then she has had burning on her external and internal labia and around her urethra and a foul odor coming from her groin. She has been washing vigorously with a gentle cleanser but has not had improvement. She tried Vagisil, but it made the burning worse.      Dysuria   This is a new problem. The current episode started in the past 7 days. The problem occurs every urination. The problem has been gradually worsening. The quality of the pain is described as burning. The pain is moderate. There has been no fever. She is Not sexually active. There is No history of pyelonephritis. Associated symptoms include

## 2025-02-26 NOTE — RESULT ENCOUNTER NOTE
Positive for blood. Culture sent. Will treat and adjust POC if indicated by culture and sensitvity SUBJECTIVE:  Mikael Beltran is a 20 y.o. female for physical exam.    Diet - regular diet   Exercise - every day-cardio   Sleep - 7    Preventative Health Questions    Breast Cancer Screening - not indicated  Cervical Cancer Screening - not indicated  Colon Cancer Screening - not indicated  Lung Cancer Screening - not indicated  Osteoporosis Screening - not indicated  Influenza Vaccination - indicated patient declined  Pneumonia Vaccination - not indicated  Tetanus - up-to-date  Falls Screening - not indicated    No past medical history on file. No past surgical history on file. Social History     Socioeconomic History    Marital status: Single     Spouse name: Not on file    Number of children: Not on file    Years of education: Not on file    Highest education level: Not on file   Occupational History    Not on file   Tobacco Use    Smoking status: Never    Smokeless tobacco: Never   Vaping Use    Vaping Use: Never used   Substance and Sexual Activity    Alcohol use: Not on file    Drug use: Not on file    Sexual activity: Not on file   Other Topics Concern    Not on file   Social History Narrative    ** Merged History Encounter **          Social Determinants of Health     Financial Resource Strain: Low Risk     Difficulty of Paying Living Expenses: Not hard at all   Food Insecurity: No Food Insecurity    Worried About Running Out of Food in the Last Year: Never true    Ran Out of Food in the Last Year: Never true   Transportation Needs: Not on file   Physical Activity: Not on file   Stress: Not on file   Social Connections: Not on file   Intimate Partner Violence: Not on file   Housing Stability: Not on file       Family History   Problem Relation Age of Onset    Asthma Mother            I have reviewed the patient's past medical history, past surgical history, allergies, medications, social and family history and I have made updates where appropriate.     Review of Systems  Positive responses are highlighted in bold    Constitutional:  Fever, Chills, Fatigue, Unexpected changes in weight  Eyes:  Eye discharge, Eye pain, Eye redness, Visual disturbances   HENT:  Ear pain, Tinnitus, Nosebleeds, Trouble swallowing  Cardiovascular:  Chest Pain, Palpitations  Respiratory:  Cough, Wheezing, Shortness of breath, Chest tightness, Apnea  Gastrointestinal:  Nausea, Vomiting, Diarrhea, Constipation, Heartburn, Blood in stool  Genitourinary:  Difficulty or painful urination, Flank pain, Change in frequency, Urgency  Skin:  Color change, Rash, Itching, Wound  Psychiatric:  Hallucinations, Anxiety, Depression, Suicidal ideation  Hematological:  Enlarged glands, Easy bleeding, Easily bruising  Musculoskeletal:  Joint pain, Back pain, Gait problems, Joint swelling, Myalgias  Neurological:  Dizziness, Headaches, Presyncope, Numbness, Seizures, Tremors  Allergy:  Environmental allergies, Food allergies  Endocrine:  Heat Intolerance, Cold Intolerance, Polydipsia, Polyphagia, Polyuria      PHYSICAL EXAM:  Vitals:    08/23/22 1111   BP: 121/65   Pulse: 85   Resp: 20   Temp: 98.4 °F (36.9 °C)   SpO2: 98%     General Appearance: well developed and well- nourished, in no acute distress  Head: normocephalic and atraumatic  Eyes: pupils equal, round, and reactive to light, extraocular eye movements intact, conjunctivae and eye lids without erythema  ENT: external ear and ear canal normal bilaterally, TMs intact and regular, nose without deformity, nasal mucosa and turbinates normal without polyps, oropharynx normal, dentition is normal for age  Neck: supple and non-tender without mass, no thyromegaly or thyroid nodules, no cervical lymphadenopathy  Pulmonary/Chest: clear to auscultation bilaterally- no wheezes, rales or rhonchi, normal air movement, no respiratory distress or retractions  Cardiovascular: normal rate, regular rhythm, normal S1 and S2, no murmurs, rubs, clicks, or gallops, distal pulses intact, no carotid bruits  Abdomen: soft, non-tender, non-distended, normal bowel sounds,  no rebound or guarding, no masses or hernias noted  Extremities: no cyanosis, clubbing or edema of the lower extremities  Musculoskeletal: No joint swelling or gross deformity   Neuro:  Alert, 5/5 strength globally and symmetrically, 2+ patellar reflexes b/l  Skin: warm and dry, no rash or erythema  Psych:  Affect appropriate. Thought process is normal without evidence of depression or psychosis. Good insight and appropriae interaction. Cognition and memory appear to be intact. ASSESSMENT & PLAN  1. Physical exam, pre-employment  The following orders have not been finalized:  -     tuberculin injection 5 Units\  -return for reading Thursday 8/25/2022    Kirk Burks was seen today for employment physical.  Return PRN any new or concerning medical problems    Counseling was provided today regarding the following topics: Healthy eating habits, Regular exercise, substance abuse and healthy sleep habits.

## 2025-02-27 ENCOUNTER — OFFICE VISIT (OUTPATIENT)
Dept: ENT CLINIC | Age: 75
End: 2025-02-27
Payer: MEDICARE

## 2025-02-27 ENCOUNTER — PROCEDURE VISIT (OUTPATIENT)
Dept: AUDIOLOGY | Age: 75
End: 2025-02-27
Payer: MEDICARE

## 2025-02-27 VITALS
BODY MASS INDEX: 25.49 KG/M2 | HEIGHT: 65 IN | HEART RATE: 67 BPM | WEIGHT: 153 LBS | DIASTOLIC BLOOD PRESSURE: 85 MMHG | SYSTOLIC BLOOD PRESSURE: 141 MMHG

## 2025-02-27 DIAGNOSIS — H93.12 TINNITUS OF LEFT EAR: ICD-10-CM

## 2025-02-27 DIAGNOSIS — H93.13 TINNITUS OF BOTH EARS: ICD-10-CM

## 2025-02-27 DIAGNOSIS — H90.3 ASYMMETRICAL SENSORINEURAL HEARING LOSS: ICD-10-CM

## 2025-02-27 DIAGNOSIS — H90.3 ASYMMETRIC SNHL (SENSORINEURAL HEARING LOSS): Primary | ICD-10-CM

## 2025-02-27 DIAGNOSIS — H90.3 SENSORINEURAL HEARING LOSS, BILATERAL: Primary | ICD-10-CM

## 2025-02-27 LAB
BACTERIA UR CULT: NORMAL
BV BACTERIA DNA VAG QL NAA+PROBE: NOT DETECTED
C GLABRATA DNA VAG QL NAA+PROBE: NORMAL
C GLABRATA DNA VAG QL NAA+PROBE: NOT DETECTED
C KRUSEI DNA VAG QL NAA+PROBE: NOT DETECTED
CANDIDA DNA VAG QL NAA+PROBE: NOT DETECTED
T VAGINALIS DNA VAG QL NAA+PROBE: NOT DETECTED

## 2025-02-27 PROCEDURE — G8417 CALC BMI ABV UP PARAM F/U: HCPCS | Performed by: STUDENT IN AN ORGANIZED HEALTH CARE EDUCATION/TRAINING PROGRAM

## 2025-02-27 PROCEDURE — 99214 OFFICE O/P EST MOD 30 MIN: CPT | Performed by: STUDENT IN AN ORGANIZED HEALTH CARE EDUCATION/TRAINING PROGRAM

## 2025-02-27 PROCEDURE — 92557 COMPREHENSIVE HEARING TEST: CPT | Performed by: AUDIOLOGIST

## 2025-02-27 PROCEDURE — 1090F PRES/ABSN URINE INCON ASSESS: CPT | Performed by: STUDENT IN AN ORGANIZED HEALTH CARE EDUCATION/TRAINING PROGRAM

## 2025-02-27 PROCEDURE — 1159F MED LIST DOCD IN RCRD: CPT | Performed by: STUDENT IN AN ORGANIZED HEALTH CARE EDUCATION/TRAINING PROGRAM

## 2025-02-27 PROCEDURE — G8400 PT W/DXA NO RESULTS DOC: HCPCS | Performed by: STUDENT IN AN ORGANIZED HEALTH CARE EDUCATION/TRAINING PROGRAM

## 2025-02-27 PROCEDURE — G8427 DOCREV CUR MEDS BY ELIG CLIN: HCPCS | Performed by: STUDENT IN AN ORGANIZED HEALTH CARE EDUCATION/TRAINING PROGRAM

## 2025-02-27 PROCEDURE — 1036F TOBACCO NON-USER: CPT | Performed by: STUDENT IN AN ORGANIZED HEALTH CARE EDUCATION/TRAINING PROGRAM

## 2025-02-27 PROCEDURE — 1123F ACP DISCUSS/DSCN MKR DOCD: CPT | Performed by: STUDENT IN AN ORGANIZED HEALTH CARE EDUCATION/TRAINING PROGRAM

## 2025-02-27 PROCEDURE — 3017F COLORECTAL CA SCREEN DOC REV: CPT | Performed by: STUDENT IN AN ORGANIZED HEALTH CARE EDUCATION/TRAINING PROGRAM

## 2025-02-27 PROCEDURE — 1160F RVW MEDS BY RX/DR IN RCRD: CPT | Performed by: STUDENT IN AN ORGANIZED HEALTH CARE EDUCATION/TRAINING PROGRAM

## 2025-02-27 PROCEDURE — 92567 TYMPANOMETRY: CPT | Performed by: AUDIOLOGIST

## 2025-02-27 NOTE — PROGRESS NOTES
Sandie Perez   1950, 74 y.o. female   [id number]       Referring Provider: Agusto Junior DO  Referral Type: In an order in Epic    Reason for Visit: Evaluation of suspected change in hearing and tinnitus    ADULT AUDIOLOGIC EVALUATION      Sandie Perez is a 74 y.o. female seen today, 2/27/2025 , for a recheck audiologic evaluation.  Patient was seen by Agusto Junior DO following today's evaluation. Patient previous evaluation from 7/8/24 viewable in medical record.     AUDIOLOGIC AND OTHER PERTINENT MEDICAL HISTORY:      Sandie Perez noted tinnitus has worsened, and is more bothersome as makes it difficulty to concentrate (LE>RE). Family continues to notice she misses details of conversation. 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 No additional significant otologic or medical history was reported.      She denied otalgia, aural fullness, otorrhea, history of falls, history of head trauma, and history of ear surgery.    Date: 2/27/2025     IMPRESSIONS:      Today's results revealed an asymmetric sensorineural hearing loss with excellent word recognition, bilaterally. Asymmetries > 10 dBHL noted from 2-6kHz with left ear worse than right. Hearing loss consistent with tinnitus percept and significant enough to create hearing difficulty in at least some listening situations. Discussed benefits of amplification and tinnitus management strategies. Recommended return for hearing aid evaluation, if desired Follow medical recommendations of Agusto Junior DO.    ASSESSMENT AND FINDINGS:     Otoscopy revealed: Clear ear canals bilaterally    RIGHT EAR:  Hearing Sensitivity: Within normal limits through 3kHz sloping to a mild to moderately-severe sensorineural hearing loss.   Speech Recognition Threshold: 15 dB HL  Word Recognition: Excellent 96%, based on NU-6 25-word list at 50 dBHL using recorded speech stimuli.    Tympanometry: Normal peak pressure and compliance, Type

## 2025-02-27 NOTE — RESULT ENCOUNTER NOTE
Please inform patient that she does not have a yeast infection or BV. Please advise her to use A&D ointment or desitin to help with the discomfort. We will inform her of the results of her culture when available

## 2025-05-12 ENCOUNTER — OFFICE VISIT (OUTPATIENT)
Dept: FAMILY MEDICINE CLINIC | Age: 75
End: 2025-05-12
Payer: MEDICARE

## 2025-05-12 VITALS
WEIGHT: 154.4 LBS | HEART RATE: 86 BPM | SYSTOLIC BLOOD PRESSURE: 138 MMHG | OXYGEN SATURATION: 98 % | TEMPERATURE: 97.9 F | HEIGHT: 65 IN | BODY MASS INDEX: 25.72 KG/M2 | DIASTOLIC BLOOD PRESSURE: 72 MMHG

## 2025-05-12 DIAGNOSIS — R13.19 ESOPHAGEAL DYSPHAGIA: ICD-10-CM

## 2025-05-12 DIAGNOSIS — J20.9 ACUTE BRONCHITIS, UNSPECIFIED ORGANISM: Primary | ICD-10-CM

## 2025-05-12 DIAGNOSIS — K21.00 GASTROESOPHAGEAL REFLUX DISEASE WITH ESOPHAGITIS, UNSPECIFIED WHETHER HEMORRHAGE: ICD-10-CM

## 2025-05-12 LAB
INFLUENZA A ANTIBODY: NEGATIVE
INFLUENZA B ANTIBODY: NEGATIVE
S PYO AG THROAT QL: NORMAL

## 2025-05-12 PROCEDURE — G8417 CALC BMI ABV UP PARAM F/U: HCPCS

## 2025-05-12 PROCEDURE — 87880 STREP A ASSAY W/OPTIC: CPT

## 2025-05-12 PROCEDURE — 99213 OFFICE O/P EST LOW 20 MIN: CPT

## 2025-05-12 PROCEDURE — 3017F COLORECTAL CA SCREEN DOC REV: CPT

## 2025-05-12 PROCEDURE — G8427 DOCREV CUR MEDS BY ELIG CLIN: HCPCS

## 2025-05-12 PROCEDURE — 1123F ACP DISCUSS/DSCN MKR DOCD: CPT

## 2025-05-12 PROCEDURE — 87804 INFLUENZA ASSAY W/OPTIC: CPT

## 2025-05-12 PROCEDURE — 1036F TOBACCO NON-USER: CPT

## 2025-05-12 PROCEDURE — 1090F PRES/ABSN URINE INCON ASSESS: CPT

## 2025-05-12 PROCEDURE — G8400 PT W/DXA NO RESULTS DOC: HCPCS

## 2025-05-12 PROCEDURE — 1159F MED LIST DOCD IN RCRD: CPT

## 2025-05-12 RX ORDER — GUAIFENESIN 600 MG/1
1200 TABLET, EXTENDED RELEASE ORAL 2 TIMES DAILY
Qty: 40 TABLET | Refills: 0 | Status: SHIPPED | OUTPATIENT
Start: 2025-05-12 | End: 2025-05-22

## 2025-05-12 RX ORDER — OMEPRAZOLE 40 MG/1
40 CAPSULE, DELAYED RELEASE ORAL
Qty: 90 CAPSULE | Refills: 1 | Status: SHIPPED | OUTPATIENT
Start: 2025-05-12

## 2025-05-12 SDOH — ECONOMIC STABILITY: FOOD INSECURITY: WITHIN THE PAST 12 MONTHS, YOU WORRIED THAT YOUR FOOD WOULD RUN OUT BEFORE YOU GOT MONEY TO BUY MORE.: NEVER TRUE

## 2025-05-12 SDOH — ECONOMIC STABILITY: FOOD INSECURITY: WITHIN THE PAST 12 MONTHS, THE FOOD YOU BOUGHT JUST DIDN'T LAST AND YOU DIDN'T HAVE MONEY TO GET MORE.: NEVER TRUE

## 2025-05-12 ASSESSMENT — ENCOUNTER SYMPTOMS
VOICE CHANGE: 0
VOMITING: 1
RHINORRHEA: 1
CHEST TIGHTNESS: 0
DIARRHEA: 0
ABDOMINAL PAIN: 0
COUGH: 1
TROUBLE SWALLOWING: 1
SINUS PRESSURE: 0
CONSTIPATION: 0
WHEEZING: 1
SHORTNESS OF BREATH: 1
NAUSEA: 1

## 2025-05-12 ASSESSMENT — PATIENT HEALTH QUESTIONNAIRE - PHQ9
2. FEELING DOWN, DEPRESSED OR HOPELESS: NOT AT ALL
1. LITTLE INTEREST OR PLEASURE IN DOING THINGS: NOT AT ALL
SUM OF ALL RESPONSES TO PHQ QUESTIONS 1-9: 0

## 2025-05-12 NOTE — ASSESSMENT & PLAN NOTE
Chronic, not at goal (unstable), patient states dysphagia has been progressively getting worse. Seems to be random- food will get caught in throat. Last saw [facility] back in 2023. Will refer back for evaluation.     Orders:    Mariann Lawson MD, Gastroenterology, [facility]

## 2025-05-12 NOTE — PATIENT INSTRUCTIONS
Take albuterol as needed for wheezing. Take loratadine as directed. Advised to follow up if no improvement in 2-3 days if no improvement.     A referral was placed for gastroenterology. Here is the number to call to schedule an appointment : [phone]

## 2025-05-12 NOTE — PROGRESS NOTES
Sandie Hardin 74 y.o. female    No chief complaint on file.      HPI  Sandie hardin presents for concern of cough and sore throat for approximately a week. PMH of lung cancer (resolved) and diagnosed copd with recently completed PFTs. Nonproductive cough with wheezing. Has an albuterol inhaler but admits that she has not used it in the past week. Sob with coughing fits. No known fevers but has chills. Has a sore throat. She has tried nyquil without improvement. Mild right ear pain- no drainage. Feels more fatigued than normal. States she was puking yesterday and is still slightly nauseous today.She does have zofran at home which she has been using.   She is also requesting a referral to gastroenterology. According to patient she has a history of reflux and dysphagia for which she has had esophageal dilations done in the past. Occasionally will get food 'caught' in her throat. She takes omeprazole daily.       Current Outpatient Medications:     hydrOXYzine HCl (ATARAX) 25 MG tablet, Take 1 tablet by mouth nightly as needed for Anxiety, Disp: 90 tablet, Rfl: 1    omeprazole (PRILOSEC) 40 MG delayed release capsule, TAKE 1 CAPSULE BY MOUTH EVERY DAY IN THE MORNING BEFORE BREAKFAST, Disp: 90 capsule, Rfl: 1    FLUoxetine (PROZAC) 20 MG capsule, TAKE 3 CAPSULES BY MOUTH EVERY DAY, Disp: 270 capsule, Rfl: 1    ondansetron (ZOFRAN-ODT) 4 MG disintegrating tablet, Take 1 tablet by mouth 3 times daily as needed for Nausea or Vomiting, Disp: 6 tablet, Rfl: 0    atorvastatin (LIPITOR) 20 MG tablet, TAKE 1 TABLET BY MOUTH EVERY DAY AT NIGHT, Disp: 90 tablet, Rfl: 3    albuterol sulfate HFA (VENTOLIN HFA) 108 (90 Base) MCG/ACT inhaler, Inhale 2 puffs into the lungs 4 times daily as needed for Wheezing, Disp: 54 g, Rfl: 5    albuterol (PROVENTIL) (2.5 MG/3ML) 0.083% nebulizer solution, Take 3 mLs by nebulization every 6 hours as needed for Wheezing, Disp: 120 each, Rfl: 3    acetaminophen (TYLENOL) 500 MG CAPS capsule, , Disp:

## 2025-05-12 NOTE — TELEPHONE ENCOUNTER
Refill Request     CONFIRM preferred pharmacy with the patient.    If Mail Order Rx - Pend for 90 day refill.      Last Seen: Last Seen Department: 5/12/2025  Last Seen by PCP: 1/13/2025    Last Written: 1/8/25 90 WITH 1 REFILL     If no future appointment scheduled:  Review the last OV with PCP and review information for follow-up visit,  Route STAFF MESSAGE with patient name to the  Pool for scheduling with the following information:            -  Timing of next visit           -  Visit type ie Physical, OV, etc           -  Diagnoses/Reason ie. COPD, HTN - Do not use MEDICATION, Follow-up or CHECK UP - Give reason for visit      Next Appointment:   Future Appointments   Date Time Provider Department Center   8/21/2025  1:00 PM [facility]   8/21/2025  1:40 PM Simone Grove MD [facility]       Message sent to  to schedule appt with patient?  NO      Requested Prescriptions     Pending Prescriptions Disp Refills    omeprazole (PRILOSEC) 40 MG delayed release capsule [Pharmacy Med Name: OMEPRAZOLE DR 40 MG CAPSULE] 90 capsule 1     Sig: TAKE 1 CAPSULE BY MOUTH EVERY DAY IN THE MORNING BEFORE BREAKFAST

## 2025-06-02 ENCOUNTER — HOSPITAL ENCOUNTER (OUTPATIENT)
Dept: GENERAL RADIOLOGY | Age: 75
Discharge: HOME | End: 2025-06-02
Attending: INTERNAL MEDICINE
Payer: MEDICARE

## 2025-06-02 DIAGNOSIS — R13.19 ESOPHAGEAL DYSPHAGIA: Primary | ICD-10-CM

## 2025-06-02 DIAGNOSIS — R13.19 ESOPHAGEAL DYSPHAGIA: ICD-10-CM

## 2025-06-02 PROCEDURE — 74220 X-RAY XM ESOPHAGUS 1CNTRST: CPT

## 2025-06-02 NOTE — PROGRESS NOTES
Sandie D Perez    Age 74 y.o.    female    1950    MRN [id number]    6/6/2025  Arrival Time_____________  OR Time____________30 Min     Procedure(s):  ESOPHAGOGASTRODUODENOSCOPY                      Monitor Anesthesia Care    Surgeon(s):  Daniel Ace N, MD       Phone [phone] (home)     Initals  Date  Info Source  Home  Cell         Work  _____________________________________________________________________  _____________________________________________________________________  _____________________________________________________________________  _____________________________________________________________________  _____________________________________________________________________    PCP _____________________________ Phone_________________     H&P  ________________  Bringing      Chart              Epic      DOS      Called________  EKG ________________   Bringing      Chart              Epic      DOS      Called________  LABS________________   Bringing     Chart              Epic      DOS      Called________  Cardiac Clearance ______ Bringing      Chart              Epic      DOS      Called________  Pulmonary Clearance____ Bringing      Chart              Epic      DOS      Called________    Cardiologist________________________ Phone___________________________  Pulmonologist_______________________Phone___________________________      ? Blood Refusal / Waiver on Chart            PAT Communications________________  ? Pre-op Instructions Given /Understood          _________________________________  ? Directions to Surgery Center                          _________________________________  ? Transportation Home_______________      __________________________________  ? Crutches/Walker__________________        __________________________________    Orders: Hard copy/ EPIC                 Transcribed/ EPIC                  ________Pharmacy                         ________WEIGHT    COVID + _______DATE     ___OK per Anesthesia   ____OK per Surgeon

## 2025-06-05 ENCOUNTER — ANESTHESIA EVENT (OUTPATIENT)
Dept: ENDOSCOPY | Age: 75
End: 2025-06-05
Payer: MEDICARE

## 2025-06-05 NOTE — ANESTHESIA PRE PROCEDURE
Department of Anesthesiology  Preprocedure Note       Name:  Sandie Perez   Age:  74 y.o.  :  1950                                          MRN:  [id number]         Date:  2025      Surgeon: Surgeon(s):  Daniel Ace MD    Procedure: Procedure(s):  ESOPHAGOGASTRODUODENOSCOPY    Medications prior to admission:   Prior to Admission medications    Medication Sig Start Date End Date Taking? Authorizing Provider   omeprazole (PRILOSEC) 40 MG delayed release capsule TAKE 1 CAPSULE BY MOUTH EVERY DAY IN THE MORNING BEFORE BREAKFAST 25   Saurabh Lauren,    hydrOXYzine HCl (ATARAX) 25 MG tablet Take 1 tablet by mouth nightly as needed for Anxiety 25   Saurabh Lauren DO   FLUoxetine (PROZAC) 20 MG capsule TAKE 3 CAPSULES BY MOUTH EVERY DAY 25   Saurabh Lauren DO   ondansetron (ZOFRAN-ODT) 4 MG disintegrating tablet Take 1 tablet by mouth 3 times daily as needed for Nausea or Vomiting 24   Lilian Gan MD   atorvastatin (LIPITOR) 20 MG tablet TAKE 1 TABLET BY MOUTH EVERY DAY AT NIGHT 24   Saurabh Lauren DO   albuterol sulfate HFA (VENTOLIN HFA) 108 (90 Base) MCG/ACT inhaler Inhale 2 puffs into the lungs 4 times daily as needed for Wheezing 24   Saurabh Lauren DO   albuterol (PROVENTIL) (2.5 MG/3ML) 0.083% nebulizer solution Take 3 mLs by nebulization every 6 hours as needed for Wheezing 23   Vicky Agustin, APRN - CNP   acetaminophen (TYLENOL) 500 MG CAPS capsule     Provider, MD Danilo   Cholecalciferol (VITAMIN D) 50 MCG (2000) CAPS capsule Take by mouth    Provider, MD Danilo       Current medications:    No current facility-administered medications for this encounter.     Current Outpatient Medications   Medication Sig Dispense Refill   • omeprazole (PRILOSEC) 40 MG delayed release capsule TAKE 1 CAPSULE BY MOUTH EVERY DAY IN THE MORNING BEFORE BREAKFAST 90 capsule 1   • hydrOXYzine HCl (ATARAX) 25 MG tablet Take 1 tablet by mouth

## 2025-06-05 NOTE — PROGRESS NOTES
Date and time of surgery : 06/06/2025 11:00 am             Arrival Time:  10:00 am     Bring Picture ID and insurance card.  Please wear simple, loose fitting clothing to the hospital.   Do not bring valuables (money, credit cards, checkbooks, etc.)   Do not wear any makeup (including  eye makeup) and no nail polish or artificial nails on your fingers or toes.  DO NOT wear any jewelry or piercings on day of surgery.  All body piercing jewelry must be removed.  If you have dentures, they will be removed before going to the OR; we will provide you a container.  If you wear contact lenses or glasses, they will be removed; please bring a case for them.  Shower the evening before or morning of surgery.  Nothing to eat or drink after midnight the day before surgery. This includes hard candy, gum, ice chips, sips of water.   You may brush your teeth and gargle the morning of surgery.  DO NOT SWALLOW WATER.   Ok to take Prozac with a sip of water morning of procedure.   Aspirin, Ibuprofen, Advil, Naproxen, Vitamin E and other Anti-inflammatory products and supplements should be stopped for 5 -7days before surgery or as directed by your physician.  Do not smoke or drink any alcoholic beverages 24 hours prior to surgery.  This includes NA Beer. Refrain from the usage of any recreational drugs, including non-prescribed prescription drugs.   You MUST plan for a responsible adult to stay on site while you are here and take you home after your surgery. You will not be allowed to leave alone or drive yourself home. It is strongly suggested someone stay with you the first 24 hrs. Your surgery will be cancelled if you do not have a ride home.  If you  have a Living Will and Durable Power of  for Healthcare, please bring in a copy.  Notify your Surgeon if you develop any illness between now and time of surgery. Cough, cold, fever, sore throat, nausea, vomiting, etc.  Please notify your surgeon if you experience dizziness,

## 2025-06-06 ENCOUNTER — ANESTHESIA (OUTPATIENT)
Dept: ENDOSCOPY | Age: 75
End: 2025-06-06
Payer: MEDICARE

## 2025-06-06 ENCOUNTER — HOSPITAL ENCOUNTER (OUTPATIENT)
Age: 75
Setting detail: OUTPATIENT SURGERY
Discharge: HOME | End: 2025-06-06
Attending: INTERNAL MEDICINE | Admitting: INTERNAL MEDICINE
Payer: MEDICARE

## 2025-06-06 VITALS
OXYGEN SATURATION: 100 % | DIASTOLIC BLOOD PRESSURE: 86 MMHG | TEMPERATURE: 97.5 F | SYSTOLIC BLOOD PRESSURE: 158 MMHG | RESPIRATION RATE: 20 BRPM | HEART RATE: 57 BPM

## 2025-06-06 DIAGNOSIS — R13.19 ESOPHAGEAL DYSPHAGIA: ICD-10-CM

## 2025-06-06 PROCEDURE — 7100000010 HC PHASE II RECOVERY - FIRST 15 MIN: Performed by: INTERNAL MEDICINE

## 2025-06-06 PROCEDURE — C1769 GUIDE WIRE: HCPCS | Performed by: INTERNAL MEDICINE

## 2025-06-06 PROCEDURE — 2709999900 HC NON-CHARGEABLE SUPPLY: Performed by: INTERNAL MEDICINE

## 2025-06-06 PROCEDURE — 3609012400 HC EGD TRANSORAL BIOPSY SINGLE/MULTIPLE: Performed by: INTERNAL MEDICINE

## 2025-06-06 PROCEDURE — 7100000011 HC PHASE II RECOVERY - ADDTL 15 MIN: Performed by: INTERNAL MEDICINE

## 2025-06-06 PROCEDURE — 2580000003 HC RX 258: Performed by: ANESTHESIOLOGY

## 2025-06-06 PROCEDURE — 3609012700 HC EGD DILATION SAVORY: Performed by: INTERNAL MEDICINE

## 2025-06-06 PROCEDURE — 6360000002 HC RX W HCPCS: Performed by: ANESTHESIOLOGY

## 2025-06-06 PROCEDURE — 3700000000 HC ANESTHESIA ATTENDED CARE: Performed by: INTERNAL MEDICINE

## 2025-06-06 PROCEDURE — 6360000002 HC RX W HCPCS: Performed by: NURSE ANESTHETIST, CERTIFIED REGISTERED

## 2025-06-06 PROCEDURE — 88305 TISSUE EXAM BY PATHOLOGIST: CPT

## 2025-06-06 PROCEDURE — 3700000001 HC ADD 15 MINUTES (ANESTHESIA): Performed by: INTERNAL MEDICINE

## 2025-06-06 RX ORDER — DIPHENHYDRAMINE HYDROCHLORIDE 50 MG/ML
12.5 INJECTION, SOLUTION INTRAMUSCULAR; INTRAVENOUS
Status: DISCONTINUED | OUTPATIENT
Start: 2025-06-06 | End: 2025-06-06 | Stop reason: HOSPADM

## 2025-06-06 RX ORDER — OXYCODONE HYDROCHLORIDE 5 MG/1
10 TABLET ORAL PRN
Status: DISCONTINUED | OUTPATIENT
Start: 2025-06-06 | End: 2025-06-06 | Stop reason: HOSPADM

## 2025-06-06 RX ORDER — ONDANSETRON 2 MG/ML
4 INJECTION INTRAMUSCULAR; INTRAVENOUS
Status: DISCONTINUED | OUTPATIENT
Start: 2025-06-06 | End: 2025-06-06 | Stop reason: HOSPADM

## 2025-06-06 RX ORDER — ONDANSETRON 2 MG/ML
4 INJECTION INTRAMUSCULAR; INTRAVENOUS ONCE
Status: COMPLETED | OUTPATIENT
Start: 2025-06-06 | End: 2025-06-06

## 2025-06-06 RX ORDER — SODIUM CHLORIDE 9 MG/ML
INJECTION, SOLUTION INTRAVENOUS PRN
Status: DISCONTINUED | OUTPATIENT
Start: 2025-06-06 | End: 2025-06-06 | Stop reason: HOSPADM

## 2025-06-06 RX ORDER — LIDOCAINE HYDROCHLORIDE 20 MG/ML
INJECTION, SOLUTION EPIDURAL; INFILTRATION; INTRACAUDAL; PERINEURAL
Status: DISCONTINUED | OUTPATIENT
Start: 2025-06-06 | End: 2025-06-06 | Stop reason: SDUPTHER

## 2025-06-06 RX ORDER — PROPOFOL 10 MG/ML
INJECTION, EMULSION INTRAVENOUS
Status: DISCONTINUED | OUTPATIENT
Start: 2025-06-06 | End: 2025-06-06 | Stop reason: SDUPTHER

## 2025-06-06 RX ORDER — SODIUM CHLORIDE 0.9 % (FLUSH) 0.9 %
5-40 SYRINGE (ML) INJECTION EVERY 12 HOURS SCHEDULED
Status: DISCONTINUED | OUTPATIENT
Start: 2025-06-06 | End: 2025-06-06 | Stop reason: HOSPADM

## 2025-06-06 RX ORDER — SODIUM CHLORIDE 0.9 % (FLUSH) 0.9 %
5-40 SYRINGE (ML) INJECTION PRN
Status: DISCONTINUED | OUTPATIENT
Start: 2025-06-06 | End: 2025-06-06 | Stop reason: HOSPADM

## 2025-06-06 RX ORDER — LABETALOL HYDROCHLORIDE 5 MG/ML
5 INJECTION, SOLUTION INTRAVENOUS EVERY 10 MIN PRN
Status: DISCONTINUED | OUTPATIENT
Start: 2025-06-06 | End: 2025-06-06 | Stop reason: HOSPADM

## 2025-06-06 RX ORDER — OXYCODONE HYDROCHLORIDE 5 MG/1
5 TABLET ORAL PRN
Status: DISCONTINUED | OUTPATIENT
Start: 2025-06-06 | End: 2025-06-06 | Stop reason: HOSPADM

## 2025-06-06 RX ORDER — SODIUM CHLORIDE, SODIUM LACTATE, POTASSIUM CHLORIDE, CALCIUM CHLORIDE 600; 310; 30; 20 MG/100ML; MG/100ML; MG/100ML; MG/100ML
INJECTION, SOLUTION INTRAVENOUS CONTINUOUS
Status: DISCONTINUED | OUTPATIENT
Start: 2025-06-06 | End: 2025-06-06 | Stop reason: HOSPADM

## 2025-06-06 RX ORDER — LIDOCAINE HYDROCHLORIDE 10 MG/ML
0.3 INJECTION, SOLUTION EPIDURAL; INFILTRATION; INTRACAUDAL; PERINEURAL
Status: DISCONTINUED | OUTPATIENT
Start: 2025-06-06 | End: 2025-06-06 | Stop reason: HOSPADM

## 2025-06-06 RX ORDER — MEPERIDINE HYDROCHLORIDE 50 MG/ML
12.5 INJECTION INTRAMUSCULAR; INTRAVENOUS; SUBCUTANEOUS EVERY 5 MIN PRN
Status: DISCONTINUED | OUTPATIENT
Start: 2025-06-06 | End: 2025-06-06 | Stop reason: HOSPADM

## 2025-06-06 RX ORDER — NALOXONE HYDROCHLORIDE 0.4 MG/ML
INJECTION, SOLUTION INTRAMUSCULAR; INTRAVENOUS; SUBCUTANEOUS PRN
Status: DISCONTINUED | OUTPATIENT
Start: 2025-06-06 | End: 2025-06-06 | Stop reason: HOSPADM

## 2025-06-06 RX ADMIN — PROPOFOL 25 MG: 10 INJECTION, EMULSION INTRAVENOUS at 11:50

## 2025-06-06 RX ADMIN — PROPOFOL 25 MG: 10 INJECTION, EMULSION INTRAVENOUS at 11:49

## 2025-06-06 RX ADMIN — PROPOFOL 25 MG: 10 INJECTION, EMULSION INTRAVENOUS at 11:47

## 2025-06-06 RX ADMIN — PROPOFOL 50 MG: 10 INJECTION, EMULSION INTRAVENOUS at 11:41

## 2025-06-06 RX ADMIN — PROPOFOL 50 MG: 10 INJECTION, EMULSION INTRAVENOUS at 11:42

## 2025-06-06 RX ADMIN — SODIUM CHLORIDE, SODIUM LACTATE, POTASSIUM CHLORIDE, AND CALCIUM CHLORIDE: .6; .31; .03; .02 INJECTION, SOLUTION INTRAVENOUS at 11:16

## 2025-06-06 RX ADMIN — PROPOFOL 25 MG: 10 INJECTION, EMULSION INTRAVENOUS at 11:48

## 2025-06-06 RX ADMIN — PROPOFOL 25 MG: 10 INJECTION, EMULSION INTRAVENOUS at 11:44

## 2025-06-06 RX ADMIN — ONDANSETRON 4 MG: 2 INJECTION, SOLUTION INTRAMUSCULAR; INTRAVENOUS at 11:13

## 2025-06-06 RX ADMIN — PROPOFOL 25 MG: 10 INJECTION, EMULSION INTRAVENOUS at 11:43

## 2025-06-06 RX ADMIN — LIDOCAINE HYDROCHLORIDE 60 MG: 20 INJECTION, SOLUTION EPIDURAL; INFILTRATION; INTRACAUDAL; PERINEURAL at 11:41

## 2025-06-06 RX ADMIN — PROPOFOL 25 MG: 10 INJECTION, EMULSION INTRAVENOUS at 11:51

## 2025-06-06 RX ADMIN — PROPOFOL 25 MG: 10 INJECTION, EMULSION INTRAVENOUS at 11:45

## 2025-06-06 ASSESSMENT — PAIN - FUNCTIONAL ASSESSMENT
PAIN_FUNCTIONAL_ASSESSMENT: 0-10
PAIN_FUNCTIONAL_ASSESSMENT: 0-10

## 2025-06-06 ASSESSMENT — PAIN SCALES - GENERAL: PAINLEVEL_OUTOF10: 0

## 2025-06-06 NOTE — H&P
[facility]   Pre-operative History and Physical    Patient: Sandie Perez  : 1950  Acct#:     HISTORY OF PRESENT ILLNESS:    The patient is a 74 y.o. female who presents for esophageal dysphagia     Indications: esophageal dysphagia     Past Medical History:        Diagnosis Date    Allergic rhinitis     Anxiety 10/10/2011    Arthritis     COPD, severity to be determined (Formerly [facility]) 2025    Depression 2017    GERD (gastroesophageal reflux disease)     Hallux valgus     Headache(784.0)     Herpes simplex     Irritable bowel syndrome     Kidney stones     Lung cancer, main bronchus, left (Former[facility]) 10/16/2020    Osteopenia     Other and unspecified hyperlipidemia 2013    Pain medication agreement signed 10/10/2011    Thyroid disease     nodules    Unspecified convulsions 04/10/2023    Wears dentures     Wears dentures     full set      Past Surgical History:        Procedure Laterality Date    COLONOSCOPY N/A 2022    COLONOSCOPY WITH BIOPSY performed by Daniel Ace MD at [facility] ENDOSCOPY    COLONOSCOPY N/A 2022    COLONOSCOPY POLYPECTOMY SNARE/COLD BIOPSY performed by Daniel Ace MD at [facility] ENDOSCOPY    CT NEEDLE BIOPSY LUNG PERCUTANEOUS W IMAGING GUIDANCE  2020    CT NEEDLE BIOPSY LUNG PERCUTANEOUS 9/3/2020 [facility] CT SCAN    CT NEEDLE BIOPSY LUNG PERCUTANEOUS W IMAGING GUIDANCE  2021    CT NEEDLE BIOPSY LUNG PERCUTANEOUS 3/25/2021 Geuro Hassan MD [facility] CT SCAN    LITHOTRIPSY      SHOULDER SURGERY      THORACOSCOPY Right 2021    RIGHT VIDEO ASSISTED THORACOSCOPIC SURGERY WITH WEDGE EXCISION OF RIGHT UPPER LOBE NODULE performed by Ernesto Moore MD at [facility] CVOR    THORACOTOMY N/A 10/16/2020    LEFT UPPER LOBECTOMY THORACOTOMY WITH MEDIASTINAL LYMPH NODE DISSECTION, CRYO ABLATION OF INTERCOSTAL NERVES performed by Anshul Irizarry MD at [facility]    TOTAL KNEE ARTHROPLASTY Right 2024    RIGHT TOTAL KNEE ARTHROPLASTY performed by Baltazar Bowling,  Vital Sign     Unable to Pay for Housing in the Last Year: No     Number of Times Moved in the Last Year: 0     Homeless in the Last Year: No      Family History:       Problem Relation Age of Onset    Asthma Mother     Heart Disease Father         PHYSICAL EXAM:      BP (!) 152/78   Pulse 63   Temp 97.5 °F (36.4 °C) (Oral)   Resp 18   LMP  (LMP Unknown)   SpO2 96%  I        Heart:  Normal apical impulse, regular rate and rhythm, normal S1 and S2, no S3 or S4, and no murmur noted    Lungs:  No increased work of breathing, good air exchange, clear to auscultation bilaterally, no crackles or wheezing    Abdomen:  No scars, normal bowel sounds, soft, non-distended, non-tender, no masses palpated, no hepatosplenomegally      ASA Class  ASA 3 - Patient with moderate systemic disease with functional limitations    Mallampati Class: 3      ASSESSMENT AND PLAN:    1.  Patient is a suitable candidate for endoscopic procedure and attendant anesthesia  2.  Risks, benefits, alternatives of procedure discussed in detail with patient including risks of bleeding, infection, perforation, risks of sedation, risks of missed lesions. The patient wishes to proceed.

## 2025-06-06 NOTE — PROCEDURES
Endoscopy Note    Patient: Sandie Perez  : 1950      Procedure: Esophagogastroduodenoscopy with bx and Savary dilation.     Date:  2025     Surgeon:  Daniel Ace MD     Referring Physician:  Saurabh Lauren DO      History:  The patient is a 74 y.o. female who presents for EGD and colonoscopy for dysphagia and diarrhea.  Prior in 2019.  Esophagus normal duodenum normal stomach normal biopsies negative for celiac disease and H. pylori was dilated 54 [place].  Colonoscopy moderate complexity diverticulosis in the sigmoid otherwise normal recommended repeat in 5 years.Similar EGD in  with dilation to 51 [place]    INDICATION: dysphagia      ASA: 2  SEDATION: MAC         Operative Surgeon: Daniel Ace MD  Scope Type: gastroscope      Preoperative Diagnosis: dysphagia, GERD  Postoperative Diagnosis: Small hiatal hernia, gastritis     Procedure Performed: EGD    Procedure Details:    With the patient in left lateral position the endoscope was passed through the hypopharynx into the esophagus. The scope as then passed through the esophagus to the second portion of the duodenum. All visualized portions were carefully inspected. The gastric air was suctioned and the scope as removed. Patient tolerated the procedure well. Findings and maneuvers are discussed below.      Complications:  None  Estimated Blood Loss: minimal to none    Post Operative Findings:   Esophagus: Esophageal mucosa was normal throughout the entire esophagus.  It was widely patent.  There is no stricture seen.  At the end of the procedure a 51 [place] savory dilation was performed with mild resistance. Biopsies were taken of the GE junction.     Stomach: Normal-appearing small hiatal hernia, antral gastritis biopsies were taken to rule out HP.     Duodenum:  Normal villous structure.    Plan: Continue acid suppression.  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 The GE junction looks normal. We can

## 2025-06-06 NOTE — ANESTHESIA POSTPROCEDURE EVALUATION
Department of Anesthesiology  Postprocedure Note    Patient: Sandie Perez  MRN: [id number]  YOB: 1950  Date of evaluation: 6/6/2025    Procedure Summary       Date: 06/06/25 Room / Location: [place] [address] / [facility]    Anesthesia Start: 1137 Anesthesia Stop: 1200    Procedures:       ESOPHAGOGASTRODUODENOSCOPY BIOPSY      ESOPHAGOGASTRODUODENOSCOPY DILATION SAVORY Diagnosis:       Esophageal dysphagia      (Esophageal dysphagia [R13.19])    Surgeons: Daniel Ace MD Responsible Provider: Ankush Tillman MD    Anesthesia Type: general ASA Status: 2            Anesthesia Type: No value filed.    Brenda Phase I: Brenda Score: 10    Brenda Phase II: Brenda Score: 8    Anesthesia Post Evaluation    Comments: Postoperative Anesthesia Note    Name:    Sandie Perez  MRN:      [id number]    Patient Vitals in the past 12 hrs:  06/06/25 1215, BP:(!) 158/86, Pulse:57, Resp:20, SpO2:100 %  06/06/25 1200, BP:103/60, Pulse:67, Resp:16  06/06/25 1059, BP:(!) 152/78, Temp:97.5 °F (36.4 °C), Temp src:Oral, Pulse:63, Resp:18, SpO2:96 %     LABS:    CBC  Lab Results       Component                Value               Date/Time                  WBC                      5.2                 11/27/2024 03:08 PM        HGB                      14.8                11/27/2024 03:08 PM        HCT                      45.0                11/27/2024 03:08 PM        PLT                      199                 11/27/2024 03:08 PM   RENAL  Lab Results       Component                Value               Date/Time                  NA                       142                 11/27/2024 03:08 PM        K                        4.2                 11/27/2024 03:08 PM        K                        4.5                 04/02/2024 06:28 AM        CL                       104                 11/27/2024 03:08 PM        CO2                      25                  11/27/2024 03:08 PM        BUN                      14

## 2025-06-06 NOTE — DISCHARGE INSTRUCTIONS
TAKING BIRTH CONTROL PILLS:  You may have received a medication during your procedure that interferes with the   actions of birth control pills (Bridion or Emend). Use some other kind of birth control in addition to your pills, like a condom, for 1 month after your procedure to prevent unwanted pregnancy.    The following instructions are to be followed if you have a known history or diagnosis of sleep apnea:  For all sleep apnea patients:  ? Sleep on your side or sitting up in a chair whenever possible, especially the first 24 hours after surgery.  ? Use only medicines prescribed by your doctor.    ? Do not drink alcohol.  ? If you have a dental device to assist you while at rest, use it at all times for the first 24 hours.  For patients using CPAP machines:  ? Use your CPAP machine during all periods of sleep as usual.  ? Use your CPAP machine during all periods of daytime rest while on pain medicines.  ** Follow up with your primary care doctor for continued care.    IF YOU DO NOT TAKE ALL OF YOUR NARCOTIC PAIN MEDICATION, please dispose of them responsibly. There are drop off boxes in the Emergency Departments 24/7 at both [facility]. If these locations are not convenient, other options for discarding them can be found at:  [email or web address]    Hospital or office staff may NOT accept any medications to drop off in the cabinet for you.      What is a Surgical Site Infection or  (SSI)?        A surgical site infection (SSI) is an infection that occurs after surgery in the part of the body where the surgery took place. Most patients who have surgery do not develop an infection. However, infections can develop in about 1-3 cases for every 100 patients who have had surgery.  Our goal is for you to NOT experience any complications and be completely satisfied with your care!    However, some signs or symptoms to look for and report immediately to your doctor are:   1. Fever above 101 degrees

## 2025-06-27 DIAGNOSIS — F33.1 MODERATE EPISODE OF RECURRENT MAJOR DEPRESSIVE DISORDER (HCC): ICD-10-CM

## 2025-06-27 NOTE — TELEPHONE ENCOUNTER
One[employer] message sent to schedule in October  
Refill Request     CONFIRM preferred pharmacy with the patient.    If Mail Order Rx - Pend for 90 day refill.      Last Seen: Last Seen Department: 5/12/2025  Last Seen by PCP: 1/13/2025    Last Written: 1/8/25 270 with 1 refill     If no future appointment scheduled:  Review the last OV with PCP and review information for follow-up visit,  Route STAFF MESSAGE with patient name to the  Pool for scheduling with the following information:            -  Timing of next visit           -  Visit type ie Physical, OV, etc           -  Diagnoses/Reason ie. COPD, HTN - Do not use MEDICATION, Follow-up or CHECK UP - Give reason for visit      Next Appointment:   Future Appointments   Date Time Provider Department Center   8/21/2025  1:00 PM [facility]   8/21/2025  1:40 PM Simone Grove MD [facility]       Message sent to  to schedule appt with patient?  YES 10/25 dysphagia/anxiety/copd       Requested Prescriptions     Pending Prescriptions Disp Refills    FLUoxetine (PROZAC) 20 MG capsule [Pharmacy Med Name: FLUOXETINE HCL 20 MG CAPSULE] 90 capsule 3     Sig: TAKE 1 CAPSULE BY MOUTH EVERY DAY       
Event Note

## 2025-07-15 ENCOUNTER — HOSPITAL ENCOUNTER (OUTPATIENT)
Dept: GENERAL RADIOLOGY | Age: 75
Discharge: HOME | End: 2025-07-15
Payer: MEDICARE

## 2025-07-15 ENCOUNTER — OFFICE VISIT (OUTPATIENT)
Dept: FAMILY MEDICINE CLINIC | Age: 75
End: 2025-07-15

## 2025-07-15 VITALS
BODY MASS INDEX: 25.66 KG/M2 | HEART RATE: 73 BPM | RESPIRATION RATE: 18 BRPM | TEMPERATURE: 98.2 F | SYSTOLIC BLOOD PRESSURE: 132 MMHG | HEIGHT: 65 IN | OXYGEN SATURATION: 96 % | DIASTOLIC BLOOD PRESSURE: 72 MMHG | WEIGHT: 154 LBS

## 2025-07-15 DIAGNOSIS — R05.9 COUGH, UNSPECIFIED TYPE: ICD-10-CM

## 2025-07-15 DIAGNOSIS — F33.1 MAJOR DEPRESSIVE DISORDER, RECURRENT, MODERATE (HCC): ICD-10-CM

## 2025-07-15 DIAGNOSIS — F41.9 ANXIETY: Primary | ICD-10-CM

## 2025-07-15 DIAGNOSIS — J00 ACUTE RHINITIS: ICD-10-CM

## 2025-07-15 DIAGNOSIS — F33.1 MODERATE EPISODE OF RECURRENT MAJOR DEPRESSIVE DISORDER (HCC): ICD-10-CM

## 2025-07-15 PROCEDURE — 71046 X-RAY EXAM CHEST 2 VIEWS: CPT

## 2025-07-15 RX ORDER — LORAZEPAM 0.5 MG/1
0.5 TABLET ORAL DAILY PRN
Qty: 5 TABLET | Refills: 0 | Status: SHIPPED | OUTPATIENT
Start: 2025-07-15 | End: 2025-07-20

## 2025-07-15 RX ORDER — LORATADINE 10 MG/1
10 TABLET ORAL DAILY
Qty: 30 TABLET | Refills: 0 | Status: SHIPPED | OUTPATIENT
Start: 2025-07-15 | End: 2025-08-14

## 2025-07-15 RX ORDER — FLUTICASONE PROPIONATE 50 MCG
2 SPRAY, SUSPENSION (ML) NASAL DAILY
Qty: 48 G | Refills: 0 | Status: SHIPPED | OUTPATIENT
Start: 2025-07-15

## 2025-07-15 ASSESSMENT — ENCOUNTER SYMPTOMS
VOICE CHANGE: 0
FACIAL SWELLING: 0
COUGH: 1
TROUBLE SWALLOWING: 0
VOMITING: 0
SINUS PAIN: 0
RHINORRHEA: 1
ABDOMINAL PAIN: 0
GASTROINTESTINAL NEGATIVE: 1
CONSTIPATION: 0
CHEST TIGHTNESS: 0
ABDOMINAL DISTENTION: 0
COLOR CHANGE: 0
SINUS PRESSURE: 0
DIARRHEA: 0
SHORTNESS OF BREATH: 0
WHEEZING: 0
NAUSEA: 0
SORE THROAT: 0

## 2025-07-15 NOTE — PROGRESS NOTES
[facility]  Establish care visit   7/15/2025    Sandie Perez (:  1950) is a 74 y.o. female    Chief Complaint   Patient presents with    Memory Loss     Lately its more frequent      Headache     Once every few weeks. With vertigo     Depression    Anxiety     Patient believes that the Prozac is helping but needs something for the anxiety and overwhelming feeling.         ASSESSMENT/ PLAN      1.Anxiety    - LORazepam (ATIVAN) 0.5 MG tablet; Take 1 tablet by mouth daily as needed for Anxiety for up to 5 days. Max Daily Amount: 0.5 mg  Dispense: 5 tablet; Refill: 0  -Increased fluoxetine dose from 40 mg daily to 60 mg daily  -Stop hydroxyzine since it is causing nightmares  -Patient has tried buspar in the past but it made her more jittery  -Has not tried propranolol prn for anxiety: but would not recommend trying since pt has COPD and beta blocker may worsen her breathing  -Discussed with pt that I will prescribe ativan to see if that helps with her anxiety as a short term solution until the increased prozac dose starts working therapeutically because it may take a couple of weeks  -Patient made aware that she will need to schedule a separate appointment if she decides to stay on the ativan for a longer period of time: will need for her to sign a controlled substance agreement and get urine drug screen completed if ativan is needed for longer than 5 days (pt verbalizes understanding)      Call [phone] anytime you think you may need emergency care. For example, call if:    You feel you cannot stop from hurting yourself or someone else.   Where to get help 24 hours a day, 7 days a week   If you or someone you know talks about suicide, self-harm, a mental health crisis, a substance use crisis, or any other kind of emotional distress, get help right away. You can:    Call the Suicide and Crisis Lifeline at 618.     Call 9-078-557-TALK (1-412.686.7403).     Text HOME to [phone] to access the Crisis Text

## 2025-07-16 ENCOUNTER — RESULTS FOLLOW-UP (OUTPATIENT)
Dept: FAMILY MEDICINE CLINIC | Age: 75
End: 2025-07-16

## 2025-08-12 ENCOUNTER — ANESTHESIA EVENT (OUTPATIENT)
Dept: ENDOSCOPY | Age: 75
End: 2025-08-12
Payer: MEDICARE

## 2025-08-13 ENCOUNTER — HOSPITAL ENCOUNTER (OUTPATIENT)
Age: 75
Setting detail: OUTPATIENT SURGERY
Discharge: HOME | End: 2025-08-13
Attending: INTERNAL MEDICINE | Admitting: INTERNAL MEDICINE
Payer: MEDICARE

## 2025-08-13 ENCOUNTER — ANESTHESIA (OUTPATIENT)
Dept: ENDOSCOPY | Age: 75
End: 2025-08-13
Payer: MEDICARE

## 2025-08-13 VITALS
BODY MASS INDEX: 25.33 KG/M2 | DIASTOLIC BLOOD PRESSURE: 80 MMHG | SYSTOLIC BLOOD PRESSURE: 143 MMHG | RESPIRATION RATE: 14 BRPM | WEIGHT: 152 LBS | HEART RATE: 58 BPM | TEMPERATURE: 97 F | OXYGEN SATURATION: 96 % | HEIGHT: 65 IN

## 2025-08-13 DIAGNOSIS — R19.7 DIARRHEA, UNSPECIFIED TYPE: ICD-10-CM

## 2025-08-13 DIAGNOSIS — R10.32 ABDOMINAL PAIN, LEFT LOWER QUADRANT: ICD-10-CM

## 2025-08-13 PROCEDURE — 3700000000 HC ANESTHESIA ATTENDED CARE: Performed by: INTERNAL MEDICINE

## 2025-08-13 PROCEDURE — 7100000010 HC PHASE II RECOVERY - FIRST 15 MIN: Performed by: INTERNAL MEDICINE

## 2025-08-13 PROCEDURE — 6370000000 HC RX 637 (ALT 250 FOR IP): Performed by: INTERNAL MEDICINE

## 2025-08-13 PROCEDURE — 3609010300 HC COLONOSCOPY W/BIOPSY SINGLE/MULTIPLE: Performed by: INTERNAL MEDICINE

## 2025-08-13 PROCEDURE — 3700000001 HC ADD 15 MINUTES (ANESTHESIA): Performed by: INTERNAL MEDICINE

## 2025-08-13 PROCEDURE — 88305 TISSUE EXAM BY PATHOLOGIST: CPT

## 2025-08-13 PROCEDURE — 7100000011 HC PHASE II RECOVERY - ADDTL 15 MIN: Performed by: INTERNAL MEDICINE

## 2025-08-13 PROCEDURE — 3609010600 HC COLONOSCOPY POLYPECTOMY SNARE/COLD BIOPSY: Performed by: INTERNAL MEDICINE

## 2025-08-13 PROCEDURE — 2709999900 HC NON-CHARGEABLE SUPPLY: Performed by: INTERNAL MEDICINE

## 2025-08-13 PROCEDURE — 6360000002 HC RX W HCPCS: Performed by: NURSE ANESTHETIST, CERTIFIED REGISTERED

## 2025-08-13 RX ORDER — MIDAZOLAM HYDROCHLORIDE 1 MG/ML
2 INJECTION, SOLUTION INTRAMUSCULAR; INTRAVENOUS
Status: DISCONTINUED | OUTPATIENT
Start: 2025-08-13 | End: 2025-08-13 | Stop reason: HOSPADM

## 2025-08-13 RX ORDER — SODIUM CHLORIDE 0.9 % (FLUSH) 0.9 %
5-40 SYRINGE (ML) INJECTION EVERY 12 HOURS SCHEDULED
Status: DISCONTINUED | OUTPATIENT
Start: 2025-08-13 | End: 2025-08-13 | Stop reason: HOSPADM

## 2025-08-13 RX ORDER — SODIUM CHLORIDE 0.9 % (FLUSH) 0.9 %
5-40 SYRINGE (ML) INJECTION PRN
Status: DISCONTINUED | OUTPATIENT
Start: 2025-08-13 | End: 2025-08-13 | Stop reason: HOSPADM

## 2025-08-13 RX ORDER — SODIUM CHLORIDE 9 MG/ML
INJECTION, SOLUTION INTRAVENOUS PRN
Status: DISCONTINUED | OUTPATIENT
Start: 2025-08-13 | End: 2025-08-13 | Stop reason: HOSPADM

## 2025-08-13 RX ORDER — SODIUM CHLORIDE, SODIUM LACTATE, POTASSIUM CHLORIDE, CALCIUM CHLORIDE 600; 310; 30; 20 MG/100ML; MG/100ML; MG/100ML; MG/100ML
INJECTION, SOLUTION INTRAVENOUS CONTINUOUS
Status: DISCONTINUED | OUTPATIENT
Start: 2025-08-13 | End: 2025-08-13 | Stop reason: HOSPADM

## 2025-08-13 RX ORDER — SIMETHICONE 40MG/0.6ML
SUSPENSION, DROPS(FINAL DOSAGE FORM)(ML) ORAL PRN
Status: DISCONTINUED | OUTPATIENT
Start: 2025-08-13 | End: 2025-08-13 | Stop reason: ALTCHOICE

## 2025-08-13 RX ORDER — LIDOCAINE HYDROCHLORIDE 10 MG/ML
1 INJECTION, SOLUTION EPIDURAL; INFILTRATION; INTRACAUDAL; PERINEURAL
Status: DISCONTINUED | OUTPATIENT
Start: 2025-08-13 | End: 2025-08-13 | Stop reason: HOSPADM

## 2025-08-13 RX ORDER — PROPOFOL 10 MG/ML
INJECTION, EMULSION INTRAVENOUS
Status: DISCONTINUED | OUTPATIENT
Start: 2025-08-13 | End: 2025-08-13 | Stop reason: SDUPTHER

## 2025-08-13 RX ADMIN — PROPOFOL 25 MG: 10 INJECTION, EMULSION INTRAVENOUS at 08:33

## 2025-08-13 RX ADMIN — PROPOFOL 25 MG: 10 INJECTION, EMULSION INTRAVENOUS at 08:35

## 2025-08-13 RX ADMIN — PROPOFOL 25 MG: 10 INJECTION, EMULSION INTRAVENOUS at 08:45

## 2025-08-13 RX ADMIN — PROPOFOL 50 MG: 10 INJECTION, EMULSION INTRAVENOUS at 08:28

## 2025-08-13 RX ADMIN — PROPOFOL 25 MG: 10 INJECTION, EMULSION INTRAVENOUS at 08:31

## 2025-08-13 ASSESSMENT — PAIN SCALES - GENERAL
PAINLEVEL_OUTOF10: 0
PAINLEVEL_OUTOF10: 0

## 2025-08-13 ASSESSMENT — PAIN SCALES - WONG BAKER: WONGBAKER_NUMERICALRESPONSE: NO HURT

## 2025-08-13 ASSESSMENT — COPD QUESTIONNAIRES: CAT_SEVERITY: SEVERE

## 2025-08-22 ENCOUNTER — HOSPITAL ENCOUNTER (OUTPATIENT)
Dept: CT IMAGING | Age: 75
Discharge: HOME | End: 2025-08-22
Attending: STUDENT IN AN ORGANIZED HEALTH CARE EDUCATION/TRAINING PROGRAM
Payer: MEDICARE

## 2025-08-22 DIAGNOSIS — Z90.2 HISTORY OF LOBECTOMY OF LUNG: ICD-10-CM

## 2025-08-22 DIAGNOSIS — C34.12 MALIGNANT NEOPLASM OF UPPER LOBE OF LEFT LUNG (HCC): ICD-10-CM

## 2025-08-22 PROCEDURE — 71250 CT THORAX DX C-: CPT

## (undated) DEVICE — Device

## (undated) DEVICE — ENDO CARRY-ON PROCEDURE KIT INCLUDES SUCTION TUBING, LUBRICANT, GAUZE, BIOHAZARD STICKER, TRANSPORT PAD AND INTERCEPT BEDSIDE KIT.: Brand: ENDO CARRY-ON PROCEDURE KIT

## (undated) DEVICE — ELECTRODE EKG WHT FOAM TEARDROP SHP MEDGEL

## (undated) DEVICE — CANNULA NSL AD TBNG L7FT PVC STR NONFLARED PRNG O2 DEL W STD

## (undated) DEVICE — NEEDLE 25GAX5.0MM INJ CARR LOCKE

## (undated) DEVICE — ENDOSCOPIC KIT 2 12 FT OP4 DE2 GWN SYR

## (undated) DEVICE — GUIDEWIRE WITH SPRING TIP - SINGLE USE: Brand: SAFEGUIDE®

## (undated) DEVICE — SUTURE VCRL SZ 3-0 L27IN ABSRB UD L36MM CT-1 1/2 CIR J258H

## (undated) DEVICE — CONMED SCOPE SAVER BITE BLOCK, 20X27 MM: Brand: SCOPE SAVER

## (undated) DEVICE — FORCEPS BX L240CM JAW DIA2.8MM L CAP W/ NDL MIC MESH TOOTH

## (undated) DEVICE — BLADE ES L6IN ELASTOMERIC COAT INSUL DURABLE BEND UPTO

## (undated) DEVICE — STERILE PATIENT PROTECTIVE PAD FOR IMP® KNEE POSITIONERS & COHESIVE WRAP (10 / CASE): Brand: DE MAYO KNEE POSITIONER®

## (undated) DEVICE — STANDARD HYPODERMIC NEEDLE,POLYPROPYLENE HUB: Brand: MONOJECT

## (undated) DEVICE — SYRINGE,TOOMEY,IRRIGATION,70CC,STERILE: Brand: MEDLINE

## (undated) DEVICE — TROCAR: Brand: KII FIOS FIRST ENTRY

## (undated) DEVICE — RELOAD STPL L45MM VASCULAR/MEDIUM TISS TAN CRV TIP ARTC

## (undated) DEVICE — 3M™ TEGADERM™ TRANSPARENT FILM DRESSING FRAME STYLE, 1624W, 2-3/8 IN X 2-3/4 IN (6 CM X 7 CM), 100/CT 4CT/CASE: Brand: 3M™ TEGADERM™

## (undated) DEVICE — STAPLER INT L16CM STD UNIV RELD DISP TRI-STAPLE ENDO GIA

## (undated) DEVICE — DECANTER: Brand: UNBRANDED

## (undated) DEVICE — SUTURE VCRL + SZ 2-0 L18IN ABSRB UD CT1 L36MM 1/2 CIR VCP839D

## (undated) DEVICE — ELECTRODE ECG MONITR FOAM TEAR DROP ADLT RED

## (undated) DEVICE — AIRLIFE™ NASAL OXYGEN CANNULA CURVED, FLARED TIP, WITH 7 FEET (2.1 M) CRUSH RESISTANT TUBING, OVER-THE-EAR STYLE: Brand: AIRLIFE™

## (undated) DEVICE — ANTI-FOG SOLUTION WITH FOAM PAD: Brand: DEVON

## (undated) DEVICE — RELOAD STPL 3.5MM L60MM 0DEG UNIV TISS PUR TI 6 ROW LIN

## (undated) DEVICE — GLOVE SURG SZ 8 L12IN FNGR THK79MIL GRN LTX FREE

## (undated) DEVICE — SUTURE ETHBND EXCEL SZ 2 L30IN NONABSORBABLE GRN L40MM V-37 MX69G

## (undated) DEVICE — SUTURE VCRL SZ 2 L27IN ABSRB VLT L65MM TP-1 1/2 CIR J649G

## (undated) DEVICE — ZIMMER® A.T.S. CYCLINDRICAL TOURNIQUET CUFF, SINGLE PORT, SINGLE BLADDER 30 IN. 76 CM)

## (undated) DEVICE — SNARE ENDOSCP L240CM SHTH DIA24MM LOOP W10MM POLYP RND REINF

## (undated) DEVICE — Device: Brand: DEFENDO VALVE AND CONNECTOR KIT

## (undated) DEVICE — ADHESIVE SKIN CLOSURE WND 8.661X1.5 IN 22 CM LIQUIBAND SECUR

## (undated) DEVICE — SUTURE ABSORBABLE BRAIDED 2-0 CT-1 27 IN UD VICRYL J259H

## (undated) DEVICE — PROBE ABLATN NERVE BLOCK 180 DEG 8 MM BALL TIP CRYOSPHERE

## (undated) DEVICE — MASK CAPNOGRAPHY AD W35IN DIA58IN SAMP LN L10FT O2 LN

## (undated) DEVICE — ELECTRODE,ECG,STRESS,FOAM,3PK: Brand: MEDLINE

## (undated) DEVICE — THORACIC CATHETER, STRAIGHT, SILICONE, WITH CLOTSTOP®: Brand: AXIOM® ATRAUM™ WITH CLOTSTOP®

## (undated) DEVICE — YANKAUER,OPEN TIP,W/O VENT,STERILE: Brand: MEDLINE INDUSTRIES, INC.

## (undated) DEVICE — Device: Brand: JELCO

## (undated) DEVICE — GLOVE ORTHO 7 1/2   MSG9475

## (undated) DEVICE — HANDPIECE SET WITH HIGH FLOW TIP AND SUCTION TUBE: Brand: INTERPULSE

## (undated) DEVICE — SUTURE ABSORBABLE MONOFILAMENT 1 CTX 36 CM 48 MM VIO PDS +

## (undated) DEVICE — PIN HOLDING HDLSS 3.2X75 MM TROCAR ZUK

## (undated) DEVICE — OPTIFOAM GENTLE SA, POSTOP, 4X12: Brand: MEDLINE

## (undated) DEVICE — HOOD, PEEL-AWAY: Brand: FLYTE

## (undated) DEVICE — SUTURE PERMA-HAND SZ 2-0 L30IN NONABSORBABLE BLK L26MM SH K833H

## (undated) DEVICE — SUTURE VCRL SZ 0 L27IN ABSRB UD L36MM CT-1 1/2 CIR J260H

## (undated) DEVICE — SUTURE STRATAFIX SPRL SZ 2 0 L14IN ABSRB UD MH L36MM 1 2 CIR SXMD2B401

## (undated) DEVICE — SUTURE PERMAHAND SZ 4-0 L12X30IN NONABSORBABLE BLK SILK A303H

## (undated) DEVICE — SUTURE VCRL + SZ 4-0 L18IN ABSRB UD L19MM PS-2 3/8 CIR PRIM VCP496H

## (undated) DEVICE — SET GRAV VENT NVENT CK VLV 3 NDL FREE PRT 10 GTT

## (undated) DEVICE — SOLUTION IV IRRIG POUR BRL 0.9% SODIUM CHL 2F7124

## (undated) DEVICE — SUTURE NONABSORBABLE MONOFILAMENT 4-0 RB-1 36 IN BLU PROLENE 8557H

## (undated) DEVICE — TISSUE RETRIEVAL SYSTEM: Brand: INZII RETRIEVAL SYSTEM

## (undated) DEVICE — TRAP SPEC POLYPR SGL CHMBR FN MESH SCRN

## (undated) DEVICE — SOLUTION IV 1000ML LAC RINGERS PH 6.5 INJ USP VIAFLX PLAS

## (undated) DEVICE — DRILL BIT 1.6MM (1/16'') X 128.0MM

## (undated) DEVICE — SUTURE STRATAFIX SPRL SZ 3-0 L12IN ABSRB UD FS-1 L30X30CM SXMP2B410

## (undated) DEVICE — BANDAGE COMPR W6INXL10YD ST M E WHITE/BEIGE

## (undated) DEVICE — HOOD: Brand: FLYTE

## (undated) DEVICE — NEEDLE SPNL 20GA L3.5IN YEL HUB S STL REG WALL FIT STYL

## (undated) DEVICE — THORACIC CATHETER, RIGHT ANGLE, SILICONE, WITH CLOTSTOP®: Brand: AXIOM® ATRAUM™ WITH CLOTSTOP®

## (undated) DEVICE — TROCAR: Brand: KII SLEEVE

## (undated) DEVICE — CONNECTOR PERF W0.25XH3/8IN BASE Y SHP REDUC W/O LUERLOCK

## (undated) DEVICE — Z DISCONTINUED USE 2276105 GOWN PROTCT UNIV CHST W28IN L49IN SL 24IN BLU SPUNBOND FLM

## (undated) DEVICE — DRAIN SURG 24FR L5/16IN DIA8MM SIL RND HUBLESS FULL FLUT

## (undated) DEVICE — SUTURE VCRL SZ 0 L36IN ABSRB UD CT-1 L36MM 1/2 CIR TAPR PNT VCP946H

## (undated) DEVICE — GAUZE,SPONGE,4"X4",8PLY,STRL,LF,10/TRAY: Brand: MEDLINE

## (undated) DEVICE — STERILE LATEX POWDER-FREE SURGICAL GLOVESWITH NITRILE AND EMOLLIENT COATINGS: Brand: PROTEXIS

## (undated) DEVICE — GLOVE ORANGE PI 7 1/2   MSG9075

## (undated) DEVICE — SYRINGE MEDICAL 3ML CLEAR PLASTIC STANDARD NON CONTROL LUERLOCK TIP DISPOSABLE

## (undated) DEVICE — DRAIN SURG SGL COLL PT TB FOR ATS BG OASIS

## (undated) DEVICE — SOLUTION IRRIG 2000ML 0.9% SOD CHL USP UROMATIC PLAS CONT